# Patient Record
Sex: FEMALE | Race: BLACK OR AFRICAN AMERICAN | NOT HISPANIC OR LATINO | Employment: OTHER | URBAN - METROPOLITAN AREA
[De-identification: names, ages, dates, MRNs, and addresses within clinical notes are randomized per-mention and may not be internally consistent; named-entity substitution may affect disease eponyms.]

---

## 2017-03-07 DIAGNOSIS — E11.9 TYPE 2 DIABETES MELLITUS WITHOUT COMPLICATION: ICD-10-CM

## 2017-03-07 RX ORDER — BLOOD SUGAR DIAGNOSTIC
STRIP MISCELLANEOUS
Qty: 100 EACH | Refills: 4 | Status: SHIPPED | OUTPATIENT
Start: 2017-03-07 | End: 2018-03-02 | Stop reason: SDUPTHER

## 2017-04-04 ENCOUNTER — OFFICE VISIT (OUTPATIENT)
Dept: INTERNAL MEDICINE | Facility: CLINIC | Age: 62
End: 2017-04-04
Payer: COMMERCIAL

## 2017-04-04 VITALS
DIASTOLIC BLOOD PRESSURE: 82 MMHG | SYSTOLIC BLOOD PRESSURE: 129 MMHG | BODY MASS INDEX: 33.35 KG/M2 | WEIGHT: 200.38 LBS | OXYGEN SATURATION: 97 % | TEMPERATURE: 98 F | HEART RATE: 79 BPM

## 2017-04-04 DIAGNOSIS — E78.2 MIXED HYPERLIPIDEMIA: ICD-10-CM

## 2017-04-04 DIAGNOSIS — I10 ESSENTIAL HYPERTENSION: ICD-10-CM

## 2017-04-04 DIAGNOSIS — E11.9 TYPE 2 DIABETES MELLITUS WITHOUT COMPLICATION, WITHOUT LONG-TERM CURRENT USE OF INSULIN: Primary | ICD-10-CM

## 2017-04-04 LAB
ALBUMIN SERPL BCP-MCNC: 3.7 G/DL
ALP SERPL-CCNC: 53 U/L
ALT SERPL W/O P-5'-P-CCNC: 8 U/L
ANION GAP SERPL CALC-SCNC: 11 MMOL/L
AST SERPL-CCNC: 18 U/L
BILIRUB SERPL-MCNC: 0.6 MG/DL
BUN SERPL-MCNC: 15 MG/DL
CALCIUM SERPL-MCNC: 9.9 MG/DL
CHLORIDE SERPL-SCNC: 104 MMOL/L
CHOLEST/HDLC SERPL: 3.7 {RATIO}
CO2 SERPL-SCNC: 28 MMOL/L
CREAT SERPL-MCNC: 0.8 MG/DL
EST. GFR  (AFRICAN AMERICAN): >60 ML/MIN/1.73 M^2
EST. GFR  (NON AFRICAN AMERICAN): >60 ML/MIN/1.73 M^2
GLUCOSE SERPL-MCNC: 105 MG/DL
HDL/CHOLESTEROL RATIO: 26.8 %
HDLC SERPL-MCNC: 142 MG/DL
HDLC SERPL-MCNC: 38 MG/DL
LDLC SERPL CALC-MCNC: 77.4 MG/DL
NONHDLC SERPL-MCNC: 104 MG/DL
POTASSIUM SERPL-SCNC: 4 MMOL/L
PROT SERPL-MCNC: 7.7 G/DL
SODIUM SERPL-SCNC: 143 MMOL/L
TRIGL SERPL-MCNC: 133 MG/DL

## 2017-04-04 PROCEDURE — 4010F ACE/ARB THERAPY RXD/TAKEN: CPT | Mod: S$GLB,,, | Performed by: FAMILY MEDICINE

## 2017-04-04 PROCEDURE — 1160F RVW MEDS BY RX/DR IN RCRD: CPT | Mod: S$GLB,,, | Performed by: FAMILY MEDICINE

## 2017-04-04 PROCEDURE — 80061 LIPID PANEL: CPT

## 2017-04-04 PROCEDURE — 80053 COMPREHEN METABOLIC PANEL: CPT

## 2017-04-04 PROCEDURE — 3074F SYST BP LT 130 MM HG: CPT | Mod: S$GLB,,, | Performed by: FAMILY MEDICINE

## 2017-04-04 PROCEDURE — 83036 HEMOGLOBIN GLYCOSYLATED A1C: CPT

## 2017-04-04 PROCEDURE — 99214 OFFICE O/P EST MOD 30 MIN: CPT | Mod: S$GLB,,, | Performed by: FAMILY MEDICINE

## 2017-04-04 PROCEDURE — 99999 PR PBB SHADOW E&M-EST. PATIENT-LVL IV: CPT | Mod: PBBFAC,,, | Performed by: FAMILY MEDICINE

## 2017-04-04 PROCEDURE — 3044F HG A1C LEVEL LT 7.0%: CPT | Mod: S$GLB,,, | Performed by: FAMILY MEDICINE

## 2017-04-04 PROCEDURE — 3079F DIAST BP 80-89 MM HG: CPT | Mod: S$GLB,,, | Performed by: FAMILY MEDICINE

## 2017-04-04 RX ORDER — BENAZEPRIL/HYDROCHLOROTHIAZIDE 20 MG-25MG
1 TABLET ORAL DAILY
Qty: 90 TABLET | Refills: 1 | Status: SHIPPED | OUTPATIENT
Start: 2017-04-04 | End: 2017-09-13 | Stop reason: SDUPTHER

## 2017-04-04 RX ORDER — GLIMEPIRIDE 2 MG/1
2 TABLET ORAL
Qty: 90 TABLET | Refills: 0 | Status: SHIPPED | OUTPATIENT
Start: 2017-04-04 | End: 2017-07-03 | Stop reason: SDUPTHER

## 2017-04-04 NOTE — PATIENT INSTRUCTIONS
FBS .   Normal <100.    2 hours after meal normal <140. Goal DM <160-180.    OK to try OTC sleep aid - tylenol PM OK - only as needed.
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
## 2017-04-04 NOTE — MR AVS SNAPSHOT
[facility] [address]  [place] [address]  Phone: [phone]  Fax: [phone]                  Raquel Pickard   2017 8:00 AM   Office Visit    Description:  Female : 1955   Provider:  Kathy Delgado MD   Department:  [facility] Care           Reason for Visit     follow up on medications           Diagnoses this Visit        Comments    Type 2 diabetes mellitus without complication, without long-term current use of insulin    -  Primary     Mixed hyperlipidemia         Essential hypertension                To Do List           Future Appointments        Provider Department Dept Phone    2017 8:15 AM Garry Alberto MD [facility] Ophthalmology [phone]      Goals (5 Years of Data)     None      Follow-Up and Disposition     Return in about 6 months (around 10/4/2017).       These Medications        Disp Refills Start End    benazepril-hydrochlorthiazide (LOTENSIN HCT) 20-25 mg Tab 90 tablet 1 2017    Take 1 tablet by mouth once daily. - Oral    Pharmacy: [facility] - [address] Ph #: [phone]       glimepiride (AMARYL) 2 MG tablet 90 tablet 0 2017     Take 1 tablet (2 mg total) by mouth daily with breakfast. - Oral    Pharmacy: [facility] - [address] Ph #: [phone]         [facility] On Call     [facility] On Call Nurse Care Line -  Assistance  Unless otherwise directed by your provider, please contact [facility], our nurse care line that is available for  assistance.     Registered nurses in the [facility] On Call Center provide: appointment scheduling, clinical advisement, health education, and other advisory services.  Call: [phone] (toll free)               Medications           Message regarding Medications     Verify the changes and/or additions to your medication regime listed below are the same as  discussed with your clinician today.  If any of these changes or additions are incorrect, please notify your healthcare provider.        START taking these NEW medications        Refills    benazepril-hydrochlorthiazide (LOTENSIN HCT) 20-25 mg Tab 1    Sig: Take 1 tablet by mouth once daily.    Class: Normal    Route: Oral      CHANGE how you are taking these medications     Start Taking Instead of    glimepiride (AMARYL) 2 MG tablet glimepiride (AMARYL) 2 MG tablet    Dosage:  Take 1 tablet (2 mg total) by mouth daily with breakfast. Dosage:  TAKE 1 TABLET ONCE DAILY (SEE MD FOR VISIT AND LABS)    Reason for Change:  Reorder       STOP taking these medications     benazepril-hydrochlorthiazide (LOTENSIN HCT) 20-12.5 mg per tablet Take 1 tablet by mouth once daily.           Verify that the below list of medications is an accurate representation of the medications you are currently taking.  If none reported, the list may be blank. If incorrect, please contact your healthcare provider. Carry this list with you in case of emergency.           Current Medications     brimonidine 0.15 % OPTH DROP (ALPHAGAN) 0.15 % ophthalmic solution Place 1 drop into both eyes 2 (two) times daily. Dispense 3 month supply    dorzolamide-timolol 2-0.5% (COSOPT) 22.3-6.8 mg/mL ophthalmic solution Place 1 drop into both eyes 2 (two) times daily. Dispense 3 month supply    glimepiride (AMARYL) 2 MG tablet Take 1 tablet (2 mg total) by mouth daily with breakfast.    latanoprost 0.005 % ophthalmic solution PLACE 1 DROP INTO BOTH EYES ONCE DAILY.    metformin (GLUCOPHAGE-XR) 500 MG 24 hr tablet TAKE 3 TABLETS (1,500 MG TOTAL) DAILY WITH DINNER OR EVENING MEAL (SEE MD FOR REFILLS)    ONETOUCH ULTRA TEST Strp USE ONCE DAILY    propranolol (INDERAL LA) 80 MG 24 hr capsule Take 1 capsule (80 mg total) by mouth once daily.    rosuvastatin (CRESTOR) 10 MG tablet Take 1 tablet (10 mg total) by mouth nightly.    benazepril-hydrochlorthiazide (LOTENSIN  HCT) 20-25 mg Tab Take 1 tablet by mouth once daily.           Clinical Reference Information           Your Vitals Were     BP Pulse Temp    144/90 (BP Location: Right arm, Patient Position: Sitting, BP Method: Manual) 79 97.6 °F (36.4 °C) (Tympanic)    Weight SpO2 BMI    90.9 kg (200 lb 6.4 oz) 97% 33.35 kg/m2      Blood Pressure          Most Recent Value    BP  (!)  144/90      Allergies as of 4/4/2017     Travatan Z [Travoprost]      Immunizations Administered on Date of Encounter - 4/4/2017     None      Orders Placed During Today's Visit      Normal Orders This Visit    Comprehensive metabolic panel     Hemoglobin A1c     Lipid panel     Future Labs/Procedures Expected by Expires    CBC auto differential  10/4/2017 (Approximate) 4/5/2018    Comprehensive metabolic panel  10/4/2017 (Approximate) 4/5/2018    Hemoglobin A1c  10/4/2017 (Approximate) 4/5/2018    Microalbumin/creatinine urine ratio  10/4/2017 4/5/2018      [facility] Sign-Up     Activating your [facility] account is as easy as 1-2-3!     1) Visit [email or web address], select Sign Up Now, enter this activation code and your date of birth, then select Next.  [email or web address]  Expires: 5/19/2017  8:50 AM      2) Create a username and password to use when you visit [facility] in the future and select a security question in case you lose your password and select Next.    3) Enter your e-mail address and click Sign Up!    Additional Information  If you have questions, please e-mail [email or web address] or call [phone] to talk to our [facility] staff. Remember, [facility] is NOT to be used for urgent needs. For medical emergencies, dial 911.         Instructions    FBS .   Normal <100.    2 hours after meal normal <140. Goal DM <160-180.    OK to try OTC sleep aid - tylenol PM OK - only as needed.       Language Assistance Services     ATTENTION: Language assistance services are available, free of charge. Please call [phone].      ATENCIÓN: Si  habla christina, tiene a cho disposición servicios gratuitos de asistencia lingüística. Llame al [phone].     CHÚ Ý: N?u b?n nói Ti?ng Vi?t, có các d?ch v? h? tr? ngôn ng? mi?n phí dành cho b?n. G?i s? [phone].         [facility] complies with applicable Federal civil rights laws and does not discriminate on the basis of race, color, national origin, age, disability, or sex.

## 2017-04-05 LAB
ESTIMATED AVG GLUCOSE: 137 MG/DL
HBA1C MFR BLD HPLC: 6.4 %

## 2017-04-17 ENCOUNTER — TELEPHONE (OUTPATIENT)
Dept: INTERNAL MEDICINE | Facility: CLINIC | Age: 62
End: 2017-04-17

## 2017-04-17 NOTE — TELEPHONE ENCOUNTER
Pt was calling in regards to her lab work, gave pt her lab results, pt verbalized understanding of results

## 2017-04-17 NOTE — TELEPHONE ENCOUNTER
----- Message from Juanita Rankin sent at 4/17/2017  1:41 PM CDT -----  Contact: Patient   Patient is returning a call regarding labs, please call her back at [phone]. Thank you

## 2017-04-28 ENCOUNTER — OFFICE VISIT (OUTPATIENT)
Dept: OPHTHALMOLOGY | Facility: CLINIC | Age: 62
End: 2017-04-28
Payer: COMMERCIAL

## 2017-04-28 DIAGNOSIS — H25.13 NUCLEAR SCLEROSIS OF BOTH EYES: ICD-10-CM

## 2017-04-28 DIAGNOSIS — H40.1133 PRIMARY OPEN ANGLE GLAUCOMA OF BOTH EYES, SEVERE STAGE: Primary | ICD-10-CM

## 2017-04-28 PROCEDURE — 92133 CPTRZD OPH DX IMG PST SGM ON: CPT | Mod: S$GLB,,, | Performed by: OPHTHALMOLOGY

## 2017-04-28 PROCEDURE — 99999 PR PBB SHADOW E&M-EST. PATIENT-LVL II: CPT | Mod: PBBFAC,,, | Performed by: OPHTHALMOLOGY

## 2017-04-28 PROCEDURE — 92012 INTRM OPH EXAM EST PATIENT: CPT | Mod: S$GLB,,, | Performed by: OPHTHALMOLOGY

## 2017-04-28 NOTE — PROGRESS NOTES
SUBJECTIVE:   Raquel Pickard is a 61 y.o. female   Uncorrected distance visual acuity was 20/25 +1 in the right eye and 20/25 +1 in the left eye.   Chief Complaint   Patient presents with    Glaucoma     4 month IOP GOCT chk        HPI:  HPI     Glaucoma    Additional comments: 4 month IOP GOCT chk           Comments   Pt here for 4 month IOP GOCT chk. No pain or discomfort. VA stable. 100%   compliant with gtts.     1. Severe COAG DX 7-10 (Tmax 23/25) Goal = 16  SLT OS 4/23/14(18-16)  SLT OD 2/12/14(17-16)  HYPEREMIA WITH XALATAN AND TRAVATAN     2. DM SINCE 2012 NO DBR  3. Mild CATS OU    COSOPT BID OU  ALPHAGAN BID OU  LATANOPROST QHS OU       Last edited by Reza Alexandra, Patient Care Assistant on 4/28/2017  9:56   AM. (History)        Assessment /Plan :  1. Primary open angle glaucoma of both eyes, severe stage Doing well, IOP within acceptable range relative to target IOP and no evidence of progression. Continue current treatment. Reviewed importance of continued compliance with treatment and follow up.     2. Nuclear sclerosis of both eyes monitor for now     Return to clinic in 4 months  or as needed.  With IOP Check

## 2017-04-28 NOTE — MR AVS SNAPSHOT
[facility] - Ophthalmology  [address] [facility] [place] [address]  Phone: [phone]  Fax: [phone]                  Raquel Pickard   2017 9:45 AM   Office Visit    Description:  Female : 1955   Provider:  Garry Alberot MD   Department:  [facility] - Ophthalmology           Reason for Visit     Glaucoma           Diagnoses this Visit        Comments    Primary open angle glaucoma of both eyes, severe stage    -  Primary     Nuclear sclerosis of both eyes                To Do List           Future Appointments        Provider Department Dept Phone    8/3/2017 9:00 AM Garry Alberto MD [facility] Ophthalmology [phone]      Goals (5 Years of Data)     None      [facility] On [facility]     [facility] On Call Nurse Care Line -  Assistance  Unless otherwise directed by your provider, please contact [facility], our nurse care line that is available for  assistance.     Registered nurses in the [facility] On Call Center provide: appointment scheduling, clinical advisement, health education, and other advisory services.  Call: [phone] (toll free)               Medications           Message regarding Medications     Verify the changes and/or additions to your medication regime listed below are the same as discussed with your clinician today.  If any of these changes or additions are incorrect, please notify your healthcare provider.             Verify that the below list of medications is an accurate representation of the medications you are currently taking.  If none reported, the list may be blank. If incorrect, please contact your healthcare provider. Carry this list with you in case of emergency.           Current Medications     benazepril-hydrochlorthiazide (LOTENSIN HCT) 20-25 mg Tab Take 1 tablet by mouth once daily.    brimonidine 0.15 % OPTH DROP (ALPHAGAN) 0.15 % ophthalmic solution Place 1 drop into both eyes 2 (two) times daily. Dispense 3 month supply     dorzolamide-timolol 2-0.5% (COSOPT) 22.3-6.8 mg/mL ophthalmic solution Place 1 drop into both eyes 2 (two) times daily. Dispense 3 month supply    glimepiride (AMARYL) 2 MG tablet Take 1 tablet (2 mg total) by mouth daily with breakfast.    latanoprost 0.005 % ophthalmic solution PLACE 1 DROP INTO BOTH EYES ONCE DAILY.    metformin (GLUCOPHAGE-XR) 500 MG 24 hr tablet TAKE 3 TABLETS (1,500 MG TOTAL) DAILY WITH DINNER OR EVENING MEAL (SEE MD FOR REFILLS)    ONETOUCH ULTRA TEST Strp USE ONCE DAILY    propranolol (INDERAL LA) 80 MG 24 hr capsule Take 1 capsule (80 mg total) by mouth once daily.    rosuvastatin (CRESTOR) 10 MG tablet Take 1 tablet (10 mg total) by mouth nightly.           Clinical Reference Information           Allergies as of 4/28/2017     Travatan Z [Travoprost]      Immunizations Administered on Date of Encounter - 4/28/2017     None      Orders Placed During Today's Visit      Normal Orders This Visit    Posterior Segment OCT Optic Nerve- Both eyes       [facility] Sign-Up     Activating your [facility] account is as easy as 1-2-3!     1) Visit [email or web address], select Sign Up Now, enter this activation code and your date of birth, then select Next.  [email or web address]  Expires: 5/19/2017  8:50 AM      2) Create a username and password to use when you visit [facility] in the future and select a security question in case you lose your password and select Next.    3) Enter your e-mail address and click Sign Up!    Additional Information  If you have questions, please e-mail [email or web address] or call [phone] to talk to our [facility] staff. Remember, [facility] is NOT to be used for urgent needs. For medical emergencies, dial 911.         Language Assistance Services     ATTENTION: Language assistance services are available, free of charge. Please call [phone].      ATENCIÓN: Si habla español, tiene a cho disposición servicios gratuitos de asistencia lingüística. Llame al [phone].     [facility]  Ý: N?u b?n nói Ti?ng Vi?t, có các d?ch v? h? tr? ngôn ng? mi?n phí dành cho b?n. G?i s? [phone].         [facility]hthal[facility] complies with applicable Federal civil rights laws and does not discriminate on the basis of race, color, national origin, age, disability, or sex.

## 2017-05-07 DIAGNOSIS — E78.2 MIXED HYPERLIPIDEMIA: ICD-10-CM

## 2017-05-07 RX ORDER — ROSUVASTATIN CALCIUM 10 MG/1
TABLET, COATED ORAL
Qty: 90 TABLET | Refills: 1 | Status: SHIPPED | OUTPATIENT
Start: 2017-05-07 | End: 2017-11-05 | Stop reason: SDUPTHER

## 2017-05-23 RX ORDER — LATANOPROST 50 UG/ML
SOLUTION/ DROPS OPHTHALMIC
Qty: 2.5 ML | Refills: 1 | Status: SHIPPED | OUTPATIENT
Start: 2017-05-23 | End: 2017-05-25 | Stop reason: SDUPTHER

## 2017-05-25 RX ORDER — LATANOPROST 50 UG/ML
1 SOLUTION/ DROPS OPHTHALMIC NIGHTLY
Qty: 2.5 ML | Refills: 12 | Status: SHIPPED | OUTPATIENT
Start: 2017-05-25 | End: 2017-06-19 | Stop reason: SDUPTHER

## 2017-05-25 NOTE — TELEPHONE ENCOUNTER
----- Message from Jeana Morales sent at 5/25/2017  1:00 PM CDT -----  Contact: Patient   Refill request for Rx Latanoprost drops.    [id number] - [place] - [address] [facility]  [address] [place] [address]  Phone: [phone] Fax: [phone]    Thanks  td

## 2017-06-19 RX ORDER — LATANOPROST 50 UG/ML
SOLUTION/ DROPS OPHTHALMIC
Qty: 7.5 ML | Refills: 11 | Status: SHIPPED | OUTPATIENT
Start: 2017-06-19 | End: 2017-06-19 | Stop reason: SDUPTHER

## 2017-06-19 NOTE — TELEPHONE ENCOUNTER
----- Message from REZA Mas sent at 6/19/2017  3:55 PM CDT -----  Contact: pt  Pt calling to speak to nurse....states that she needs to get Rx refills called in for all 3 of her current eye drop Rx...the patient states that she is running low and needs to have them called in to her mail order pharmacy..the patient uses.    [facility] - [address] [address]  Phone: [phone] Fax: [phone]    Please adv/call pt back at CELL# [phone]//thx jw

## 2017-06-21 RX ORDER — BRIMONIDINE TARTRATE 1.5 MG/ML
1 SOLUTION/ DROPS OPHTHALMIC 2 TIMES DAILY
Qty: 3 BOTTLE | Refills: 6 | Status: SHIPPED | OUTPATIENT
Start: 2017-06-21 | End: 2018-05-03 | Stop reason: SDUPTHER

## 2017-06-21 RX ORDER — DORZOLAMIDE HYDROCHLORIDE AND TIMOLOL MALEATE 20; 5 MG/ML; MG/ML
1 SOLUTION/ DROPS OPHTHALMIC 2 TIMES DAILY
Qty: 3 BOTTLE | Refills: 6 | Status: SHIPPED | OUTPATIENT
Start: 2017-06-21 | End: 2018-04-17 | Stop reason: SDUPTHER

## 2017-06-21 RX ORDER — LATANOPROST 50 UG/ML
1 SOLUTION/ DROPS OPHTHALMIC NIGHTLY
Qty: 7.5 ML | Refills: 11 | Status: SHIPPED | OUTPATIENT
Start: 2017-06-21 | End: 2018-10-08 | Stop reason: SDUPTHER

## 2017-07-03 DIAGNOSIS — E11.9 TYPE 2 DIABETES MELLITUS WITHOUT COMPLICATION, WITHOUT LONG-TERM CURRENT USE OF INSULIN: ICD-10-CM

## 2017-07-03 RX ORDER — GLIMEPIRIDE 2 MG/1
TABLET ORAL
Qty: 90 TABLET | Refills: 0 | Status: SHIPPED | OUTPATIENT
Start: 2017-07-03 | End: 2017-10-01 | Stop reason: SDUPTHER

## 2017-07-28 ENCOUNTER — OFFICE VISIT (OUTPATIENT)
Dept: OPHTHALMOLOGY | Facility: CLINIC | Age: 62
End: 2017-07-28
Payer: COMMERCIAL

## 2017-07-28 DIAGNOSIS — H25.13 NUCLEAR SCLEROSIS OF BOTH EYES: ICD-10-CM

## 2017-07-28 DIAGNOSIS — H40.1133 PRIMARY OPEN ANGLE GLAUCOMA OF BOTH EYES, SEVERE STAGE: Primary | ICD-10-CM

## 2017-07-28 PROCEDURE — 99999 PR PBB SHADOW E&M-EST. PATIENT-LVL II: CPT | Mod: PBBFAC,,, | Performed by: OPHTHALMOLOGY

## 2017-07-28 PROCEDURE — 92012 INTRM OPH EXAM EST PATIENT: CPT | Mod: S$GLB,,, | Performed by: OPHTHALMOLOGY

## 2017-07-28 NOTE — PROGRESS NOTES
SUBJECTIVE:   Raquel Pickard is a 61 y.o. female   Uncorrected distance visual acuity was 20/25 -2 in the right eye and 20/25 -1 in the left eye.   Chief Complaint   Patient presents with    Glaucoma     4 month IOP check, Cosopt BID OU, Alphagan BID OU, Latanoprost QHS OU        HPI:  HPI     Glaucoma    Additional comments: 4 month IOP check, Cosopt BID OU, Alphagan BID OU,   Latanoprost QHS OU           Comments   Pt states she had trouble getting her drops refilled so she was out for a   couple weeks(2weeks ago). She has been back on her drops regularly for   about a week. No pain or irritation in the eyes. Only using readers for   small print.     1. Severe COAG DX 7-10 (Tmax 23/25) Goal = 16  SLT OS 4/23/14(18-16)  SLT OD 2/12/14(17-16)  HYPEREMIA WITH XALATAN AND TRAVATAN     2. DM SINCE 2012 NO DBR  3. Mild CATS OU    COSOPT BID OU  ALPHAGAN BID OU  LATANOPROST QHS OU       Last edited by Onesimo Griggs on 7/28/2017  9:01 AM. (History)        Assessment /Plan :  1. Primary open angle glaucoma of both eyes, severe stage Doing well, IOP within acceptable range relative to target IOP and no evidence of progression. Continue current treatment. Reviewed importance of continued compliance with treatment and follow up.     2. Nuclear sclerosis of both eyes monitor for now     Return to clinic in 4 months  or as needed.  With 24-2 HVF, Dilation and SDP's

## 2017-09-13 DIAGNOSIS — I10 ESSENTIAL HYPERTENSION: ICD-10-CM

## 2017-09-13 RX ORDER — BENAZEPRIL/HYDROCHLOROTHIAZIDE 20 MG-25MG
TABLET ORAL
Qty: 90 TABLET | Refills: 1 | Status: SHIPPED | OUTPATIENT
Start: 2017-09-13 | End: 2018-03-11 | Stop reason: SDUPTHER

## 2017-10-01 DIAGNOSIS — E11.9 TYPE 2 DIABETES MELLITUS WITHOUT COMPLICATION, WITHOUT LONG-TERM CURRENT USE OF INSULIN: ICD-10-CM

## 2017-10-01 RX ORDER — GLIMEPIRIDE 2 MG/1
TABLET ORAL
Qty: 90 TABLET | Refills: 0 | Status: SHIPPED | OUTPATIENT
Start: 2017-10-01 | End: 2017-12-29 | Stop reason: SDUPTHER

## 2017-11-05 DIAGNOSIS — E78.2 MIXED HYPERLIPIDEMIA: ICD-10-CM

## 2017-11-05 RX ORDER — ROSUVASTATIN CALCIUM 10 MG/1
TABLET, COATED ORAL
Qty: 90 TABLET | Refills: 1 | Status: SHIPPED | OUTPATIENT
Start: 2017-11-05 | End: 2018-05-04 | Stop reason: SDUPTHER

## 2017-11-14 ENCOUNTER — TELEPHONE (OUTPATIENT)
Dept: INTERNAL MEDICINE | Facility: CLINIC | Age: 62
End: 2017-11-14

## 2017-12-01 ENCOUNTER — OFFICE VISIT (OUTPATIENT)
Dept: INTERNAL MEDICINE | Facility: CLINIC | Age: 62
End: 2017-12-01
Payer: COMMERCIAL

## 2017-12-01 VITALS
HEART RATE: 94 BPM | BODY MASS INDEX: 32.34 KG/M2 | OXYGEN SATURATION: 98 % | DIASTOLIC BLOOD PRESSURE: 89 MMHG | TEMPERATURE: 98 F | SYSTOLIC BLOOD PRESSURE: 139 MMHG | WEIGHT: 194.31 LBS

## 2017-12-01 DIAGNOSIS — Z87.898 HISTORY OF PALPITATIONS: ICD-10-CM

## 2017-12-01 DIAGNOSIS — Z23 IMMUNIZATION DUE: ICD-10-CM

## 2017-12-01 DIAGNOSIS — I10 ESSENTIAL HYPERTENSION: Chronic | ICD-10-CM

## 2017-12-01 DIAGNOSIS — E78.2 MIXED HYPERLIPIDEMIA: ICD-10-CM

## 2017-12-01 DIAGNOSIS — E11.9 TYPE 2 DIABETES MELLITUS WITHOUT COMPLICATION, WITHOUT LONG-TERM CURRENT USE OF INSULIN: ICD-10-CM

## 2017-12-01 DIAGNOSIS — Z00.00 WELLNESS EXAMINATION: Primary | ICD-10-CM

## 2017-12-01 LAB
ALBUMIN SERPL BCP-MCNC: 3.6 G/DL
ALP SERPL-CCNC: 54 U/L
ALT SERPL W/O P-5'-P-CCNC: 12 U/L
ANION GAP SERPL CALC-SCNC: 9 MMOL/L
AST SERPL-CCNC: 17 U/L
BASOPHILS # BLD AUTO: 0.04 K/UL
BASOPHILS NFR BLD: 0.5 %
BILIRUB SERPL-MCNC: 0.5 MG/DL
BUN SERPL-MCNC: 19 MG/DL
CALCIUM SERPL-MCNC: 10 MG/DL
CHLORIDE SERPL-SCNC: 104 MMOL/L
CO2 SERPL-SCNC: 30 MMOL/L
CREAT SERPL-MCNC: 0.7 MG/DL
CREAT UR-MCNC: 89 MG/DL
DIFFERENTIAL METHOD: ABNORMAL
EOSINOPHIL # BLD AUTO: 0.2 K/UL
EOSINOPHIL NFR BLD: 2.1 %
ERYTHROCYTE [DISTWIDTH] IN BLOOD BY AUTOMATED COUNT: 15.6 %
EST. GFR  (AFRICAN AMERICAN): >60 ML/MIN/1.73 M^2
EST. GFR  (NON AFRICAN AMERICAN): >60 ML/MIN/1.73 M^2
ESTIMATED AVG GLUCOSE: 128 MG/DL
GLUCOSE SERPL-MCNC: 91 MG/DL
HBA1C MFR BLD HPLC: 6.1 %
HCT VFR BLD AUTO: 36.2 %
HGB BLD-MCNC: 11.5 G/DL
IMM GRANULOCYTES # BLD AUTO: 0.03 K/UL
IMM GRANULOCYTES NFR BLD AUTO: 0.4 %
LYMPHOCYTES # BLD AUTO: 2.4 K/UL
LYMPHOCYTES NFR BLD: 30.6 %
MCH RBC QN AUTO: 29.5 PG
MCHC RBC AUTO-ENTMCNC: 31.8 G/DL
MCV RBC AUTO: 93 FL
MICROALBUMIN UR DL<=1MG/L-MCNC: 21 UG/ML
MICROALBUMIN/CREATININE RATIO: 23.6 UG/MG
MONOCYTES # BLD AUTO: 0.5 K/UL
MONOCYTES NFR BLD: 6.6 %
NEUTROPHILS # BLD AUTO: 4.7 K/UL
NEUTROPHILS NFR BLD: 59.8 %
NRBC BLD-RTO: 0 /100 WBC
PLATELET # BLD AUTO: 239 K/UL
PMV BLD AUTO: 9.9 FL
POTASSIUM SERPL-SCNC: 3.4 MMOL/L
PROT SERPL-MCNC: 7.7 G/DL
RBC # BLD AUTO: 3.9 M/UL
SODIUM SERPL-SCNC: 143 MMOL/L
WBC # BLD AUTO: 7.91 K/UL

## 2017-12-01 PROCEDURE — 82570 ASSAY OF URINE CREATININE: CPT

## 2017-12-01 PROCEDURE — 99999 PR PBB SHADOW E&M-EST. PATIENT-LVL IV: CPT | Mod: PBBFAC,,, | Performed by: FAMILY MEDICINE

## 2017-12-01 PROCEDURE — 99396 PREV VISIT EST AGE 40-64: CPT | Mod: 25,S$GLB,, | Performed by: FAMILY MEDICINE

## 2017-12-01 PROCEDURE — 83036 HEMOGLOBIN GLYCOSYLATED A1C: CPT

## 2017-12-01 PROCEDURE — 90686 IIV4 VACC NO PRSV 0.5 ML IM: CPT | Mod: S$GLB,,, | Performed by: FAMILY MEDICINE

## 2017-12-01 PROCEDURE — 80053 COMPREHEN METABOLIC PANEL: CPT

## 2017-12-01 PROCEDURE — 90471 IMMUNIZATION ADMIN: CPT | Mod: S$GLB,,, | Performed by: FAMILY MEDICINE

## 2017-12-01 PROCEDURE — 85025 COMPLETE CBC W/AUTO DIFF WBC: CPT

## 2017-12-01 PROCEDURE — 99212 OFFICE O/P EST SF 10 MIN: CPT | Mod: 25,S$GLB,, | Performed by: FAMILY MEDICINE

## 2017-12-01 RX ORDER — PROPRANOLOL HYDROCHLORIDE 80 MG/1
80 CAPSULE, EXTENDED RELEASE ORAL DAILY
Qty: 90 CAPSULE | Refills: 3 | Status: SHIPPED | OUTPATIENT
Start: 2017-12-01 | End: 2018-11-25 | Stop reason: SDUPTHER

## 2017-12-01 RX ORDER — METFORMIN HYDROCHLORIDE 500 MG/1
1500 TABLET, EXTENDED RELEASE ORAL DAILY
Qty: 270 TABLET | Refills: 1 | Status: SHIPPED | OUTPATIENT
Start: 2017-12-01 | End: 2018-05-30 | Stop reason: SDUPTHER
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
## 2017-12-05 ENCOUNTER — TELEPHONE (OUTPATIENT)
Dept: INTERNAL MEDICINE | Facility: CLINIC | Age: 62
End: 2017-12-05

## 2017-12-05 NOTE — TELEPHONE ENCOUNTER
----- Message from Luis Rodriguez sent at 12/5/2017 11:59 AM CST -----  Contact: self [phone]  Returning call, please call back at [phone].  Md Gonsalo

## 2017-12-06 ENCOUNTER — OFFICE VISIT (OUTPATIENT)
Dept: OPHTHALMOLOGY | Facility: CLINIC | Age: 62
End: 2017-12-06
Payer: COMMERCIAL

## 2017-12-06 DIAGNOSIS — E11.9 TYPE 2 DIABETES MELLITUS WITHOUT COMPLICATION, WITHOUT LONG-TERM CURRENT USE OF INSULIN: ICD-10-CM

## 2017-12-06 DIAGNOSIS — H40.1133 PRIMARY OPEN ANGLE GLAUCOMA OF BOTH EYES, SEVERE STAGE: Primary | ICD-10-CM

## 2017-12-06 DIAGNOSIS — H25.13 NUCLEAR SCLEROSIS OF BOTH EYES: ICD-10-CM

## 2017-12-06 PROCEDURE — 92250 FUNDUS PHOTOGRAPHY W/I&R: CPT | Mod: S$GLB,,, | Performed by: OPHTHALMOLOGY

## 2017-12-06 PROCEDURE — 92083 EXTENDED VISUAL FIELD XM: CPT | Mod: S$GLB,,, | Performed by: OPHTHALMOLOGY

## 2017-12-06 PROCEDURE — 99999 PR PBB SHADOW E&M-EST. PATIENT-LVL II: CPT | Mod: PBBFAC,,, | Performed by: OPHTHALMOLOGY

## 2017-12-06 PROCEDURE — 92014 COMPRE OPH EXAM EST PT 1/>: CPT | Mod: S$GLB,,, | Performed by: OPHTHALMOLOGY

## 2017-12-06 NOTE — PROGRESS NOTES
SUBJECTIVE:   Raquel Pickard is a 62 y.o. female   Uncorrected distance visual acuity was 20/25 in the right eye and 20/25 +1 in the left eye.   No chief complaint on file.       HPI:      Assessment /Plan :  1. Primary open angle glaucoma of both eyes, severe stage Doing well, IOP within acceptable range relative to target IOP and no evidence of progression. Continue current treatment. Reviewed importance of continued compliance with treatment and follow up.   2. Nuclear sclerosis of both eyes Nuclear sclerotic cataract - not visually significant. Observe.     3. Type 2 diabetes mellitus without complication, without long-term current use of insulin   No diabetic retinopathy at this time. Reviewed diabetic eye precautions including avoiding tobacco use,  Good glucose control, and importance of regular follow up.        Return to clinic in 3 months  or as needed.  With IOP Check and GOCT

## 2017-12-21 DIAGNOSIS — H40.1193 SEVERE STAGE CHRONIC OPEN ANGLE GLAUCOMA: Primary | ICD-10-CM

## 2017-12-22 RX ORDER — DORZOLAMIDE HCL 20 MG/ML
1 SOLUTION/ DROPS OPHTHALMIC 3 TIMES DAILY
Qty: 10 ML | Refills: 1 | Status: SHIPPED | OUTPATIENT
Start: 2017-12-22 | End: 2018-01-02 | Stop reason: RX

## 2017-12-22 RX ORDER — TIMOLOL MALEATE 5 MG/ML
1 SOLUTION/ DROPS OPHTHALMIC 2 TIMES DAILY
Qty: 5 ML | Refills: 1 | Status: SHIPPED | OUTPATIENT
Start: 2017-12-22 | End: 2018-10-08 | Stop reason: ALTCHOICE

## 2017-12-29 DIAGNOSIS — E11.9 TYPE 2 DIABETES MELLITUS WITHOUT COMPLICATION, WITHOUT LONG-TERM CURRENT USE OF INSULIN: ICD-10-CM

## 2017-12-31 RX ORDER — GLIMEPIRIDE 2 MG/1
TABLET ORAL
Qty: 90 TABLET | Refills: 3 | Status: SHIPPED | OUTPATIENT
Start: 2017-12-31 | End: 2018-12-25 | Stop reason: SDUPTHER

## 2018-01-02 RX ORDER — BRINZOLAMIDE 10 MG/ML
1 SUSPENSION/ DROPS OPHTHALMIC 3 TIMES DAILY
Qty: 90 DROP | Refills: 3 | Status: SHIPPED | OUTPATIENT
Start: 2018-01-02 | End: 2018-10-08 | Stop reason: ALTCHOICE

## 2018-03-02 DIAGNOSIS — E11.9 TYPE 2 DIABETES MELLITUS WITHOUT COMPLICATION: ICD-10-CM

## 2018-03-02 RX ORDER — BLOOD SUGAR DIAGNOSTIC
STRIP MISCELLANEOUS
Qty: 100 EACH | Refills: 4 | Status: SHIPPED | OUTPATIENT
Start: 2018-03-02 | End: 2019-02-24 | Stop reason: SDUPTHER

## 2018-03-05 ENCOUNTER — OFFICE VISIT (OUTPATIENT)
Dept: OPHTHALMOLOGY | Facility: CLINIC | Age: 63
End: 2018-03-05
Payer: COMMERCIAL

## 2018-03-05 DIAGNOSIS — H40.1133 PRIMARY OPEN ANGLE GLAUCOMA OF BOTH EYES, SEVERE STAGE: Primary | ICD-10-CM

## 2018-03-05 DIAGNOSIS — H25.13 NUCLEAR SCLEROSIS OF BOTH EYES: ICD-10-CM

## 2018-03-05 PROCEDURE — 99999 PR PBB SHADOW E&M-EST. PATIENT-LVL I: CPT | Mod: PBBFAC,,, | Performed by: OPHTHALMOLOGY

## 2018-03-05 PROCEDURE — 92012 INTRM OPH EXAM EST PATIENT: CPT | Mod: S$GLB,,, | Performed by: OPHTHALMOLOGY

## 2018-03-05 PROCEDURE — 92133 CPTRZD OPH DX IMG PST SGM ON: CPT | Mod: S$GLB,,, | Performed by: OPHTHALMOLOGY

## 2018-03-05 NOTE — PROGRESS NOTES
SUBJECTIVE:   Raquel Pickard is a 62 y.o. female   Uncorrected distance visual acuity was 20/25 -1 in the right eye and 20/25 -1 in the left eye.   Chief Complaint   Patient presents with    Glaucoma     Latanoprost OU qhs, Cosopt BID OU, and Alphagan BID OU 95-98% cx        HPI:  HPI     Glaucoma    Additional comments: Latanoprost OU qhs, Cosopt BID OU, and Alphagan BID   OU 95-98% cx           Comments   3 month glaucoma check (IOP check and review GOCT)    No new eye changes since patient's last visit  No eye pain  Patient states that she has not started the Azopt and Timolol, because she   still had some Cosopt left.    1. Severe COAG DX 7-10 (Tmax 23/25) Goal = 16  SLT OS 4/23/14(18-16)  SLT OD 2/12/14(17-16)  HYPEREMIA WITH XALATAN AND TRAVATAN     2. DM SINCE 2012 NO DBR  3. Mild CATS OU    COSOPT BID OU  ALPHAGAN BID OU  LATANOPROST QHS OU  95-98% cx       Last edited by Meaghan William on 3/5/2018  7:59 AM. (History)        Assessment /Plan :  1. Primary open angle glaucoma of both eyes, severe stage Doing well, IOP within acceptable range relative to target IOP and no evidence of progression. Continue current treatment. Reviewed importance of continued compliance with treatment and follow up.     2. Nuclear sclerosis of both eyes Nuclear sclerotic cataract - not visually significant. Observe.         Return to clinic in 3-4 months  or as needed.  With IOP Check

## 2018-03-11 DIAGNOSIS — I10 ESSENTIAL HYPERTENSION: ICD-10-CM

## 2018-03-12 RX ORDER — BENAZEPRIL/HYDROCHLOROTHIAZIDE 20 MG-25MG
TABLET ORAL
Qty: 90 TABLET | Refills: 1 | Status: SHIPPED | OUTPATIENT
Start: 2018-03-12 | End: 2018-11-05 | Stop reason: SDUPTHER

## 2018-04-13 DIAGNOSIS — E11.9 TYPE 2 DIABETES MELLITUS WITHOUT COMPLICATION: ICD-10-CM

## 2018-04-17 RX ORDER — DORZOLAMIDE HYDROCHLORIDE AND TIMOLOL MALEATE 20; 5 MG/ML; MG/ML
1 SOLUTION/ DROPS OPHTHALMIC 2 TIMES DAILY
Qty: 3 BOTTLE | Refills: 6 | Status: SHIPPED | OUTPATIENT
Start: 2018-04-17 | End: 2018-10-08 | Stop reason: SDUPTHER

## 2018-04-17 NOTE — TELEPHONE ENCOUNTER
----- Message from Rosaline Orlando sent at 4/17/2018 10:10 AM CDT -----  Contact: pt  Would like to get refill on (COSOPT) into pharmacy at [facility] [phone] any questions pls call [phone]

## 2018-05-03 ENCOUNTER — TELEPHONE (OUTPATIENT)
Dept: OPHTHALMOLOGY | Facility: CLINIC | Age: 63
End: 2018-05-03

## 2018-05-03 RX ORDER — BRIMONIDINE TARTRATE 1.5 MG/ML
1 SOLUTION/ DROPS OPHTHALMIC 2 TIMES DAILY
Qty: 3 BOTTLE | Refills: 6 | Status: SHIPPED | OUTPATIENT
Start: 2018-05-03 | End: 2018-10-08 | Stop reason: SDUPTHER

## 2018-05-03 NOTE — TELEPHONE ENCOUNTER
----- Message from Rosaline Orlando sent at 5/3/2018  9:47 AM CDT -----  Contact: pt  Need refill sent into [facility] at [phone] for  brimonidine 0.15 % OPTH DROP (ALPHAGAN) 0.15 % ophthalmic solution any questions pls call  [phone] thx

## 2018-05-04 DIAGNOSIS — E78.2 MIXED HYPERLIPIDEMIA: ICD-10-CM

## 2018-05-04 RX ORDER — ROSUVASTATIN CALCIUM 10 MG/1
TABLET, COATED ORAL
Qty: 90 TABLET | Refills: 0 | Status: SHIPPED | OUTPATIENT
Start: 2018-05-04 | End: 2018-08-02 | Stop reason: SDUPTHER

## 2018-05-30 DIAGNOSIS — E11.9 TYPE 2 DIABETES MELLITUS WITHOUT COMPLICATION, WITHOUT LONG-TERM CURRENT USE OF INSULIN: ICD-10-CM

## 2018-05-30 RX ORDER — METFORMIN HYDROCHLORIDE 500 MG/1
TABLET, EXTENDED RELEASE ORAL
Qty: 270 TABLET | Refills: 1 | Status: SHIPPED | OUTPATIENT
Start: 2018-05-30 | End: 2018-11-25 | Stop reason: SDUPTHER

## 2018-06-04 ENCOUNTER — OFFICE VISIT (OUTPATIENT)
Dept: OPHTHALMOLOGY | Facility: CLINIC | Age: 63
End: 2018-06-04
Payer: COMMERCIAL

## 2018-06-04 DIAGNOSIS — H04.129 DRY EYE: ICD-10-CM

## 2018-06-04 DIAGNOSIS — H25.13 NUCLEAR SCLEROSIS OF BOTH EYES: ICD-10-CM

## 2018-06-04 DIAGNOSIS — H40.1133 PRIMARY OPEN ANGLE GLAUCOMA OF BOTH EYES, SEVERE STAGE: Primary | ICD-10-CM

## 2018-06-04 PROCEDURE — 99999 PR PBB SHADOW E&M-EST. PATIENT-LVL II: CPT | Mod: PBBFAC,,, | Performed by: OPHTHALMOLOGY

## 2018-06-04 PROCEDURE — 92012 INTRM OPH EXAM EST PATIENT: CPT | Mod: S$GLB,,, | Performed by: OPHTHALMOLOGY

## 2018-06-04 NOTE — PROGRESS NOTES
SUBJECTIVE:   Raquel Pickard is a 62 y.o. female   Uncorrected distance visual acuity was 20/25 in the right eye and 20/25 +1 in the left eye.   Chief Complaint   Patient presents with    Glaucoma     pt here for 4 month iop check states ou are watering very bad this am beside that no other complaints doing much better with drops         HPI:  HPI     Glaucoma    Additional comments: pt here for 4 month iop check states ou are watering   very bad this am beside that no other complaints doing much better with   drops            Comments   1. Severe COAG DX 7-10 (Tmax 23/25) Goal = 16  SLT OS 4/23/14(18-16)  SLT OD 2/12/14(17-16)  HYPEREMIA WITH XALATAN AND TRAVATAN     2. DM SINCE 2012 NO DBR  3. Mild CATS OU    COSOPT BID OU  ALPHAGAN BID OU  LATANOPROST QHS OU       Last edited by Stefani Rojas MA on 6/4/2018  8:53 AM. (History)        Assessment /Plan :  1. Primary open angle glaucoma of both eyes, severe stage Doing well, IOP within acceptable range relative to target IOP and no evidence of progression. Continue current treatment. Reviewed importance of continued compliance with treatment and follow up.     2. Nuclear sclerosis of both eyes monitor for now   3. Dry eye, bilateral recommend artificial tears prn OU       Return to clinic in 3-4 months  or as needed.  With IOP Check and GOCT

## 2018-07-20 DIAGNOSIS — E11.9 TYPE 2 DIABETES MELLITUS WITHOUT COMPLICATION: ICD-10-CM

## 2018-08-02 DIAGNOSIS — E78.2 MIXED HYPERLIPIDEMIA: ICD-10-CM

## 2018-08-02 RX ORDER — ROSUVASTATIN CALCIUM 10 MG/1
TABLET, COATED ORAL
Qty: 90 TABLET | Refills: 1 | Status: SHIPPED | OUTPATIENT
Start: 2018-08-02 | End: 2019-01-29 | Stop reason: SDUPTHER

## 2018-09-24 ENCOUNTER — PATIENT OUTREACH (OUTPATIENT)
Dept: ADMINISTRATIVE | Facility: HOSPITAL | Age: 63
End: 2018-09-24

## 2018-10-08 ENCOUNTER — OFFICE VISIT (OUTPATIENT)
Dept: OPHTHALMOLOGY | Facility: CLINIC | Age: 63
End: 2018-10-08
Payer: COMMERCIAL

## 2018-10-08 DIAGNOSIS — H40.1133 PRIMARY OPEN ANGLE GLAUCOMA OF BOTH EYES, SEVERE STAGE: Primary | ICD-10-CM

## 2018-10-08 PROCEDURE — 99999 PR PBB SHADOW E&M-EST. PATIENT-LVL II: CPT | Mod: PBBFAC,,, | Performed by: OPHTHALMOLOGY

## 2018-10-08 PROCEDURE — 92012 INTRM OPH EXAM EST PATIENT: CPT | Mod: S$GLB,,, | Performed by: OPHTHALMOLOGY

## 2018-10-08 RX ORDER — DORZOLAMIDE HYDROCHLORIDE AND TIMOLOL MALEATE 20; 5 MG/ML; MG/ML
1 SOLUTION/ DROPS OPHTHALMIC 2 TIMES DAILY
Qty: 3 BOTTLE | Refills: 3 | Status: SHIPPED | OUTPATIENT
Start: 2018-10-08 | End: 2019-04-09 | Stop reason: SDUPTHER

## 2018-10-08 RX ORDER — BRIMONIDINE TARTRATE 1.5 MG/ML
1 SOLUTION/ DROPS OPHTHALMIC 2 TIMES DAILY
Qty: 3 BOTTLE | Refills: 3 | Status: SHIPPED | OUTPATIENT
Start: 2018-10-08 | End: 2019-09-27 | Stop reason: SDUPTHER

## 2018-10-08 RX ORDER — LATANOPROST 50 UG/ML
1 SOLUTION/ DROPS OPHTHALMIC NIGHTLY
Qty: 7.5 ML | Refills: 3 | Status: SHIPPED | OUTPATIENT
Start: 2018-10-08 | End: 2019-06-12 | Stop reason: SDUPTHER

## 2018-10-08 NOTE — PROGRESS NOTES
===============================  10/08/2018   Raquel Pickard,   62 y.o. female   Last visit [facility]: :12/15/2015   Last visit eye dept. 6/4/2018  [facility]:  Uncorrected distance visual acuity was 20/25 in the right eye and 20/30 in the left eye.  Tonometry     Tonometry (Applanation, 8:38 AM)       Right Left    Pressure 16 13          Tonometry #2 (Applanation, 9:25 AM)       Right Left    Pressure 15 16          Target Pressure       Right Left    Target 16 16               Not recorded         Not recorded        Chief Complaint   Patient presents with    Glaucoma     DR. CPG PTS/  4 MONTH IOP CHECK/GOCT     Ophthalmic Medications     Ophthalmic - Intraocular Pressure Reducing Agents, Beta-blockers Start End    timolol maleate 0.5% (TIMOPTIC) 0.5 % Drop (Discontinued) 12/22/2017 10/8/2018    Sig: Place 1 drop into both eyes 2 (two) times daily.    Class: Phone In    Route: Both Eyes    Reason for Discontinue: Alternate therapy    Ophthalmic-Intraocular Pressure Reducing Agents, Prostaglandin Analogs Start End    latanoprost 0.005 % ophthalmic solution 10/8/2018     Sig: Place 1 drop into both eyes every evening.    Route: Both Eyes    latanoprost 0.005 % ophthalmic solution (Discontinued) 6/21/2017 10/8/2018    Sig: Place 1 drop into both eyes every evening.    Route: Both Eyes    Reason for Discontinue: Reorder    Ophthalmic-Intraocular Press. Reducing, Terra. Alpha Adrenergic Agonists Start End    brimonidine 0.15 % OPTH DROP (ALPHAGAN) 0.15 % ophthalmic solution 10/8/2018     Sig: Place 1 drop into both eyes 2 (two) times daily. Dispense 3 month supply    Route: Both Eyes    brimonidine 0.15 % OPTH DROP (ALPHAGAN) 0.15 % ophthalmic solution (Discontinued) 5/3/2018 10/8/2018    Sig: Place 1 drop into both eyes 2 (two) times daily. Dispense 3 month supply    Route: Both Eyes    Reason for Discontinue: Reorder    Ophthalmic - Carbonic Anhydrase Inhibitors Start End    brinzolamide (AZOPT) 1 % ophthalmic suspension  (Discontinued) 1/2/2018 10/8/2018    Sig: Place 1 drop into both eyes 3 (three) times daily.    Patient taking differently:  Place 1 drop into both eyes 2 (two) times daily.       Route: Both Eyes    Reason for Discontinue: Alternate therapy         HPI     Glaucoma      Additional comments: DR. CPG PTS/  4 MONTH IOP CHECK/GOCT              Comments     1. Severe COAG DX 7-10 (Tmax 23/25) Goal = 16  SLT OS 4/23/14(18-16)  SLT OD 2/12/14(17-16)  HYPEREMIA WITH XALATAN AND TRAVATAN     2. DM SINCE 2012 NO DBR  3. Mild CATS OU    COSOPT BID OU  ALPHAGAN BID OU  LATANOPROST QHS OU          Last edited by Cairna Wade on 10/8/2018  8:32 AM. (History)          ________________  10/8/2018  Problem List Items Addressed This Visit        Eye/Vision problems    Primary open angle glaucoma of both eyes, severe stage - Primary    Relevant Medications    brimonidine 0.15 % OPTH DROP (ALPHAGAN) 0.15 % ophthalmic solution    latanoprost 0.005 % ophthalmic solution    dorzolamide-timolol 2-0.5% (COSOPT) 22.3-6.8 mg/mL ophthalmic solution        Dr. Alberto patient  .Good IOP  Compliant with Drops  Continue above drops  rtc with Dr. Alberto 4 months, VF, SDP, dilate       ===========================

## 2018-10-15 ENCOUNTER — PATIENT OUTREACH (OUTPATIENT)
Dept: ADMINISTRATIVE | Facility: HOSPITAL | Age: 63
End: 2018-10-15

## 2018-10-31 ENCOUNTER — TELEPHONE (OUTPATIENT)
Dept: OPHTHALMOLOGY | Facility: CLINIC | Age: 63
End: 2018-10-31

## 2018-11-05 DIAGNOSIS — I10 ESSENTIAL HYPERTENSION: ICD-10-CM

## 2018-11-05 RX ORDER — BENAZEPRIL/HYDROCHLOROTHIAZIDE 20 MG-25MG
1 TABLET ORAL DAILY
Qty: 90 TABLET | Refills: 0 | Status: SHIPPED | OUTPATIENT
Start: 2018-11-05 | End: 2018-11-09 | Stop reason: SDUPTHER

## 2018-11-05 NOTE — TELEPHONE ENCOUNTER
[facility] sent [facility] requesting a new prescription for    benazepril-hydrochlorthiazide (LOTENSIN HCT) 20-25 mg Tab. Please advise.    LOV 12/01/2017

## 2018-11-09 DIAGNOSIS — I10 ESSENTIAL HYPERTENSION: ICD-10-CM

## 2018-11-09 RX ORDER — BENAZEPRIL/HYDROCHLOROTHIAZIDE 20 MG-25MG
1 TABLET ORAL DAILY
Qty: 90 TABLET | Refills: 0 | Status: SHIPPED | OUTPATIENT
Start: 2018-11-09 | End: 2019-02-09 | Stop reason: SDUPTHER

## 2018-11-09 NOTE — TELEPHONE ENCOUNTER
----- Message from Jocelin Lowry sent at 11/9/2018  2:59 PM CST -----  Contact: pt  Pt stated she calling to see if the doctor can change the pharmacy to [facility], she can be reached at [phone] Thanks

## 2018-11-09 NOTE — TELEPHONE ENCOUNTER
Patient called stating that [facility] can not ship her benazepril-hydrochlorthiazide (LOTENSIN HCT) 20-25 mg Tab due to they are out.  Patient then stated that she is completely out of her medication and needs  to send a prescription to [facility] on [address]. Please advise.

## 2018-11-10 NOTE — TELEPHONE ENCOUNTER
Unfortunately [facility] pharmacy not open this weekend. I have sent RX [facility].  Will advise pt of this by phone in AM.

## 2018-11-19 ENCOUNTER — PATIENT OUTREACH (OUTPATIENT)
Dept: ADMINISTRATIVE | Facility: HOSPITAL | Age: 63
End: 2018-11-19

## 2018-11-19 NOTE — PROGRESS NOTES
I have attempted without success to contact this patient to schedule an appointment. Contact not reachable.

## 2018-11-25 DIAGNOSIS — E11.9 TYPE 2 DIABETES MELLITUS WITHOUT COMPLICATION, WITHOUT LONG-TERM CURRENT USE OF INSULIN: ICD-10-CM

## 2018-11-25 DIAGNOSIS — Z87.898 HISTORY OF PALPITATIONS: ICD-10-CM

## 2018-11-25 DIAGNOSIS — I10 ESSENTIAL HYPERTENSION: Primary | ICD-10-CM

## 2018-11-26 PROBLEM — E11.9 TYPE 2 DIABETES MELLITUS WITHOUT COMPLICATION, WITHOUT LONG-TERM CURRENT USE OF INSULIN: Status: ACTIVE | Noted: 2018-11-26

## 2018-11-26 RX ORDER — PROPRANOLOL HYDROCHLORIDE 80 MG/1
CAPSULE, EXTENDED RELEASE ORAL
Qty: 90 CAPSULE | Refills: 3 | Status: SHIPPED | OUTPATIENT
Start: 2018-11-26 | End: 2019-12-05 | Stop reason: SDUPTHER

## 2018-11-26 RX ORDER — METFORMIN HYDROCHLORIDE 500 MG/1
TABLET, EXTENDED RELEASE ORAL
Qty: 270 TABLET | Refills: 0 | Status: SHIPPED | OUTPATIENT
Start: 2018-11-26 | End: 2019-02-24 | Stop reason: SDUPTHER

## 2018-11-26 NOTE — TELEPHONE ENCOUNTER
Refill requests for metformin and propranolol are refilled: metformin for 90 days only.    The patient is due for lab work and annual evaluation.  I have added her urine test for microalbuminuria to the A1c and lipid tests which are already in the chart.  All 3 of these tests need to be scheduled and a visit scheduled to follow.

## 2018-12-18 NOTE — TELEPHONE ENCOUNTER
----- Message from Rosaline Orlando sent at 12/18/2018  3:42 PM CST -----  Contact: pt/ [phone]  pls send in 3 month supplies refill on  dorzolamide-timolol 2-0.5% (COSOPT)  Over to [facility] [address]  any questions pls contact pt

## 2018-12-18 NOTE — TELEPHONE ENCOUNTER
Spoke with Hyun at [facility].  Okay refill request, okay to divide into dorzolamide and timolol if needed.

## 2018-12-19 RX ORDER — TIMOLOL MALEATE 5 MG/ML
1 SOLUTION/ DROPS OPHTHALMIC 2 TIMES DAILY
Qty: 5 ML | Refills: 6 | Status: SHIPPED | OUTPATIENT
Start: 2018-12-19 | End: 2019-02-11

## 2018-12-19 RX ORDER — DORZOLAMIDE HCL 20 MG/ML
1 SOLUTION/ DROPS OPHTHALMIC 3 TIMES DAILY
Qty: 10 ML | Refills: 4 | Status: SHIPPED | OUTPATIENT
Start: 2018-12-19 | End: 2019-02-11

## 2018-12-24 ENCOUNTER — OFFICE VISIT (OUTPATIENT)
Dept: INTERNAL MEDICINE | Facility: CLINIC | Age: 63
End: 2018-12-24
Payer: COMMERCIAL

## 2018-12-24 VITALS
TEMPERATURE: 98 F | WEIGHT: 197.63 LBS | OXYGEN SATURATION: 99 % | HEIGHT: 65 IN | BODY MASS INDEX: 32.93 KG/M2 | HEART RATE: 79 BPM | DIASTOLIC BLOOD PRESSURE: 84 MMHG | SYSTOLIC BLOOD PRESSURE: 138 MMHG

## 2018-12-24 DIAGNOSIS — Z01.419 WELL WOMAN EXAM WITH ROUTINE GYNECOLOGICAL EXAM: Primary | ICD-10-CM

## 2018-12-24 DIAGNOSIS — I10 ESSENTIAL HYPERTENSION: Chronic | ICD-10-CM

## 2018-12-24 DIAGNOSIS — E11.9 TYPE 2 DIABETES MELLITUS WITHOUT COMPLICATION, WITHOUT LONG-TERM CURRENT USE OF INSULIN: ICD-10-CM

## 2018-12-24 DIAGNOSIS — E78.5 HYPERLIPIDEMIA ASSOCIATED WITH TYPE 2 DIABETES MELLITUS: ICD-10-CM

## 2018-12-24 DIAGNOSIS — E11.69 HYPERLIPIDEMIA ASSOCIATED WITH TYPE 2 DIABETES MELLITUS: ICD-10-CM

## 2018-12-24 LAB
ALBUMIN SERPL BCP-MCNC: 3.6 G/DL
ALBUMIN/CREAT UR: 10.8 UG/MG
ALP SERPL-CCNC: 56 U/L
ALT SERPL W/O P-5'-P-CCNC: 13 U/L
ANION GAP SERPL CALC-SCNC: 10 MMOL/L
AST SERPL-CCNC: 21 U/L
BASOPHILS # BLD AUTO: 0.04 K/UL
BASOPHILS NFR BLD: 0.6 %
BILIRUB SERPL-MCNC: 0.4 MG/DL
BUN SERPL-MCNC: 18 MG/DL
CALCIUM SERPL-MCNC: 10.4 MG/DL
CHLORIDE SERPL-SCNC: 103 MMOL/L
CHOLEST SERPL-MCNC: 162 MG/DL
CHOLEST/HDLC SERPL: 3.7 {RATIO}
CO2 SERPL-SCNC: 30 MMOL/L
CREAT SERPL-MCNC: 0.8 MG/DL
CREAT UR-MCNC: 130 MG/DL
DIFFERENTIAL METHOD: ABNORMAL
EOSINOPHIL # BLD AUTO: 0.1 K/UL
EOSINOPHIL NFR BLD: 1.9 %
ERYTHROCYTE [DISTWIDTH] IN BLOOD BY AUTOMATED COUNT: 15.3 %
EST. GFR  (AFRICAN AMERICAN): >60 ML/MIN/1.73 M^2
EST. GFR  (NON AFRICAN AMERICAN): >60 ML/MIN/1.73 M^2
ESTIMATED AVG GLUCOSE: 126 MG/DL
GLUCOSE SERPL-MCNC: 127 MG/DL
HBA1C MFR BLD HPLC: 6 %
HCT VFR BLD AUTO: 37.4 %
HDLC SERPL-MCNC: 44 MG/DL
HDLC SERPL: 27.2 %
HGB BLD-MCNC: 11.6 G/DL
IMM GRANULOCYTES # BLD AUTO: 0.02 K/UL
IMM GRANULOCYTES NFR BLD AUTO: 0.3 %
LDLC SERPL CALC-MCNC: 84.8 MG/DL
LYMPHOCYTES # BLD AUTO: 2.7 K/UL
LYMPHOCYTES NFR BLD: 37.7 %
MCH RBC QN AUTO: 28.8 PG
MCHC RBC AUTO-ENTMCNC: 31 G/DL
MCV RBC AUTO: 93 FL
MICROALBUMIN UR DL<=1MG/L-MCNC: 14 UG/ML
MONOCYTES # BLD AUTO: 0.5 K/UL
MONOCYTES NFR BLD: 6.4 %
NEUTROPHILS # BLD AUTO: 3.7 K/UL
NEUTROPHILS NFR BLD: 53.1 %
NONHDLC SERPL-MCNC: 118 MG/DL
NRBC BLD-RTO: 0 /100 WBC
PLATELET # BLD AUTO: 270 K/UL
PMV BLD AUTO: 10.1 FL
POTASSIUM SERPL-SCNC: 3.7 MMOL/L
PROT SERPL-MCNC: 7.8 G/DL
RBC # BLD AUTO: 4.03 M/UL
SODIUM SERPL-SCNC: 143 MMOL/L
TRIGL SERPL-MCNC: 166 MG/DL
WBC # BLD AUTO: 7.02 K/UL

## 2018-12-24 PROCEDURE — 3079F DIAST BP 80-89 MM HG: CPT | Mod: CPTII,S$GLB,, | Performed by: FAMILY MEDICINE

## 2018-12-24 PROCEDURE — 83036 HEMOGLOBIN GLYCOSYLATED A1C: CPT

## 2018-12-24 PROCEDURE — 82043 UR ALBUMIN QUANTITATIVE: CPT

## 2018-12-24 PROCEDURE — 80061 LIPID PANEL: CPT

## 2018-12-24 PROCEDURE — 3075F SYST BP GE 130 - 139MM HG: CPT | Mod: CPTII,S$GLB,, | Performed by: FAMILY MEDICINE

## 2018-12-24 PROCEDURE — 99999 PR PBB SHADOW E&M-EST. PATIENT-LVL III: CPT | Mod: PBBFAC,,, | Performed by: FAMILY MEDICINE

## 2018-12-24 PROCEDURE — 90686 IIV4 VACC NO PRSV 0.5 ML IM: CPT | Mod: S$GLB,,, | Performed by: FAMILY MEDICINE

## 2018-12-24 PROCEDURE — 85025 COMPLETE CBC W/AUTO DIFF WBC: CPT

## 2018-12-24 PROCEDURE — 90471 IMMUNIZATION ADMIN: CPT | Mod: S$GLB,,, | Performed by: FAMILY MEDICINE

## 2018-12-24 PROCEDURE — 3044F HG A1C LEVEL LT 7.0%: CPT | Mod: CPTII,S$GLB,, | Performed by: FAMILY MEDICINE

## 2018-12-24 PROCEDURE — 99396 PREV VISIT EST AGE 40-64: CPT | Mod: 25,S$GLB,, | Performed by: FAMILY MEDICINE

## 2018-12-24 PROCEDURE — 80053 COMPREHEN METABOLIC PANEL: CPT

## 2018-12-24 PROCEDURE — 88175 CYTOPATH C/V AUTO FLUID REDO: CPT

## 2018-12-24 NOTE — PATIENT INSTRUCTIONS
Normal FBS <100.  or more is consistent with diabetes (DM).    Normal BS 2 hours after eating a meal <140. Good control for DM <160-170. Uncontrolled BS at 2 hours after a meal is 200 or more.    Times to check BS:   Before any meal.   2 hours after eating a meal.   Before bedtime.

## 2018-12-24 NOTE — PROGRESS NOTES
Subjective:       Patient ID: Raquel Pickard is a 63 y.o. female.    Chief Complaint: Annual Exam    Pt with DM2, HTN, HLD here for F/U - last visit 12/2017.  She wishes to have her well-woman exam with Pap performed today.  Lab Results       Component                Value               Date                       WBC                      7.91                12/01/2017                 HGB                      11.5 (L)            12/01/2017                 HCT                      36.2 (L)            12/01/2017                 PLT                      239                 12/01/2017                 CHOL                     142                 04/04/2017                 TRIG                     133                 04/04/2017                 HDL                      38 (L)              04/04/2017                 LDLCALC                  77.4                04/04/2017                 ALT                      12                  12/01/2017                 AST                      17                  12/01/2017                 NA                       143                 12/01/2017                 K                        3.4 (L)             12/01/2017                 CL                       104                 12/01/2017                 CALCIUM                  10.0                12/01/2017                 CREATININE               0.7                 12/01/2017                 BUN                      19                  12/01/2017                 CO2                      30 (H)              12/01/2017                 GLU                      91                  12/01/2017                 ESTGFRAFRICA             >60.0               12/01/2017                 EGFRNONAA                >60.0               12/01/2017                 HGBA1C                   6.1 (H)             12/01/2017                 MICALBCREAT              23.6                12/01/2017            DM2 has been well controlled on metformin 1500 daily and  "2 mg glimepiride just prn. Last time she took it has been 2 weeks ago. Takes it 2-3 times month. DM assoc HLd controlled on rosuvastatin 10 ion past. She currently only takes an OTC product "gralique" QOD. She took crestor last 2-3 days.  HTN on Ace/Hctz controlled in past. States took no BP med this AM.      ANNUAL EXAM:  Preventative Health Checklist 24-64 years of age    Raquel Pickard presents today for an annual exam.    Zoster Vaccine due on 10/15/2014 - declined  Lipid Panel due on 04/04/2018 - today  Hemoglobin A1c due on 06/01/2018 - today  Pap Smear with HPV Cotest due on 06/24/2018 - today  Influenza Vaccine due on 08/01/2018 - today  Mammogram due on 10/09/2018 - Dr Hernandez - October 2018 - HUMA signed  Health Maintenance Summary                Zoster Vaccine Overdue 10/15/2014     Lipid Panel Overdue 4/4/2018      Done 4/4/2017 LIPID PANEL     Done 11/8/2016 LIPID PANEL     Done 5/15/2015 LIPID PANEL     Done 6/10/2014 LIPID PANEL     Done 6/26/2013     Hemoglobin A1c Overdue 6/1/2018      Done 12/1/2017 HEMOGLOBIN A1C Hemoglobin A1C      Patient has more history with this topic...    Pap Smear with HPV Cotest Overdue 6/24/2018      Done 6/24/2015 LIQUID-BASED PAP SMEAR, SCREENING     Done 6/24/2015 [facility]: FINDINGS - EXTERNAL LAB DATE - PAP SMEAR    Influenza Vaccine Overdue 8/1/2018      Done 12/1/2017 Imm Admin: Influenza - Quadrivalent - PF     Done 11/8/2016 Imm Admin: Influenza - Quadrivalent     Done 10/29/2014 Imm Admin: Influenza - Quadrivalent    Mammogram Overdue 10/9/2018      Done 10/9/2017 [facility]: WORKFLOW - HEALTHY PLANET - EXTERNAL DATA -   EXTERNAL PROCEDURES DATE - MAMMOGRAM     Done 10/10/2016 [facility]: WORKFLOW - HEALTHY PLANET - EXTERNAL DATA -   EXTERNAL PROCEDURES DATE - MAMMOGRAM     Done 10/12/2015 [facility]: WORKFLOW - HEALTHY PLANET - EXTERNAL DATA -   EXTERNAL PROCEDURES DATE - MAMMOGRAM     Not Clinically Appropriate 6/24/2015      Done 10/29/2014 [facility]: WORKFLOW - " HEALTHY PLANET - EXTERNAL DATA -   EXTERNAL PROCEDURES DATE - MAMMOGRAM     Patient has more history with this topic...    Eye Exam Next Due 10/8/2019      Done 10/8/2018 LOS:93332 IA EYE EXAM, EST PATIENT,INTERMED     Done 6/4/2018 LOS:88141 IA EYE EXAM, EST PATIENT,INTERMED     Done 3/5/2018 LOS:23901 IA EYE EXAM, EST PATIENT,INTERMED     Done 12/6/2017 LOS:97771 IA EYE EXAM, EST PATIENT,COMPREHESV     Done 7/28/2017 LOS:14350 IA EYE EXAM, EST PATIENT,INTERMED     Patient has more history with this topic...    Low Dose Statin Next Due 12/24/2019      Done 12/24/2018 Registry Metric: Last Current Statin Reviewed Date     Done 8/2/2018 Registry Metric: Last Current Statin Order Date    Foot Exam Next Due 12/24/2019      Done 12/24/2018      Done 12/1/2017 [facility]: WORKFLOW - DIABETES - DIABETIC FOOT EXAM   PERFORMED     Done 11/8/2016 [facility]: WORKFLOW - DIABETES - DIABETIC FOOT EXAM   PERFORMED     Done 5/15/2015     Colonoscopy Next Due 4/18/2022      Done 4/18/2017 [facility]: WORKFLOW - HEALTHY PLANET - EXTERNAL DATA -   EXTERNAL PROCEDURES DATE - COLONOSCOPY     Done 4/18/2017 DR. CHARLENE NATH/GI ASSO. POLYP X 3. REPEAT 5 YRS.     Done 4/18/2017 [facility] COLONOSCOPY     Done 8/13/2012     TETANUS VACCINE Next Due 11/8/2026      Done 11/8/2016 Imm Admin: Tdap    Hepatitis C Screening This plan is no longer active.      Done 5/15/2015 HEPATITIS C ANTIBODY    Pneumococcal Vaccine (Medium Risk) This plan is no longer active.      Done 6/24/2015 Imm Admin: Pneumococcal Polysaccharide - 23 Valent        Counseling:  Nutrition: Encouraged to take 5-10 servings fruits and vegetables daily   Exercise:  Physical activity recommendations reviewed and given hand out  Avoid Tobacco Use: reviewed  Avoid ETOH/Drug Use:  reviewed  STD Prevention/Abstinence:   Avoid High Risk Behavior:   Unintended Pregnancy:    Lap-shoulder Belts:  Yes  No text/talk while driving: Reviewed  Bike/ATV Motorcycle Helmets:  N/A  Smoke Detector:   yes  Safe Storage/Removal of Firearms: reviewed    Calcium Intake (females):  Calcium recommendations given with handout  Regular Dental Visits:  yes  Floss, Brush and Fluoride: yes    She has completed a full 14 system review. All items are negative except as indicated.      Review of Systems   Constitutional: Negative.    HENT: Negative.    Eyes: Negative.    Respiratory: Negative.    Cardiovascular: Negative.    Gastrointestinal: Negative.    Endocrine: Negative.    Genitourinary: Negative.    Musculoskeletal: Positive for arthralgias (B wrists L>R - saw ortho at [facility] - had shot to left, now right hurts).   Skin: Negative.    Allergic/Immunologic: Negative.    Neurological: Negative.    Hematological: Negative.    Psychiatric/Behavioral: Negative.        Objective:      Physical Exam   Constitutional: She is oriented to person, place, and time. She appears well-developed and well-nourished.   HENT:   Head: Normocephalic and atraumatic.   Right Ear: Tympanic membrane, external ear and ear canal normal.   Left Ear: Tympanic membrane, external ear and ear canal normal.   Nose: Nose normal.   Mouth/Throat: Oropharynx is clear and moist. No oropharyngeal exudate.   Eyes: Conjunctivae and EOM are normal.   Neck: Normal range of motion. Neck supple. No thyromegaly present.   Cardiovascular: Normal rate, regular rhythm and normal heart sounds. Exam reveals no gallop and no friction rub.   No murmur heard.  Pulmonary/Chest: Effort normal and breath sounds normal. She exhibits no tenderness.   Abdominal: Soft. She exhibits no distension. There is no tenderness.   Genitourinary: Vagina normal and uterus normal. Cervix exhibits no motion tenderness and no discharge. Right adnexum displays no mass, no tenderness and no fullness. Left adnexum displays no mass, no tenderness and no fullness. No vaginal discharge found.   Musculoskeletal: She exhibits no edema.   Lymphadenopathy:     She has no cervical adenopathy.    Neurological: She is alert and oriented to person, place, and time.   Skin: Skin is warm and dry.   Psychiatric: She has a normal mood and affect. Her behavior is normal.         Assessment/Plan:     1. Well woman exam with routine gynecological exam  Lipid panel    CBC auto differential    Comprehensive metabolic panel    Liquid-based pap smear, screening   2. Essential hypertension  CBC auto differential    Comprehensive metabolic panel   3. Type 2 diabetes mellitus without complication, without long-term current use of insulin  Hemoglobin A1c    Microalbumin/creatinine urine ratio   4. Hyperlipidemia associated with type 2 diabetes mellitus  Lipid panel   HUMA sigend for mammogram from Dr Hernandez performed this fall.

## 2018-12-25 DIAGNOSIS — E11.9 TYPE 2 DIABETES MELLITUS WITHOUT COMPLICATION, WITHOUT LONG-TERM CURRENT USE OF INSULIN: ICD-10-CM

## 2018-12-26 RX ORDER — GLIMEPIRIDE 2 MG/1
TABLET ORAL
Qty: 90 TABLET | Refills: 3 | Status: SHIPPED | OUTPATIENT
Start: 2018-12-26 | End: 2019-12-05 | Stop reason: SDUPTHER

## 2018-12-28 ENCOUNTER — TELEPHONE (OUTPATIENT)
Dept: INTERNAL MEDICINE | Facility: CLINIC | Age: 63
End: 2018-12-28

## 2018-12-28 NOTE — TELEPHONE ENCOUNTER
Returned the patient phone call. Advised her of her test results. Patient verbally understood the information given.

## 2018-12-28 NOTE — TELEPHONE ENCOUNTER
----- Message from Perla Galeanoite sent at 12/28/2018 10:29 AM CST -----  Contact: Pt   Pt called and stated she was returning a call. She can be reached at [phone].    Thanks,  TF

## 2019-01-29 DIAGNOSIS — E78.2 MIXED HYPERLIPIDEMIA: ICD-10-CM

## 2019-01-29 RX ORDER — ROSUVASTATIN CALCIUM 10 MG/1
TABLET, COATED ORAL
Qty: 90 TABLET | Refills: 3 | Status: SHIPPED | OUTPATIENT
Start: 2019-01-29 | End: 2019-12-05 | Stop reason: SDUPTHER

## 2019-02-09 DIAGNOSIS — I10 ESSENTIAL HYPERTENSION: ICD-10-CM

## 2019-02-09 RX ORDER — BENAZEPRIL/HYDROCHLOROTHIAZIDE 20 MG-25MG
TABLET ORAL
Qty: 90 TABLET | Refills: 3 | Status: SHIPPED | OUTPATIENT
Start: 2019-02-09 | End: 2019-06-27 | Stop reason: SDUPTHER

## 2019-02-11 ENCOUNTER — OFFICE VISIT (OUTPATIENT)
Dept: OPHTHALMOLOGY | Facility: CLINIC | Age: 64
End: 2019-02-11
Payer: COMMERCIAL

## 2019-02-11 DIAGNOSIS — E11.9 TYPE 2 DIABETES MELLITUS WITHOUT COMPLICATION, WITHOUT LONG-TERM CURRENT USE OF INSULIN: ICD-10-CM

## 2019-02-11 DIAGNOSIS — H25.13 NUCLEAR SCLEROSIS OF BOTH EYES: ICD-10-CM

## 2019-02-11 DIAGNOSIS — H04.129 DRY EYE: ICD-10-CM

## 2019-02-11 DIAGNOSIS — H40.1133 PRIMARY OPEN ANGLE GLAUCOMA OF BOTH EYES, SEVERE STAGE: Primary | ICD-10-CM

## 2019-02-11 PROCEDURE — 92014 COMPRE OPH EXAM EST PT 1/>: CPT | Mod: S$GLB,,, | Performed by: OPHTHALMOLOGY

## 2019-02-11 PROCEDURE — 99999 PR PBB SHADOW E&M-EST. PATIENT-LVL II: CPT | Mod: PBBFAC,,, | Performed by: OPHTHALMOLOGY

## 2019-02-11 PROCEDURE — 92250 COLOR FUNDUS PHOTOGRAPHY - OU - BOTH EYES: ICD-10-PCS | Mod: S$GLB,,, | Performed by: OPHTHALMOLOGY

## 2019-02-11 PROCEDURE — 92083 EXTENDED VISUAL FIELD XM: CPT | Mod: S$GLB,,, | Performed by: OPHTHALMOLOGY

## 2019-02-11 PROCEDURE — 92250 FUNDUS PHOTOGRAPHY W/I&R: CPT | Mod: S$GLB,,, | Performed by: OPHTHALMOLOGY

## 2019-02-11 PROCEDURE — 92083 HUMPHREY VISUAL FIELD - OU - BOTH EYES: ICD-10-PCS | Mod: S$GLB,,, | Performed by: OPHTHALMOLOGY

## 2019-02-11 PROCEDURE — 99999 PR PBB SHADOW E&M-EST. PATIENT-LVL II: ICD-10-PCS | Mod: PBBFAC,,, | Performed by: OPHTHALMOLOGY

## 2019-02-11 PROCEDURE — 92014 PR EYE EXAM, EST PATIENT,COMPREHESV: ICD-10-PCS | Mod: S$GLB,,, | Performed by: OPHTHALMOLOGY

## 2019-02-11 NOTE — PROGRESS NOTES
SUBJECTIVE:   Raquel Pickard is a 63 y.o. female   Uncorrected distance visual acuity was 20/30 -1 in the right eye and 20/25 +1 in the left eye.   Chief Complaint   Patient presents with    Glaucoma    Diabetic Eye Exam        HPI:  HPI     4 mon HVF, Dilated Exam/Yearly Diabetic Eye Exam  No pain or discomfort  VA stable OU  100% compliant with drops  BS under control    1. Severe COAG DX 7-10 (Tmax 23/25) Goal = 16  SLT OS 4/23/14(18-16)  SLT OD 2/12/14(17-16)  HYPEREMIA WITH XALATAN AND TRAVATAN     2. DM SINCE 2012 NO DBR  3. Mild CATS OU    COSOPT BID OU  ALPHAGAN BID OU  LATANOPROST QHS OU    Last edited by Vanessa Arteaga on 2/11/2019  9:10 AM. (History)        Assessment /Plan :  1. Primary open angle glaucoma of both eyes, severe stage Doing well, IOP within acceptable range relative to target IOP and no evidence of progression. Continue current treatment. Reviewed importance of continued compliance with treatment and follow up.     2. Nuclear sclerosis of both eyes Patient does reports mild visual decline from incipient cataractous changes, but not sufficient to affect activities of daily living. I recommend monitoring visual status and follow up when visual symptoms worsen.     3. Type 2 diabetes mellitus without complication, without long-term current use of insulin No diabetic retinopathy at this time. Reviewed diabetic eye precautions including avoiding tobacco use,  Good glucose control, and importance of regular follow up.      4. Dry eye  -- Condition stable, no therapeutic change required. Monitoring routinely.           Return to clinic in 3-4 months  or as needed.  With IOP Check and GOCT

## 2019-02-24 DIAGNOSIS — E11.9 TYPE 2 DIABETES MELLITUS WITHOUT COMPLICATION: ICD-10-CM

## 2019-02-24 DIAGNOSIS — E11.9 TYPE 2 DIABETES MELLITUS WITHOUT COMPLICATION, WITHOUT LONG-TERM CURRENT USE OF INSULIN: ICD-10-CM

## 2019-02-25 RX ORDER — METFORMIN HYDROCHLORIDE 500 MG/1
TABLET, EXTENDED RELEASE ORAL
Qty: 270 TABLET | Refills: 2 | Status: SHIPPED | OUTPATIENT
Start: 2019-02-25 | End: 2019-12-05 | Stop reason: SDUPTHER

## 2019-04-09 DIAGNOSIS — H40.1133 PRIMARY OPEN ANGLE GLAUCOMA OF BOTH EYES, SEVERE STAGE: ICD-10-CM

## 2019-04-09 RX ORDER — DORZOLAMIDE HYDROCHLORIDE AND TIMOLOL MALEATE 20; 5 MG/ML; MG/ML
1 SOLUTION/ DROPS OPHTHALMIC 2 TIMES DAILY
Qty: 3 BOTTLE | Refills: 3 | Status: SHIPPED | OUTPATIENT
Start: 2019-04-09 | End: 2019-06-12 | Stop reason: SDUPTHER

## 2019-04-09 RX ORDER — DORZOLAMIDE HYDROCHLORIDE AND TIMOLOL MALEATE 20; 5 MG/ML; MG/ML
1 SOLUTION/ DROPS OPHTHALMIC 2 TIMES DAILY
Qty: 3 BOTTLE | Refills: 3 | Status: CANCELLED | OUTPATIENT
Start: 2019-04-09

## 2019-04-09 NOTE — TELEPHONE ENCOUNTER
----- Message from Debra Foy sent at 4/9/2019  1:57 PM CDT -----  Contact: pt  .Type:  RX Refill Request    Who Called:  pt  Refill or New Rx: refill   RX Name and Strength: eye drops (2)  How is the patient currently taking it? (ex. 1XDay):  Is this a 30 day or 90 day RX:   Preferred Pharmacy with phone number:     [facility]  [id number] [place] [id number]  Phone: [phone] Fax: [phone]      Local or Mail Order:local  Ordering Provider: Dolly  Would the patient rather a call back or a response via [facility]?  Call back   Best Call Back Number:[phone]  Additional Information:

## 2019-04-11 RX ORDER — LATANOPROST 50 UG/ML
1 SOLUTION/ DROPS OPHTHALMIC DAILY
Qty: 1 BOTTLE | Refills: 12 | OUTPATIENT
Start: 2019-04-11

## 2019-05-10 ENCOUNTER — OFFICE VISIT (OUTPATIENT)
Dept: OPHTHALMOLOGY | Facility: CLINIC | Age: 64
End: 2019-05-10
Payer: COMMERCIAL

## 2019-05-10 DIAGNOSIS — H25.13 NUCLEAR SCLEROSIS OF BOTH EYES: ICD-10-CM

## 2019-05-10 DIAGNOSIS — H40.1133 PRIMARY OPEN ANGLE GLAUCOMA OF BOTH EYES, SEVERE STAGE: Primary | ICD-10-CM

## 2019-05-10 PROCEDURE — 99999 PR PBB SHADOW E&M-EST. PATIENT-LVL II: ICD-10-PCS | Mod: PBBFAC,,, | Performed by: OPHTHALMOLOGY

## 2019-05-10 PROCEDURE — 92012 INTRM OPH EXAM EST PATIENT: CPT | Mod: S$GLB,,, | Performed by: OPHTHALMOLOGY

## 2019-05-10 PROCEDURE — 92012 PR EYE EXAM, EST PATIENT,INTERMED: ICD-10-PCS | Mod: S$GLB,,, | Performed by: OPHTHALMOLOGY

## 2019-05-10 PROCEDURE — 92133 CPTRZD OPH DX IMG PST SGM ON: CPT | Mod: S$GLB,,, | Performed by: OPHTHALMOLOGY

## 2019-05-10 PROCEDURE — 99999 PR PBB SHADOW E&M-EST. PATIENT-LVL II: CPT | Mod: PBBFAC,,, | Performed by: OPHTHALMOLOGY

## 2019-05-10 PROCEDURE — 92133 POSTERIOR SEGMENT OCT OPTIC NERVE(OCULAR COHERENCE TOMOGRAPHY) - OU - BOTH EYES: ICD-10-PCS | Mod: S$GLB,,, | Performed by: OPHTHALMOLOGY

## 2019-05-10 NOTE — PROGRESS NOTES
SUBJECTIVE:   Raquel Pickard is a 63 y.o. female   Uncorrected distance visual acuity was 20/40 +1 in the right eye and 20/25 in the left eye.   Chief Complaint   Patient presents with    Glaucoma        HPI:  HPI     Pt here for 3m IOP GOCT chk. No pain or discomfort. VA stable. 100%   compliant with gtts.     1. Severe COAG DX 7-10 (Tmax 23/25) Goal = 16  SLT OS 4/23/14(18-16)  SLT OD 2/12/14(17-16)  HYPEREMIA WITH XALATAN AND TRAVATAN     2. DM SINCE 2012 NO DBR  3. MOD CATS OD>OS    COSOPT BID OU  ALPHAGAN BID OU  LATANOPROST QHS OU    Last edited by Reza Alexandra, Patient Care Assistant on 5/10/2019  7:53   AM. (History)        Assessment /Plan :  1. Primary open angle glaucoma of both eyes, severe stage Doing well, IOP within acceptable range relative to target IOP and no evidence of progression. Continue current treatment. Reviewed importance of continued compliance with treatment and follow up.     2. Nuclear sclerosis of both eyes monitor for now     Return to clinic in 3 months  or as needed.  With IOP Check

## 2019-06-11 DIAGNOSIS — H40.1133 PRIMARY OPEN ANGLE GLAUCOMA OF BOTH EYES, SEVERE STAGE: ICD-10-CM

## 2019-06-11 NOTE — TELEPHONE ENCOUNTER
----- Message from Rosaline Orlando sent at 6/11/2019  2:03 PM CDT -----  Contact: pt  Please call pt at  [phone], regarding eye drop, pt need called in to [facility] pharmacy .

## 2019-06-12 RX ORDER — DORZOLAMIDE HYDROCHLORIDE AND TIMOLOL MALEATE 20; 5 MG/ML; MG/ML
1 SOLUTION/ DROPS OPHTHALMIC 2 TIMES DAILY
Qty: 30 ML | Refills: 3 | Status: SHIPPED | OUTPATIENT
Start: 2019-06-12 | End: 2019-08-07 | Stop reason: SDUPTHER

## 2019-06-12 RX ORDER — LATANOPROST 50 UG/ML
1 SOLUTION/ DROPS OPHTHALMIC NIGHTLY
Qty: 7.5 ML | Refills: 3 | Status: SHIPPED | OUTPATIENT
Start: 2019-06-12 | End: 2019-09-27 | Stop reason: SDUPTHER

## 2019-06-26 RX ORDER — LATANOPROST 50 UG/ML
1 SOLUTION/ DROPS OPHTHALMIC NIGHTLY
Qty: 3 BOTTLE | Refills: 4 | Status: SHIPPED | OUTPATIENT
Start: 2019-06-26 | End: 2019-12-05 | Stop reason: SDUPTHER

## 2019-06-26 RX ORDER — DORZOLAMIDE HYDROCHLORIDE AND TIMOLOL MALEATE 20; 5 MG/ML; MG/ML
1 SOLUTION/ DROPS OPHTHALMIC 2 TIMES DAILY
Qty: 10 ML | Refills: 4 | Status: SHIPPED | OUTPATIENT
Start: 2019-06-26 | End: 2019-12-05 | Stop reason: SDUPTHER

## 2019-06-27 DIAGNOSIS — I10 ESSENTIAL HYPERTENSION: ICD-10-CM

## 2019-06-27 RX ORDER — BENAZEPRIL/HYDROCHLOROTHIAZIDE 20 MG-25MG
1 TABLET ORAL DAILY
Qty: 90 TABLET | Refills: 3 | Status: SHIPPED | OUTPATIENT
Start: 2019-06-27 | End: 2019-12-05 | Stop reason: SDUPTHER

## 2019-07-03 ENCOUNTER — PATIENT OUTREACH (OUTPATIENT)
Dept: ADMINISTRATIVE | Facility: HOSPITAL | Age: 64
End: 2019-07-03

## 2019-07-03 NOTE — PROGRESS NOTES
Spoke with pt re scheduling MD appt with Dr. Delgado for f/u DM and HTN. Appt scheduled for 08/07/19. Instructed pt on appt. Pt verbalized understanding

## 2019-07-24 ENCOUNTER — PATIENT OUTREACH (OUTPATIENT)
Dept: ADMINISTRATIVE | Facility: HOSPITAL | Age: 64
End: 2019-07-24

## 2019-08-07 ENCOUNTER — OFFICE VISIT (OUTPATIENT)
Dept: INTERNAL MEDICINE | Facility: CLINIC | Age: 64
End: 2019-08-07
Payer: COMMERCIAL

## 2019-08-07 VITALS
DIASTOLIC BLOOD PRESSURE: 80 MMHG | HEIGHT: 65 IN | BODY MASS INDEX: 31.07 KG/M2 | WEIGHT: 186.5 LBS | HEART RATE: 72 BPM | OXYGEN SATURATION: 99 % | TEMPERATURE: 99 F | SYSTOLIC BLOOD PRESSURE: 127 MMHG

## 2019-08-07 DIAGNOSIS — E78.5 HYPERLIPIDEMIA ASSOCIATED WITH TYPE 2 DIABETES MELLITUS: ICD-10-CM

## 2019-08-07 DIAGNOSIS — E11.9 TYPE 2 DIABETES MELLITUS WITHOUT COMPLICATION, WITHOUT LONG-TERM CURRENT USE OF INSULIN: Primary | ICD-10-CM

## 2019-08-07 DIAGNOSIS — E11.69 HYPERLIPIDEMIA ASSOCIATED WITH TYPE 2 DIABETES MELLITUS: ICD-10-CM

## 2019-08-07 DIAGNOSIS — I10 ESSENTIAL HYPERTENSION: ICD-10-CM

## 2019-08-07 LAB
ALBUMIN SERPL BCP-MCNC: 3.6 G/DL (ref 3.5–5.2)
ALBUMIN/CREAT UR: 6.3 UG/MG (ref 0–30)
ALP SERPL-CCNC: 55 U/L (ref 55–135)
ALT SERPL W/O P-5'-P-CCNC: 12 U/L (ref 10–44)
ANION GAP SERPL CALC-SCNC: 11 MMOL/L (ref 8–16)
AST SERPL-CCNC: 16 U/L (ref 10–40)
BILIRUB SERPL-MCNC: 0.5 MG/DL (ref 0.1–1)
BUN SERPL-MCNC: 17 MG/DL (ref 8–23)
CALCIUM SERPL-MCNC: 10.1 MG/DL (ref 8.7–10.5)
CHLORIDE SERPL-SCNC: 102 MMOL/L (ref 95–110)
CHOLEST SERPL-MCNC: 144 MG/DL (ref 120–199)
CHOLEST/HDLC SERPL: 3.2 {RATIO} (ref 2–5)
CO2 SERPL-SCNC: 29 MMOL/L (ref 23–29)
CREAT SERPL-MCNC: 0.8 MG/DL (ref 0.5–1.4)
CREAT UR-MCNC: 111 MG/DL (ref 15–325)
EST. GFR  (AFRICAN AMERICAN): >60 ML/MIN/1.73 M^2
EST. GFR  (NON AFRICAN AMERICAN): >60 ML/MIN/1.73 M^2
GLUCOSE SERPL-MCNC: 127 MG/DL (ref 70–110)
HDLC SERPL-MCNC: 45 MG/DL (ref 40–75)
HDLC SERPL: 31.3 % (ref 20–50)
LDLC SERPL CALC-MCNC: 73.8 MG/DL (ref 63–159)
MICROALBUMIN UR DL<=1MG/L-MCNC: 7 UG/ML
NONHDLC SERPL-MCNC: 99 MG/DL
POTASSIUM SERPL-SCNC: 3.3 MMOL/L (ref 3.5–5.1)
PROT SERPL-MCNC: 7.4 G/DL (ref 6–8.4)
SODIUM SERPL-SCNC: 142 MMOL/L (ref 136–145)
TRIGL SERPL-MCNC: 126 MG/DL (ref 30–150)

## 2019-08-07 PROCEDURE — 80061 LIPID PANEL: CPT

## 2019-08-07 PROCEDURE — 3079F DIAST BP 80-89 MM HG: CPT | Mod: CPTII,S$GLB,, | Performed by: FAMILY MEDICINE

## 2019-08-07 PROCEDURE — 3074F PR MOST RECENT SYSTOLIC BLOOD PRESSURE < 130 MM HG: ICD-10-PCS | Mod: CPTII,S$GLB,, | Performed by: FAMILY MEDICINE

## 2019-08-07 PROCEDURE — 99214 PR OFFICE/OUTPT VISIT, EST, LEVL IV, 30-39 MIN: ICD-10-PCS | Mod: S$GLB,,, | Performed by: FAMILY MEDICINE

## 2019-08-07 PROCEDURE — 3008F PR BODY MASS INDEX (BMI) DOCUMENTED: ICD-10-PCS | Mod: CPTII,S$GLB,, | Performed by: FAMILY MEDICINE

## 2019-08-07 PROCEDURE — 3074F SYST BP LT 130 MM HG: CPT | Mod: CPTII,S$GLB,, | Performed by: FAMILY MEDICINE

## 2019-08-07 PROCEDURE — 83036 HEMOGLOBIN GLYCOSYLATED A1C: CPT

## 2019-08-07 PROCEDURE — 3008F BODY MASS INDEX DOCD: CPT | Mod: CPTII,S$GLB,, | Performed by: FAMILY MEDICINE

## 2019-08-07 PROCEDURE — 80053 COMPREHEN METABOLIC PANEL: CPT

## 2019-08-07 PROCEDURE — 3044F PR MOST RECENT HEMOGLOBIN A1C LEVEL <7.0%: ICD-10-PCS | Mod: CPTII,S$GLB,, | Performed by: FAMILY MEDICINE

## 2019-08-07 PROCEDURE — 3044F HG A1C LEVEL LT 7.0%: CPT | Mod: CPTII,S$GLB,, | Performed by: FAMILY MEDICINE

## 2019-08-07 PROCEDURE — 99999 PR PBB SHADOW E&M-EST. PATIENT-LVL III: ICD-10-PCS | Mod: PBBFAC,,, | Performed by: FAMILY MEDICINE

## 2019-08-07 PROCEDURE — 3079F PR MOST RECENT DIASTOLIC BLOOD PRESSURE 80-89 MM HG: ICD-10-PCS | Mod: CPTII,S$GLB,, | Performed by: FAMILY MEDICINE

## 2019-08-07 PROCEDURE — 99999 PR PBB SHADOW E&M-EST. PATIENT-LVL III: CPT | Mod: PBBFAC,,, | Performed by: FAMILY MEDICINE

## 2019-08-07 PROCEDURE — 99214 OFFICE O/P EST MOD 30 MIN: CPT | Mod: S$GLB,,, | Performed by: FAMILY MEDICINE

## 2019-08-07 PROCEDURE — 82043 UR ALBUMIN QUANTITATIVE: CPT

## 2019-08-07 NOTE — PROGRESS NOTES
Subjective:       Patient ID: Raquel Pickard is a 63 y.o. female.    Chief Complaint: Diabetes and Hypertension    Patient with type 2 diabetes, hypertension, hyperlipidemia here for scheduled recheck with last labs December 2018. Lab Results       Component                Value               Date                       WBC                      7.02                12/24/2018                 HGB                      11.6 (L)            12/24/2018                 HCT                      37.4                12/24/2018                 PLT                      270                 12/24/2018                 CHOL                     162                 12/24/2018                 TRIG                     166 (H)             12/24/2018                 HDL                      44                  12/24/2018                 LDLCALC                  84.8                12/24/2018                 ALT                      13                  12/24/2018                 AST                      21                  12/24/2018                 NA                       143                 12/24/2018                 K                        3.7                 12/24/2018                 CL                       103                 12/24/2018                 CALCIUM                  10.4                12/24/2018                 CREATININE               0.8                 12/24/2018                 BUN                      18                  12/24/2018                 CO2                      30 (H)              12/24/2018                 GLU                      127 (H)             12/24/2018                 ESTGFRAFRICA             >60.0               12/24/2018                 EGFRNONAA                >60.0               12/24/2018                 HGBA1C                   6.0 (H)             12/24/2018                 MICALBCREAT              10.8                12/24/2018    Well controlled DM2 on current Diabetes Medications    glimepiride (AMARYL) 2 MG tablet takes PRN - if going out to eat. MetFORMIN (GLUCOPHAGE-XR) 500 MG 24 hr tablet 3 TABLETS DAILY     HTN controlled on Hypertension Medications  benazepril-hydrochlorthiazide (LOTENSIN HCT) 20-25 mg daily.    propranolol (INDERAL LA) 80 MG 24 hr capsule TAKE 1 CAPSULE DAILY     Lipids controlled on statin - but inconsistent use - using supplemental garlique at last visit, occas statin.            Diabetes   She presents for her follow-up diabetic visit. She has type 2 diabetes mellitus. There are no hypoglycemic associated symptoms. Pertinent negatives for diabetes include no chest pain, no fatigue, no foot paresthesias and no visual change. There are no hypoglycemic complications. Symptoms are stable. Pertinent negatives for diabetic complications include no retinopathy. Risk factors for coronary artery disease include dyslipidemia, hypertension, obesity, post-menopausal and diabetes mellitus. Current diabetic treatment includes oral agent (dual therapy). She is compliant with treatment all of the time. Weight trend: weight down 11 lbs since last December. She is following a generally healthy diet. Meal planning includes avoidance of concentrated sweets. She participates in exercise intermittently. Her home blood glucose trend is fluctuating minimally. Her breakfast blood glucose range is generally 110-130 mg/dl. An ACE inhibitor/angiotensin II receptor blocker is being taken. She does not see a podiatrist.Eye exam is current.     Review of Systems   Constitutional: Negative for fatigue.   Respiratory: Negative for shortness of breath.    Cardiovascular: Negative for chest pain, palpitations and leg swelling.   Gastrointestinal: Negative for constipation and diarrhea.   Musculoskeletal: Positive for arthralgias (upper back into her shoulders). Negative for neck pain and neck stiffness.   Psychiatric/Behavioral: Negative for sleep disturbance.       Objective:      Physical Exam    Constitutional: She is oriented to person, place, and time. She appears well-developed and well-nourished.   HENT:   Head: Normocephalic and atraumatic.   Right Ear: Tympanic membrane, external ear and ear canal normal.   Left Ear: Tympanic membrane, external ear and ear canal normal.   Nose: Nose normal.   Mouth/Throat: Oropharynx is clear and moist. No oropharyngeal exudate.   Eyes: Conjunctivae and EOM are normal.   Neck: Normal range of motion. Neck supple. No thyromegaly present.   Cardiovascular: Normal rate, regular rhythm and normal heart sounds. Exam reveals no gallop and no friction rub.   No murmur heard.  Pulmonary/Chest: Effort normal and breath sounds normal. She exhibits no tenderness.   Abdominal: Soft. She exhibits no distension. There is no tenderness.   Musculoskeletal: She exhibits no edema.   Lymphadenopathy:     She has no cervical adenopathy.   Neurological: She is alert and oriented to person, place, and time.   Skin: Skin is warm and dry.   Psychiatric: She has a normal mood and affect. Her behavior is normal.         Assessment/Plan:     1. Type 2 diabetes mellitus without complication, without long-term current use of insulin  Hemoglobin A1c    Microalbumin/creatinine urine ratio    Comprehensive metabolic panel   2. Essential hypertension     3. Hyperlipidemia associated with type 2 diabetes mellitus  Lipid panel    She declines digital medicine programs.  She has well controlled diabetes and hypertension at this time.  Lab check today then move to 6 month a1c and annual again in a year.  Refills prn.  She gets Mammogram from Dr. Hernandez.  HUMA signed.  Mammo due in October will hold till November.

## 2019-08-07 NOTE — PATIENT INSTRUCTIONS
"Talk to your pharmacy and/or your insurance about the "Shingrix" shingles vaccine (two-shot series).    "

## 2019-08-08 LAB
ESTIMATED AVG GLUCOSE: 146 MG/DL (ref 68–131)
HBA1C MFR BLD HPLC: 6.7 % (ref 4–5.6)

## 2019-08-12 ENCOUNTER — OFFICE VISIT (OUTPATIENT)
Dept: OPHTHALMOLOGY | Facility: CLINIC | Age: 64
End: 2019-08-12
Payer: COMMERCIAL

## 2019-08-12 DIAGNOSIS — H04.129 DRY EYE: ICD-10-CM

## 2019-08-12 DIAGNOSIS — H40.1133 PRIMARY OPEN ANGLE GLAUCOMA OF BOTH EYES, SEVERE STAGE: Primary | ICD-10-CM

## 2019-08-12 DIAGNOSIS — H25.13 NUCLEAR SCLEROSIS OF BOTH EYES: ICD-10-CM

## 2019-08-12 PROCEDURE — 99999 PR PBB SHADOW E&M-EST. PATIENT-LVL II: CPT | Mod: PBBFAC,,, | Performed by: OPHTHALMOLOGY

## 2019-08-12 PROCEDURE — 99999 PR PBB SHADOW E&M-EST. PATIENT-LVL II: ICD-10-PCS | Mod: PBBFAC,,, | Performed by: OPHTHALMOLOGY

## 2019-08-12 PROCEDURE — 92012 INTRM OPH EXAM EST PATIENT: CPT | Mod: S$GLB,,, | Performed by: OPHTHALMOLOGY

## 2019-08-12 PROCEDURE — 92012 PR EYE EXAM, EST PATIENT,INTERMED: ICD-10-PCS | Mod: S$GLB,,, | Performed by: OPHTHALMOLOGY

## 2019-08-12 NOTE — PROGRESS NOTES
SUBJECTIVE:   Raquel Pickard is a 63 y.o. female   Uncorrected distance visual acuity was 20/40 -1 in the right eye and 20/25 in the left eye.   Chief Complaint   Patient presents with    Glaucoma     3 mth IOP check. c/o tearing OD>OS         HPI:  HPI     Glaucoma      Additional comments: 3 mth IOP check. c/o tearing OD>OS               Comments     1. Severe COAG DX 7-10 (Tmax 23/25) Goal = 16  SLT OS 4/23/14(18-16)  SLT OD 2/12/14(17-16)  HYPEREMIA WITH XALATAN AND TRAVATAN     2. DM SINCE 2012 NO DBR  3. MOD CATS OD>OS    COSOPT BID OU  ALPHAGAN BID OU  LATANOPROST QHS OU          Last edited by Eliana Figueroa MA on 8/12/2019  7:57 AM. (History)        Assessment /Plan :  1. Primary open angle glaucoma of both eyes, severe stage Doing well, IOP within acceptable range relative to target IOP and no evidence of progression. Continue current treatment. Reviewed importance of continued compliance with treatment and follow up.     2. Nuclear sclerosis of both eyes monitor for now   3. Dry eye recommend artificial tears prn OU     Return to clinic in 3 months  or as needed.  With IOP Check and GOCT

## 2019-09-27 DIAGNOSIS — H40.1133 PRIMARY OPEN ANGLE GLAUCOMA OF BOTH EYES, SEVERE STAGE: ICD-10-CM

## 2019-09-27 RX ORDER — BRIMONIDINE TARTRATE 1.5 MG/ML
1 SOLUTION/ DROPS OPHTHALMIC 2 TIMES DAILY
Qty: 15 ML | Refills: 3 | Status: SHIPPED | OUTPATIENT
Start: 2019-09-27 | End: 2019-11-13 | Stop reason: SDUPTHER

## 2019-09-27 RX ORDER — LATANOPROST 50 UG/ML
1 SOLUTION/ DROPS OPHTHALMIC NIGHTLY
Qty: 7.5 ML | Refills: 3 | Status: SHIPPED | OUTPATIENT
Start: 2019-09-27 | End: 2020-05-21 | Stop reason: SDUPTHER

## 2019-09-27 NOTE — TELEPHONE ENCOUNTER
----- Message from Rosaline Orlando sent at 9/27/2019 11:09 AM CDT -----  Contact: pt  Please call pt at  [phone] regarding a script for eye drops to [facility].

## 2019-10-18 ENCOUNTER — IMMUNIZATION (OUTPATIENT)
Dept: PHARMACY | Facility: CLINIC | Age: 64
End: 2019-10-18
Payer: COMMERCIAL

## 2019-11-06 ENCOUNTER — TELEPHONE (OUTPATIENT)
Dept: INTERNAL MEDICINE | Facility: CLINIC | Age: 64
End: 2019-11-06

## 2019-11-06 NOTE — TELEPHONE ENCOUNTER
Spoke with the patient wanted to see Dr. Delgado today but refused the next available 12/10/19 and refused to see other doctor. Patient requesting to call her back if there will be an open spot today.

## 2019-11-06 NOTE — TELEPHONE ENCOUNTER
----- Message from Peewee Gardiner sent at 11/6/2019  9:36 AM CST -----  Contact: self  Type:  Sooner Apoointment Request    Caller is requesting a sooner appointment.  Caller declined first available appointment listed below.  Caller will not accept being placed on the waitlist and is requesting a message be sent to doctor.  Name of Caller:Raquel Pickard  When is the first available appointment?12/10  Symptoms:pain in right side   Would the patient rather a call back or a response via [facility]? Call back  Best Call Back Number:[phone]  Additional Information: none    Thanks,  Peewee Gardiner

## 2019-11-11 ENCOUNTER — TELEPHONE (OUTPATIENT)
Dept: INTERNAL MEDICINE | Facility: CLINIC | Age: 64
End: 2019-11-11

## 2019-11-11 NOTE — TELEPHONE ENCOUNTER
----- Message from Lucila Welch sent at 11/11/2019  8:39 AM CST -----  Contact: pt   She's calling in regards Pain in side     pls call pt back at [phone] (home)

## 2019-11-11 NOTE — TELEPHONE ENCOUNTER
Patient called requesting an appointment for pain in her side.  Advised patient of  availability.  Patient declined.  Offered .  Patient accepted.

## 2019-11-12 ENCOUNTER — OFFICE VISIT (OUTPATIENT)
Dept: INTERNAL MEDICINE | Facility: CLINIC | Age: 64
End: 2019-11-12
Payer: MEDICARE

## 2019-11-12 VITALS
HEIGHT: 65 IN | WEIGHT: 197.44 LBS | HEART RATE: 69 BPM | BODY MASS INDEX: 32.9 KG/M2 | OXYGEN SATURATION: 98 % | SYSTOLIC BLOOD PRESSURE: 118 MMHG | TEMPERATURE: 99 F | DIASTOLIC BLOOD PRESSURE: 72 MMHG

## 2019-11-12 DIAGNOSIS — R14.1 ABDOMINAL GAS PAIN: ICD-10-CM

## 2019-11-12 DIAGNOSIS — I10 ESSENTIAL HYPERTENSION: Chronic | ICD-10-CM

## 2019-11-12 DIAGNOSIS — R14.0 ABDOMINAL BLOATING: Primary | ICD-10-CM

## 2019-11-12 PROCEDURE — 3074F PR MOST RECENT SYSTOLIC BLOOD PRESSURE < 130 MM HG: ICD-10-PCS | Mod: CPTII,S$GLB,, | Performed by: FAMILY MEDICINE

## 2019-11-12 PROCEDURE — 3008F PR BODY MASS INDEX (BMI) DOCUMENTED: ICD-10-PCS | Mod: CPTII,S$GLB,, | Performed by: FAMILY MEDICINE

## 2019-11-12 PROCEDURE — 3008F BODY MASS INDEX DOCD: CPT | Mod: CPTII,S$GLB,, | Performed by: FAMILY MEDICINE

## 2019-11-12 PROCEDURE — 99213 PR OFFICE/OUTPT VISIT, EST, LEVL III, 20-29 MIN: ICD-10-PCS | Mod: S$GLB,,, | Performed by: FAMILY MEDICINE

## 2019-11-12 PROCEDURE — 1100F PTFALLS ASSESS-DOCD GE2>/YR: CPT | Mod: CPTII,S$GLB,, | Performed by: FAMILY MEDICINE

## 2019-11-12 PROCEDURE — 99999 PR PBB SHADOW E&M-EST. PATIENT-LVL III: ICD-10-PCS | Mod: PBBFAC,,, | Performed by: FAMILY MEDICINE

## 2019-11-12 PROCEDURE — 3074F SYST BP LT 130 MM HG: CPT | Mod: CPTII,S$GLB,, | Performed by: FAMILY MEDICINE

## 2019-11-12 PROCEDURE — 99213 OFFICE O/P EST LOW 20 MIN: CPT | Mod: S$GLB,,, | Performed by: FAMILY MEDICINE

## 2019-11-12 PROCEDURE — 3078F PR MOST RECENT DIASTOLIC BLOOD PRESSURE < 80 MM HG: ICD-10-PCS | Mod: CPTII,S$GLB,, | Performed by: FAMILY MEDICINE

## 2019-11-12 PROCEDURE — 3288F FALL RISK ASSESSMENT DOCD: CPT | Mod: CPTII,S$GLB,, | Performed by: FAMILY MEDICINE

## 2019-11-12 PROCEDURE — 3288F PR FALLS RISK ASSESSMENT DOCUMENTED: ICD-10-PCS | Mod: CPTII,S$GLB,, | Performed by: FAMILY MEDICINE

## 2019-11-12 PROCEDURE — 1100F PR PT FALLS ASSESS DOC 2+ FALLS/FALL W/INJURY/YR: ICD-10-PCS | Mod: CPTII,S$GLB,, | Performed by: FAMILY MEDICINE

## 2019-11-12 PROCEDURE — 99999 PR PBB SHADOW E&M-EST. PATIENT-LVL III: CPT | Mod: PBBFAC,,, | Performed by: FAMILY MEDICINE

## 2019-11-12 PROCEDURE — 3078F DIAST BP <80 MM HG: CPT | Mod: CPTII,S$GLB,, | Performed by: FAMILY MEDICINE

## 2019-11-12 NOTE — PATIENT INSTRUCTIONS
Antacid/Gas Oral solution  What is this medicine?  ALUMINUM HYDROXIDE; MAGNESIUM HYDROXIDE; SIMETHICONE (a LOO mi num dez DROX prabhu; mag NEE zhum dez DROX prabhu; sye METH i jamia) is an antacid and antigas medicine. It is used to relieve the symptoms of indigestion, heartburn, sour stomach, and the discomfort caused by gas.  How should I use this medicine?  Take this medicine by mouth. Follow the directions on the label. Shake well before using. Use a specially marked spoon or container to measure your medicine. Ask your pharmacist if you do not have one. Household spoons are not accurate. Antacids are usually taken after meals and at bedtime or as directed by your doctor or health care professional. After taking the medication, drink a full glass of water. Take your doses at regular intervals. Do not take your medicine more often than directed.  Talk to your pediatrician regarding the use of this medicine in children. While this drug may be prescribed for children as young as 6 years old for selected conditions, precautions do apply.  What side effects may I notice from receiving this medicine?  Side effects that you should report to your doctor or health care professional as soon as possible:  · allergic reactions like skin rash, itching or hives, swelling of the face, lips, or tongue  · bone or joint aches and pains  · confusion or irritability  · headache  · loss of appetite  · nausea, vomiting  · unusually weak or tired  Side effects that usually do not require medical attention (report to your doctor or health care professional if they continue or are bothersome):  · chalky taste  · constipation  · diarrhea  · hemorrhoids  What may interact with this medicine?  · amphetamine  · antibiotics  · captopril  · delavirdine  · gabapentin  · heart medicines, such as digoxin or digitoxin  · hyoscyamine  · iron salts  · isoniazid  · medicines for breathing difficulties like ipratropium and tiotropium  · medicines for  diabetes  · medicines for fungal infections like itraconazole and ketoconazole  · medicines for osteoporosis like alendronate, etidronate, risedronate and tiludronate  · medicines for overactive bladder like oxybutynin and tolterodine  · medicines for seizures like ethotoin and phenytoin  · methenamine  · mycophenolate  · pancrelipase  · penicillamine  · phenothiazines like chlorpromazine, mesoridazine, prochlorperazine, thioridazine  · quinidine  · rosuvastatin  · sodium fluoride  · sodium polystyrene sulfonate  · sotalol  · sucralfate  · tacrolimus  · thyroid hormones like levothyroxine  · ursodiol  · vitamin D  · zalcitabine  What if I miss a dose?  If you miss a dose, take it as soon as you can. If it is almost time for your next dose, take only that dose. Do not take double or extra doses.  Where should I keep my medicine?  Keep out of the reach of children.  Store at room temperature between 15 and 30 degrees C (59 and 86 degrees F). Do not freeze. Protect from light and moisture. Throw away any unused medicine after the expiration date.  What should I tell my health care provider before I take this medicine?  They need to know if you have any of these conditions:  · bowel, intestinal, or stomach disease  · constipation  · diarrhea  · kidney disease  · liver disease  · on a sodium (salt) restricted diet  · stomach bleeding or obstruction  · an unusual or allergic reaction to aluminum hydroxide, magnesium hydroxide, simethicone, other medicines, foods, dyes, or preservatives  · pregnant or trying to get pregnant  · breast-feeding  What should I watch for while using this medicine?  Tell your doctor or healthcare professional if your symptoms do not start to get better or if they get worse. Do not treat yourself for stomach problems with this medicine for more than 2 weeks. See a doctor if you have black tarry stools, rectal bleeding, or if you feel unusually tired. Do not change to another antacid product  without advice.  If you are taking other medicines, leave an interval of at least 2 hours before or after taking this medicine.  To help reduce constipation, drink several glasses of water a day.  NOTE:This sheet is a summary. It may not cover all possible information. If you have questions about this medicine, talk to your doctor, pharmacist, or health care provider. Copyright© 2017 [employer]         (3) slightly limited

## 2019-11-12 NOTE — PROGRESS NOTES
"Subjective:       Patient ID: Raquel Pickard is a 64 y.o. female.    Chief Complaint: Abdominal Pain (right side)    HPI  Onset 1+ weeks  Relieved with positions and holding side  Denies blood in stool  Characterized as discomfort, no pain  A/w belching, discomfort rotates to different places, abdominal bloating  Last BM yesterday  Some constipation  Denies trauma  Has tried advil to relief possible muscle pain    Review of Systems   Constitutional: Positive for appetite change. Negative for activity change, fever and unexpected weight change.   Gastrointestinal: Positive for abdominal distention. Negative for abdominal pain, blood in stool, constipation and diarrhea.   Skin: Negative for rash.        Objective:   /72 (BP Location: Right arm, Patient Position: Sitting, BP Method: Large (Automatic))   Pulse 69   Temp 99.4 °F (37.4 °C) (Tympanic)   Ht 5' 5" (1.651 m)   Wt 89.5 kg (197 lb 6.8 oz)   SpO2 98%   BMI 32.85 kg/m²     BP Readings from Last 3 Encounters:   11/12/19 118/72   08/07/19 127/80   12/24/18 138/84       Lab Results   Component Value Date    HGBA1C 6.7 (H) 08/07/2019       Physical Exam   Constitutional: She is oriented to person, place, and time. She appears well-nourished. No distress.   HENT:   Head: Normocephalic and atraumatic.   Mouth/Throat: Oropharynx is clear and moist.   Eyes: Conjunctivae and EOM are normal. No scleral icterus.   Neck: Normal range of motion. Neck supple.   Cardiovascular: Normal rate, regular rhythm and normal heart sounds.   Pulmonary/Chest: Effort normal and breath sounds normal. She has no wheezes.   Abdominal: Soft. Bowel sounds are normal. There is no tenderness. There is no rigidity, no rebound, no guarding, no CVA tenderness, no tenderness at McBurney's point and negative Gaxiola's sign.   Normal abdominal exam w/  Tympanic to percussion upper quadrants  Dullness to percussion lower quadrants  Neg carnet sign  belching thorough out exam   Musculoskeletal: " She exhibits no edema or deformity.   Neurological: She is alert and oriented to person, place, and time.   Skin: Skin is warm and dry.   Psychiatric: She has a normal mood and affect. Her behavior is normal.   Vitals reviewed.    Assessment:     1. Abdominal bloating    2. Essential hypertension    3. Abdominal gas pain      Plan:     Problem List Items Addressed This Visit        Cardiac/Vascular    Essential hypertension (Chronic)    Current Assessment & Plan     Normotensive. Cont meds and monitor           Other Visit Diagnoses     Abdominal bloating    -  Primary    Abdominal gas pain          Reviewed labs, imaging, past documents from previous providers, prior hospitalizations (when applicable) and summarized findings.    Last cscope in 2017 with polypectomy  Follow up if symptoms worsen or fail to improve.

## 2019-11-13 DIAGNOSIS — H40.1133 PRIMARY OPEN ANGLE GLAUCOMA OF BOTH EYES, SEVERE STAGE: ICD-10-CM

## 2019-11-13 RX ORDER — BRIMONIDINE TARTRATE 1.5 MG/ML
1 SOLUTION/ DROPS OPHTHALMIC 2 TIMES DAILY
Qty: 15 ML | Refills: 3 | Status: SHIPPED | OUTPATIENT
Start: 2019-11-13 | End: 2019-12-05 | Stop reason: SDUPTHER

## 2019-11-18 DIAGNOSIS — M79.641 PAIN OF RIGHT HAND: Primary | ICD-10-CM

## 2019-11-19 ENCOUNTER — OFFICE VISIT (OUTPATIENT)
Dept: ORTHOPEDICS | Facility: CLINIC | Age: 64
End: 2019-11-19
Payer: MEDICARE

## 2019-11-19 ENCOUNTER — HOSPITAL ENCOUNTER (OUTPATIENT)
Dept: RADIOLOGY | Facility: HOSPITAL | Age: 64
Discharge: HOME | End: 2019-11-19
Attending: ORTHOPAEDIC SURGERY
Payer: MEDICARE

## 2019-11-19 VITALS
DIASTOLIC BLOOD PRESSURE: 77 MMHG | WEIGHT: 197 LBS | HEART RATE: 77 BPM | HEIGHT: 65 IN | SYSTOLIC BLOOD PRESSURE: 120 MMHG | BODY MASS INDEX: 32.82 KG/M2

## 2019-11-19 DIAGNOSIS — M79.641 PAIN OF RIGHT HAND: ICD-10-CM

## 2019-11-19 DIAGNOSIS — S63.511A SPRAIN OF CARPAL JOINT OF RIGHT WRIST, INITIAL ENCOUNTER: Primary | ICD-10-CM

## 2019-11-19 PROCEDURE — 3008F BODY MASS INDEX DOCD: CPT | Mod: CPTII,S$GLB,, | Performed by: ORTHOPAEDIC SURGERY

## 2019-11-19 PROCEDURE — 99203 PR OFFICE/OUTPT VISIT, NEW, LEVL III, 30-44 MIN: ICD-10-PCS | Mod: S$GLB,,, | Performed by: ORTHOPAEDIC SURGERY

## 2019-11-19 PROCEDURE — 3078F PR MOST RECENT DIASTOLIC BLOOD PRESSURE < 80 MM HG: ICD-10-PCS | Mod: CPTII,S$GLB,, | Performed by: ORTHOPAEDIC SURGERY

## 2019-11-19 PROCEDURE — 99999 PR PBB SHADOW E&M-EST. PATIENT-LVL III: CPT | Mod: PBBFAC,,, | Performed by: ORTHOPAEDIC SURGERY

## 2019-11-19 PROCEDURE — 73130 X-RAY EXAM OF HAND: CPT | Mod: TC,RT

## 2019-11-19 PROCEDURE — 99203 OFFICE O/P NEW LOW 30 MIN: CPT | Mod: S$GLB,,, | Performed by: ORTHOPAEDIC SURGERY

## 2019-11-19 PROCEDURE — 73130 X-RAY EXAM OF HAND: CPT | Mod: 26,RT,, | Performed by: RADIOLOGY

## 2019-11-19 PROCEDURE — 3288F FALL RISK ASSESSMENT DOCD: CPT | Mod: CPTII,S$GLB,, | Performed by: ORTHOPAEDIC SURGERY

## 2019-11-19 PROCEDURE — 1100F PR PT FALLS ASSESS DOC 2+ FALLS/FALL W/INJURY/YR: ICD-10-PCS | Mod: CPTII,S$GLB,, | Performed by: ORTHOPAEDIC SURGERY

## 2019-11-19 PROCEDURE — 1100F PTFALLS ASSESS-DOCD GE2>/YR: CPT | Mod: CPTII,S$GLB,, | Performed by: ORTHOPAEDIC SURGERY

## 2019-11-19 PROCEDURE — 73130 XR HAND COMPLETE 3 VIEW RIGHT: ICD-10-PCS | Mod: 26,RT,, | Performed by: RADIOLOGY

## 2019-11-19 PROCEDURE — 99999 PR PBB SHADOW E&M-EST. PATIENT-LVL III: ICD-10-PCS | Mod: PBBFAC,,, | Performed by: ORTHOPAEDIC SURGERY

## 2019-11-19 PROCEDURE — 3078F DIAST BP <80 MM HG: CPT | Mod: CPTII,S$GLB,, | Performed by: ORTHOPAEDIC SURGERY

## 2019-11-19 PROCEDURE — 3074F SYST BP LT 130 MM HG: CPT | Mod: CPTII,S$GLB,, | Performed by: ORTHOPAEDIC SURGERY

## 2019-11-19 PROCEDURE — 3074F PR MOST RECENT SYSTOLIC BLOOD PRESSURE < 130 MM HG: ICD-10-PCS | Mod: CPTII,S$GLB,, | Performed by: ORTHOPAEDIC SURGERY

## 2019-11-19 PROCEDURE — 3008F PR BODY MASS INDEX (BMI) DOCUMENTED: ICD-10-PCS | Mod: CPTII,S$GLB,, | Performed by: ORTHOPAEDIC SURGERY

## 2019-11-19 PROCEDURE — 3288F PR FALLS RISK ASSESSMENT DOCUMENTED: ICD-10-PCS | Mod: CPTII,S$GLB,, | Performed by: ORTHOPAEDIC SURGERY

## 2019-11-19 NOTE — PROGRESS NOTES
Subjective:     Patient ID: Raquel Pickard is a 64 y.o. female.    Chief Complaint: Pain, Swelling, and Injury of the Right Wrist    The patient is a 64-year-old female who fell onto her outstretched right hand 9 days ago and injured her right wrist. She tried a splint with minimal improvement.      Past Medical History:   Diagnosis Date    Breast cancer 2011    L    Diabetes mellitus     Glaucoma     Hyperlipidemia     Hypertension     MVP (mitral valve prolapse)     Obesity      Past Surgical History:   Procedure Laterality Date    BREAST LUMPECTOMY      COLONOSCOPY W/ POLYPECTOMY  04/18/2017    DR. CHARLENE NATH/ANTONIO SALTER. POLYP X 3. REPEAT 5 YRS.    SLT - OU - BOTH EYES      TRIGGER FINGER RELEASE      Thumb    TUBAL LIGATION       Family History   Problem Relation Age of Onset    Glaucoma Brother     Macular degeneration Brother     Esophageal cancer Father     Heart disease Father     Hypertension Father     Liver cancer Mother     Cancer Mother     Hypertension Sister     Cataracts Sister     Diabetes Sister     Cholelithiasis Sister     Blindness Neg Hx     Retinal detachment Neg Hx     Strabismus Neg Hx     Stroke Neg Hx     Thyroid disease Neg Hx      Social History     Socioeconomic History    Marital status:      Spouse name: Not on file    Number of children: 2    Years of education: Not on file    Highest education level: Not on file   Occupational History    Occupation: retired teacher   Social Needs    Financial resource strain: Not on file    Food insecurity:     Worry: Not on file     Inability: Not on file    Transportation needs:     Medical: Not on file     Non-medical: Not on file   Tobacco Use    Smoking status: Never Smoker    Smokeless tobacco: Never Used   Substance and Sexual Activity    Alcohol use: Yes     Comment: very rare    Drug use: No    Sexual activity: Yes     Partners: Male   Lifestyle    Physical activity:     Days per week: Not  on file     Minutes per session: Not on file    Stress: Not on file   Relationships    Social connections:     Talks on phone: Not on file     Gets together: Not on file     Attends Faith service: Not on file     Active member of club or organization: Not on file     Attends meetings of clubs or organizations: Not on file     Relationship status: Not on file   Other Topics Concern    Not on file   Social History Narrative    Not on file     Medication List with Changes/Refills   Current Medications    BENAZEPRIL-HYDROCHLORTHIAZIDE (LOTENSIN HCT) 20-25 MG TAB    Take 1 tablet by mouth once daily.    BRIMONIDINE 0.15 % OPTH DROP (ALPHAGAN) 0.15 % OPHTHALMIC SOLUTION    Place 1 drop into both eyes 2 (two) times daily. Dispense 3 month supply    DORZOLAMIDE-TIMOLOL 2-0.5% (COSOPT) 22.3-6.8 MG/ML OPHTHALMIC SOLUTION    Place 1 drop into both eyes 2 (two) times daily. Please dispense a 3 month supply    GLIMEPIRIDE (AMARYL) 2 MG TABLET    TAKE 1 TABLET DAILY WITH BREAKFAST    LATANOPROST 0.005 % OPHTHALMIC SOLUTION    Place 1 drop into both eyes every evening. Please dispense a 3 month supply    LATANOPROST 0.005 % OPHTHALMIC SOLUTION    Place 1 drop into both eyes every evening.    METFORMIN (GLUCOPHAGE-XR) 500 MG 24 HR TABLET    TAKE 3 TABLETS ONCE DAILY    ONETOUCH ULTRA BLUE TEST STRIP STRP    USE ONCE DAILY    PROPRANOLOL (INDERAL LA) 80 MG 24 HR CAPSULE    TAKE 1 CAPSULE DAILY    ROSUVASTATIN (CRESTOR) 10 MG TABLET    TAKE 1 TABLET NIGHTLY     Review of patient's allergies indicates:   Allergen Reactions    Travatan z [travoprost]      Review of Systems   Constitution: Negative for malaise/fatigue.   HENT: Negative for hearing loss.    Eyes: Positive for visual disturbance. Negative for double vision.   Cardiovascular: Negative for chest pain.   Respiratory: Negative for shortness of breath.    Endocrine: Negative for cold intolerance.   Hematologic/Lymphatic: Does not bruise/bleed easily.   Skin: Negative  for poor wound healing and suspicious lesions.   Musculoskeletal: Negative for gout, joint pain and joint swelling.   Gastrointestinal: Negative for nausea and vomiting.   Genitourinary: Negative for dysuria.   Neurological: Negative for numbness, paresthesias and sensory change.   Psychiatric/Behavioral: Negative for depression, memory loss and substance abuse. The patient is not nervous/anxious.    Allergic/Immunologic: Negative for persistent infections.       Objective:   Body mass index is 32.78 kg/m².  Vitals:    11/19/19 1311   BP: 120/77   Pulse: 77                General    Constitutional: She is oriented to person, place, and time. She appears well-developed and well-nourished. No distress.   HENT:   Head: Normocephalic.   Eyes: EOM are normal.   Neck: Normal range of motion.   Pulmonary/Chest: Effort normal.   Neurological: She is oriented to person, place, and time.   Psychiatric: She has a normal mood and affect.             Right Hand/Wrist Exam     Inspection   Scars: Wrist - absent Hand -  absent  Effusion: Wrist - absent Hand -  absent    Pain   Wrist - The patient exhibits pain of the flexor/pronator group.    Tenderness   The patient is tender to palpation of the dorsal area.    Other     Neuorologic Exam    Median Distribution: normal  Ulnar Distribution: normal  Radial Distribution: normal    Comments:  The patient is tender about the right wrist dorsal radiocarpal joint as well as volarly.  She has no numbness.  There are no motor or sensory deficits.  There is slight swelling.          Vascular Exam       Capillary Refill  Right Hand: normal capillary refill      Relevant imaging results reviewed and interpreted by me, discussed with the patient and / or family today radiographs of the right hand and wrist were reviewed which showed no fracture other than calcific deposits in the long and small finger  Assessment:     Encounter Diagnosis   Name Primary?    Sprain of carpal joint of right  wrist, initial encounter Yes        Plan:       The patient was given a thumb spica splint.  She will take naproxen as needed. She will return in 3 or 4 weeks if no better for consideration of MRI scanning              Disclaimer: This note was prepared using a voice recognition system and is likely to have sound alike errors within the text.

## 2019-11-21 ENCOUNTER — OFFICE VISIT (OUTPATIENT)
Dept: OPHTHALMOLOGY | Facility: CLINIC | Age: 64
End: 2019-11-21
Payer: MEDICARE

## 2019-11-21 DIAGNOSIS — H40.1133 PRIMARY OPEN ANGLE GLAUCOMA OF BOTH EYES, SEVERE STAGE: Primary | ICD-10-CM

## 2019-11-21 DIAGNOSIS — H25.13 NUCLEAR SCLEROSIS OF BOTH EYES: ICD-10-CM

## 2019-11-21 DIAGNOSIS — H04.129 DRY EYE: ICD-10-CM

## 2019-11-21 PROCEDURE — 92133 POSTERIOR SEGMENT OCT OPTIC NERVE(OCULAR COHERENCE TOMOGRAPHY) - OU - BOTH EYES: ICD-10-PCS | Mod: S$GLB,,, | Performed by: OPHTHALMOLOGY

## 2019-11-21 PROCEDURE — 92012 INTRM OPH EXAM EST PATIENT: CPT | Mod: S$GLB,,, | Performed by: OPHTHALMOLOGY

## 2019-11-21 PROCEDURE — 92133 CPTRZD OPH DX IMG PST SGM ON: CPT | Mod: S$GLB,,, | Performed by: OPHTHALMOLOGY

## 2019-11-21 PROCEDURE — 99999 PR PBB SHADOW E&M-EST. PATIENT-LVL II: CPT | Mod: PBBFAC,,, | Performed by: OPHTHALMOLOGY

## 2019-11-21 PROCEDURE — 99999 PR PBB SHADOW E&M-EST. PATIENT-LVL II: ICD-10-PCS | Mod: PBBFAC,,, | Performed by: OPHTHALMOLOGY

## 2019-11-21 PROCEDURE — 92012 PR EYE EXAM, EST PATIENT,INTERMED: ICD-10-PCS | Mod: S$GLB,,, | Performed by: OPHTHALMOLOGY

## 2019-11-21 NOTE — PROGRESS NOTES
SUBJECTIVE:   Raquel Pickard is a 64 y.o. female   Uncorrected distance visual acuity was 20/30 in the right eye and 20/25 in the left eye.   Chief Complaint   Patient presents with    Glaucoma        HPI:  HPI     Pt here for 3m GOCT IOP chk. No pain or discomfort. VA stable. 100%   compliant with gtts.     1. Severe COAG DX 7-10 (Tmax 23/25) Goal = 16  SLT OS 4/23/14(18-16)  SLT OD 2/12/14(17-16)  HYPEREMIA WITH XALATAN AND TRAVATAN     2. DM SINCE 2012 NO DBR  3. MOD CATS OD>OS  4. Dry Eyes    COSOPT BID OU  ALPHAGAN BID OU  LATANOPROST QHS OU    Last edited by Reza Alexandra, Patient Care Assistant on 11/21/2019  9:39   AM. (History)        Assessment /Plan :  1. Primary open angle glaucoma of both eyes, severe stage Doing well, IOP within acceptable range relative to target IOP and no evidence of progression. Continue current treatment. Reviewed importance of continued compliance with treatment and follow up.     2. Nuclear sclerosis of both eyes Patient does reports mild visual decline from incipient cataractous changes, but not sufficient to affect activities of daily living. I recommend monitoring visual status and follow up when visual symptoms worsen.     3. Dry eye recommend at's qid OU           Return to clinic in 3-4 months  or as needed.  With Dilation, HVF 24-2 and SDP

## 2019-12-05 DIAGNOSIS — E78.2 MIXED HYPERLIPIDEMIA: ICD-10-CM

## 2019-12-05 DIAGNOSIS — H40.1133 PRIMARY OPEN ANGLE GLAUCOMA OF BOTH EYES, SEVERE STAGE: ICD-10-CM

## 2019-12-05 DIAGNOSIS — E11.9 TYPE 2 DIABETES MELLITUS WITHOUT COMPLICATION: ICD-10-CM

## 2019-12-05 DIAGNOSIS — Z87.898 HISTORY OF PALPITATIONS: ICD-10-CM

## 2019-12-05 DIAGNOSIS — I10 ESSENTIAL HYPERTENSION: ICD-10-CM

## 2019-12-05 DIAGNOSIS — E11.9 TYPE 2 DIABETES MELLITUS WITHOUT COMPLICATION, WITHOUT LONG-TERM CURRENT USE OF INSULIN: ICD-10-CM

## 2019-12-05 RX ORDER — DORZOLAMIDE HYDROCHLORIDE AND TIMOLOL MALEATE 20; 5 MG/ML; MG/ML
1 SOLUTION/ DROPS OPHTHALMIC 2 TIMES DAILY
Qty: 10 ML | Refills: 4 | Status: SHIPPED | OUTPATIENT
Start: 2019-12-05 | End: 2020-11-18 | Stop reason: SDUPTHER

## 2019-12-05 RX ORDER — LATANOPROST 50 UG/ML
1 SOLUTION/ DROPS OPHTHALMIC NIGHTLY
Qty: 3 BOTTLE | Refills: 4 | Status: SHIPPED | OUTPATIENT
Start: 2019-12-05 | End: 2020-12-30 | Stop reason: SDUPTHER

## 2019-12-05 RX ORDER — BRIMONIDINE TARTRATE 1.5 MG/ML
1 SOLUTION/ DROPS OPHTHALMIC 2 TIMES DAILY
Qty: 15 ML | Refills: 3 | Status: SHIPPED | OUTPATIENT
Start: 2019-12-05 | End: 2020-04-30 | Stop reason: SDUPTHER

## 2019-12-05 NOTE — TELEPHONE ENCOUNTER
----- Message from Tabitha Orlando sent at 12/5/2019 10:40 AM CST -----  Contact: pt  Per pt please fax [facility] at [phone], no additional info given and can be reached at [phone] or [phone]///thxMW

## 2019-12-05 NOTE — TELEPHONE ENCOUNTER
Patient called requesting all medications to go to [facility].  Please advise.    LOV 11/12/2019

## 2019-12-05 NOTE — TELEPHONE ENCOUNTER
----- Message from Tabitha Orlando sent at 12/5/2019 10:40 AM CST -----  Contact: pt  Per pt please fax all med refills to [facility] at [phone], no additional info given and can be reached at [phone] or [phone]///thxMW

## 2019-12-06 RX ORDER — METFORMIN HYDROCHLORIDE 500 MG/1
1500 TABLET, EXTENDED RELEASE ORAL DAILY
Qty: 270 TABLET | Refills: 4 | Status: SHIPPED | OUTPATIENT
Start: 2019-12-06 | End: 2021-04-08

## 2019-12-06 RX ORDER — BENAZEPRIL/HYDROCHLOROTHIAZIDE 20 MG-25MG
1 TABLET ORAL DAILY
Qty: 90 TABLET | Refills: 4 | Status: SHIPPED | OUTPATIENT
Start: 2019-12-06 | End: 2021-03-02 | Stop reason: SDUPTHER

## 2019-12-06 RX ORDER — PROPRANOLOL HYDROCHLORIDE 80 MG/1
80 CAPSULE, EXTENDED RELEASE ORAL DAILY
Qty: 90 CAPSULE | Refills: 4 | Status: SHIPPED | OUTPATIENT
Start: 2019-12-06 | End: 2021-01-14

## 2019-12-06 RX ORDER — ROSUVASTATIN CALCIUM 10 MG/1
10 TABLET, COATED ORAL NIGHTLY
Qty: 90 TABLET | Refills: 4 | Status: SHIPPED | OUTPATIENT
Start: 2019-12-06 | End: 2020-01-27

## 2019-12-06 RX ORDER — GLIMEPIRIDE 2 MG/1
2 TABLET ORAL
Qty: 90 TABLET | Refills: 4 | Status: SHIPPED | OUTPATIENT
Start: 2019-12-06 | End: 2020-02-03

## 2019-12-09 NOTE — TELEPHONE ENCOUNTER
Called the patient to advise that her prescriptions were sent to [facility].  Received no answer. Left a voicemail.

## 2020-01-08 ENCOUNTER — OFFICE VISIT (OUTPATIENT)
Dept: ORTHOPEDICS | Facility: CLINIC | Age: 65
End: 2020-01-08
Payer: MEDICARE

## 2020-01-08 VITALS
HEIGHT: 65 IN | BODY MASS INDEX: 32.82 KG/M2 | SYSTOLIC BLOOD PRESSURE: 120 MMHG | HEART RATE: 71 BPM | DIASTOLIC BLOOD PRESSURE: 81 MMHG | WEIGHT: 197 LBS

## 2020-01-08 DIAGNOSIS — M65.4 RADIAL STYLOID TENOSYNOVITIS (DE QUERVAIN): Primary | ICD-10-CM

## 2020-01-08 PROCEDURE — 3008F PR BODY MASS INDEX (BMI) DOCUMENTED: ICD-10-PCS | Mod: HCNC,CPTII,S$GLB, | Performed by: ORTHOPAEDIC SURGERY

## 2020-01-08 PROCEDURE — 3079F PR MOST RECENT DIASTOLIC BLOOD PRESSURE 80-89 MM HG: ICD-10-PCS | Mod: HCNC,CPTII,S$GLB, | Performed by: ORTHOPAEDIC SURGERY

## 2020-01-08 PROCEDURE — 3288F PR FALLS RISK ASSESSMENT DOCUMENTED: ICD-10-PCS | Mod: HCNC,CPTII,S$GLB, | Performed by: ORTHOPAEDIC SURGERY

## 2020-01-08 PROCEDURE — 1100F PR PT FALLS ASSESS DOC 2+ FALLS/FALL W/INJURY/YR: ICD-10-PCS | Mod: HCNC,CPTII,S$GLB, | Performed by: ORTHOPAEDIC SURGERY

## 2020-01-08 PROCEDURE — 3288F FALL RISK ASSESSMENT DOCD: CPT | Mod: HCNC,CPTII,S$GLB, | Performed by: ORTHOPAEDIC SURGERY

## 2020-01-08 PROCEDURE — 3074F SYST BP LT 130 MM HG: CPT | Mod: HCNC,CPTII,S$GLB, | Performed by: ORTHOPAEDIC SURGERY

## 2020-01-08 PROCEDURE — 3008F BODY MASS INDEX DOCD: CPT | Mod: HCNC,CPTII,S$GLB, | Performed by: ORTHOPAEDIC SURGERY

## 2020-01-08 PROCEDURE — 99213 OFFICE O/P EST LOW 20 MIN: CPT | Mod: HCNC,25,S$GLB, | Performed by: ORTHOPAEDIC SURGERY

## 2020-01-08 PROCEDURE — 20550 TENDON SHEATH: ICD-10-PCS | Mod: HCNC,RT,S$GLB, | Performed by: ORTHOPAEDIC SURGERY

## 2020-01-08 PROCEDURE — 3079F DIAST BP 80-89 MM HG: CPT | Mod: HCNC,CPTII,S$GLB, | Performed by: ORTHOPAEDIC SURGERY

## 2020-01-08 PROCEDURE — 3074F PR MOST RECENT SYSTOLIC BLOOD PRESSURE < 130 MM HG: ICD-10-PCS | Mod: HCNC,CPTII,S$GLB, | Performed by: ORTHOPAEDIC SURGERY

## 2020-01-08 PROCEDURE — 1100F PTFALLS ASSESS-DOCD GE2>/YR: CPT | Mod: HCNC,CPTII,S$GLB, | Performed by: ORTHOPAEDIC SURGERY

## 2020-01-08 PROCEDURE — 99213 PR OFFICE/OUTPT VISIT, EST, LEVL III, 20-29 MIN: ICD-10-PCS | Mod: HCNC,25,S$GLB, | Performed by: ORTHOPAEDIC SURGERY

## 2020-01-08 PROCEDURE — 99999 PR PBB SHADOW E&M-EST. PATIENT-LVL III: ICD-10-PCS | Mod: PBBFAC,HCNC,, | Performed by: ORTHOPAEDIC SURGERY

## 2020-01-08 PROCEDURE — 99999 PR PBB SHADOW E&M-EST. PATIENT-LVL III: CPT | Mod: PBBFAC,HCNC,, | Performed by: ORTHOPAEDIC SURGERY

## 2020-01-08 PROCEDURE — 20550 NJX 1 TENDON SHEATH/LIGAMENT: CPT | Mod: HCNC,RT,S$GLB, | Performed by: ORTHOPAEDIC SURGERY

## 2020-01-08 RX ORDER — TRIAMCINOLONE ACETONIDE 40 MG/ML
40 INJECTION, SUSPENSION INTRA-ARTICULAR; INTRAMUSCULAR
Status: DISCONTINUED | OUTPATIENT
Start: 2020-01-08 | End: 2020-01-08 | Stop reason: HOSPADM

## 2020-01-08 RX ADMIN — TRIAMCINOLONE ACETONIDE 40 MG: 40 INJECTION, SUSPENSION INTRA-ARTICULAR; INTRAMUSCULAR at 09:01

## 2020-01-08 NOTE — PROCEDURES
R first doral compartmentTendon Sheath  Date/Time: 1/8/2020 9:20 AM  Performed by: Mark Rashid MD  Authorized by: Mark Rashid MD     Consent Done?: Yes (Verbal)  Timeout: prior to procedure the correct patient, procedure, and site was verified    Indications:  Pain  Timeout: prior to procedure the correct patient, procedure, and site was verified    Location:  Wrist  Prep: patient was prepped and draped in usual sterile fashion    Needle size:  25 G  Approach:  Radial  Medications:  40 mg triamcinolone acetonide 40 mg/mL

## 2020-01-08 NOTE — PROGRESS NOTES
Subjective:     Patient ID: Raquel Pickard is a 64 y.o. female.    Chief Complaint: Pain of the Right Wrist    The patient is a 64-year-old female with right de Quervain tendonitis unresponsive to splinting and physical therapy.  She requested cortisone injection.      Past Medical History:   Diagnosis Date    Breast cancer 2011    L    Diabetes mellitus     Glaucoma     Hyperlipidemia     Hypertension     MVP (mitral valve prolapse)     Obesity      Past Surgical History:   Procedure Laterality Date    BREAST LUMPECTOMY      COLONOSCOPY W/ POLYPECTOMY  04/18/2017    DR. CHARLENE NATH/ANTONIO SALTER. POLYP X 3. REPEAT 5 YRS.    SLT - OU - BOTH EYES      TRIGGER FINGER RELEASE      Thumb    TUBAL LIGATION       Family History   Problem Relation Age of Onset    Glaucoma Brother     Macular degeneration Brother     Esophageal cancer Father     Heart disease Father     Hypertension Father     Liver cancer Mother     Cancer Mother     Hypertension Sister     Cataracts Sister     Diabetes Sister     Cholelithiasis Sister     Blindness Neg Hx     Retinal detachment Neg Hx     Strabismus Neg Hx     Stroke Neg Hx     Thyroid disease Neg Hx      Social History     Socioeconomic History    Marital status:      Spouse name: Not on file    Number of children: 2    Years of education: Not on file    Highest education level: Not on file   Occupational History    Occupation: retired teacher   Social Needs    Financial resource strain: Not on file    Food insecurity:     Worry: Not on file     Inability: Not on file    Transportation needs:     Medical: Not on file     Non-medical: Not on file   Tobacco Use    Smoking status: Never Smoker    Smokeless tobacco: Never Used   Substance and Sexual Activity    Alcohol use: Yes     Comment: very rare    Drug use: No    Sexual activity: Yes     Partners: Male   Lifestyle    Physical activity:     Days per week: Not on file     Minutes per  session: Not on file    Stress: Not on file   Relationships    Social connections:     Talks on phone: Not on file     Gets together: Not on file     Attends Orthodox service: Not on file     Active member of club or organization: Not on file     Attends meetings of clubs or organizations: Not on file     Relationship status: Not on file   Other Topics Concern    Not on file   Social History Narrative    Not on file     Medication List with Changes/Refills   Current Medications    BENAZEPRIL-HYDROCHLORTHIAZIDE (LOTENSIN HCT) 20-25 MG TAB    Take 1 tablet by mouth once daily.    BLOOD SUGAR DIAGNOSTIC (ONETOUCH ULTRA BLUE TEST STRIP) STRP    USE ONCE DAILY    BRIMONIDINE 0.15 % OPTH DROP (ALPHAGAN) 0.15 % OPHTHALMIC SOLUTION    Place 1 drop into both eyes 2 (two) times daily. Dispense 3 month supply    DORZOLAMIDE-TIMOLOL 2-0.5% (COSOPT) 22.3-6.8 MG/ML OPHTHALMIC SOLUTION    Place 1 drop into both eyes 2 (two) times daily. Please dispense a 3 month supply    GLIMEPIRIDE (AMARYL) 2 MG TABLET    Take 1 tablet (2 mg total) by mouth daily with breakfast.    LATANOPROST 0.005 % OPHTHALMIC SOLUTION    Place 1 drop into both eyes every evening.    LATANOPROST 0.005 % OPHTHALMIC SOLUTION    Place 1 drop into both eyes every evening. Please dispense a 3 month supply    METFORMIN (GLUCOPHAGE-XR) 500 MG 24 HR TABLET    Take 3 tablets (1,500 mg total) by mouth once daily.    PROPRANOLOL (INDERAL LA) 80 MG 24 HR CAPSULE    Take 1 capsule (80 mg total) by mouth once daily.    ROSUVASTATIN (CRESTOR) 10 MG TABLET    Take 1 tablet (10 mg total) by mouth nightly.     Review of patient's allergies indicates:   Allergen Reactions    Juanjo garcia [travoprost]      Review of Systems   Constitution: Negative for malaise/fatigue.   HENT: Negative for hearing loss.    Eyes: Positive for visual disturbance. Negative for double vision.   Cardiovascular: Negative for chest pain.   Respiratory: Negative for shortness of breath.    Endocrine:  Negative for cold intolerance.   Hematologic/Lymphatic: Does not bruise/bleed easily.   Skin: Negative for poor wound healing and suspicious lesions.   Musculoskeletal: Negative for gout, joint pain and joint swelling.   Gastrointestinal: Negative for nausea and vomiting.   Genitourinary: Negative for dysuria.   Neurological: Negative for numbness, paresthesias and sensory change.   Psychiatric/Behavioral: Negative for depression, memory loss and substance abuse. The patient is not nervous/anxious.    Allergic/Immunologic: Negative for persistent infections.       Objective:   Body mass index is 32.78 kg/m².  Vitals:    01/08/20 0931   BP: 120/81   Pulse: 71                General    Constitutional: She is oriented to person, place, and time. She appears well-developed and well-nourished. No distress.   HENT:   Head: Normocephalic.   Eyes: EOM are normal.   Neck: Normal range of motion.   Pulmonary/Chest: Effort normal.   Neurological: She is oriented to person, place, and time.   Psychiatric: She has a normal mood and affect.             Right Hand/Wrist Exam     Inspection   Scars: Wrist - absent   Effusion: Wrist - absent     Pain   Wrist - The patient exhibits pain of the extensory musculature.    Tenderness   The patient is tender to palpation of the radial area.    Tests   Finkelstein's test: positive      Other     Neuorologic Exam    Median Distribution: normal  Ulnar Distribution: normal  Radial Distribution: normal    Comments:  The patient is tender 1st dorsal extensor tendon compartment and has a positive Finkelstein test.  There are no motor or sensory deficits.          Vascular Exam       Capillary Refill  Right Hand: normal capillary refill         radiographs right wrist were normal  Assessment:     Encounter Diagnosis   Name Primary?    Radial styloid tenosynovitis (de quervain) Yes    right wrist    Plan:     The patient is injected right wrist 1st dorsal extensor tendon compartment with 0.5 cc  of Kenalog and 0.5 cc of 2% plain lidocaine under sterile technique. She will return in 3 weeks if not improved.                Disclaimer: This note was prepared using a voice recognition system and is likely to have sound alike errors within the text.

## 2020-01-24 ENCOUNTER — PATIENT OUTREACH (OUTPATIENT)
Dept: ADMINISTRATIVE | Facility: HOSPITAL | Age: 65
End: 2020-01-24

## 2020-01-24 NOTE — LETTER
January 24, 2020        Raquel Pickard  [address]  [place] [address]      Dear Ms. Pickard,    You have an upcoming appointment with Kathy Delgado MD on 02/07/20.      Your chart is indicating you may be due for the following and I will be happy to assist you in scheduling any needed appointments:  Health Maintenance Due   Topic    DEXA SCAN     Mammogram     Pneumococcal Vaccine (65+ Low/Medium Risk) (1 of 2 - PCV13)    Foot Exam           If you have had any of the above done at another facility, please bring the records or information with you so that your record at [facility] will be complete.    We will be happy to assist you with scheduling any necessary appointments or you may contact the [facility] appointment desk at [phone] to schedule at your convenience.     Thank you for choosing [facility] for your healthcare needs,      Marleen C., LPN Care Coordinator  [facility]  [phone]

## 2020-01-25 DIAGNOSIS — E78.2 MIXED HYPERLIPIDEMIA: ICD-10-CM

## 2020-01-27 RX ORDER — ROSUVASTATIN CALCIUM 10 MG/1
TABLET, COATED ORAL
Qty: 90 TABLET | Refills: 4 | Status: SHIPPED | OUTPATIENT
Start: 2020-01-27 | End: 2021-01-14

## 2020-02-03 DIAGNOSIS — E11.9 TYPE 2 DIABETES MELLITUS WITHOUT COMPLICATION, WITHOUT LONG-TERM CURRENT USE OF INSULIN: ICD-10-CM

## 2020-02-03 RX ORDER — GLIMEPIRIDE 2 MG/1
TABLET ORAL
Qty: 90 TABLET | Refills: 4 | Status: SHIPPED | OUTPATIENT
Start: 2020-02-03 | End: 2021-07-07 | Stop reason: SDUPTHER

## 2020-02-07 ENCOUNTER — OFFICE VISIT (OUTPATIENT)
Dept: INTERNAL MEDICINE | Facility: CLINIC | Age: 65
End: 2020-02-07
Payer: MEDICARE

## 2020-02-07 VITALS
SYSTOLIC BLOOD PRESSURE: 114 MMHG | HEART RATE: 76 BPM | BODY MASS INDEX: 33.21 KG/M2 | WEIGHT: 199.31 LBS | DIASTOLIC BLOOD PRESSURE: 73 MMHG | TEMPERATURE: 98 F | OXYGEN SATURATION: 97 % | HEIGHT: 65 IN

## 2020-02-07 DIAGNOSIS — E78.5 HYPERLIPIDEMIA ASSOCIATED WITH TYPE 2 DIABETES MELLITUS: ICD-10-CM

## 2020-02-07 DIAGNOSIS — Z23 IMMUNIZATION DUE: ICD-10-CM

## 2020-02-07 DIAGNOSIS — I10 ESSENTIAL HYPERTENSION: Chronic | ICD-10-CM

## 2020-02-07 DIAGNOSIS — E11.69 HYPERLIPIDEMIA ASSOCIATED WITH TYPE 2 DIABETES MELLITUS: ICD-10-CM

## 2020-02-07 DIAGNOSIS — Z78.0 ASYMPTOMATIC MENOPAUSAL STATE: ICD-10-CM

## 2020-02-07 DIAGNOSIS — Z11.4 SCREENING FOR HIV WITHOUT PRESENCE OF RISK FACTORS: ICD-10-CM

## 2020-02-07 DIAGNOSIS — E11.9 TYPE 2 DIABETES MELLITUS WITHOUT COMPLICATION, WITHOUT LONG-TERM CURRENT USE OF INSULIN: Primary | ICD-10-CM

## 2020-02-07 LAB
ANION GAP SERPL CALC-SCNC: 9 MMOL/L (ref 8–16)
BASOPHILS # BLD AUTO: 0.02 K/UL (ref 0–0.2)
BASOPHILS NFR BLD: 0.3 % (ref 0–1.9)
BUN SERPL-MCNC: 18 MG/DL (ref 8–23)
CALCIUM SERPL-MCNC: 10 MG/DL (ref 8.7–10.5)
CHLORIDE SERPL-SCNC: 105 MMOL/L (ref 95–110)
CO2 SERPL-SCNC: 31 MMOL/L (ref 23–29)
CREAT SERPL-MCNC: 0.8 MG/DL (ref 0.5–1.4)
DIFFERENTIAL METHOD: ABNORMAL
EOSINOPHIL # BLD AUTO: 0.2 K/UL (ref 0–0.5)
EOSINOPHIL NFR BLD: 2.2 % (ref 0–8)
ERYTHROCYTE [DISTWIDTH] IN BLOOD BY AUTOMATED COUNT: 15.9 % (ref 11.5–14.5)
EST. GFR  (AFRICAN AMERICAN): >60 ML/MIN/1.73 M^2
EST. GFR  (NON AFRICAN AMERICAN): >60 ML/MIN/1.73 M^2
GLUCOSE SERPL-MCNC: 113 MG/DL (ref 70–110)
HCT VFR BLD AUTO: 37.8 % (ref 37–48.5)
HGB BLD-MCNC: 11.4 G/DL (ref 12–16)
IMM GRANULOCYTES # BLD AUTO: 0.04 K/UL (ref 0–0.04)
IMM GRANULOCYTES NFR BLD AUTO: 0.5 % (ref 0–0.5)
LYMPHOCYTES # BLD AUTO: 2.5 K/UL (ref 1–4.8)
LYMPHOCYTES NFR BLD: 32.3 % (ref 18–48)
MCH RBC QN AUTO: 29.4 PG (ref 27–31)
MCHC RBC AUTO-ENTMCNC: 30.2 G/DL (ref 32–36)
MCV RBC AUTO: 97 FL (ref 82–98)
MONOCYTES # BLD AUTO: 0.6 K/UL (ref 0.3–1)
MONOCYTES NFR BLD: 7.4 % (ref 4–15)
NEUTROPHILS # BLD AUTO: 4.5 K/UL (ref 1.8–7.7)
NEUTROPHILS NFR BLD: 57.3 % (ref 38–73)
NRBC BLD-RTO: 0 /100 WBC
PLATELET # BLD AUTO: 229 K/UL (ref 150–350)
PMV BLD AUTO: 10.2 FL (ref 9.2–12.9)
POTASSIUM SERPL-SCNC: 4 MMOL/L (ref 3.5–5.1)
RBC # BLD AUTO: 3.88 M/UL (ref 4–5.4)
SODIUM SERPL-SCNC: 145 MMOL/L (ref 136–145)
WBC # BLD AUTO: 7.87 K/UL (ref 3.9–12.7)

## 2020-02-07 PROCEDURE — 90670 PNEUMOCOCCAL CONJUGATE VACCINE 13-VALENT LESS THAN 5YO & GREATER THAN: ICD-10-PCS | Mod: HCNC,S$GLB,, | Performed by: FAMILY MEDICINE

## 2020-02-07 PROCEDURE — 99214 PR OFFICE/OUTPT VISIT, EST, LEVL IV, 30-39 MIN: ICD-10-PCS | Mod: 25,HCNC,S$GLB, | Performed by: FAMILY MEDICINE

## 2020-02-07 PROCEDURE — 3078F DIAST BP <80 MM HG: CPT | Mod: HCNC,CPTII,S$GLB, | Performed by: FAMILY MEDICINE

## 2020-02-07 PROCEDURE — 90670 PCV13 VACCINE IM: CPT | Mod: HCNC,S$GLB,, | Performed by: FAMILY MEDICINE

## 2020-02-07 PROCEDURE — G0009 ADMIN PNEUMOCOCCAL VACCINE: HCPCS | Mod: HCNC,S$GLB,, | Performed by: FAMILY MEDICINE

## 2020-02-07 PROCEDURE — 3078F PR MOST RECENT DIASTOLIC BLOOD PRESSURE < 80 MM HG: ICD-10-PCS | Mod: HCNC,CPTII,S$GLB, | Performed by: FAMILY MEDICINE

## 2020-02-07 PROCEDURE — 86703 HIV-1/HIV-2 1 RESULT ANTBDY: CPT | Mod: HCNC

## 2020-02-07 PROCEDURE — 99214 OFFICE O/P EST MOD 30 MIN: CPT | Mod: 25,HCNC,S$GLB, | Performed by: FAMILY MEDICINE

## 2020-02-07 PROCEDURE — 3008F PR BODY MASS INDEX (BMI) DOCUMENTED: ICD-10-PCS | Mod: HCNC,CPTII,S$GLB, | Performed by: FAMILY MEDICINE

## 2020-02-07 PROCEDURE — 3008F BODY MASS INDEX DOCD: CPT | Mod: HCNC,CPTII,S$GLB, | Performed by: FAMILY MEDICINE

## 2020-02-07 PROCEDURE — 3074F PR MOST RECENT SYSTOLIC BLOOD PRESSURE < 130 MM HG: ICD-10-PCS | Mod: HCNC,CPTII,S$GLB, | Performed by: FAMILY MEDICINE

## 2020-02-07 PROCEDURE — 80048 BASIC METABOLIC PNL TOTAL CA: CPT | Mod: HCNC

## 2020-02-07 PROCEDURE — 85025 COMPLETE CBC W/AUTO DIFF WBC: CPT | Mod: HCNC

## 2020-02-07 PROCEDURE — 99999 PR PBB SHADOW E&M-EST. PATIENT-LVL IV: ICD-10-PCS | Mod: PBBFAC,HCNC,, | Performed by: FAMILY MEDICINE

## 2020-02-07 PROCEDURE — 3074F SYST BP LT 130 MM HG: CPT | Mod: HCNC,CPTII,S$GLB, | Performed by: FAMILY MEDICINE

## 2020-02-07 PROCEDURE — 83036 HEMOGLOBIN GLYCOSYLATED A1C: CPT | Mod: HCNC

## 2020-02-07 PROCEDURE — 1101F PT FALLS ASSESS-DOCD LE1/YR: CPT | Mod: HCNC,CPTII,S$GLB, | Performed by: FAMILY MEDICINE

## 2020-02-07 PROCEDURE — 3044F PR MOST RECENT HEMOGLOBIN A1C LEVEL <7.0%: ICD-10-PCS | Mod: HCNC,CPTII,S$GLB, | Performed by: FAMILY MEDICINE

## 2020-02-07 PROCEDURE — G0009 PNEUMOCOCCAL CONJUGATE VACCINE 13-VALENT LESS THAN 5YO & GREATER THAN: ICD-10-PCS | Mod: HCNC,S$GLB,, | Performed by: FAMILY MEDICINE

## 2020-02-07 PROCEDURE — 3044F HG A1C LEVEL LT 7.0%: CPT | Mod: HCNC,CPTII,S$GLB, | Performed by: FAMILY MEDICINE

## 2020-02-07 PROCEDURE — 1101F PR PT FALLS ASSESS DOC 0-1 FALLS W/OUT INJ PAST YR: ICD-10-PCS | Mod: HCNC,CPTII,S$GLB, | Performed by: FAMILY MEDICINE

## 2020-02-07 PROCEDURE — 99999 PR PBB SHADOW E&M-EST. PATIENT-LVL IV: CPT | Mod: PBBFAC,HCNC,, | Performed by: FAMILY MEDICINE

## 2020-02-07 RX ORDER — BENAZEPRIL HYDROCHLORIDE 20 MG/1
TABLET ORAL
COMMUNITY
End: 2020-08-07

## 2020-02-07 NOTE — PROGRESS NOTES
Subjective:       Patient ID: Raquel Pickard is a 64 y.o. female.    Chief Complaint: Annual Exam    Patient with type 2 diabetes, hypertension, hyperlipidemia here for scheduled recheck and due for labs.     Most recent labs;       Component                Value               Date                       WBC                      7.02                12/24/2018                 HGB                      11.6 (L)            12/24/2018                 HCT                      37.4                12/24/2018                 PLT                      270                 12/24/2018                 CHOL                     144                 08/07/2019                 TRIG                     126                 08/07/2019                 HDL                      45                  08/07/2019                 LDLCALC                  73.8                08/07/2019                 ALT                      12                  08/07/2019                 AST                      16                  08/07/2019                 NA                       142                 08/07/2019                 K                        3.3 (L)             08/07/2019                 CL                       102                 08/07/2019                 CALCIUM                  10.1                08/07/2019                 CREATININE               0.8                 08/07/2019                 BUN                      17                  08/07/2019                 CO2                      29                  08/07/2019                 GLU                      127 (H)             08/07/2019                 ESTGFRAFRICA             >60.0               08/07/2019                 EGFRNONAA                >60.0               08/07/2019                 HGBA1C                   6.7 (H)             08/07/2019                 MICALBCREAT              6.3                 08/07/2019              A1c above 6.5 for the 1st time in a few years in August.  Lab  Results       Component                Value               Date                       HGBA1C                   6.7 (H)             08/07/2019                 HGBA1C                   6.0 (H)             12/24/2018                 HGBA1C                   6.1 (H)             12/01/2017                 HGBA1C                   6.4 (H)             04/04/2017                 HGBA1C                   6.4 (H)             11/08/2016                 HGBA1C                   6.3 (H)             05/15/2015            Diabetes Medications      glimepiride (AMARYL) 2 MG tablet TAKE 1 TABLET DAILY WITH BREAKFAST  - only takes it if going to eat a big meal   metFORMIN (GLUCOPHAGE-XR) 500 MG 24 hr tablet Take 3 tablets (1,500 mg total) by mouth once daily.   She really does not like taking metformin.  We reviewed precautions and hepatic injury of which she is most concerned is noted to be rare.     BP controlled.  BP Readings from Last 3 Encounters:  02/07/20 : 114/73  01/08/20 : 120/81  11/19/19 : 120/77    Hypertension Medications      benazepril-hydrochlorthiazide (LOTENSIN HCT) 20-25 mg Tab Take 1 tablet by mouth once daily.    propranolol (INDERAL LA) 80 MG 24 hr capsule Take 1 capsule (80 mg total) by mouth once daily.        On Rosuvastatin 10:  Lab Results       Component                Value               Date                       CHOL                     144                 08/07/2019                 CHOL                     162                 12/24/2018                 CHOL                     142                 04/04/2017            Lab Results       Component                Value               Date                       HDL                      45                  08/07/2019                 HDL                      44                  12/24/2018                 HDL                      38 (L)              04/04/2017            Lab Results       Component                Value               Date                        LDLCALC                  73.8                08/07/2019                 LDLCALC                  84.8                12/24/2018                 LDLCALC                  77.4                04/04/2017            Lab Results       Component                Value               Date                       TRIG                     126                 08/07/2019                 TRIG                     166 (H)             12/24/2018                 TRIG                     133                 04/04/2017              S/he has completed a full 14 system review. All items are negative except as indicated.      Review of Systems   Constitutional: Negative.    HENT: Negative.    Eyes: Positive for itching.   Respiratory: Negative.  Negative for shortness of breath.    Cardiovascular: Negative.  Negative for chest pain and leg swelling.   Gastrointestinal: Negative.    Endocrine: Negative.    Genitourinary: Negative.    Musculoskeletal: Negative.    Skin: Negative.    Allergic/Immunologic: Negative.    Neurological: Negative.  Negative for numbness.   Hematological: Negative.    Psychiatric/Behavioral: Negative.        Objective:      Physical Exam   Constitutional: She is oriented to person, place, and time. She appears well-developed and well-nourished.   HENT:   Head: Normocephalic and atraumatic.   Right Ear: External ear normal.   Left Ear: External ear normal.   Mouth/Throat: Oropharynx is clear and moist. No oropharyngeal exudate.   Neck: Normal range of motion. Neck supple.   Cardiovascular: Normal rate, regular rhythm and normal heart sounds.   Pulses:       Dorsalis pedis pulses are 2+ on the right side, and 2+ on the left side.        Posterior tibial pulses are 1+ on the right side, and 1+ on the left side.   Pulmonary/Chest: Effort normal and breath sounds normal.   Abdominal: Soft. Bowel sounds are normal. She exhibits no distension. There is no tenderness.   Musculoskeletal:        Right foot: There is normal range  of motion and no deformity.        Left foot: There is normal range of motion and no deformity.   Feet:   Right Foot:   Protective Sensation: 10 sites tested. 10 sites sensed.   Skin Integrity: Negative for ulcer or skin breakdown.   Left Foot:   Protective Sensation: 10 sites tested. 10 sites sensed.   Skin Integrity: Negative for ulcer or skin breakdown.   Neurological: She is alert and oriented to person, place, and time.   Skin: Skin is warm and dry.   Psychiatric: She has a normal mood and affect. Her behavior is normal.         Assessment/Plan:     1. Type 2 diabetes mellitus without complication, without long-term current use of insulin  Hemoglobin A1c   2. Essential hypertension  CBC auto differential    Basic metabolic panel   3. Hyperlipidemia associated with type 2 diabetes mellitus     4. Asymptomatic menopausal state  DXA Bone Density Spine And Hip   5. Screening for HIV without presence of risk factors  HIV 1/2 Ag/Ab (4th Gen)   6. Immunization due  Pneumococcal Conjugate Vaccine (13 Valent) (IM)    Labs today.  She had never seen the results of her last labs.  Reviewed low potassium and diet supplementation.  If A1C is 7.0 or above, she will be increased to glimepiride once every day. Currently only taking PRN.  Recheck in 6 months unless problems.  [facility] signed for 2019 mammogram [facility]. BMD due, ordered.

## 2020-02-07 NOTE — PATIENT INSTRUCTIONS
Chemistry panel shows slight decrease in potassium.  Your diuretic in your blood pressure medicine can cause this if you're not getting enough potassium in foods.  Potassium rich foods include most melons, oranges, bananas, baked potatoes.  Please eat potassium rich food daily.    If A1C is 7.0 or above, I will be asking you to take glimepiride once every day with your first meal of the day.

## 2020-02-08 LAB
ESTIMATED AVG GLUCOSE: 140 MG/DL (ref 68–131)
HBA1C MFR BLD HPLC: 6.5 % (ref 4–5.6)

## 2020-02-10 LAB — HIV 1+2 AB+HIV1 P24 AG SERPL QL IA: NEGATIVE

## 2020-02-21 ENCOUNTER — OFFICE VISIT (OUTPATIENT)
Dept: OPHTHALMOLOGY | Facility: CLINIC | Age: 65
End: 2020-02-21
Payer: MEDICARE

## 2020-02-21 DIAGNOSIS — H25.043 POSTERIOR SUBCAPSULAR AGE-RELATED CATARACT OF BOTH EYES: ICD-10-CM

## 2020-02-21 DIAGNOSIS — H26.9 CORTICAL CATARACT OF BOTH EYES: ICD-10-CM

## 2020-02-21 DIAGNOSIS — E11.9 TYPE 2 DIABETES MELLITUS WITHOUT COMPLICATION, WITHOUT LONG-TERM CURRENT USE OF INSULIN: ICD-10-CM

## 2020-02-21 DIAGNOSIS — H25.13 NUCLEAR SCLEROSIS OF BOTH EYES: ICD-10-CM

## 2020-02-21 DIAGNOSIS — H40.1133 PRIMARY OPEN ANGLE GLAUCOMA OF BOTH EYES, SEVERE STAGE: Primary | ICD-10-CM

## 2020-02-21 PROCEDURE — 99999 PR PBB SHADOW E&M-EST. PATIENT-LVL II: CPT | Mod: PBBFAC,HCNC,, | Performed by: OPHTHALMOLOGY

## 2020-02-21 PROCEDURE — 92083 EXTENDED VISUAL FIELD XM: CPT | Mod: HCNC,S$GLB,, | Performed by: OPHTHALMOLOGY

## 2020-02-21 PROCEDURE — 92014 PR EYE EXAM, EST PATIENT,COMPREHESV: ICD-10-PCS | Mod: HCNC,S$GLB,, | Performed by: OPHTHALMOLOGY

## 2020-02-21 PROCEDURE — 99499 RISK ADDL DX/OHS AUDIT: ICD-10-PCS | Mod: HCNC,S$GLB,, | Performed by: OPHTHALMOLOGY

## 2020-02-21 PROCEDURE — 92136 IOL MASTER - OD - RIGHT EYE: ICD-10-PCS | Mod: HCNC,RT,S$GLB, | Performed by: OPHTHALMOLOGY

## 2020-02-21 PROCEDURE — 92083 HUMPHREY VISUAL FIELD - OU - BOTH EYES: ICD-10-PCS | Mod: HCNC,S$GLB,, | Performed by: OPHTHALMOLOGY

## 2020-02-21 PROCEDURE — 92250 COLOR FUNDUS PHOTOGRAPHY - OU - BOTH EYES: ICD-10-PCS | Mod: HCNC,S$GLB,, | Performed by: OPHTHALMOLOGY

## 2020-02-21 PROCEDURE — 92014 COMPRE OPH EXAM EST PT 1/>: CPT | Mod: HCNC,S$GLB,, | Performed by: OPHTHALMOLOGY

## 2020-02-21 PROCEDURE — 99999 PR PBB SHADOW E&M-EST. PATIENT-LVL II: ICD-10-PCS | Mod: PBBFAC,HCNC,, | Performed by: OPHTHALMOLOGY

## 2020-02-21 PROCEDURE — 92136 OPHTHALMIC BIOMETRY: CPT | Mod: HCNC,RT,S$GLB, | Performed by: OPHTHALMOLOGY

## 2020-02-21 PROCEDURE — 92250 FUNDUS PHOTOGRAPHY W/I&R: CPT | Mod: HCNC,S$GLB,, | Performed by: OPHTHALMOLOGY

## 2020-02-21 PROCEDURE — 99499 UNLISTED E&M SERVICE: CPT | Mod: HCNC,S$GLB,, | Performed by: OPHTHALMOLOGY

## 2020-02-21 RX ORDER — GATIFLOXACIN 5 MG/ML
1 SOLUTION/ DROPS OPHTHALMIC 2 TIMES DAILY
Qty: 1 BOTTLE | Refills: 2 | Status: SHIPPED | OUTPATIENT
Start: 2020-02-21 | End: 2020-12-30 | Stop reason: ALTCHOICE

## 2020-02-21 RX ORDER — KETOROLAC TROMETHAMINE 5 MG/ML
1 SOLUTION OPHTHALMIC 4 TIMES DAILY
Qty: 1 BOTTLE | Refills: 2 | Status: SHIPPED | OUTPATIENT
Start: 2020-02-21 | End: 2020-12-30 | Stop reason: ALTCHOICE

## 2020-02-21 RX ORDER — PREDNISOLONE ACETATE 10 MG/ML
1 SUSPENSION/ DROPS OPHTHALMIC 4 TIMES DAILY
Qty: 1 BOTTLE | Refills: 2 | Status: SHIPPED | OUTPATIENT
Start: 2020-02-21 | End: 2020-12-30 | Stop reason: ALTCHOICE

## 2020-02-21 NOTE — PROGRESS NOTES
SUBJECTIVE  Raquel Pickard is 64 y.o. female  Uncorrected distance visual acuity was 20/50 -1 in the right eye and 20/25 -1 in the left eye.   Chief Complaint   Patient presents with    Glaucoma          HPI     3 to 4 months glaucoma check (dilation, HVF and SDP's)    Patient states that her right eye gets blurry temporally. It clears up   when she brushes her eyelids.  Eyelids or eyelashes are blocking it    1. Severe COAG DX 7-10 (Tmax 23/25) Goal = 16  SLT OS 4/23/14(18-16)  SLT OD 2/12/14(17-16)  HYPEREMIA WITH XALATAN AND TRAVATAN     2. DM SINCE 2012 NO DBR  3. MOD CATS OD>OS  4. Dry Eyes    COSOPT BID OU  ALPHAGAN BID OU  LATANOPROST QHS OU    Last edited by Meaghan William on 2/21/2020  8:10 AM. (History)         Assessment /Plan :  1. Primary open angle glaucoma of both eyes, severe stage Doing well, IOP within acceptable range relative to target IOP and no evidence of progression. Continue current treatment. Reviewed importance of continued compliance with treatment and follow up.     2. Type 2 diabetes mellitus without complication, without long-term current use of insulin No diabetic retinopathy at this time. Reviewed diabetic eye precautions including avoiding tobacco use,  Good glucose control, and importance of regular follow up.      3. Nuclear sclerosis of both eyes OD>OS  Visually Significant Cataract OD > OS:   Patient reports decreased vision consistent with the clinical amount of lenticular opacity,  which reaches the level of visual significance and affects activities of daily living such as  read. Risks, benefits, and alternatives to cataract surgery were  discussed.  IOL options were discussed, including Premium IOL'S and the associated  side effects and additional patient cost associated with them as well as patient's visual  goals. The pt expressed a desire to proceed with surgery with the potential for some  reasonable degree of visual improvement and was consented.  Risks of loss  of vision and eye reviewed as well as possibility of need for spectacle correction after surgery even with premium implants. Verbal and written preop  instructions were provided to the patient.       Pt is interested in traditional monofocal IOL aiming for:    Distance OU and understands that glasses will be generally needed at all times for near vision and often for finer distance correction especially for higher degrees of astigmatism.       Final visual acuity may be limited by astigmatism, glaucoma damage and diabetes    Pt wishes to have Phaco/IOL  OD with MIGS    Requests a Monofocal IOL.    Will aim for distance    Other considerations: None     4. Cortical cataract of both eyes as above   5. Posterior subcapsular age-related cataract of both eyes OD>OS as above

## 2020-02-24 ENCOUNTER — APPOINTMENT (OUTPATIENT)
Dept: RADIOLOGY | Facility: HOSPITAL | Age: 65
End: 2020-02-24
Attending: FAMILY MEDICINE
Payer: MEDICARE

## 2020-02-24 DIAGNOSIS — Z78.0 ASYMPTOMATIC MENOPAUSAL STATE: ICD-10-CM

## 2020-02-24 PROCEDURE — 77080 DEXA BONE DENSITY SPINE HIP: ICD-10-PCS | Mod: 26,HCNC,, | Performed by: RADIOLOGY

## 2020-02-24 PROCEDURE — 77080 DXA BONE DENSITY AXIAL: CPT | Mod: 26,HCNC,, | Performed by: RADIOLOGY

## 2020-02-24 PROCEDURE — 77080 DXA BONE DENSITY AXIAL: CPT | Mod: TC,HCNC

## 2020-03-30 DIAGNOSIS — E11.9 TYPE 2 DIABETES MELLITUS WITHOUT COMPLICATION: ICD-10-CM

## 2020-03-30 NOTE — TELEPHONE ENCOUNTER
----- Message from Isidro Umanzor sent at 3/30/2020 10:29 AM CDT -----  Contact: Pt  Pt called and needs a refill for Ultra One Touch Strips     Pt is testing 2 or 3 times per day so she would like you to increase the quantity.    Pt can be reached at [phone] or [phone]

## 2020-03-30 NOTE — TELEPHONE ENCOUNTER
Spoke with the patient. Patient asking a new prescription to filled in with new direction. Patient state she testing 2 to 3 times a day and asking if 300 each will be dispense. Please advise.

## 2020-04-15 DIAGNOSIS — E11.9 TYPE 2 DIABETES MELLITUS WITHOUT COMPLICATION, WITHOUT LONG-TERM CURRENT USE OF INSULIN: Primary | ICD-10-CM

## 2020-04-15 NOTE — TELEPHONE ENCOUNTER
----- Message from Christine Fisher sent at 4/15/2020  1:17 PM CDT -----  Contact: Aleksandra/[facility] [phone]  Type:  Pharmacy Calling to Clarify an RX    Name of Caller:Aleksandra  Pharmacy Name:[facility]   Prescription Name: accu check ronel meter, strips and lancets  What do they need to clarify?:   Best Call Back Number:[phone]  Additional Information: States that she is calling to check status on refill request.

## 2020-04-16 RX ORDER — DEXTROSE 4 G
TABLET,CHEWABLE ORAL
Refills: 0 | Status: CANCELLED | OUTPATIENT
Start: 2020-04-16 | End: 2021-04-16

## 2020-04-16 NOTE — TELEPHONE ENCOUNTER
Spoke with the patient stating to send it to mail order. Verbally understood.    [facility] - [place], [place] - [address]

## 2020-04-17 RX ORDER — INSULIN PUMP SYRINGE, 3 ML
EACH MISCELLANEOUS
Qty: 1 EACH | Refills: 0 | Status: SHIPPED | OUTPATIENT
Start: 2020-04-17

## 2020-04-17 RX ORDER — LANCETS
EACH MISCELLANEOUS
Qty: 100 EACH | Refills: 4 | Status: SHIPPED | OUTPATIENT
Start: 2020-04-17 | End: 2021-04-29

## 2020-04-30 DIAGNOSIS — H40.1133 PRIMARY OPEN ANGLE GLAUCOMA OF BOTH EYES, SEVERE STAGE: ICD-10-CM

## 2020-05-01 RX ORDER — BRIMONIDINE TARTRATE 1.5 MG/ML
1 SOLUTION/ DROPS OPHTHALMIC 2 TIMES DAILY
Qty: 15 ML | Refills: 3 | Status: SHIPPED | OUTPATIENT
Start: 2020-05-01 | End: 2020-11-18 | Stop reason: SDUPTHER

## 2020-05-19 ENCOUNTER — PATIENT OUTREACH (OUTPATIENT)
Dept: ADMINISTRATIVE | Facility: OTHER | Age: 65
End: 2020-05-19

## 2020-05-19 DIAGNOSIS — Z12.31 ENCOUNTER FOR SCREENING MAMMOGRAM FOR MALIGNANT NEOPLASM OF BREAST: Primary | ICD-10-CM

## 2020-05-21 ENCOUNTER — OFFICE VISIT (OUTPATIENT)
Dept: OPHTHALMOLOGY | Facility: CLINIC | Age: 65
End: 2020-05-21
Payer: MEDICARE

## 2020-05-21 DIAGNOSIS — H25.13 NUCLEAR SCLEROSIS OF BOTH EYES: ICD-10-CM

## 2020-05-21 DIAGNOSIS — E11.9 TYPE 2 DIABETES MELLITUS WITHOUT COMPLICATION, WITHOUT LONG-TERM CURRENT USE OF INSULIN: ICD-10-CM

## 2020-05-21 DIAGNOSIS — H40.1133 PRIMARY OPEN ANGLE GLAUCOMA OF BOTH EYES, SEVERE STAGE: Primary | ICD-10-CM

## 2020-05-21 PROCEDURE — 99499 UNLISTED E&M SERVICE: CPT | Mod: HCNC,S$GLB,, | Performed by: OPHTHALMOLOGY

## 2020-05-21 PROCEDURE — 99499 RISK ADDL DX/OHS AUDIT: ICD-10-PCS | Mod: HCNC,S$GLB,, | Performed by: OPHTHALMOLOGY

## 2020-05-21 PROCEDURE — 92012 PR EYE EXAM, EST PATIENT,INTERMED: ICD-10-PCS | Mod: HCNC,S$GLB,, | Performed by: OPHTHALMOLOGY

## 2020-05-21 PROCEDURE — 92012 INTRM OPH EXAM EST PATIENT: CPT | Mod: HCNC,S$GLB,, | Performed by: OPHTHALMOLOGY

## 2020-05-21 PROCEDURE — 99999 PR PBB SHADOW E&M-EST. PATIENT-LVL I: ICD-10-PCS | Mod: PBBFAC,HCNC,, | Performed by: OPHTHALMOLOGY

## 2020-05-21 PROCEDURE — 99999 PR PBB SHADOW E&M-EST. PATIENT-LVL I: CPT | Mod: PBBFAC,HCNC,, | Performed by: OPHTHALMOLOGY

## 2020-05-21 RX ORDER — LATANOPROST 50 UG/ML
1 SOLUTION/ DROPS OPHTHALMIC NIGHTLY
Qty: 7.5 ML | Refills: 3 | Status: SHIPPED | OUTPATIENT
Start: 2020-05-21 | End: 2020-11-18 | Stop reason: SDUPTHER

## 2020-05-21 NOTE — PROGRESS NOTES
SUBJECTIVE  Raquel Pickard is 64 y.o. female  Uncorrected distance visual acuity was 20/60 in the right eye and 20/25 -1 in the left eye.   Chief Complaint   Patient presents with    Glaucoma          HPI     The patient states her eyes are doing fine. The patient states she would   like an IOP check by [facility] then discuss cataract surgery.    1. Severe COAG DX 7-10 (Tmax 23/25) Goal = 16  SLT OS 4/23/14(18-16)  SLT OD 2/12/14(17-16)  HYPEREMIA WITH XALATAN AND TRAVATAN     2. DM SINCE 2012 NO DBR  3. MOD CATS OD>OS  4. Dry Eyes    COSOPT BID OU  ALPHAGAN BID OU  LATANOPROST QHS OU    Last edited by Alexus Kelley on 5/21/2020  9:00 AM. (History)         Assessment /Plan :  1. Primary open angle glaucoma of both eyes, severe stage IOP not within acceptable range relative to target IOP with risk of irreversible visual loss. Better IOP control is recommended. Discussed options, risks, and benefits of additional medication, SLT laser, and/or incisional glaucoma surgery. Reviewed importance of continued compliance with treatment and follow up.     Patient chooses pt ran out of drops will resume      2. Nuclear sclerosis of both eyes will discuss surgery once covid-19 has declined    3. Type 2 diabetes mellitus without complication, without long-term current use of insulin             Return to clinic in 3 months  or as needed.  With IOP Check and GOCT

## 2020-06-17 ENCOUNTER — TELEPHONE (OUTPATIENT)
Dept: INTERNAL MEDICINE | Facility: CLINIC | Age: 65
End: 2020-06-17

## 2020-06-17 NOTE — TELEPHONE ENCOUNTER
Spoke with patient and she wanted to speak with doctor about there being a recall on Metformin and she wants to know what to do.----- Message from Maggy Slaughter sent at 6/16/2020 11:09 AM CDT -----  Contact: Raquel  Pt called to consult with nurse about medications. Please call pt back at [phone] or [phone]. Thanks tp

## 2020-06-19 ENCOUNTER — TELEPHONE (OUTPATIENT)
Dept: INTERNAL MEDICINE | Facility: CLINIC | Age: 65
End: 2020-06-19

## 2020-06-19 NOTE — TELEPHONE ENCOUNTER
Called [facility] pharmacy and spoke with Dayan (pharmacy technician) and she stated that none of the [facility] patients were affected by recall because they don't use any of the manufacturers that were involved in the recall. I spoke with patient and informed her of what I was told by Chuy and she stated that she hopes they are telling the truth and thanked me for calling.

## 2020-06-21 NOTE — TELEPHONE ENCOUNTER
Only some products/mfg were affected. She should call her pharmacy to see if her medication was affected. If it was, they might have an alternate  available for her.  If it was affected and they have no safe product, she can check with other parmacies to see if they have unaffected lines in stock.  [facility] have unaffected product and I can refill it through them if needed.

## 2020-07-06 ENCOUNTER — TELEPHONE (OUTPATIENT)
Dept: INTERNAL MEDICINE | Facility: CLINIC | Age: 65
End: 2020-07-06

## 2020-07-06 NOTE — TELEPHONE ENCOUNTER
----- Message from Ousmane Bowman sent at 7/6/2020  2:20 PM CDT -----  Contact: Pt  Pt states she has questions about her meds / and can be reached at [phone]//[email or web address]

## 2020-07-22 ENCOUNTER — OUTSIDE PLACE OF SERVICE (OUTPATIENT)
Dept: ADMINISTRATIVE | Facility: OTHER | Age: 65
End: 2020-07-22
Payer: MEDICARE

## 2020-07-22 PROCEDURE — 66984 XCAPSL CTRC RMVL W/O ECP: CPT | Mod: 51,RT,ICN, | Performed by: OPHTHALMOLOGY

## 2020-07-22 PROCEDURE — 66984 PR REMOVAL, CATARACT, W/INSRT INTRAOC LENS, W/O ENDO CYCLO: ICD-10-PCS | Mod: 51,RT,ICN, | Performed by: OPHTHALMOLOGY

## 2020-07-22 PROCEDURE — 0191T PR INSERT ANT SEGMENT DRAIN WO RESERVOIR, INTERNAL APPR, INTO TRABECULAR: CPT | Mod: RT,ICN,, | Performed by: OPHTHALMOLOGY

## 2020-07-22 PROCEDURE — 0191T PR INSERT ANT SEGMENT DRAIN WO RESERVOIR, INTERNAL APPR, INTO TRABECULAR: ICD-10-PCS | Mod: RT,ICN,, | Performed by: OPHTHALMOLOGY

## 2020-07-23 ENCOUNTER — OFFICE VISIT (OUTPATIENT)
Dept: OPHTHALMOLOGY | Facility: CLINIC | Age: 65
End: 2020-07-23
Payer: MEDICARE

## 2020-07-23 DIAGNOSIS — Z98.890 POST-OPERATIVE STATE: Primary | ICD-10-CM

## 2020-07-23 PROCEDURE — 99999 PR PBB SHADOW E&M-EST. PATIENT-LVL III: CPT | Mod: PBBFAC,HCNC,, | Performed by: OPHTHALMOLOGY

## 2020-07-23 PROCEDURE — 99024 PR POST-OP FOLLOW-UP VISIT: ICD-10-PCS | Mod: HCNC,S$GLB,, | Performed by: OPHTHALMOLOGY

## 2020-07-23 PROCEDURE — 99024 POSTOP FOLLOW-UP VISIT: CPT | Mod: HCNC,S$GLB,, | Performed by: OPHTHALMOLOGY

## 2020-07-23 PROCEDURE — 99999 PR PBB SHADOW E&M-EST. PATIENT-LVL III: ICD-10-PCS | Mod: PBBFAC,HCNC,, | Performed by: OPHTHALMOLOGY

## 2020-07-23 NOTE — PROGRESS NOTES
SUBJECTIVE  Raquelchris Pickard is 64 y.o. female  Uncorrected distance visual acuity was 20/20 -1 in the right eye and not recorded in the left eye.   Chief Complaint   Patient presents with    Post-op Evaluation          HPI     1 day phaco OD with migs per pt doing well no complaints     1. Severe COAG DX 7-10 (Tmax 23/25) Goal = 16  SLT OS 4/23/14(18-16)  SLT OD 2/12/14(17-16)  HYPEREMIA WITH XALATAN AND TRAVATAN     2. DM SINCE 2012 NO DBR  3. MOD CATS OS  PC IOL/MIGS OD 7/22/20  4. Dry Eyes        Pred ket qid gat bid OD   COSOPT BID OU  ALPHAGAN BID OU  LATANOPROST QHS OU    Last edited by Stefani Rojas MA on 7/23/2020  8:11 AM. (History)         Assessment /Plan :  1. Post-operative state Exam:  SLE:  S/C: normal  K : trace k fold  AC: trace cell and flare  Iris: normal  Lens: PCIOL    IMP:  PO Day 1 S/P Phaco/IOL OD with goniotomy : Doing well.    Plan:  Continue gtts to operative eye:  Pred 1% QID  Ketorolac QID  Zymaxid BID  Reinstructed in importance of absolute compliance with Post-OP instructions including medications, shield at bedtime, and limitation of activities. Follow up appointments in approximately one and six weeks or call immediately for increased pain, redness or vision loss.

## 2020-07-30 ENCOUNTER — OFFICE VISIT (OUTPATIENT)
Dept: OPHTHALMOLOGY | Facility: CLINIC | Age: 65
End: 2020-07-30
Payer: MEDICARE

## 2020-07-30 DIAGNOSIS — Z98.890 POST-OPERATIVE STATE: Primary | ICD-10-CM

## 2020-07-30 DIAGNOSIS — H40.1133 PRIMARY OPEN ANGLE GLAUCOMA OF BOTH EYES, SEVERE STAGE: ICD-10-CM

## 2020-07-30 PROCEDURE — 99999 PR PBB SHADOW E&M-EST. PATIENT-LVL III: ICD-10-PCS | Mod: PBBFAC,HCNC,, | Performed by: OPHTHALMOLOGY

## 2020-07-30 PROCEDURE — 99024 POSTOP FOLLOW-UP VISIT: CPT | Mod: HCNC,S$GLB,, | Performed by: OPHTHALMOLOGY

## 2020-07-30 PROCEDURE — 99024 PR POST-OP FOLLOW-UP VISIT: ICD-10-PCS | Mod: HCNC,S$GLB,, | Performed by: OPHTHALMOLOGY

## 2020-07-30 PROCEDURE — 99999 PR PBB SHADOW E&M-EST. PATIENT-LVL III: CPT | Mod: PBBFAC,HCNC,, | Performed by: OPHTHALMOLOGY

## 2020-07-30 NOTE — PROGRESS NOTES
SUBJECTIVE  Raquel MARLA Pickard is 64 y.o. female  Uncorrected distance visual acuity was 20/30 +2 in the right eye and not recorded in the left eye.   Chief Complaint   Patient presents with    Post-op Evaluation          HPI     Patient returns for a one week p.o. visit.  Patient states her vision is   good    Last edited by ASHELY Olivares on 7/30/2020  8:17 AM. (History)         Assessment /Plan :  1. Post-operative state Slit lamp exam:  L/L: nl  K: clear, wound sealed  AC: trace cell and flare  Iris/Lens: IOL centered and stable      IMP:  PO Week 1 S/P Phaco/ IOL withGoniotomy OD : Doing well with no evidence of infection or abnormal inflammation.     Plan:  Pt given and instructed in one week PO instructions. D/C zymaxid and start to taper off Ketorlac and Pred Forte 1% weekly. Can resume normal activitites and d/c eye shield. OTC reading glasses can be used until evaluated for final MR. Follow up in one month or PRN pain, redness, vision loss, or other concerns.     2. Primary open angle glaucoma of both eyes, severe stage

## 2020-08-07 ENCOUNTER — OFFICE VISIT (OUTPATIENT)
Dept: INTERNAL MEDICINE | Facility: CLINIC | Age: 65
End: 2020-08-07
Payer: MEDICARE

## 2020-08-07 VITALS
OXYGEN SATURATION: 95 % | BODY MASS INDEX: 31.77 KG/M2 | DIASTOLIC BLOOD PRESSURE: 80 MMHG | HEART RATE: 64 BPM | SYSTOLIC BLOOD PRESSURE: 132 MMHG | WEIGHT: 190.69 LBS | HEIGHT: 65 IN | TEMPERATURE: 97 F

## 2020-08-07 DIAGNOSIS — E11.69 HYPERLIPIDEMIA ASSOCIATED WITH TYPE 2 DIABETES MELLITUS: ICD-10-CM

## 2020-08-07 DIAGNOSIS — E78.5 HYPERLIPIDEMIA ASSOCIATED WITH TYPE 2 DIABETES MELLITUS: ICD-10-CM

## 2020-08-07 DIAGNOSIS — I10 ESSENTIAL HYPERTENSION: ICD-10-CM

## 2020-08-07 DIAGNOSIS — E11.9 TYPE 2 DIABETES MELLITUS WITHOUT COMPLICATION, WITHOUT LONG-TERM CURRENT USE OF INSULIN: Primary | ICD-10-CM

## 2020-08-07 DIAGNOSIS — E66.9 OBESITY, CLASS I, BMI 30-34.9: ICD-10-CM

## 2020-08-07 PROCEDURE — 3075F PR MOST RECENT SYSTOLIC BLOOD PRESS GE 130-139MM HG: ICD-10-PCS | Mod: HCNC,CPTII,S$GLB, | Performed by: FAMILY MEDICINE

## 2020-08-07 PROCEDURE — 99214 PR OFFICE/OUTPT VISIT, EST, LEVL IV, 30-39 MIN: ICD-10-PCS | Mod: HCNC,S$GLB,, | Performed by: FAMILY MEDICINE

## 2020-08-07 PROCEDURE — 3075F SYST BP GE 130 - 139MM HG: CPT | Mod: HCNC,CPTII,S$GLB, | Performed by: FAMILY MEDICINE

## 2020-08-07 PROCEDURE — 3008F BODY MASS INDEX DOCD: CPT | Mod: HCNC,CPTII,S$GLB, | Performed by: FAMILY MEDICINE

## 2020-08-07 PROCEDURE — 99999 PR PBB SHADOW E&M-EST. PATIENT-LVL V: CPT | Mod: PBBFAC,HCNC,, | Performed by: FAMILY MEDICINE

## 2020-08-07 PROCEDURE — 1101F PR PT FALLS ASSESS DOC 0-1 FALLS W/OUT INJ PAST YR: ICD-10-PCS | Mod: HCNC,CPTII,S$GLB, | Performed by: FAMILY MEDICINE

## 2020-08-07 PROCEDURE — 3079F DIAST BP 80-89 MM HG: CPT | Mod: HCNC,CPTII,S$GLB, | Performed by: FAMILY MEDICINE

## 2020-08-07 PROCEDURE — 1101F PT FALLS ASSESS-DOCD LE1/YR: CPT | Mod: HCNC,CPTII,S$GLB, | Performed by: FAMILY MEDICINE

## 2020-08-07 PROCEDURE — 99999 PR PBB SHADOW E&M-EST. PATIENT-LVL V: ICD-10-PCS | Mod: PBBFAC,HCNC,, | Performed by: FAMILY MEDICINE

## 2020-08-07 PROCEDURE — 99214 OFFICE O/P EST MOD 30 MIN: CPT | Mod: HCNC,S$GLB,, | Performed by: FAMILY MEDICINE

## 2020-08-07 PROCEDURE — 3008F PR BODY MASS INDEX (BMI) DOCUMENTED: ICD-10-PCS | Mod: HCNC,CPTII,S$GLB, | Performed by: FAMILY MEDICINE

## 2020-08-07 PROCEDURE — 3044F PR MOST RECENT HEMOGLOBIN A1C LEVEL <7.0%: ICD-10-PCS | Mod: HCNC,CPTII,S$GLB, | Performed by: FAMILY MEDICINE

## 2020-08-07 PROCEDURE — 3044F HG A1C LEVEL LT 7.0%: CPT | Mod: HCNC,CPTII,S$GLB, | Performed by: FAMILY MEDICINE

## 2020-08-07 PROCEDURE — 3079F PR MOST RECENT DIASTOLIC BLOOD PRESSURE 80-89 MM HG: ICD-10-PCS | Mod: HCNC,CPTII,S$GLB, | Performed by: FAMILY MEDICINE

## 2020-08-07 NOTE — PROGRESS NOTES
Subjective:       Patient ID: Raquel Pickard is a 64 y.o. female.    Chief Complaint: Follow-up (6 months)    Patient with type 2 diabetes, hypertension, hyperlipidemia here for scheduled recheck.   Due for labs - has contd to keep well controlled with only p.r.n. use of glimepiride.  sts taking a number of supplements for COVID and not exercising - states talking about getting out walking with .  Lab Results - 6/3/2020 CMP and CBC reviewed from hem/onc  Still on eye meds from recent right cataract surgery.  Still not comfortable taking metformin - discussed rare liver effect reviewed at last visit. Kidney functioin excellent - eGFR>90. She does take it - occas just two. Reviewed MOA.       Component                Value               Date                       WBC                      7.87                02/07/2020                 HGB                      11.4 (L)            02/07/2020                 HCT                      37.8                02/07/2020                 PLT                      229                 02/07/2020                 CHOL                     144                 08/07/2019                 TRIG                     126                 08/07/2019                 HDL                      45                  08/07/2019                 LDLCALC                  73.8                08/07/2019                 ALT                      12                  08/07/2019                 AST                      16                  08/07/2019                 NA                       145                 02/07/2020                 K                        4.0                 02/07/2020                 CL                       105                 02/07/2020                 CALCIUM                  10.0                02/07/2020                 CREATININE               0.8                 02/07/2020                 BUN                      18                  02/07/2020                 CO2                       31 (H)              02/07/2020                 GLU                      113 (H)             02/07/2020                 ESTGFRAFRICA             >60.0               02/07/2020                 EGFRNONAA                >60.0               02/07/2020                 HGBA1C                   6.5 (H)             02/07/2020                 MICALBCREAT              6.3                 08/07/2019            Lab Results       Component                Value               Date                       HGBA1C                   6.5 (H)             02/07/2020                 HGBA1C                   6.7 (H)             08/07/2019                 HGBA1C                   6.0 (H)             12/24/2018                 HGBA1C                   6.1 (H)             12/01/2017                 HGBA1C                   6.4 (H)             04/04/2017                 HGBA1C                   6.4 (H)             11/08/2016            Diabetes Medications        glimepiride (AMARYL) 2 MG tablet TAKE 1 TABLET DAILY WITH BREAKFAST - takes PRN only   metFORMIN (GLUCOPHAGE-XR) 500 MG 24 hr tablet Take 3 tablets (1,500 mg total) by mouth once daily.        BP Readings from Last 3 Encounters:  08/07/20 : 132/80  02/07/20 : 114/73  01/08/20 : 120/81  On her checks sts 110s-120s/60s-70s    Hypertension Medications        benazepril-hydrochlorthiazide (LOTENSIN HCT) 20-25 mg Tab Take 1 tablet by mouth once daily.    propranolol (INDERAL LA) 80 MG 24 hr capsule Take 1 capsule (80 mg total) by mouth once daily.        Lab Results rosuvastatin 10 mg       Component                Value               Date                       CHOL                     144                 08/07/2019                 CHOL                     162                 12/24/2018                 CHOL                     142                 04/04/2017            Lab Results       Component                Value               Date                       HDL                      45                   08/07/2019                 HDL                      44                  12/24/2018                 HDL                      38 (L)              04/04/2017            Lab Results       Component                Value               Date                       LDLCALC                  73.8                08/07/2019                 LDLCALC                  84.8                12/24/2018                 LDLCALC                  77.4                04/04/2017            Lab Results       Component                Value               Date                       TRIG                     126                 08/07/2019                 TRIG                     166 (H)             12/24/2018                 TRIG                     133                 04/04/2017                 CHOLHDL                  26.8                04/04/2017              Diabetes  She presents for her follow-up diabetic visit. She has type 2 diabetes mellitus. Her disease course has been stable. (Had two episodes of low BS - sweaty not right - BS was 40s. sts no affect on thought processes. Just had not eaten during day.) Pertinent negatives for diabetes include no chest pain, no foot paresthesias, no foot ulcerations, no polyphagia and no polyuria. There are no hypoglycemic complications. There are no diabetic complications. Risk factors for coronary artery disease include hypertension, obesity, dyslipidemia, diabetes mellitus, post-menopausal and sedentary lifestyle. Current diabetic treatment includes oral agent (dual therapy). Weight trend: down 9 lbs since Feb. She is following a generally healthy diet. Meal planning includes avoidance of concentrated sweets. She rarely (was going to the gym prior to COVID - not walking) participates in exercise. Her home blood glucose trend is fluctuating minimally. Her breakfast blood glucose range is generally 110-130 mg/dl. An ACE inhibitor/angiotensin II receptor blocker is being taken. She does not  see a podiatrist.Eye exam is current.     Review of Systems   Constitutional: Negative for fever.   HENT: Negative for sore throat.    Respiratory: Negative for cough and shortness of breath.    Cardiovascular: Negative for chest pain and leg swelling.   Gastrointestinal: Negative for constipation and diarrhea.   Endocrine: Negative for polyphagia and polyuria.   Musculoskeletal: Positive for arthralgias (mild - resolve with movement). Negative for back pain.   Psychiatric/Behavioral: Negative for sleep disturbance.       Objective:      Physical Exam  Constitutional:       Appearance: She is well-developed.   HENT:      Head: Normocephalic and atraumatic.   Cardiovascular:      Rate and Rhythm: Normal rate and regular rhythm.      Heart sounds: Normal heart sounds.   Pulmonary:      Effort: Pulmonary effort is normal. No respiratory distress.      Breath sounds: Normal breath sounds.   Musculoskeletal:      Right lower leg: No edema.      Left lower leg: No edema.   Skin:     General: Skin is warm and dry.   Neurological:      Mental Status: She is alert and oriented to person, place, and time.   Psychiatric:         Behavior: Behavior normal.           Assessment/Plan:     1. Type 2 diabetes mellitus without complication, without long-term current use of insulin  Hemoglobin A1C    Microalbumin/creatinine urine ratio    Hemoglobin A1C   2. Hyperlipidemia associated with type 2 diabetes mellitus  Lipid Panel   3. Essential hypertension  Basic metabolic panel   doing well - any changes dep on labs - otherwise recheck 6 months with labs  HUMA signed for mm from 10/2020 - Dr Hernandez  Metformin vs alternatives discussed - she will continue on metformin.   PNV next time. Shingrix rec reviewed. Flu shot advised early Oct.

## 2020-08-19 ENCOUNTER — PATIENT OUTREACH (OUTPATIENT)
Dept: ADMINISTRATIVE | Facility: HOSPITAL | Age: 65
End: 2020-08-19

## 2020-08-20 ENCOUNTER — PATIENT OUTREACH (OUTPATIENT)
Dept: ADMINISTRATIVE | Facility: OTHER | Age: 65
End: 2020-08-20

## 2020-08-20 NOTE — PROGRESS NOTES
Health Maintenance Due   Topic Date Due    Shingles Vaccine (1 of 2) 10/15/2004    Mammogram  10/08/2019     Updates were requested from care everywhere.  Chart was reviewed for overdue Proactive [facility] Encounters ([facility]) topics (CRS, Breast Cancer Screening, Eye exam)  Health Maintenance has been updated.  LINKS immunization registry triggered.  Immunizations were reconciled.

## 2020-08-21 ENCOUNTER — OFFICE VISIT (OUTPATIENT)
Dept: OPHTHALMOLOGY | Facility: CLINIC | Age: 65
End: 2020-08-21
Payer: MEDICARE

## 2020-08-21 DIAGNOSIS — H40.1133 PRIMARY OPEN ANGLE GLAUCOMA OF BOTH EYES, SEVERE STAGE: ICD-10-CM

## 2020-08-21 DIAGNOSIS — Z98.890 POST-OPERATIVE STATE: Primary | ICD-10-CM

## 2020-08-21 PROCEDURE — 99499 RISK ADDL DX/OHS AUDIT: ICD-10-PCS | Mod: S$GLB,,, | Performed by: OPHTHALMOLOGY

## 2020-08-21 PROCEDURE — 99024 POSTOP FOLLOW-UP VISIT: CPT | Mod: HCNC,S$GLB,, | Performed by: OPHTHALMOLOGY

## 2020-08-21 PROCEDURE — 99499 UNLISTED E&M SERVICE: CPT | Mod: S$GLB,,, | Performed by: OPHTHALMOLOGY

## 2020-08-21 PROCEDURE — 99999 PR PBB SHADOW E&M-EST. PATIENT-LVL III: ICD-10-PCS | Mod: PBBFAC,HCNC,, | Performed by: OPHTHALMOLOGY

## 2020-08-21 PROCEDURE — 99999 PR PBB SHADOW E&M-EST. PATIENT-LVL III: CPT | Mod: PBBFAC,HCNC,, | Performed by: OPHTHALMOLOGY

## 2020-08-21 PROCEDURE — 99024 PR POST-OP FOLLOW-UP VISIT: ICD-10-PCS | Mod: HCNC,S$GLB,, | Performed by: OPHTHALMOLOGY

## 2020-08-21 NOTE — PROGRESS NOTES
SUBJECTIVE  Raquelchris Pickard is 64 y.o. female  Uncorrected distance visual acuity was 20/25 -1 in the right eye and 20/20 -1 in the left eye.   Chief Complaint   Patient presents with    Glaucoma          HPI     Pt here for 1 month iop check states she has been out of alphagan for 5   days       1. Severe COAG DX 7-10 (Tmax 23/25) Goal = 16  SLT OS 4/23/14(18-16)  SLT OD 2/12/14(17-16)  HYPEREMIA WITH XALATAN AND TRAVATAN     2. DM SINCE 2012 NO DBR  3. MOD CATS OS  PC IOL/Hydrus OD 7/22/20 (+18.5 SN60WF)  4. Dry Eyes        COSOPT BID OU  ALPHAGAN BID OU  LATANOPROST QHS OU    Last edited by Stefani Rojas MA on 8/21/2020 10:51 AM. (History)         Assessment /Plan :  1. Post-operative state   Exam:    Slit lamp exam:  L/L: nl  S/C: quiet and uninflamed  K: clear, wound sealed  AC: no cell or flare  Iris/Lens: IOL centered and stable      Assessment:    PO Month 1 S/P Phaco/IOL OD + MIGS :Doing Well and completing healing process. Final visual correction options discussed and appropriate prescriptions and/or OTC reading glass recommendations offered to patient. Pt desires OTC Readers. Pt instructed to follow up with Dr. Alberto in 3 months for IOP check and GOCT or PRN any pain, redness, vision changes or other concerns.     2. Primary open angle glaucoma of both eyes, severe stage

## 2020-08-24 ENCOUNTER — PATIENT OUTREACH (OUTPATIENT)
Dept: ADMINISTRATIVE | Facility: HOSPITAL | Age: 65
End: 2020-08-24

## 2020-08-24 NOTE — LETTER
August 24, 2020    Dr Kassy Hernandez             [facility]  [address]  [facility] [address]  Phone: [phone] August 24, 2020     Patient: Raquel Pickard    YOB: 1955   Date of Visit: 8/24/2020       To whom it may concern       We are seeing Raquel PickardJONATHAN.O.B is 1955, at [facility]. Kathy Delgado MD is their primary care physician. To help with our [place] maintenance records could you please send the following:     Most recent Mammogram Result.    Please send fax to [phone], Attention Amanda MORA LPN Care Coordination.    Thank-you in advance for your assistance. If you have any questions or concerns, please don't hesitate to contact me at [phone].     Amanda DEAN LPN  Care Coordination Department  [facility]

## 2020-08-24 NOTE — PROGRESS NOTES
MAMMOGRAM REPORT: I spoke with pt that states she has her mammogram yearly outside of [facility] and is up to date. I have requested mammogram.

## 2020-09-25 ENCOUNTER — PATIENT OUTREACH (OUTPATIENT)
Dept: ADMINISTRATIVE | Facility: HOSPITAL | Age: 65
End: 2020-09-25

## 2020-09-25 NOTE — LETTER
September 25, 2020        Ada Daigle MD  [address] [facility] [address]             [facility] [address]  [facility] [address]  Phone: [phone]   Patient: Raquel Pickard   MR Number: [id number]   YOB: 1955   Date of Visit: 9/25/2020       Dear Dr. Daigle:      To whom it may concern       We are seeing Raquel Pickard YOB: 1955, at [facility]. Kathy Delgado MD is their primary care physician. To help with our health maintenance records could you please send the following:     Most recent Mammogram Result.      Please send fax to [phone].    Thank-you in advance for your assistance. If you have any questions or concerns, please don't hesitate to contact me at [phone].     Jami GUTIÉRREZ LPN Care Coordination   [facility]  Phone [phone],  Fax [phone]

## 2020-09-25 NOTE — PROGRESS NOTES
Working Mammogram Report.    Encounter note 8/24/20 indicates mammo is up to date and report has been requested.

## 2020-09-30 ENCOUNTER — IMMUNIZATION (OUTPATIENT)
Dept: PHARMACY | Facility: CLINIC | Age: 65
End: 2020-09-30
Payer: MEDICARE

## 2020-10-12 ENCOUNTER — TELEPHONE (OUTPATIENT)
Dept: INTERNAL MEDICINE | Facility: CLINIC | Age: 65
End: 2020-10-12

## 2020-10-12 NOTE — TELEPHONE ENCOUNTER
----- Message from Eliana Sousa sent at 10/12/2020 11:43 AM CDT -----  Contact: Raquel Torres called in regards to having concerns about medication recall. Please call back at [phone]. Thanks jh

## 2020-10-12 NOTE — TELEPHONE ENCOUNTER
Spoke with patient.  Advised her that specific lots and specific 's products are involved.  Advised patient to contact her pharmacy to find out if her medications are involved.  She is generally a little worried about metformin to begin with.  Her numbers are very good.  We discussed that she could probably be well controlled on just 2 metformin daily.  Advised her that she can stop her metformin altogether until she finds out if it is involved in the recalls.  Advised her to start out with 1 and then add a 2nd 1 after a week if she has stopped it for any amount of time to avoid diarrhea.  Asked her to give us a call when she has clarified what she is going to do.

## 2020-10-30 ENCOUNTER — OFFICE VISIT (OUTPATIENT)
Dept: ORTHOPEDICS | Facility: CLINIC | Age: 65
End: 2020-10-30
Payer: MEDICARE

## 2020-10-30 VITALS
HEIGHT: 65 IN | HEART RATE: 75 BPM | WEIGHT: 190 LBS | SYSTOLIC BLOOD PRESSURE: 113 MMHG | DIASTOLIC BLOOD PRESSURE: 81 MMHG | BODY MASS INDEX: 31.65 KG/M2

## 2020-10-30 DIAGNOSIS — M65.4 RADIAL STYLOID TENOSYNOVITIS (DE QUERVAIN): Primary | ICD-10-CM

## 2020-10-30 DIAGNOSIS — M65.30 TRIGGER FINGER, ACQUIRED: ICD-10-CM

## 2020-10-30 PROCEDURE — 3074F SYST BP LT 130 MM HG: CPT | Mod: HCNC,CPTII,S$GLB, | Performed by: ORTHOPAEDIC SURGERY

## 2020-10-30 PROCEDURE — 20550 NJX 1 TENDON SHEATH/LIGAMENT: CPT | Mod: HCNC,RT,S$GLB, | Performed by: ORTHOPAEDIC SURGERY

## 2020-10-30 PROCEDURE — 1125F AMNT PAIN NOTED PAIN PRSNT: CPT | Mod: HCNC,S$GLB,, | Performed by: ORTHOPAEDIC SURGERY

## 2020-10-30 PROCEDURE — 99213 PR OFFICE/OUTPT VISIT, EST, LEVL III, 20-29 MIN: ICD-10-PCS | Mod: HCNC,25,S$GLB, | Performed by: ORTHOPAEDIC SURGERY

## 2020-10-30 PROCEDURE — 20550 TENDON SHEATH: R LONG MCP: ICD-10-PCS | Mod: HCNC,RT,S$GLB, | Performed by: ORTHOPAEDIC SURGERY

## 2020-10-30 PROCEDURE — 3079F DIAST BP 80-89 MM HG: CPT | Mod: HCNC,CPTII,S$GLB, | Performed by: ORTHOPAEDIC SURGERY

## 2020-10-30 PROCEDURE — 99213 OFFICE O/P EST LOW 20 MIN: CPT | Mod: HCNC,25,S$GLB, | Performed by: ORTHOPAEDIC SURGERY

## 2020-10-30 PROCEDURE — 3079F PR MOST RECENT DIASTOLIC BLOOD PRESSURE 80-89 MM HG: ICD-10-PCS | Mod: HCNC,CPTII,S$GLB, | Performed by: ORTHOPAEDIC SURGERY

## 2020-10-30 PROCEDURE — 3008F BODY MASS INDEX DOCD: CPT | Mod: HCNC,CPTII,S$GLB, | Performed by: ORTHOPAEDIC SURGERY

## 2020-10-30 PROCEDURE — 1101F PT FALLS ASSESS-DOCD LE1/YR: CPT | Mod: HCNC,CPTII,S$GLB, | Performed by: ORTHOPAEDIC SURGERY

## 2020-10-30 PROCEDURE — 3008F PR BODY MASS INDEX (BMI) DOCUMENTED: ICD-10-PCS | Mod: HCNC,CPTII,S$GLB, | Performed by: ORTHOPAEDIC SURGERY

## 2020-10-30 PROCEDURE — 99999 PR PBB SHADOW E&M-EST. PATIENT-LVL III: CPT | Mod: PBBFAC,HCNC,, | Performed by: ORTHOPAEDIC SURGERY

## 2020-10-30 PROCEDURE — 1125F PR PAIN SEVERITY QUANTIFIED, PAIN PRESENT: ICD-10-PCS | Mod: HCNC,S$GLB,, | Performed by: ORTHOPAEDIC SURGERY

## 2020-10-30 PROCEDURE — 1101F PR PT FALLS ASSESS DOC 0-1 FALLS W/OUT INJ PAST YR: ICD-10-PCS | Mod: HCNC,CPTII,S$GLB, | Performed by: ORTHOPAEDIC SURGERY

## 2020-10-30 PROCEDURE — 1159F MED LIST DOCD IN RCRD: CPT | Mod: HCNC,S$GLB,, | Performed by: ORTHOPAEDIC SURGERY

## 2020-10-30 PROCEDURE — 99999 PR PBB SHADOW E&M-EST. PATIENT-LVL III: ICD-10-PCS | Mod: PBBFAC,HCNC,, | Performed by: ORTHOPAEDIC SURGERY

## 2020-10-30 PROCEDURE — 1159F PR MEDICATION LIST DOCUMENTED IN MEDICAL RECORD: ICD-10-PCS | Mod: HCNC,S$GLB,, | Performed by: ORTHOPAEDIC SURGERY

## 2020-10-30 PROCEDURE — 3074F PR MOST RECENT SYSTOLIC BLOOD PRESSURE < 130 MM HG: ICD-10-PCS | Mod: HCNC,CPTII,S$GLB, | Performed by: ORTHOPAEDIC SURGERY

## 2020-10-30 RX ORDER — TRIAMCINOLONE ACETONIDE 40 MG/ML
40 INJECTION, SUSPENSION INTRA-ARTICULAR; INTRAMUSCULAR
Status: SHIPPED | OUTPATIENT
Start: 2020-10-30

## 2020-10-30 RX ADMIN — TRIAMCINOLONE ACETONIDE 40 MG: 40 INJECTION, SUSPENSION INTRA-ARTICULAR; INTRAMUSCULAR at 08:10

## 2020-10-30 NOTE — PROGRESS NOTES
Subjective:     Patient ID: Raquel Pickard is a 65 y.o. female.    Chief Complaint: Pain of the Right Wrist    The patient is a 65-year-old female status post right de Quervain injection November 2019 with good results until last month.  She declines additional injections.  She wishes to have a right de Quervain release.  She has tried splinting without improvement.  She also has right long trigger finger she wished to have addressed at the same surgery and also inject left index and small finger during the same anesthetic.  However she changed her mind and wished to have injection right de Quervain and right long trigger finger.      Past Medical History:   Diagnosis Date    Breast cancer 2011    L    Diabetes mellitus     Glaucoma     Hyperlipidemia     Hypertension     MVP (mitral valve prolapse)     Obesity      Past Surgical History:   Procedure Laterality Date    BREAST LUMPECTOMY      CATARACT EXTRACTION      COLONOSCOPY W/ POLYPECTOMY  04/18/2017    DR. CHARLENE NATH/ANTONIO SALTER. POLYP X 3. REPEAT 5 YRS.    PCIOL Right     DR. MENDOZA    SLT - OU - BOTH EYES      TRIGGER FINGER RELEASE      Thumb    TUBAL LIGATION       Family History   Problem Relation Age of Onset    Glaucoma Brother     Macular degeneration Brother     Esophageal cancer Father     Heart disease Father     Hypertension Father     Liver cancer Mother     Cancer Mother     Hypertension Sister     Cataracts Sister     Diabetes Sister     Cholelithiasis Sister     Blindness Neg Hx     Retinal detachment Neg Hx     Strabismus Neg Hx     Stroke Neg Hx     Thyroid disease Neg Hx      Social History     Socioeconomic History    Marital status:      Spouse name: Not on file    Number of children: 2    Years of education: Not on file    Highest education level: Not on file   Occupational History    Occupation: retired teacher   Social Needs    Financial resource strain: Not on file    Food insecurity      Worry: Not on file     Inability: Not on file    Transportation needs     Medical: Not on file     Non-medical: Not on file   Tobacco Use    Smoking status: Never Smoker    Smokeless tobacco: Never Used   Substance and Sexual Activity    Alcohol use: Yes     Comment: very rare    Drug use: No    Sexual activity: Yes     Partners: Male   Lifestyle    Physical activity     Days per week: Not on file     Minutes per session: Not on file    Stress: Not on file   Relationships    Social connections     Talks on phone: Not on file     Gets together: Not on file     Attends Jew service: Not on file     Active member of club or organization: Not on file     Attends meetings of clubs or organizations: Not on file     Relationship status: Not on file   Other Topics Concern    Not on file   Social History Narrative    Not on file     Medication List with Changes/Refills   Current Medications    BENAZEPRIL-HYDROCHLORTHIAZIDE (LOTENSIN HCT) 20-25 MG TAB    Take 1 tablet by mouth once daily.    BLOOD SUGAR DIAGNOSTIC STRP    To check BG 1 times daily, to use with [employer] meter    BLOOD-GLUCOSE METER KIT    To check BG 1 times daily, [employer] meter    BRIMONIDINE 0.15 % OPTH DROP (ALPHAGAN) 0.15 % OPHTHALMIC SOLUTION    Place 1 drop into both eyes 2 (two) times daily. Dispense 3 month supply    DORZOLAMIDE-TIMOLOL 2-0.5% (COSOPT) 22.3-6.8 MG/ML OPHTHALMIC SOLUTION    Place 1 drop into both eyes 2 (two) times daily. Please dispense a 3 month supply    GATIFLOXACIN 0.5 % DROP DROPS    Place 1 drop into the right eye 2 (two) times daily. Start one day before surgery    GLIMEPIRIDE (AMARYL) 2 MG TABLET    TAKE 1 TABLET DAILY WITH BREAKFAST    KETOROLAC 0.5% (ACULAR) 0.5 % DROP    Place 1 drop into the right eye 4 (four) times daily. Eyedrops to start one day before surgery    LANCETS MISC    To check BG 1 times daily, to use with [employer] meter    LATANOPROST 0.005 % OPHTHALMIC SOLUTION    Place 1  drop into both eyes every evening. Please dispense a 3 month supply    LATANOPROST 0.005 % OPHTHALMIC SOLUTION    Place 1 drop into both eyes every evening.    METFORMIN (GLUCOPHAGE-XR) 500 MG 24 HR TABLET    Take 3 tablets (1,500 mg total) by mouth once daily.    PREDNISOLONE ACETATE (PRED FORTE) 1 % DRPS    Place 1 drop into the right eye 4 (four) times daily. Start one day before surgery    PROPRANOLOL (INDERAL LA) 80 MG 24 HR CAPSULE    Take 1 capsule (80 mg total) by mouth once daily.    ROSUVASTATIN (CRESTOR) 10 MG TABLET    TAKE 1 TABLET NIGHTLY     Review of patient's allergies indicates:   Allergen Reactions    Travatan z [travoprost]      Review of Systems   Constitution: Negative for malaise/fatigue.   HENT: Negative for hearing loss.    Eyes: Positive for visual disturbance. Negative for double vision.   Cardiovascular: Negative for chest pain.   Respiratory: Negative for shortness of breath.    Endocrine: Negative for cold intolerance.   Hematologic/Lymphatic: Does not bruise/bleed easily.   Skin: Negative for poor wound healing and suspicious lesions.   Musculoskeletal: Negative for gout, joint pain and joint swelling.   Gastrointestinal: Negative for nausea and vomiting.   Genitourinary: Negative for dysuria.   Neurological: Negative for numbness, paresthesias and sensory change.   Psychiatric/Behavioral: Negative for depression, memory loss and substance abuse. The patient is not nervous/anxious.    Allergic/Immunologic: Negative for persistent infections.       Objective:   Body mass index is 31.62 kg/m².  Vitals:    10/30/20 0821   BP: 113/81   Pulse: 75                General    Constitutional: She is oriented to person, place, and time. She appears well-developed and well-nourished. No distress.   HENT:   Head: Normocephalic.   Mouth/Throat: Oropharynx is clear and moist.   Eyes: EOM are normal.   Neck: Normal range of motion.   Cardiovascular: Normal rate.    Pulmonary/Chest: Effort normal.    Abdominal: Soft.   Neurological: She is alert and oriented to person, place, and time. No cranial nerve deficit.   Psychiatric: She has a normal mood and affect.             Right Hand/Wrist Exam     Inspection   Scars: Wrist - absent Hand -  absent  Effusion: Wrist - absent Hand -  absent    Pain   Hand - The patient exhibits pain of the middle MCP.  Wrist - The patient exhibits pain of the extensory musculature.    Tests   Finkelstein's test: positive      Other     Neuorologic Exam    Median Distribution: normal  Ulnar Distribution: normal  Radial Distribution: normal    Comments:  The patient is tender 1st dorsal extensor tendon compartment with a positive Finkelstein test.  She has active triggering of the right long finger.  She has palpable nodule that moves with tendon excursion.      Left Hand/Wrist Exam     Inspection   Scars: Wrist - absent Hand -  absent  Effusion: Wrist - absent Hand -  absent    Pain   Hand - The patient exhibits pain of the little MCP and index MCP.    Other     Sensory Exam  Median Distribution: normal  Ulnar Distribution: normal  Radial Distribution: normal    Comments:  The patient is active triggering left index and small finger with a palpable nodule that moves with tendon excursion.          Vascular Exam       Capillary Refill  Right Hand: normal capillary refill  Left Hand: normal capillary refill      Relevant imaging results reviewed and interpreted by me, discussed with the patient and / or family today radiographs right wrist were last year were normal but she does have 2 small calcific deposits long and small finger PIP joints as noted previously  Assessment:     Encounter Diagnoses   Name Primary?    Trigger finger, acquired Yes    Radial styloid tenosynovitis (de quervain)     right long trigger finger  Right de Quervain tendonitis  Left index and small trigger finger    Plan:       The patient wishes to have a right de Quervain release as well as right long trigger  finger release.  She wishes to have the left index and small trigger fingers injected during the same anesthetic..  She was counseled regarding the surgery.  Risk complications and alternatives were discussed including the risk of infection, anesthetic risk, injury to nerves and vessels, loss of motion, and possible need for additional surgeries were discussed.  She seems to understand and agree that surgery.  All questions were answered.  However the patient changed her mind and wished to have injection right de Quervain and right long trigger finger and was injected today with 0.5 cc of Kenalog and 0.5 cc of 2% plain lidocaine 1st dorsal extensor tendon compartment and 0.5 cc Kenalog and 0.5 cc 2% plain lidocaine in her right long trigger finger.              Disclaimer: This note was prepared using a voice recognition system and is likely to have sound alike errors within the text.

## 2020-10-30 NOTE — PROCEDURES
Tendon Sheath    Date/Time: 10/30/2020 8:40 AM  Performed by: Mark Rashid MD  Authorized by: Mark Rashid MD     Consent Done?:  Yes (Verbal)  Indications:  Pain  Timeout: prior to procedure the correct patient, procedure, and site was verified    Prep: patient was prepped and draped in usual sterile fashion      Local anesthesia used?: Yes    Local anesthetic:  Lidocaine 2% without epinephrine  Anesthetic total (ml):  0.5    Location:  Wrist  Site:  R first doral compartment  Ultrasonic guidance for needle placement?: No    Needle size:  25 G  Medications:  40 mg triamcinolone acetonide 40 mg/mL

## 2020-10-30 NOTE — PROCEDURES
Tendon Sheath: R long MCP    Date/Time: 10/30/2020 8:40 AM  Performed by: Mark Rashid MD  Authorized by: Mark Rashid MD     Consent Done?:  Yes (Verbal)  Indications:  Pain  Timeout: prior to procedure the correct patient, procedure, and site was verified    Prep: patient was prepped and draped in usual sterile fashion      Local anesthesia used?: Yes    Local anesthetic:  Lidocaine 2% without epinephrine  Anesthetic total (ml):  0.5    Location:  Long finger  Site:  R long MCP  Ultrasonic guidance for needle placement?: No    Needle size:  25 G  Approach:  Volar  Medications:  40 mg triamcinolone acetonide 40 mg/mL

## 2020-11-18 ENCOUNTER — OFFICE VISIT (OUTPATIENT)
Dept: OPHTHALMOLOGY | Facility: CLINIC | Age: 65
End: 2020-11-18
Payer: MEDICARE

## 2020-11-18 DIAGNOSIS — Z96.1 PSEUDOPHAKIA OF RIGHT EYE: ICD-10-CM

## 2020-11-18 DIAGNOSIS — H26.9 CORTICAL CATARACT OF LEFT EYE: ICD-10-CM

## 2020-11-18 DIAGNOSIS — H40.1133 PRIMARY OPEN ANGLE GLAUCOMA OF BOTH EYES, SEVERE STAGE: Primary | ICD-10-CM

## 2020-11-18 DIAGNOSIS — H25.12 NUCLEAR SCLEROSIS OF LEFT EYE: ICD-10-CM

## 2020-11-18 DIAGNOSIS — H40.1133 PRIMARY OPEN ANGLE GLAUCOMA OF BOTH EYES, SEVERE STAGE: ICD-10-CM

## 2020-11-18 DIAGNOSIS — H25.042 POSTERIOR SUBCAPSULAR AGE-RELATED CATARACT OF LEFT EYE: ICD-10-CM

## 2020-11-18 PROCEDURE — 92012 INTRM OPH EXAM EST PATIENT: CPT | Mod: HCNC,S$GLB,, | Performed by: OPHTHALMOLOGY

## 2020-11-18 PROCEDURE — 99999 PR PBB SHADOW E&M-EST. PATIENT-LVL III: ICD-10-PCS | Mod: PBBFAC,HCNC,, | Performed by: OPHTHALMOLOGY

## 2020-11-18 PROCEDURE — 92012 PR EYE EXAM, EST PATIENT,INTERMED: ICD-10-PCS | Mod: HCNC,S$GLB,, | Performed by: OPHTHALMOLOGY

## 2020-11-18 PROCEDURE — 92133 POSTERIOR SEGMENT OCT OPTIC NERVE(OCULAR COHERENCE TOMOGRAPHY) - OU - BOTH EYES: ICD-10-PCS | Mod: HCNC,S$GLB,, | Performed by: OPHTHALMOLOGY

## 2020-11-18 PROCEDURE — 99999 PR PBB SHADOW E&M-EST. PATIENT-LVL III: CPT | Mod: PBBFAC,HCNC,, | Performed by: OPHTHALMOLOGY

## 2020-11-18 PROCEDURE — 92133 CPTRZD OPH DX IMG PST SGM ON: CPT | Mod: HCNC,S$GLB,, | Performed by: OPHTHALMOLOGY

## 2020-11-18 NOTE — PROGRESS NOTES
SUBJECTIVE  Raquelchris Pickard is 65 y.o. female  Uncorrected distance visual acuity was 20/20 -1 in the right eye and 20/25 -2 in the left eye.   Chief Complaint   Patient presents with    Glaucoma     3M IOP w/ GOCT           HPI     Glaucoma      Additional comments: 3M IOP w/ GOCT               Comments     The patient states that her eyes are doing well and every now and then   she'll see a floater. She denies any pain. 100% drop compliance.     1. Severe COAG DX 7-10 (Tmax 23/25) Goal = 16  SLT OS 4/23/14(18-16)  SLT OD 2/12/14(17-16)  HYPEREMIA WITH XALATAN AND TRAVATAN     2. DM SINCE 2012 NO DBR  3. MOD CATS OS  PC IOL + ? OD 7/22/20 (+18.5 SN60WF)  4. Dry Eyes      *gonio next visit to confirm hydrus vs gonio*    COSOPT BID OU  ALPHAGAN BID OU  LATANOPROST QHS OU          Last edited by Guillermina Mendoza on 11/18/2020  2:27 PM. (History)         Assessment /Plan :  1. Primary open angle glaucoma of both eyes, severe stage Doing well, IOP within acceptable range relative to target IOP and no evidence of progression. Continue current treatment. Reviewed importance of continued compliance with treatment and follow up.     2. Pseudophakia of right eye  -- Condition stable, no therapeutic change required. Monitoring routinely.     3. Nuclear sclerosis of left eye monitor for now   4. Cortical cataract of left eye monitor for now   5. Posterior subcapsular age-related cataract of left eye monitor for now     Return to clinic in 3 months  or as needed.  With Dilation and HVF 24-2

## 2020-11-19 RX ORDER — BRIMONIDINE TARTRATE 1.5 MG/ML
1 SOLUTION/ DROPS OPHTHALMIC 2 TIMES DAILY
Qty: 15 ML | Refills: 3 | Status: SHIPPED | OUTPATIENT
Start: 2020-11-19 | End: 2021-08-04

## 2020-11-19 RX ORDER — DORZOLAMIDE HYDROCHLORIDE AND TIMOLOL MALEATE 20; 5 MG/ML; MG/ML
1 SOLUTION/ DROPS OPHTHALMIC 2 TIMES DAILY
Qty: 10 ML | Refills: 4 | Status: SHIPPED | OUTPATIENT
Start: 2020-11-19 | End: 2021-05-03

## 2020-11-19 RX ORDER — LATANOPROST 50 UG/ML
1 SOLUTION/ DROPS OPHTHALMIC NIGHTLY
Qty: 7.5 ML | Refills: 3 | Status: SHIPPED | OUTPATIENT
Start: 2020-11-19 | End: 2021-06-01

## 2020-12-30 ENCOUNTER — OFFICE VISIT (OUTPATIENT)
Dept: INTERNAL MEDICINE | Facility: CLINIC | Age: 65
End: 2020-12-30
Payer: MEDICARE

## 2020-12-30 VITALS
TEMPERATURE: 99 F | WEIGHT: 189.81 LBS | HEART RATE: 72 BPM | BODY MASS INDEX: 31.63 KG/M2 | SYSTOLIC BLOOD PRESSURE: 118 MMHG | HEIGHT: 65 IN | DIASTOLIC BLOOD PRESSURE: 68 MMHG | OXYGEN SATURATION: 97 %

## 2020-12-30 DIAGNOSIS — H69.92 ETD (EUSTACHIAN TUBE DYSFUNCTION), LEFT: Primary | ICD-10-CM

## 2020-12-30 PROCEDURE — 3008F BODY MASS INDEX DOCD: CPT | Mod: HCNC,CPTII,S$GLB, | Performed by: FAMILY MEDICINE

## 2020-12-30 PROCEDURE — 99999 PR PBB SHADOW E&M-EST. PATIENT-LVL IV: CPT | Mod: PBBFAC,HCNC,, | Performed by: FAMILY MEDICINE

## 2020-12-30 PROCEDURE — 1126F PR PAIN SEVERITY QUANTIFIED, NO PAIN PRESENT: ICD-10-PCS | Mod: HCNC,S$GLB,, | Performed by: FAMILY MEDICINE

## 2020-12-30 PROCEDURE — 1159F MED LIST DOCD IN RCRD: CPT | Mod: HCNC,S$GLB,, | Performed by: FAMILY MEDICINE

## 2020-12-30 PROCEDURE — 3288F FALL RISK ASSESSMENT DOCD: CPT | Mod: HCNC,CPTII,S$GLB, | Performed by: FAMILY MEDICINE

## 2020-12-30 PROCEDURE — 1159F PR MEDICATION LIST DOCUMENTED IN MEDICAL RECORD: ICD-10-PCS | Mod: HCNC,S$GLB,, | Performed by: FAMILY MEDICINE

## 2020-12-30 PROCEDURE — 3074F PR MOST RECENT SYSTOLIC BLOOD PRESSURE < 130 MM HG: ICD-10-PCS | Mod: HCNC,CPTII,S$GLB, | Performed by: FAMILY MEDICINE

## 2020-12-30 PROCEDURE — 1126F AMNT PAIN NOTED NONE PRSNT: CPT | Mod: HCNC,S$GLB,, | Performed by: FAMILY MEDICINE

## 2020-12-30 PROCEDURE — 99213 PR OFFICE/OUTPT VISIT, EST, LEVL III, 20-29 MIN: ICD-10-PCS | Mod: HCNC,S$GLB,, | Performed by: FAMILY MEDICINE

## 2020-12-30 PROCEDURE — 1101F PT FALLS ASSESS-DOCD LE1/YR: CPT | Mod: HCNC,CPTII,S$GLB, | Performed by: FAMILY MEDICINE

## 2020-12-30 PROCEDURE — 3074F SYST BP LT 130 MM HG: CPT | Mod: HCNC,CPTII,S$GLB, | Performed by: FAMILY MEDICINE

## 2020-12-30 PROCEDURE — 3288F PR FALLS RISK ASSESSMENT DOCUMENTED: ICD-10-PCS | Mod: HCNC,CPTII,S$GLB, | Performed by: FAMILY MEDICINE

## 2020-12-30 PROCEDURE — 3078F DIAST BP <80 MM HG: CPT | Mod: HCNC,CPTII,S$GLB, | Performed by: FAMILY MEDICINE

## 2020-12-30 PROCEDURE — 3072F LOW RISK FOR RETINOPATHY: CPT | Mod: HCNC,S$GLB,, | Performed by: FAMILY MEDICINE

## 2020-12-30 PROCEDURE — 99999 PR PBB SHADOW E&M-EST. PATIENT-LVL IV: ICD-10-PCS | Mod: PBBFAC,HCNC,, | Performed by: FAMILY MEDICINE

## 2020-12-30 PROCEDURE — 3008F PR BODY MASS INDEX (BMI) DOCUMENTED: ICD-10-PCS | Mod: HCNC,CPTII,S$GLB, | Performed by: FAMILY MEDICINE

## 2020-12-30 PROCEDURE — 99213 OFFICE O/P EST LOW 20 MIN: CPT | Mod: HCNC,S$GLB,, | Performed by: FAMILY MEDICINE

## 2020-12-30 PROCEDURE — 1101F PR PT FALLS ASSESS DOC 0-1 FALLS W/OUT INJ PAST YR: ICD-10-PCS | Mod: HCNC,CPTII,S$GLB, | Performed by: FAMILY MEDICINE

## 2020-12-30 PROCEDURE — 3072F PR LOW RISK FOR RETINOPATHY: ICD-10-PCS | Mod: HCNC,S$GLB,, | Performed by: FAMILY MEDICINE

## 2020-12-30 PROCEDURE — 3078F PR MOST RECENT DIASTOLIC BLOOD PRESSURE < 80 MM HG: ICD-10-PCS | Mod: HCNC,CPTII,S$GLB, | Performed by: FAMILY MEDICINE

## 2020-12-30 RX ORDER — FLUTICASONE PROPIONATE 50 MCG
2 SPRAY, SUSPENSION (ML) NASAL DAILY
Qty: 16 G | Refills: 3 | Status: SHIPPED | OUTPATIENT
Start: 2020-12-30 | End: 2021-03-24

## 2020-12-30 NOTE — PROGRESS NOTES
Subjective:       Patient ID: Raquel Pickard is a 65 y.o. female.    Chief Complaint: Ear Drainage (no pain just uncomfortable pt can feel fluid draining when ear is tilled but nothing presents)    Here with c/o left ear fullness pressure for last 5 days or so -no pain just uncomfortable pt can feel fluid draining when ear is tilled but nothing comes out. No decrease hearing. Also feeling pressure to midface, mid forehead. Used vicks salve to her nostrils last night which helped, as did hot shower. Symptoms worse at night. Has never used INS spray.    Review of Systems   Constitutional: Negative for chills and fever.   HENT: Negative for hearing loss, rhinorrhea, sinus pressure and tinnitus.    Respiratory: Negative for cough.    Cardiovascular: Negative for chest pain.       Objective:      Physical Exam  Constitutional:       General: She is not in acute distress.     Appearance: She is well-developed. She is obese.   HENT:      Head: Normocephalic and atraumatic.      Right Ear: Tympanic membrane, ear canal and external ear normal. There is no impacted cerumen.      Left Ear: Tympanic membrane, ear canal and external ear normal. There is no impacted cerumen.      Ears:      Comments: Cerumen present B EACs. Not occluding either EAC.     Nose: Nose normal.   Eyes:      Conjunctiva/sclera: Conjunctivae normal.   Neck:      Musculoskeletal: Neck supple.      Thyroid: No thyromegaly.   Cardiovascular:      Rate and Rhythm: Normal rate and regular rhythm.      Heart sounds: Normal heart sounds.   Pulmonary:      Effort: Pulmonary effort is normal.      Breath sounds: Normal breath sounds.   Lymphadenopathy:      Cervical: No cervical adenopathy.   Skin:     General: Skin is warm and dry.   Neurological:      Mental Status: She is alert and oriented to person, place, and time.   Psychiatric:         Behavior: Behavior normal.           Assessment/Plan:     1. ETD (Eustachian tube dysfunction), left  fluticasone  propionate (FLONASE) 50 mcg/actuation nasal spray   also saline NS.

## 2021-01-20 ENCOUNTER — IMMUNIZATION (OUTPATIENT)
Dept: INTERNAL MEDICINE | Facility: CLINIC | Age: 66
End: 2021-01-20
Payer: MEDICARE

## 2021-01-20 DIAGNOSIS — Z23 NEED FOR VACCINATION: Primary | ICD-10-CM

## 2021-01-20 PROCEDURE — 91300 COVID-19, MRNA, LNP-S, PF, 30 MCG/0.3 ML DOSE VACCINE: CPT | Mod: HCNC,PBBFAC | Performed by: FAMILY MEDICINE

## 2021-02-05 ENCOUNTER — LAB VISIT (OUTPATIENT)
Dept: LAB | Facility: HOSPITAL | Age: 66
End: 2021-02-05
Attending: FAMILY MEDICINE
Payer: MEDICARE

## 2021-02-05 DIAGNOSIS — I10 ESSENTIAL HYPERTENSION: ICD-10-CM

## 2021-02-05 DIAGNOSIS — E11.9 TYPE 2 DIABETES MELLITUS WITHOUT COMPLICATION, WITHOUT LONG-TERM CURRENT USE OF INSULIN: ICD-10-CM

## 2021-02-05 PROCEDURE — 80048 BASIC METABOLIC PNL TOTAL CA: CPT

## 2021-02-05 PROCEDURE — 36415 COLL VENOUS BLD VENIPUNCTURE: CPT

## 2021-02-05 PROCEDURE — 83036 HEMOGLOBIN GLYCOSYLATED A1C: CPT

## 2021-02-06 LAB
ANION GAP SERPL CALC-SCNC: 11 MMOL/L (ref 8–16)
BUN SERPL-MCNC: 15 MG/DL (ref 8–23)
CALCIUM SERPL-MCNC: 9.2 MG/DL (ref 8.7–10.5)
CHLORIDE SERPL-SCNC: 103 MMOL/L (ref 95–110)
CO2 SERPL-SCNC: 28 MMOL/L (ref 23–29)
CREAT SERPL-MCNC: 0.7 MG/DL (ref 0.5–1.4)
EST. GFR  (AFRICAN AMERICAN): >60 ML/MIN/1.73 M^2
EST. GFR  (NON AFRICAN AMERICAN): >60 ML/MIN/1.73 M^2
ESTIMATED AVG GLUCOSE: 120 MG/DL (ref 68–131)
GLUCOSE SERPL-MCNC: 92 MG/DL (ref 70–110)
HBA1C MFR BLD: 5.8 % (ref 4–5.6)
POTASSIUM SERPL-SCNC: 3.4 MMOL/L (ref 3.5–5.1)
SODIUM SERPL-SCNC: 142 MMOL/L (ref 136–145)

## 2021-02-08 ENCOUNTER — OFFICE VISIT (OUTPATIENT)
Dept: INTERNAL MEDICINE | Facility: CLINIC | Age: 66
End: 2021-02-08
Payer: MEDICARE

## 2021-02-08 VITALS
RESPIRATION RATE: 18 BRPM | DIASTOLIC BLOOD PRESSURE: 80 MMHG | OXYGEN SATURATION: 95 % | TEMPERATURE: 98 F | BODY MASS INDEX: 31.33 KG/M2 | SYSTOLIC BLOOD PRESSURE: 124 MMHG | WEIGHT: 188.06 LBS | HEART RATE: 84 BPM | HEIGHT: 65 IN

## 2021-02-08 DIAGNOSIS — E11.69 HYPERLIPIDEMIA ASSOCIATED WITH TYPE 2 DIABETES MELLITUS: ICD-10-CM

## 2021-02-08 DIAGNOSIS — E11.9 TYPE 2 DIABETES MELLITUS WITHOUT COMPLICATION, WITHOUT LONG-TERM CURRENT USE OF INSULIN: Primary | ICD-10-CM

## 2021-02-08 DIAGNOSIS — Z23 NEED FOR VACCINATION: ICD-10-CM

## 2021-02-08 DIAGNOSIS — Z85.3 HISTORY OF BREAST CANCER: ICD-10-CM

## 2021-02-08 DIAGNOSIS — M54.9 PAIN, UPPER BACK: ICD-10-CM

## 2021-02-08 DIAGNOSIS — E78.5 HYPERLIPIDEMIA ASSOCIATED WITH TYPE 2 DIABETES MELLITUS: ICD-10-CM

## 2021-02-08 DIAGNOSIS — E87.6 DIURETIC-INDUCED HYPOKALEMIA: ICD-10-CM

## 2021-02-08 DIAGNOSIS — I10 ESSENTIAL HYPERTENSION: ICD-10-CM

## 2021-02-08 DIAGNOSIS — T50.2X5A DIURETIC-INDUCED HYPOKALEMIA: ICD-10-CM

## 2021-02-08 PROCEDURE — 99999 PR PBB SHADOW E&M-EST. PATIENT-LVL V: CPT | Mod: PBBFAC,,, | Performed by: FAMILY MEDICINE

## 2021-02-08 PROCEDURE — 3288F FALL RISK ASSESSMENT DOCD: CPT | Mod: CPTII,S$GLB,, | Performed by: FAMILY MEDICINE

## 2021-02-08 PROCEDURE — 1159F PR MEDICATION LIST DOCUMENTED IN MEDICAL RECORD: ICD-10-PCS | Mod: S$GLB,,, | Performed by: FAMILY MEDICINE

## 2021-02-08 PROCEDURE — 99999 PR PBB SHADOW E&M-EST. PATIENT-LVL V: ICD-10-PCS | Mod: PBBFAC,,, | Performed by: FAMILY MEDICINE

## 2021-02-08 PROCEDURE — 3072F LOW RISK FOR RETINOPATHY: CPT | Mod: S$GLB,,, | Performed by: FAMILY MEDICINE

## 2021-02-08 PROCEDURE — 3044F PR MOST RECENT HEMOGLOBIN A1C LEVEL <7.0%: ICD-10-PCS | Mod: CPTII,S$GLB,, | Performed by: FAMILY MEDICINE

## 2021-02-08 PROCEDURE — 1159F MED LIST DOCD IN RCRD: CPT | Mod: S$GLB,,, | Performed by: FAMILY MEDICINE

## 2021-02-08 PROCEDURE — 1101F PR PT FALLS ASSESS DOC 0-1 FALLS W/OUT INJ PAST YR: ICD-10-PCS | Mod: CPTII,S$GLB,, | Performed by: FAMILY MEDICINE

## 2021-02-08 PROCEDURE — 99214 PR OFFICE/OUTPT VISIT, EST, LEVL IV, 30-39 MIN: ICD-10-PCS | Mod: S$GLB,,, | Performed by: FAMILY MEDICINE

## 2021-02-08 PROCEDURE — 3079F DIAST BP 80-89 MM HG: CPT | Mod: CPTII,S$GLB,, | Performed by: FAMILY MEDICINE

## 2021-02-08 PROCEDURE — 3074F SYST BP LT 130 MM HG: CPT | Mod: CPTII,S$GLB,, | Performed by: FAMILY MEDICINE

## 2021-02-08 PROCEDURE — 3072F PR LOW RISK FOR RETINOPATHY: ICD-10-PCS | Mod: S$GLB,,, | Performed by: FAMILY MEDICINE

## 2021-02-08 PROCEDURE — 3044F HG A1C LEVEL LT 7.0%: CPT | Mod: CPTII,S$GLB,, | Performed by: FAMILY MEDICINE

## 2021-02-08 PROCEDURE — 1101F PT FALLS ASSESS-DOCD LE1/YR: CPT | Mod: CPTII,S$GLB,, | Performed by: FAMILY MEDICINE

## 2021-02-08 PROCEDURE — 1126F AMNT PAIN NOTED NONE PRSNT: CPT | Mod: S$GLB,,, | Performed by: FAMILY MEDICINE

## 2021-02-08 PROCEDURE — 3008F PR BODY MASS INDEX (BMI) DOCUMENTED: ICD-10-PCS | Mod: CPTII,S$GLB,, | Performed by: FAMILY MEDICINE

## 2021-02-08 PROCEDURE — 3008F BODY MASS INDEX DOCD: CPT | Mod: CPTII,S$GLB,, | Performed by: FAMILY MEDICINE

## 2021-02-08 PROCEDURE — 1126F PR PAIN SEVERITY QUANTIFIED, NO PAIN PRESENT: ICD-10-PCS | Mod: S$GLB,,, | Performed by: FAMILY MEDICINE

## 2021-02-08 PROCEDURE — 3288F PR FALLS RISK ASSESSMENT DOCUMENTED: ICD-10-PCS | Mod: CPTII,S$GLB,, | Performed by: FAMILY MEDICINE

## 2021-02-08 PROCEDURE — 99214 OFFICE O/P EST MOD 30 MIN: CPT | Mod: S$GLB,,, | Performed by: FAMILY MEDICINE

## 2021-02-08 PROCEDURE — 3079F PR MOST RECENT DIASTOLIC BLOOD PRESSURE 80-89 MM HG: ICD-10-PCS | Mod: CPTII,S$GLB,, | Performed by: FAMILY MEDICINE

## 2021-02-08 PROCEDURE — 3074F PR MOST RECENT SYSTOLIC BLOOD PRESSURE < 130 MM HG: ICD-10-PCS | Mod: CPTII,S$GLB,, | Performed by: FAMILY MEDICINE

## 2021-02-08 RX ORDER — CYCLOBENZAPRINE HCL 5 MG
5 TABLET ORAL NIGHTLY PRN
Qty: 20 TABLET | Refills: 0 | Status: SHIPPED | OUTPATIENT
Start: 2021-02-08 | End: 2021-07-10

## 2021-02-08 RX ORDER — POTASSIUM CHLORIDE 20 MEQ/1
20 TABLET, EXTENDED RELEASE ORAL DAILY
Qty: 90 TABLET | Refills: 3 | Status: SHIPPED | OUTPATIENT
Start: 2021-02-08 | End: 2022-05-11 | Stop reason: SDUPTHER

## 2021-02-08 RX ORDER — ROSUVASTATIN CALCIUM 20 MG/1
20 TABLET, COATED ORAL DAILY
Qty: 90 TABLET | Refills: 3 | Status: SHIPPED | OUTPATIENT
Start: 2021-02-08 | End: 2021-11-10

## 2021-02-10 ENCOUNTER — IMMUNIZATION (OUTPATIENT)
Dept: INTERNAL MEDICINE | Facility: CLINIC | Age: 66
End: 2021-02-10
Payer: MEDICARE

## 2021-02-10 ENCOUNTER — PES CALL (OUTPATIENT)
Dept: ADMINISTRATIVE | Facility: CLINIC | Age: 66
End: 2021-02-10

## 2021-02-10 DIAGNOSIS — Z23 NEED FOR VACCINATION: Primary | ICD-10-CM

## 2021-02-10 PROCEDURE — 0002A COVID-19, MRNA, LNP-S, PF, 30 MCG/0.3 ML DOSE VACCINE: CPT | Mod: HCNC,PBBFAC | Performed by: FAMILY MEDICINE

## 2021-02-10 PROCEDURE — 91300 COVID-19, MRNA, LNP-S, PF, 30 MCG/0.3 ML DOSE VACCINE: CPT | Mod: HCNC,PBBFAC | Performed by: FAMILY MEDICINE

## 2021-02-18 ENCOUNTER — PATIENT OUTREACH (OUTPATIENT)
Dept: ADMINISTRATIVE | Facility: OTHER | Age: 66
End: 2021-02-18

## 2021-02-19 ENCOUNTER — OFFICE VISIT (OUTPATIENT)
Dept: OPHTHALMOLOGY | Facility: CLINIC | Age: 66
End: 2021-02-19
Payer: MEDICARE

## 2021-02-19 DIAGNOSIS — E11.9 TYPE 2 DIABETES MELLITUS WITHOUT COMPLICATION, WITHOUT LONG-TERM CURRENT USE OF INSULIN: ICD-10-CM

## 2021-02-19 DIAGNOSIS — H40.1133 PRIMARY OPEN ANGLE GLAUCOMA OF BOTH EYES, SEVERE STAGE: Primary | ICD-10-CM

## 2021-02-19 DIAGNOSIS — Z96.1 PSEUDOPHAKIA OF RIGHT EYE: ICD-10-CM

## 2021-02-19 DIAGNOSIS — H25.12 NUCLEAR SCLEROSIS OF LEFT EYE: ICD-10-CM

## 2021-02-19 DIAGNOSIS — H25.042 POSTERIOR SUBCAPSULAR AGE-RELATED CATARACT OF LEFT EYE: ICD-10-CM

## 2021-02-19 DIAGNOSIS — H26.9 CORTICAL CATARACT OF LEFT EYE: ICD-10-CM

## 2021-02-19 LAB
LEFT EYE DM RETINOPATHY: NEGATIVE
RIGHT EYE DM RETINOPATHY: NEGATIVE

## 2021-02-19 PROCEDURE — 92083 EXTENDED VISUAL FIELD XM: CPT | Mod: S$GLB,,, | Performed by: OPHTHALMOLOGY

## 2021-02-19 PROCEDURE — 99999 PR PBB SHADOW E&M-EST. PATIENT-LVL III: ICD-10-PCS | Mod: PBBFAC,,, | Performed by: OPHTHALMOLOGY

## 2021-02-19 PROCEDURE — 3044F PR MOST RECENT HEMOGLOBIN A1C LEVEL <7.0%: ICD-10-PCS | Mod: CPTII,S$GLB,, | Performed by: OPHTHALMOLOGY

## 2021-02-19 PROCEDURE — 3044F HG A1C LEVEL LT 7.0%: CPT | Mod: CPTII,S$GLB,, | Performed by: OPHTHALMOLOGY

## 2021-02-19 PROCEDURE — 1159F MED LIST DOCD IN RCRD: CPT | Mod: S$GLB,,, | Performed by: OPHTHALMOLOGY

## 2021-02-19 PROCEDURE — 99214 OFFICE O/P EST MOD 30 MIN: CPT | Mod: S$GLB,,, | Performed by: OPHTHALMOLOGY

## 2021-02-19 PROCEDURE — 1159F PR MEDICATION LIST DOCUMENTED IN MEDICAL RECORD: ICD-10-PCS | Mod: S$GLB,,, | Performed by: OPHTHALMOLOGY

## 2021-02-19 PROCEDURE — 99214 PR OFFICE/OUTPT VISIT, EST, LEVL IV, 30-39 MIN: ICD-10-PCS | Mod: S$GLB,,, | Performed by: OPHTHALMOLOGY

## 2021-02-19 PROCEDURE — 92083 HUMPHREY VISUAL FIELD - OU - BOTH EYES: ICD-10-PCS | Mod: S$GLB,,, | Performed by: OPHTHALMOLOGY

## 2021-02-19 PROCEDURE — 99999 PR PBB SHADOW E&M-EST. PATIENT-LVL III: CPT | Mod: PBBFAC,,, | Performed by: OPHTHALMOLOGY

## 2021-03-02 DIAGNOSIS — I10 ESSENTIAL HYPERTENSION: ICD-10-CM

## 2021-03-02 RX ORDER — BENAZEPRIL/HYDROCHLOROTHIAZIDE 20 MG-25MG
1 TABLET ORAL DAILY
Qty: 90 TABLET | Refills: 0 | Status: SHIPPED | OUTPATIENT
Start: 2021-03-02 | End: 2021-04-09 | Stop reason: SINTOL

## 2021-03-02 RX ORDER — BENAZEPRIL/HYDROCHLOROTHIAZIDE 20 MG-25MG
1 TABLET ORAL DAILY
Qty: 15 TABLET | Refills: 0 | Status: SHIPPED | OUTPATIENT
Start: 2021-03-02 | End: 2021-03-02 | Stop reason: SDUPTHER

## 2021-04-09 ENCOUNTER — OFFICE VISIT (OUTPATIENT)
Dept: INTERNAL MEDICINE | Facility: CLINIC | Age: 66
End: 2021-04-09
Payer: MEDICARE

## 2021-04-09 VITALS
OXYGEN SATURATION: 99 % | DIASTOLIC BLOOD PRESSURE: 74 MMHG | BODY MASS INDEX: 31.4 KG/M2 | WEIGHT: 188.5 LBS | RESPIRATION RATE: 18 BRPM | SYSTOLIC BLOOD PRESSURE: 118 MMHG | TEMPERATURE: 98 F | HEIGHT: 65 IN | HEART RATE: 79 BPM

## 2021-04-09 DIAGNOSIS — Z17.0 MALIGNANT NEOPLASM OF LEFT BREAST IN FEMALE, ESTROGEN RECEPTOR POSITIVE, UNSPECIFIED SITE OF BREAST: ICD-10-CM

## 2021-04-09 DIAGNOSIS — Z76.89 ESTABLISHING CARE WITH NEW DOCTOR, ENCOUNTER FOR: Primary | ICD-10-CM

## 2021-04-09 DIAGNOSIS — I10 ESSENTIAL HYPERTENSION: ICD-10-CM

## 2021-04-09 DIAGNOSIS — C50.912 MALIGNANT NEOPLASM OF LEFT BREAST IN FEMALE, ESTROGEN RECEPTOR POSITIVE, UNSPECIFIED SITE OF BREAST: ICD-10-CM

## 2021-04-09 PROCEDURE — 3008F PR BODY MASS INDEX (BMI) DOCUMENTED: ICD-10-PCS | Mod: HCNC,CPTII,S$GLB, | Performed by: FAMILY MEDICINE

## 2021-04-09 PROCEDURE — 99214 PR OFFICE/OUTPT VISIT, EST, LEVL IV, 30-39 MIN: ICD-10-PCS | Mod: HCNC,S$GLB,, | Performed by: FAMILY MEDICINE

## 2021-04-09 PROCEDURE — 1159F MED LIST DOCD IN RCRD: CPT | Mod: HCNC,S$GLB,, | Performed by: FAMILY MEDICINE

## 2021-04-09 PROCEDURE — 99499 UNLISTED E&M SERVICE: CPT | Mod: HCNC,S$GLB,, | Performed by: FAMILY MEDICINE

## 2021-04-09 PROCEDURE — 1159F PR MEDICATION LIST DOCUMENTED IN MEDICAL RECORD: ICD-10-PCS | Mod: HCNC,S$GLB,, | Performed by: FAMILY MEDICINE

## 2021-04-09 PROCEDURE — 3288F PR FALLS RISK ASSESSMENT DOCUMENTED: ICD-10-PCS | Mod: HCNC,CPTII,S$GLB, | Performed by: FAMILY MEDICINE

## 2021-04-09 PROCEDURE — 3072F LOW RISK FOR RETINOPATHY: CPT | Mod: HCNC,S$GLB,, | Performed by: FAMILY MEDICINE

## 2021-04-09 PROCEDURE — 99499 RISK ADDL DX/OHS AUDIT: ICD-10-PCS | Mod: HCNC,S$GLB,, | Performed by: FAMILY MEDICINE

## 2021-04-09 PROCEDURE — 1126F PR PAIN SEVERITY QUANTIFIED, NO PAIN PRESENT: ICD-10-PCS | Mod: HCNC,S$GLB,, | Performed by: FAMILY MEDICINE

## 2021-04-09 PROCEDURE — 99999 PR PBB SHADOW E&M-EST. PATIENT-LVL IV: CPT | Mod: PBBFAC,HCNC,, | Performed by: FAMILY MEDICINE

## 2021-04-09 PROCEDURE — 1101F PR PT FALLS ASSESS DOC 0-1 FALLS W/OUT INJ PAST YR: ICD-10-PCS | Mod: HCNC,CPTII,S$GLB, | Performed by: FAMILY MEDICINE

## 2021-04-09 PROCEDURE — 1101F PT FALLS ASSESS-DOCD LE1/YR: CPT | Mod: HCNC,CPTII,S$GLB, | Performed by: FAMILY MEDICINE

## 2021-04-09 PROCEDURE — 3008F BODY MASS INDEX DOCD: CPT | Mod: HCNC,CPTII,S$GLB, | Performed by: FAMILY MEDICINE

## 2021-04-09 PROCEDURE — 99214 OFFICE O/P EST MOD 30 MIN: CPT | Mod: HCNC,S$GLB,, | Performed by: FAMILY MEDICINE

## 2021-04-09 PROCEDURE — 3072F PR LOW RISK FOR RETINOPATHY: ICD-10-PCS | Mod: HCNC,S$GLB,, | Performed by: FAMILY MEDICINE

## 2021-04-09 PROCEDURE — 3288F FALL RISK ASSESSMENT DOCD: CPT | Mod: HCNC,CPTII,S$GLB, | Performed by: FAMILY MEDICINE

## 2021-04-09 PROCEDURE — 1126F AMNT PAIN NOTED NONE PRSNT: CPT | Mod: HCNC,S$GLB,, | Performed by: FAMILY MEDICINE

## 2021-04-09 PROCEDURE — 99999 PR PBB SHADOW E&M-EST. PATIENT-LVL IV: ICD-10-PCS | Mod: PBBFAC,HCNC,, | Performed by: FAMILY MEDICINE

## 2021-04-09 RX ORDER — AMLODIPINE BESYLATE 5 MG/1
5 TABLET ORAL DAILY
Qty: 30 TABLET | Refills: 0 | Status: SHIPPED | OUTPATIENT
Start: 2021-04-09 | End: 2021-05-03

## 2021-04-23 ENCOUNTER — CLINICAL SUPPORT (OUTPATIENT)
Dept: INTERNAL MEDICINE | Facility: CLINIC | Age: 66
End: 2021-04-23
Payer: MEDICARE

## 2021-04-23 VITALS — SYSTOLIC BLOOD PRESSURE: 128 MMHG | HEART RATE: 81 BPM | DIASTOLIC BLOOD PRESSURE: 78 MMHG

## 2021-04-23 PROCEDURE — 99999 PR PBB SHADOW E&M-EST. PATIENT-LVL I: ICD-10-PCS | Mod: PBBFAC,HCNC,,

## 2021-04-23 PROCEDURE — 99999 PR PBB SHADOW E&M-EST. PATIENT-LVL I: CPT | Mod: PBBFAC,HCNC,,

## 2021-04-28 DIAGNOSIS — E11.9 TYPE 2 DIABETES MELLITUS WITHOUT COMPLICATION, WITHOUT LONG-TERM CURRENT USE OF INSULIN: ICD-10-CM

## 2021-04-28 DIAGNOSIS — I10 ESSENTIAL HYPERTENSION: Primary | ICD-10-CM

## 2021-04-29 RX ORDER — BENAZEPRIL/HYDROCHLOROTHIAZIDE 20 MG-25MG
TABLET ORAL
Qty: 90 TABLET | Refills: 3 | OUTPATIENT
Start: 2021-04-29

## 2021-04-30 ENCOUNTER — PES CALL (OUTPATIENT)
Dept: ADMINISTRATIVE | Facility: CLINIC | Age: 66
End: 2021-04-30

## 2021-04-30 RX ORDER — LANCETS
EACH MISCELLANEOUS
Qty: 100 EACH | Refills: 3 | Status: SHIPPED | OUTPATIENT
Start: 2021-04-30 | End: 2022-07-05

## 2021-04-30 RX ORDER — BLOOD SUGAR DIAGNOSTIC
STRIP MISCELLANEOUS
Qty: 100 STRIP | Refills: 3 | Status: SHIPPED | OUTPATIENT
Start: 2021-04-30 | End: 2022-07-05

## 2021-05-01 DIAGNOSIS — I10 ESSENTIAL HYPERTENSION: ICD-10-CM

## 2021-05-04 RX ORDER — AMLODIPINE BESYLATE 5 MG/1
TABLET ORAL
Qty: 90 TABLET | Refills: 3 | Status: SHIPPED | OUTPATIENT
Start: 2021-05-04 | End: 2022-05-11

## 2021-05-07 ENCOUNTER — PES CALL (OUTPATIENT)
Dept: ADMINISTRATIVE | Facility: CLINIC | Age: 66
End: 2021-05-07

## 2021-05-20 ENCOUNTER — PATIENT OUTREACH (OUTPATIENT)
Dept: ADMINISTRATIVE | Facility: OTHER | Age: 66
End: 2021-05-20

## 2021-05-24 ENCOUNTER — OFFICE VISIT (OUTPATIENT)
Dept: OPHTHALMOLOGY | Facility: CLINIC | Age: 66
End: 2021-05-24
Payer: MEDICARE

## 2021-05-24 DIAGNOSIS — Z96.1 PSEUDOPHAKIA OF RIGHT EYE: ICD-10-CM

## 2021-05-24 DIAGNOSIS — H40.1133 PRIMARY OPEN ANGLE GLAUCOMA OF BOTH EYES, SEVERE STAGE: Primary | ICD-10-CM

## 2021-05-24 DIAGNOSIS — H26.9 CORTICAL CATARACT OF LEFT EYE: ICD-10-CM

## 2021-05-24 DIAGNOSIS — H25.042 POSTERIOR SUBCAPSULAR AGE-RELATED CATARACT OF LEFT EYE: ICD-10-CM

## 2021-05-24 DIAGNOSIS — E11.9 TYPE 2 DIABETES MELLITUS WITHOUT COMPLICATION, WITHOUT LONG-TERM CURRENT USE OF INSULIN: ICD-10-CM

## 2021-05-24 DIAGNOSIS — H25.12 NUCLEAR SCLEROSIS OF LEFT EYE: ICD-10-CM

## 2021-05-24 LAB
LEFT EYE DM RETINOPATHY: NEGATIVE
RIGHT EYE DM RETINOPATHY: NEGATIVE

## 2021-05-24 PROCEDURE — 99999 PR PBB SHADOW E&M-EST. PATIENT-LVL III: ICD-10-PCS | Mod: PBBFAC,HCNC,, | Performed by: OPHTHALMOLOGY

## 2021-05-24 PROCEDURE — 99214 OFFICE O/P EST MOD 30 MIN: CPT | Mod: HCNC,S$GLB,, | Performed by: OPHTHALMOLOGY

## 2021-05-24 PROCEDURE — 92133 POSTERIOR SEGMENT OCT OPTIC NERVE(OCULAR COHERENCE TOMOGRAPHY) - OU - BOTH EYES: ICD-10-PCS | Mod: HCNC,S$GLB,, | Performed by: OPHTHALMOLOGY

## 2021-05-24 PROCEDURE — 92136 IOL MASTER - OD - RIGHT EYE: ICD-10-PCS | Mod: HCNC,RT,S$GLB, | Performed by: OPHTHALMOLOGY

## 2021-05-24 PROCEDURE — 1159F MED LIST DOCD IN RCRD: CPT | Mod: HCNC,S$GLB,, | Performed by: OPHTHALMOLOGY

## 2021-05-24 PROCEDURE — 99499 RISK ADDL DX/OHS AUDIT: ICD-10-PCS | Mod: HCNC,S$GLB,, | Performed by: OPHTHALMOLOGY

## 2021-05-24 PROCEDURE — 92133 CPTRZD OPH DX IMG PST SGM ON: CPT | Mod: HCNC,S$GLB,, | Performed by: OPHTHALMOLOGY

## 2021-05-24 PROCEDURE — 92136 OPHTHALMIC BIOMETRY: CPT | Mod: HCNC,RT,S$GLB, | Performed by: OPHTHALMOLOGY

## 2021-05-24 PROCEDURE — 1159F PR MEDICATION LIST DOCUMENTED IN MEDICAL RECORD: ICD-10-PCS | Mod: HCNC,S$GLB,, | Performed by: OPHTHALMOLOGY

## 2021-05-24 PROCEDURE — 99214 PR OFFICE/OUTPT VISIT, EST, LEVL IV, 30-39 MIN: ICD-10-PCS | Mod: HCNC,S$GLB,, | Performed by: OPHTHALMOLOGY

## 2021-05-24 PROCEDURE — 99499 UNLISTED E&M SERVICE: CPT | Mod: HCNC,S$GLB,, | Performed by: OPHTHALMOLOGY

## 2021-05-24 PROCEDURE — 99999 PR PBB SHADOW E&M-EST. PATIENT-LVL III: CPT | Mod: PBBFAC,HCNC,, | Performed by: OPHTHALMOLOGY

## 2021-05-24 RX ORDER — GATIFLOXACIN 5 MG/ML
1 SOLUTION/ DROPS OPHTHALMIC 2 TIMES DAILY
Qty: 1 BOTTLE | Refills: 2 | Status: SHIPPED | OUTPATIENT
Start: 2021-05-24 | End: 2021-10-25

## 2021-05-24 RX ORDER — PREDNISOLONE ACETATE 10 MG/ML
1 SUSPENSION/ DROPS OPHTHALMIC 4 TIMES DAILY
Qty: 1 BOTTLE | Refills: 2 | Status: SHIPPED | OUTPATIENT
Start: 2021-05-24 | End: 2021-08-23 | Stop reason: SDUPTHER

## 2021-05-24 RX ORDER — KETOROLAC TROMETHAMINE 5 MG/ML
1 SOLUTION OPHTHALMIC 4 TIMES DAILY
Qty: 1 BOTTLE | Refills: 2 | Status: SHIPPED | OUTPATIENT
Start: 2021-05-24 | End: 2021-10-25

## 2021-06-16 ENCOUNTER — OUTSIDE PLACE OF SERVICE (OUTPATIENT)
Dept: OPHTHALMOLOGY | Facility: CLINIC | Age: 66
End: 2021-06-16
Payer: MEDICARE

## 2021-06-16 PROCEDURE — 66984 XCAPSL CTRC RMVL W/O ECP: CPT | Mod: 51,LT,, | Performed by: OPHTHALMOLOGY

## 2021-06-16 PROCEDURE — 66984 PR REMOVAL, CATARACT, W/INSRT INTRAOC LENS, W/O ENDO CYCLO: ICD-10-PCS | Mod: 51,LT,, | Performed by: OPHTHALMOLOGY

## 2021-06-16 PROCEDURE — 65820 PR RELIEVE INNER EYE PRESSURE: ICD-10-PCS | Mod: LT,,, | Performed by: OPHTHALMOLOGY

## 2021-06-16 PROCEDURE — 65820 GONIOTOMY: CPT | Mod: LT,,, | Performed by: OPHTHALMOLOGY

## 2021-06-17 ENCOUNTER — OFFICE VISIT (OUTPATIENT)
Dept: OPHTHALMOLOGY | Facility: CLINIC | Age: 66
End: 2021-06-17
Payer: MEDICARE

## 2021-06-17 DIAGNOSIS — Z98.890 POST-OPERATIVE STATE: Primary | ICD-10-CM

## 2021-06-17 PROCEDURE — 99999 PR PBB SHADOW E&M-EST. PATIENT-LVL III: ICD-10-PCS | Mod: PBBFAC,HCNC,, | Performed by: OPHTHALMOLOGY

## 2021-06-17 PROCEDURE — 3044F HG A1C LEVEL LT 7.0%: CPT | Mod: HCNC,CPTII,S$GLB, | Performed by: OPHTHALMOLOGY

## 2021-06-17 PROCEDURE — 3044F PR MOST RECENT HEMOGLOBIN A1C LEVEL <7.0%: ICD-10-PCS | Mod: HCNC,CPTII,S$GLB, | Performed by: OPHTHALMOLOGY

## 2021-06-17 PROCEDURE — 99024 POSTOP FOLLOW-UP VISIT: CPT | Mod: HCNC,S$GLB,, | Performed by: OPHTHALMOLOGY

## 2021-06-17 PROCEDURE — 99999 PR PBB SHADOW E&M-EST. PATIENT-LVL III: CPT | Mod: PBBFAC,HCNC,, | Performed by: OPHTHALMOLOGY

## 2021-06-17 PROCEDURE — 99024 PR POST-OP FOLLOW-UP VISIT: ICD-10-PCS | Mod: HCNC,S$GLB,, | Performed by: OPHTHALMOLOGY

## 2021-06-24 ENCOUNTER — OFFICE VISIT (OUTPATIENT)
Dept: OPHTHALMOLOGY | Facility: CLINIC | Age: 66
End: 2021-06-24
Payer: MEDICARE

## 2021-06-24 DIAGNOSIS — Z98.890 POST-OPERATIVE STATE: Primary | ICD-10-CM

## 2021-06-24 PROCEDURE — 3044F HG A1C LEVEL LT 7.0%: CPT | Mod: HCNC,CPTII,S$GLB, | Performed by: OPHTHALMOLOGY

## 2021-06-24 PROCEDURE — 3044F PR MOST RECENT HEMOGLOBIN A1C LEVEL <7.0%: ICD-10-PCS | Mod: HCNC,CPTII,S$GLB, | Performed by: OPHTHALMOLOGY

## 2021-06-24 PROCEDURE — 99024 PR POST-OP FOLLOW-UP VISIT: ICD-10-PCS | Mod: HCNC,S$GLB,, | Performed by: OPHTHALMOLOGY

## 2021-06-24 PROCEDURE — 99024 POSTOP FOLLOW-UP VISIT: CPT | Mod: HCNC,S$GLB,, | Performed by: OPHTHALMOLOGY

## 2021-06-24 PROCEDURE — 99999 PR PBB SHADOW E&M-EST. PATIENT-LVL III: CPT | Mod: PBBFAC,HCNC,, | Performed by: OPHTHALMOLOGY

## 2021-06-24 PROCEDURE — 99999 PR PBB SHADOW E&M-EST. PATIENT-LVL III: ICD-10-PCS | Mod: PBBFAC,HCNC,, | Performed by: OPHTHALMOLOGY

## 2021-07-06 ENCOUNTER — PATIENT OUTREACH (OUTPATIENT)
Dept: ADMINISTRATIVE | Facility: HOSPITAL | Age: 66
End: 2021-07-06

## 2021-07-07 ENCOUNTER — OFFICE VISIT (OUTPATIENT)
Dept: INTERNAL MEDICINE | Facility: CLINIC | Age: 66
End: 2021-07-07
Payer: MEDICARE

## 2021-07-07 ENCOUNTER — LAB VISIT (OUTPATIENT)
Dept: LAB | Facility: HOSPITAL | Age: 66
End: 2021-07-07
Attending: FAMILY MEDICINE
Payer: MEDICARE

## 2021-07-07 VITALS
BODY MASS INDEX: 32.14 KG/M2 | DIASTOLIC BLOOD PRESSURE: 78 MMHG | HEIGHT: 65 IN | TEMPERATURE: 98 F | OXYGEN SATURATION: 96 % | RESPIRATION RATE: 18 BRPM | WEIGHT: 192.88 LBS | SYSTOLIC BLOOD PRESSURE: 126 MMHG | HEART RATE: 90 BPM

## 2021-07-07 DIAGNOSIS — G89.29 CHRONIC PAIN OF BOTH SHOULDERS: ICD-10-CM

## 2021-07-07 DIAGNOSIS — M25.512 CHRONIC PAIN OF BOTH SHOULDERS: ICD-10-CM

## 2021-07-07 DIAGNOSIS — Z00.00 ROUTINE PHYSICAL EXAMINATION: ICD-10-CM

## 2021-07-07 DIAGNOSIS — M25.511 CHRONIC PAIN OF BOTH SHOULDERS: ICD-10-CM

## 2021-07-07 DIAGNOSIS — E11.9 TYPE 2 DIABETES MELLITUS WITHOUT COMPLICATION, WITHOUT LONG-TERM CURRENT USE OF INSULIN: ICD-10-CM

## 2021-07-07 DIAGNOSIS — Z00.00 ROUTINE PHYSICAL EXAMINATION: Primary | ICD-10-CM

## 2021-07-07 LAB
ALBUMIN SERPL BCP-MCNC: 3.6 G/DL (ref 3.5–5.2)
ALP SERPL-CCNC: 68 U/L (ref 55–135)
ALT SERPL W/O P-5'-P-CCNC: 11 U/L (ref 10–44)
ANION GAP SERPL CALC-SCNC: 10 MMOL/L (ref 8–16)
AST SERPL-CCNC: 16 U/L (ref 10–40)
BILIRUB SERPL-MCNC: 0.6 MG/DL (ref 0.1–1)
BUN SERPL-MCNC: 16 MG/DL (ref 8–23)
CALCIUM SERPL-MCNC: 9.9 MG/DL (ref 8.7–10.5)
CHLORIDE SERPL-SCNC: 104 MMOL/L (ref 95–110)
CHOLEST SERPL-MCNC: 131 MG/DL (ref 120–199)
CHOLEST/HDLC SERPL: 3 {RATIO} (ref 2–5)
CO2 SERPL-SCNC: 27 MMOL/L (ref 23–29)
CREAT SERPL-MCNC: 0.7 MG/DL (ref 0.5–1.4)
EST. GFR  (AFRICAN AMERICAN): >60 ML/MIN/1.73 M^2
EST. GFR  (NON AFRICAN AMERICAN): >60 ML/MIN/1.73 M^2
ESTIMATED AVG GLUCOSE: 134 MG/DL (ref 68–131)
GLUCOSE SERPL-MCNC: 109 MG/DL (ref 70–110)
HBA1C MFR BLD: 6.3 % (ref 4–5.6)
HDLC SERPL-MCNC: 43 MG/DL (ref 40–75)
HDLC SERPL: 32.8 % (ref 20–50)
LDLC SERPL CALC-MCNC: 62 MG/DL (ref 63–159)
NONHDLC SERPL-MCNC: 88 MG/DL
POTASSIUM SERPL-SCNC: 3.7 MMOL/L (ref 3.5–5.1)
PROT SERPL-MCNC: 7.7 G/DL (ref 6–8.4)
SODIUM SERPL-SCNC: 141 MMOL/L (ref 136–145)
TRIGL SERPL-MCNC: 130 MG/DL (ref 30–150)

## 2021-07-07 PROCEDURE — 80061 LIPID PANEL: CPT | Mod: HCNC | Performed by: FAMILY MEDICINE

## 2021-07-07 PROCEDURE — 3288F PR FALLS RISK ASSESSMENT DOCUMENTED: ICD-10-PCS | Mod: HCNC,CPTII,S$GLB, | Performed by: FAMILY MEDICINE

## 2021-07-07 PROCEDURE — 3072F LOW RISK FOR RETINOPATHY: CPT | Mod: HCNC,S$GLB,, | Performed by: FAMILY MEDICINE

## 2021-07-07 PROCEDURE — 3072F PR LOW RISK FOR RETINOPATHY: ICD-10-PCS | Mod: HCNC,S$GLB,, | Performed by: FAMILY MEDICINE

## 2021-07-07 PROCEDURE — 99499 UNLISTED E&M SERVICE: CPT | Mod: HCNC,S$GLB,, | Performed by: FAMILY MEDICINE

## 2021-07-07 PROCEDURE — 3078F DIAST BP <80 MM HG: CPT | Mod: HCNC,CPTII,S$GLB, | Performed by: FAMILY MEDICINE

## 2021-07-07 PROCEDURE — 3044F PR MOST RECENT HEMOGLOBIN A1C LEVEL <7.0%: ICD-10-PCS | Mod: HCNC,CPTII,S$GLB, | Performed by: FAMILY MEDICINE

## 2021-07-07 PROCEDURE — 1126F PR PAIN SEVERITY QUANTIFIED, NO PAIN PRESENT: ICD-10-PCS | Mod: HCNC,S$GLB,, | Performed by: FAMILY MEDICINE

## 2021-07-07 PROCEDURE — 3008F PR BODY MASS INDEX (BMI) DOCUMENTED: ICD-10-PCS | Mod: HCNC,CPTII,S$GLB, | Performed by: FAMILY MEDICINE

## 2021-07-07 PROCEDURE — 3074F SYST BP LT 130 MM HG: CPT | Mod: HCNC,CPTII,S$GLB, | Performed by: FAMILY MEDICINE

## 2021-07-07 PROCEDURE — 99397 PER PM REEVAL EST PAT 65+ YR: CPT | Mod: HCNC,S$GLB,, | Performed by: FAMILY MEDICINE

## 2021-07-07 PROCEDURE — 99999 PR PBB SHADOW E&M-EST. PATIENT-LVL IV: CPT | Mod: PBBFAC,HCNC,, | Performed by: FAMILY MEDICINE

## 2021-07-07 PROCEDURE — 3288F FALL RISK ASSESSMENT DOCD: CPT | Mod: HCNC,CPTII,S$GLB, | Performed by: FAMILY MEDICINE

## 2021-07-07 PROCEDURE — 99499 RISK ADDL DX/OHS AUDIT: ICD-10-PCS | Mod: HCNC,S$GLB,, | Performed by: FAMILY MEDICINE

## 2021-07-07 PROCEDURE — 1126F AMNT PAIN NOTED NONE PRSNT: CPT | Mod: HCNC,S$GLB,, | Performed by: FAMILY MEDICINE

## 2021-07-07 PROCEDURE — 36415 COLL VENOUS BLD VENIPUNCTURE: CPT | Mod: HCNC | Performed by: FAMILY MEDICINE

## 2021-07-07 PROCEDURE — 1101F PT FALLS ASSESS-DOCD LE1/YR: CPT | Mod: HCNC,CPTII,S$GLB, | Performed by: FAMILY MEDICINE

## 2021-07-07 PROCEDURE — 83036 HEMOGLOBIN GLYCOSYLATED A1C: CPT | Mod: HCNC | Performed by: FAMILY MEDICINE

## 2021-07-07 PROCEDURE — 80053 COMPREHEN METABOLIC PANEL: CPT | Mod: HCNC | Performed by: FAMILY MEDICINE

## 2021-07-07 PROCEDURE — 99999 PR PBB SHADOW E&M-EST. PATIENT-LVL IV: ICD-10-PCS | Mod: PBBFAC,HCNC,, | Performed by: FAMILY MEDICINE

## 2021-07-07 PROCEDURE — 1101F PR PT FALLS ASSESS DOC 0-1 FALLS W/OUT INJ PAST YR: ICD-10-PCS | Mod: HCNC,CPTII,S$GLB, | Performed by: FAMILY MEDICINE

## 2021-07-07 PROCEDURE — 3008F BODY MASS INDEX DOCD: CPT | Mod: HCNC,CPTII,S$GLB, | Performed by: FAMILY MEDICINE

## 2021-07-07 PROCEDURE — 3074F PR MOST RECENT SYSTOLIC BLOOD PRESSURE < 130 MM HG: ICD-10-PCS | Mod: HCNC,CPTII,S$GLB, | Performed by: FAMILY MEDICINE

## 2021-07-07 PROCEDURE — 99397 PR PREVENTIVE VISIT,EST,65 & OVER: ICD-10-PCS | Mod: HCNC,S$GLB,, | Performed by: FAMILY MEDICINE

## 2021-07-07 PROCEDURE — 3078F PR MOST RECENT DIASTOLIC BLOOD PRESSURE < 80 MM HG: ICD-10-PCS | Mod: HCNC,CPTII,S$GLB, | Performed by: FAMILY MEDICINE

## 2021-07-07 PROCEDURE — 3044F HG A1C LEVEL LT 7.0%: CPT | Mod: HCNC,CPTII,S$GLB, | Performed by: FAMILY MEDICINE

## 2021-07-07 RX ORDER — GLIMEPIRIDE 2 MG/1
2 TABLET ORAL DAILY PRN
Qty: 90 TABLET | Refills: 3 | Status: SHIPPED | OUTPATIENT
Start: 2021-07-07 | End: 2022-04-28

## 2021-07-07 RX ORDER — TRAMADOL HYDROCHLORIDE 50 MG/1
50 TABLET ORAL EVERY 6 HOURS PRN
COMMUNITY
Start: 2021-04-18 | End: 2023-06-08

## 2021-07-15 DIAGNOSIS — E11.9 TYPE 2 DIABETES MELLITUS WITHOUT COMPLICATION, WITHOUT LONG-TERM CURRENT USE OF INSULIN: ICD-10-CM

## 2021-07-19 RX ORDER — METFORMIN HYDROCHLORIDE 500 MG/1
TABLET, EXTENDED RELEASE ORAL
Qty: 270 TABLET | Refills: 0 | Status: SHIPPED | OUTPATIENT
Start: 2021-07-19 | End: 2021-09-15

## 2021-07-26 ENCOUNTER — OFFICE VISIT (OUTPATIENT)
Dept: OPHTHALMOLOGY | Facility: CLINIC | Age: 66
End: 2021-07-26
Payer: MEDICARE

## 2021-07-26 DIAGNOSIS — Z98.890 POST-OPERATIVE STATE: Primary | ICD-10-CM

## 2021-07-26 PROCEDURE — 99999 PR PBB SHADOW E&M-EST. PATIENT-LVL I: CPT | Mod: PBBFAC,HCNC,, | Performed by: OPHTHALMOLOGY

## 2021-07-26 PROCEDURE — 99999 PR PBB SHADOW E&M-EST. PATIENT-LVL I: ICD-10-PCS | Mod: PBBFAC,HCNC,, | Performed by: OPHTHALMOLOGY

## 2021-07-26 PROCEDURE — 1159F PR MEDICATION LIST DOCUMENTED IN MEDICAL RECORD: ICD-10-PCS | Mod: HCNC,CPTII,S$GLB, | Performed by: OPHTHALMOLOGY

## 2021-07-26 PROCEDURE — 3044F PR MOST RECENT HEMOGLOBIN A1C LEVEL <7.0%: ICD-10-PCS | Mod: HCNC,CPTII,S$GLB, | Performed by: OPHTHALMOLOGY

## 2021-07-26 PROCEDURE — 99024 POSTOP FOLLOW-UP VISIT: CPT | Mod: HCNC,S$GLB,, | Performed by: OPHTHALMOLOGY

## 2021-07-26 PROCEDURE — 1159F MED LIST DOCD IN RCRD: CPT | Mod: HCNC,CPTII,S$GLB, | Performed by: OPHTHALMOLOGY

## 2021-07-26 PROCEDURE — 1160F RVW MEDS BY RX/DR IN RCRD: CPT | Mod: HCNC,CPTII,S$GLB, | Performed by: OPHTHALMOLOGY

## 2021-07-26 PROCEDURE — 99024 PR POST-OP FOLLOW-UP VISIT: ICD-10-PCS | Mod: HCNC,S$GLB,, | Performed by: OPHTHALMOLOGY

## 2021-07-26 PROCEDURE — 1160F PR REVIEW ALL MEDS BY PRESCRIBER/CLIN PHARMACIST DOCUMENTED: ICD-10-PCS | Mod: HCNC,CPTII,S$GLB, | Performed by: OPHTHALMOLOGY

## 2021-07-26 PROCEDURE — 3044F HG A1C LEVEL LT 7.0%: CPT | Mod: HCNC,CPTII,S$GLB, | Performed by: OPHTHALMOLOGY

## 2021-07-29 ENCOUNTER — PES CALL (OUTPATIENT)
Dept: ADMINISTRATIVE | Facility: CLINIC | Age: 66
End: 2021-07-29

## 2021-08-16 DIAGNOSIS — H40.1133 PRIMARY OPEN ANGLE GLAUCOMA OF BOTH EYES, SEVERE STAGE: ICD-10-CM

## 2021-08-17 DIAGNOSIS — H40.1133 PRIMARY OPEN ANGLE GLAUCOMA OF BOTH EYES, SEVERE STAGE: ICD-10-CM

## 2021-08-17 RX ORDER — BRIMONIDINE TARTRATE 1.5 MG/ML
1 SOLUTION/ DROPS OPHTHALMIC 2 TIMES DAILY
Qty: 15 ML | Refills: 3 | Status: SHIPPED | OUTPATIENT
Start: 2021-08-17 | End: 2022-09-19

## 2021-08-17 RX ORDER — BRIMONIDINE TARTRATE 1.5 MG/ML
1 SOLUTION/ DROPS OPHTHALMIC 2 TIMES DAILY
Qty: 15 ML | Refills: 3 | Status: SHIPPED | OUTPATIENT
Start: 2021-08-17 | End: 2021-08-17 | Stop reason: SDUPTHER

## 2021-08-23 ENCOUNTER — OFFICE VISIT (OUTPATIENT)
Dept: OPHTHALMOLOGY | Facility: CLINIC | Age: 66
End: 2021-08-23
Payer: MEDICARE

## 2021-08-23 DIAGNOSIS — Z98.890 POST-OPERATIVE STATE: Primary | ICD-10-CM

## 2021-08-23 PROCEDURE — 99999 PR PBB SHADOW E&M-EST. PATIENT-LVL III: CPT | Mod: PBBFAC,HCNC,, | Performed by: OPHTHALMOLOGY

## 2021-08-23 PROCEDURE — 99024 POSTOP FOLLOW-UP VISIT: CPT | Mod: HCNC,S$GLB,, | Performed by: OPHTHALMOLOGY

## 2021-08-23 PROCEDURE — 1160F RVW MEDS BY RX/DR IN RCRD: CPT | Mod: HCNC,CPTII,S$GLB, | Performed by: OPHTHALMOLOGY

## 2021-08-23 PROCEDURE — 1126F AMNT PAIN NOTED NONE PRSNT: CPT | Mod: HCNC,CPTII,S$GLB, | Performed by: OPHTHALMOLOGY

## 2021-08-23 PROCEDURE — 1101F PR PT FALLS ASSESS DOC 0-1 FALLS W/OUT INJ PAST YR: ICD-10-PCS | Mod: HCNC,CPTII,S$GLB, | Performed by: OPHTHALMOLOGY

## 2021-08-23 PROCEDURE — 3288F PR FALLS RISK ASSESSMENT DOCUMENTED: ICD-10-PCS | Mod: HCNC,CPTII,S$GLB, | Performed by: OPHTHALMOLOGY

## 2021-08-23 PROCEDURE — 99999 PR PBB SHADOW E&M-EST. PATIENT-LVL III: ICD-10-PCS | Mod: PBBFAC,HCNC,, | Performed by: OPHTHALMOLOGY

## 2021-08-23 PROCEDURE — 3288F FALL RISK ASSESSMENT DOCD: CPT | Mod: HCNC,CPTII,S$GLB, | Performed by: OPHTHALMOLOGY

## 2021-08-23 PROCEDURE — 3044F HG A1C LEVEL LT 7.0%: CPT | Mod: HCNC,CPTII,S$GLB, | Performed by: OPHTHALMOLOGY

## 2021-08-23 PROCEDURE — 1101F PT FALLS ASSESS-DOCD LE1/YR: CPT | Mod: HCNC,CPTII,S$GLB, | Performed by: OPHTHALMOLOGY

## 2021-08-23 PROCEDURE — 99024 PR POST-OP FOLLOW-UP VISIT: ICD-10-PCS | Mod: HCNC,S$GLB,, | Performed by: OPHTHALMOLOGY

## 2021-08-23 PROCEDURE — 1160F PR REVIEW ALL MEDS BY PRESCRIBER/CLIN PHARMACIST DOCUMENTED: ICD-10-PCS | Mod: HCNC,CPTII,S$GLB, | Performed by: OPHTHALMOLOGY

## 2021-08-23 PROCEDURE — 1126F PR PAIN SEVERITY QUANTIFIED, NO PAIN PRESENT: ICD-10-PCS | Mod: HCNC,CPTII,S$GLB, | Performed by: OPHTHALMOLOGY

## 2021-08-23 PROCEDURE — 1159F PR MEDICATION LIST DOCUMENTED IN MEDICAL RECORD: ICD-10-PCS | Mod: HCNC,CPTII,S$GLB, | Performed by: OPHTHALMOLOGY

## 2021-08-23 PROCEDURE — 3044F PR MOST RECENT HEMOGLOBIN A1C LEVEL <7.0%: ICD-10-PCS | Mod: HCNC,CPTII,S$GLB, | Performed by: OPHTHALMOLOGY

## 2021-08-23 PROCEDURE — 1159F MED LIST DOCD IN RCRD: CPT | Mod: HCNC,CPTII,S$GLB, | Performed by: OPHTHALMOLOGY

## 2021-08-23 RX ORDER — PREDNISOLONE ACETATE 10 MG/ML
1 SUSPENSION/ DROPS OPHTHALMIC 2 TIMES DAILY
Qty: 5 ML | Refills: 0 | Status: SHIPPED | OUTPATIENT
Start: 2021-08-23 | End: 2021-09-06

## 2021-09-13 ENCOUNTER — TELEPHONE (OUTPATIENT)
Dept: INTERNAL MEDICINE | Facility: CLINIC | Age: 66
End: 2021-09-13

## 2021-09-13 DIAGNOSIS — E11.9 TYPE 2 DIABETES MELLITUS WITHOUT COMPLICATION, WITHOUT LONG-TERM CURRENT USE OF INSULIN: ICD-10-CM

## 2021-09-15 RX ORDER — METFORMIN HYDROCHLORIDE 500 MG/1
TABLET, EXTENDED RELEASE ORAL
Qty: 270 TABLET | Refills: 1 | Status: SHIPPED | OUTPATIENT
Start: 2021-09-15 | End: 2021-11-10 | Stop reason: SDUPTHER

## 2021-09-29 ENCOUNTER — TELEPHONE (OUTPATIENT)
Dept: INTERNAL MEDICINE | Facility: CLINIC | Age: 66
End: 2021-09-29

## 2021-10-01 ENCOUNTER — IMMUNIZATION (OUTPATIENT)
Dept: PRIMARY CARE CLINIC | Facility: CLINIC | Age: 66
End: 2021-10-01
Payer: MEDICARE

## 2021-10-01 DIAGNOSIS — Z23 NEED FOR VACCINATION: Primary | ICD-10-CM

## 2021-10-01 PROCEDURE — 91300 COVID-19, MRNA, LNP-S, PF, 30 MCG/0.3 ML DOSE VACCINE: CPT | Mod: PBBFAC | Performed by: FAMILY MEDICINE

## 2021-10-01 PROCEDURE — 0003A COVID-19, MRNA, LNP-S, PF, 30 MCG/0.3 ML DOSE VACCINE: CPT | Mod: CV19,PBBFAC | Performed by: FAMILY MEDICINE

## 2021-10-10 ENCOUNTER — IMMUNIZATION (OUTPATIENT)
Dept: PRIMARY CARE CLINIC | Facility: CLINIC | Age: 66
End: 2021-10-10
Payer: MEDICARE

## 2021-10-10 PROCEDURE — 90694 FLU VACCINE - QUADRIVALENT - ADJUVANTED: ICD-10-PCS | Mod: HCNC,S$GLB,, | Performed by: FAMILY MEDICINE

## 2021-10-10 PROCEDURE — 90694 VACC AIIV4 NO PRSRV 0.5ML IM: CPT | Mod: HCNC,S$GLB,, | Performed by: FAMILY MEDICINE

## 2021-10-10 PROCEDURE — G0008 FLU VACCINE - QUADRIVALENT - ADJUVANTED: ICD-10-PCS | Mod: HCNC,S$GLB,, | Performed by: FAMILY MEDICINE

## 2021-10-10 PROCEDURE — G0008 ADMIN INFLUENZA VIRUS VAC: HCPCS | Mod: HCNC,S$GLB,, | Performed by: FAMILY MEDICINE

## 2021-10-25 ENCOUNTER — OFFICE VISIT (OUTPATIENT)
Dept: OPHTHALMOLOGY | Facility: CLINIC | Age: 66
End: 2021-10-25
Payer: MEDICARE

## 2021-10-25 DIAGNOSIS — H43.392 VITREOUS FLOATERS OF LEFT EYE: ICD-10-CM

## 2021-10-25 DIAGNOSIS — H40.1133 PRIMARY OPEN ANGLE GLAUCOMA OF BOTH EYES, SEVERE STAGE: Primary | ICD-10-CM

## 2021-10-25 DIAGNOSIS — Z96.1 PSEUDOPHAKIA: ICD-10-CM

## 2021-10-25 PROCEDURE — 1159F PR MEDICATION LIST DOCUMENTED IN MEDICAL RECORD: ICD-10-PCS | Mod: HCNC,CPTII,S$GLB, | Performed by: OPHTHALMOLOGY

## 2021-10-25 PROCEDURE — 4010F PR ACE/ARB THEARPY RXD/TAKEN: ICD-10-PCS | Mod: HCNC,CPTII,S$GLB, | Performed by: OPHTHALMOLOGY

## 2021-10-25 PROCEDURE — 3044F HG A1C LEVEL LT 7.0%: CPT | Mod: HCNC,CPTII,S$GLB, | Performed by: OPHTHALMOLOGY

## 2021-10-25 PROCEDURE — 1160F RVW MEDS BY RX/DR IN RCRD: CPT | Mod: HCNC,CPTII,S$GLB, | Performed by: OPHTHALMOLOGY

## 2021-10-25 PROCEDURE — 1160F PR REVIEW ALL MEDS BY PRESCRIBER/CLIN PHARMACIST DOCUMENTED: ICD-10-PCS | Mod: HCNC,CPTII,S$GLB, | Performed by: OPHTHALMOLOGY

## 2021-10-25 PROCEDURE — 3060F PR POS MICROALBUMINURIA RESULT DOCUMENTED/REVIEW: ICD-10-PCS | Mod: HCNC,CPTII,S$GLB, | Performed by: OPHTHALMOLOGY

## 2021-10-25 PROCEDURE — 3066F PR DOCUMENTATION OF TREATMENT FOR NEPHROPATHY: ICD-10-PCS | Mod: HCNC,CPTII,S$GLB, | Performed by: OPHTHALMOLOGY

## 2021-10-25 PROCEDURE — 99499 UNLISTED E&M SERVICE: CPT | Mod: S$GLB,,, | Performed by: OPHTHALMOLOGY

## 2021-10-25 PROCEDURE — 3060F POS MICROALBUMINURIA REV: CPT | Mod: HCNC,CPTII,S$GLB, | Performed by: OPHTHALMOLOGY

## 2021-10-25 PROCEDURE — 99214 PR OFFICE/OUTPT VISIT, EST, LEVL IV, 30-39 MIN: ICD-10-PCS | Mod: HCNC,S$GLB,, | Performed by: OPHTHALMOLOGY

## 2021-10-25 PROCEDURE — 1159F MED LIST DOCD IN RCRD: CPT | Mod: HCNC,CPTII,S$GLB, | Performed by: OPHTHALMOLOGY

## 2021-10-25 PROCEDURE — 99999 PR PBB SHADOW E&M-EST. PATIENT-LVL III: ICD-10-PCS | Mod: PBBFAC,HCNC,, | Performed by: OPHTHALMOLOGY

## 2021-10-25 PROCEDURE — 4010F ACE/ARB THERAPY RXD/TAKEN: CPT | Mod: HCNC,CPTII,S$GLB, | Performed by: OPHTHALMOLOGY

## 2021-10-25 PROCEDURE — 3066F NEPHROPATHY DOC TX: CPT | Mod: HCNC,CPTII,S$GLB, | Performed by: OPHTHALMOLOGY

## 2021-10-25 PROCEDURE — 99499 RISK ADDL DX/OHS AUDIT: ICD-10-PCS | Mod: S$GLB,,, | Performed by: OPHTHALMOLOGY

## 2021-10-25 PROCEDURE — 92250 COLOR FUNDUS PHOTOGRAPHY - OU - BOTH EYES: ICD-10-PCS | Mod: HCNC,S$GLB,, | Performed by: OPHTHALMOLOGY

## 2021-10-25 PROCEDURE — 92250 FUNDUS PHOTOGRAPHY W/I&R: CPT | Mod: HCNC,S$GLB,, | Performed by: OPHTHALMOLOGY

## 2021-10-25 PROCEDURE — 3044F PR MOST RECENT HEMOGLOBIN A1C LEVEL <7.0%: ICD-10-PCS | Mod: HCNC,CPTII,S$GLB, | Performed by: OPHTHALMOLOGY

## 2021-10-25 PROCEDURE — 99214 OFFICE O/P EST MOD 30 MIN: CPT | Mod: HCNC,S$GLB,, | Performed by: OPHTHALMOLOGY

## 2021-10-25 PROCEDURE — 99999 PR PBB SHADOW E&M-EST. PATIENT-LVL III: CPT | Mod: PBBFAC,HCNC,, | Performed by: OPHTHALMOLOGY

## 2021-10-25 RX ORDER — KETOTIFEN FUMARATE 0.35 MG/ML
1 SOLUTION/ DROPS OPHTHALMIC 2 TIMES DAILY
COMMUNITY
End: 2021-11-10

## 2021-10-27 ENCOUNTER — PATIENT OUTREACH (OUTPATIENT)
Dept: ADMINISTRATIVE | Facility: HOSPITAL | Age: 66
End: 2021-10-27
Payer: MEDICARE

## 2021-11-10 ENCOUNTER — LAB VISIT (OUTPATIENT)
Dept: LAB | Facility: HOSPITAL | Age: 66
End: 2021-11-10
Attending: FAMILY MEDICINE
Payer: MEDICARE

## 2021-11-10 ENCOUNTER — OFFICE VISIT (OUTPATIENT)
Dept: INTERNAL MEDICINE | Facility: CLINIC | Age: 66
End: 2021-11-10
Payer: MEDICARE

## 2021-11-10 VITALS
HEART RATE: 88 BPM | RESPIRATION RATE: 18 BRPM | WEIGHT: 191.38 LBS | BODY MASS INDEX: 31.89 KG/M2 | HEIGHT: 65 IN | OXYGEN SATURATION: 98 % | DIASTOLIC BLOOD PRESSURE: 84 MMHG | TEMPERATURE: 99 F | SYSTOLIC BLOOD PRESSURE: 128 MMHG

## 2021-11-10 DIAGNOSIS — E11.9 TYPE 2 DIABETES MELLITUS WITHOUT COMPLICATION, WITHOUT LONG-TERM CURRENT USE OF INSULIN: Primary | ICD-10-CM

## 2021-11-10 DIAGNOSIS — E11.9 TYPE 2 DIABETES MELLITUS WITHOUT COMPLICATION, WITHOUT LONG-TERM CURRENT USE OF INSULIN: ICD-10-CM

## 2021-11-10 PROCEDURE — 4010F ACE/ARB THERAPY RXD/TAKEN: CPT | Mod: CPTII,S$GLB,, | Performed by: FAMILY MEDICINE

## 2021-11-10 PROCEDURE — 3074F SYST BP LT 130 MM HG: CPT | Mod: CPTII,S$GLB,, | Performed by: FAMILY MEDICINE

## 2021-11-10 PROCEDURE — 3079F PR MOST RECENT DIASTOLIC BLOOD PRESSURE 80-89 MM HG: ICD-10-PCS | Mod: CPTII,S$GLB,, | Performed by: FAMILY MEDICINE

## 2021-11-10 PROCEDURE — 83036 HEMOGLOBIN GLYCOSYLATED A1C: CPT | Performed by: FAMILY MEDICINE

## 2021-11-10 PROCEDURE — 99213 PR OFFICE/OUTPT VISIT, EST, LEVL III, 20-29 MIN: ICD-10-PCS | Mod: S$GLB,,, | Performed by: FAMILY MEDICINE

## 2021-11-10 PROCEDURE — 3060F PR POS MICROALBUMINURIA RESULT DOCUMENTED/REVIEW: ICD-10-PCS | Mod: CPTII,S$GLB,, | Performed by: FAMILY MEDICINE

## 2021-11-10 PROCEDURE — 1101F PR PT FALLS ASSESS DOC 0-1 FALLS W/OUT INJ PAST YR: ICD-10-PCS | Mod: CPTII,S$GLB,, | Performed by: FAMILY MEDICINE

## 2021-11-10 PROCEDURE — 3066F PR DOCUMENTATION OF TREATMENT FOR NEPHROPATHY: ICD-10-PCS | Mod: CPTII,S$GLB,, | Performed by: FAMILY MEDICINE

## 2021-11-10 PROCEDURE — 3288F FALL RISK ASSESSMENT DOCD: CPT | Mod: CPTII,S$GLB,, | Performed by: FAMILY MEDICINE

## 2021-11-10 PROCEDURE — 3079F DIAST BP 80-89 MM HG: CPT | Mod: CPTII,S$GLB,, | Performed by: FAMILY MEDICINE

## 2021-11-10 PROCEDURE — 99999 PR PBB SHADOW E&M-EST. PATIENT-LVL IV: ICD-10-PCS | Mod: PBBFAC,,, | Performed by: FAMILY MEDICINE

## 2021-11-10 PROCEDURE — 3072F LOW RISK FOR RETINOPATHY: CPT | Mod: CPTII,S$GLB,, | Performed by: FAMILY MEDICINE

## 2021-11-10 PROCEDURE — 3008F PR BODY MASS INDEX (BMI) DOCUMENTED: ICD-10-PCS | Mod: CPTII,S$GLB,, | Performed by: FAMILY MEDICINE

## 2021-11-10 PROCEDURE — 3060F POS MICROALBUMINURIA REV: CPT | Mod: CPTII,S$GLB,, | Performed by: FAMILY MEDICINE

## 2021-11-10 PROCEDURE — 3008F BODY MASS INDEX DOCD: CPT | Mod: CPTII,S$GLB,, | Performed by: FAMILY MEDICINE

## 2021-11-10 PROCEDURE — 99999 PR PBB SHADOW E&M-EST. PATIENT-LVL IV: CPT | Mod: PBBFAC,,, | Performed by: FAMILY MEDICINE

## 2021-11-10 PROCEDURE — 36415 COLL VENOUS BLD VENIPUNCTURE: CPT | Performed by: FAMILY MEDICINE

## 2021-11-10 PROCEDURE — 1159F MED LIST DOCD IN RCRD: CPT | Mod: CPTII,S$GLB,, | Performed by: FAMILY MEDICINE

## 2021-11-10 PROCEDURE — 3044F PR MOST RECENT HEMOGLOBIN A1C LEVEL <7.0%: ICD-10-PCS | Mod: CPTII,S$GLB,, | Performed by: FAMILY MEDICINE

## 2021-11-10 PROCEDURE — 3066F NEPHROPATHY DOC TX: CPT | Mod: CPTII,S$GLB,, | Performed by: FAMILY MEDICINE

## 2021-11-10 PROCEDURE — 1101F PT FALLS ASSESS-DOCD LE1/YR: CPT | Mod: CPTII,S$GLB,, | Performed by: FAMILY MEDICINE

## 2021-11-10 PROCEDURE — 1125F PR PAIN SEVERITY QUANTIFIED, PAIN PRESENT: ICD-10-PCS | Mod: CPTII,S$GLB,, | Performed by: FAMILY MEDICINE

## 2021-11-10 PROCEDURE — 1159F PR MEDICATION LIST DOCUMENTED IN MEDICAL RECORD: ICD-10-PCS | Mod: CPTII,S$GLB,, | Performed by: FAMILY MEDICINE

## 2021-11-10 PROCEDURE — 3072F PR LOW RISK FOR RETINOPATHY: ICD-10-PCS | Mod: CPTII,S$GLB,, | Performed by: FAMILY MEDICINE

## 2021-11-10 PROCEDURE — 3288F PR FALLS RISK ASSESSMENT DOCUMENTED: ICD-10-PCS | Mod: CPTII,S$GLB,, | Performed by: FAMILY MEDICINE

## 2021-11-10 PROCEDURE — 4010F PR ACE/ARB THEARPY RXD/TAKEN: ICD-10-PCS | Mod: CPTII,S$GLB,, | Performed by: FAMILY MEDICINE

## 2021-11-10 PROCEDURE — 1125F AMNT PAIN NOTED PAIN PRSNT: CPT | Mod: CPTII,S$GLB,, | Performed by: FAMILY MEDICINE

## 2021-11-10 PROCEDURE — 3044F HG A1C LEVEL LT 7.0%: CPT | Mod: CPTII,S$GLB,, | Performed by: FAMILY MEDICINE

## 2021-11-10 PROCEDURE — 3074F PR MOST RECENT SYSTOLIC BLOOD PRESSURE < 130 MM HG: ICD-10-PCS | Mod: CPTII,S$GLB,, | Performed by: FAMILY MEDICINE

## 2021-11-10 PROCEDURE — 99213 OFFICE O/P EST LOW 20 MIN: CPT | Mod: S$GLB,,, | Performed by: FAMILY MEDICINE

## 2021-11-10 RX ORDER — METFORMIN HYDROCHLORIDE 500 MG/1
TABLET, EXTENDED RELEASE ORAL
Qty: 270 TABLET | Refills: 1 | Status: SHIPPED | OUTPATIENT
Start: 2021-11-10 | End: 2022-04-26

## 2021-11-10 RX ORDER — ROSUVASTATIN CALCIUM 5 MG/1
5 TABLET, COATED ORAL DAILY
Qty: 90 TABLET | Refills: 3 | Status: SHIPPED | OUTPATIENT
Start: 2021-11-10 | End: 2022-09-19

## 2021-11-11 LAB
ESTIMATED AVG GLUCOSE: 128 MG/DL (ref 68–131)
HBA1C MFR BLD: 6.1 % (ref 4–5.6)

## 2021-11-23 ENCOUNTER — TELEPHONE (OUTPATIENT)
Dept: INTERNAL MEDICINE | Facility: CLINIC | Age: 66
End: 2021-11-23
Payer: MEDICARE

## 2021-11-24 DIAGNOSIS — H40.1133 PRIMARY OPEN ANGLE GLAUCOMA OF BOTH EYES, SEVERE STAGE: ICD-10-CM

## 2021-11-24 RX ORDER — DORZOLAMIDE HYDROCHLORIDE AND TIMOLOL MALEATE 20; 5 MG/ML; MG/ML
1 SOLUTION/ DROPS OPHTHALMIC 2 TIMES DAILY
Qty: 20 ML | Refills: 12 | Status: SHIPPED | OUTPATIENT
Start: 2021-11-24 | End: 2022-08-17 | Stop reason: SDUPTHER

## 2021-11-24 RX ORDER — BENAZEPRIL/HYDROCHLOROTHIAZIDE 20 MG-25MG
1 TABLET ORAL DAILY
Qty: 90 TABLET | Refills: 3 | Status: SHIPPED | OUTPATIENT
Start: 2021-11-24 | End: 2022-05-11 | Stop reason: SDUPTHER

## 2021-11-24 NOTE — TELEPHONE ENCOUNTER
----- Message from Kennedy Addison sent at 11/24/2021 12:19 PM CST -----  Contact: Raquel  .Type:  RX Refill Request    Who Called: Raquel  Refill or New Rx: refill  RX Name and Strength: dorzolamide-timolol 2-0.5% (COSOPT) 22.3-6.8 mg/mL ophthalmic  How is the patient currently taking it? (ex. 1XDay):  Is this a 30 day or 90 day RX: 90  Preferred Pharmacy with phone number:.[facility] [address]  Local or Mail Order: local  Ordering Provider: Dr Alberto  Would the patient rather a call back or a response via [facility]? call  Best Call Back Number: [phone]  Additional Information: please give patient a call to notify  Thanks,  SARAH

## 2021-12-09 ENCOUNTER — HOSPITAL ENCOUNTER (OUTPATIENT)
Dept: RADIOLOGY | Facility: HOSPITAL | Age: 66
Discharge: HOME | End: 2021-12-09
Attending: PODIATRIST
Payer: MEDICARE

## 2021-12-09 ENCOUNTER — OFFICE VISIT (OUTPATIENT)
Dept: PODIATRY | Facility: CLINIC | Age: 66
End: 2021-12-09
Payer: MEDICARE

## 2021-12-09 VITALS — HEIGHT: 65 IN | BODY MASS INDEX: 31.82 KG/M2 | WEIGHT: 191 LBS

## 2021-12-09 DIAGNOSIS — M62.461 GASTROCNEMIUS EQUINUS OF RIGHT LOWER EXTREMITY: ICD-10-CM

## 2021-12-09 DIAGNOSIS — M79.672 BILATERAL FOOT PAIN: Primary | ICD-10-CM

## 2021-12-09 DIAGNOSIS — I87.2 VENOUS INSUFFICIENCY OF BOTH LOWER EXTREMITIES: ICD-10-CM

## 2021-12-09 DIAGNOSIS — M62.462 GASTROCNEMIUS EQUINUS OF LEFT LOWER EXTREMITY: ICD-10-CM

## 2021-12-09 DIAGNOSIS — M79.671 BILATERAL FOOT PAIN: Primary | ICD-10-CM

## 2021-12-09 DIAGNOSIS — M72.2 PLANTAR FASCIITIS: ICD-10-CM

## 2021-12-09 PROCEDURE — 3066F PR DOCUMENTATION OF TREATMENT FOR NEPHROPATHY: ICD-10-PCS | Mod: HCNC,CPTII,S$GLB, | Performed by: PODIATRIST

## 2021-12-09 PROCEDURE — 73630 XR FOOT COMPLETE 3 VIEW BILATERAL: ICD-10-PCS | Mod: 26,50,HCNC, | Performed by: RADIOLOGY

## 2021-12-09 PROCEDURE — 99999 PR PBB SHADOW E&M-EST. PATIENT-LVL IV: ICD-10-PCS | Mod: PBBFAC,HCNC,, | Performed by: PODIATRIST

## 2021-12-09 PROCEDURE — 99204 OFFICE O/P NEW MOD 45 MIN: CPT | Mod: HCNC,S$GLB,, | Performed by: PODIATRIST

## 2021-12-09 PROCEDURE — 99999 PR PBB SHADOW E&M-EST. PATIENT-LVL IV: CPT | Mod: PBBFAC,HCNC,, | Performed by: PODIATRIST

## 2021-12-09 PROCEDURE — 3066F NEPHROPATHY DOC TX: CPT | Mod: HCNC,CPTII,S$GLB, | Performed by: PODIATRIST

## 2021-12-09 PROCEDURE — 4010F ACE/ARB THERAPY RXD/TAKEN: CPT | Mod: HCNC,CPTII,S$GLB, | Performed by: PODIATRIST

## 2021-12-09 PROCEDURE — 3060F POS MICROALBUMINURIA REV: CPT | Mod: HCNC,CPTII,S$GLB, | Performed by: PODIATRIST

## 2021-12-09 PROCEDURE — 4010F PR ACE/ARB THEARPY RXD/TAKEN: ICD-10-PCS | Mod: HCNC,CPTII,S$GLB, | Performed by: PODIATRIST

## 2021-12-09 PROCEDURE — 3072F LOW RISK FOR RETINOPATHY: CPT | Mod: HCNC,CPTII,S$GLB, | Performed by: PODIATRIST

## 2021-12-09 PROCEDURE — 73630 X-RAY EXAM OF FOOT: CPT | Mod: 26,50,HCNC, | Performed by: RADIOLOGY

## 2021-12-09 PROCEDURE — 3060F PR POS MICROALBUMINURIA RESULT DOCUMENTED/REVIEW: ICD-10-PCS | Mod: HCNC,CPTII,S$GLB, | Performed by: PODIATRIST

## 2021-12-09 PROCEDURE — 73630 X-RAY EXAM OF FOOT: CPT | Mod: TC,50,HCNC

## 2021-12-09 PROCEDURE — 3072F PR LOW RISK FOR RETINOPATHY: ICD-10-PCS | Mod: HCNC,CPTII,S$GLB, | Performed by: PODIATRIST

## 2021-12-09 PROCEDURE — 99204 PR OFFICE/OUTPT VISIT, NEW, LEVL IV, 45-59 MIN: ICD-10-PCS | Mod: HCNC,S$GLB,, | Performed by: PODIATRIST

## 2021-12-14 ENCOUNTER — PES CALL (OUTPATIENT)
Dept: ADMINISTRATIVE | Facility: CLINIC | Age: 66
End: 2021-12-14
Payer: MEDICARE

## 2021-12-15 ENCOUNTER — CLINICAL SUPPORT (OUTPATIENT)
Dept: REHABILITATION | Facility: HOSPITAL | Age: 66
End: 2021-12-15
Attending: PODIATRIST
Payer: MEDICARE

## 2021-12-15 DIAGNOSIS — M79.672 BILATERAL FOOT PAIN: ICD-10-CM

## 2021-12-15 DIAGNOSIS — M79.671 BILATERAL FOOT PAIN: ICD-10-CM

## 2021-12-15 DIAGNOSIS — M72.2 PLANTAR FASCIITIS: ICD-10-CM

## 2021-12-15 PROCEDURE — 97110 THERAPEUTIC EXERCISES: CPT | Mod: HCNC

## 2021-12-15 PROCEDURE — 97161 PT EVAL LOW COMPLEX 20 MIN: CPT | Mod: HCNC

## 2021-12-20 ENCOUNTER — CLINICAL SUPPORT (OUTPATIENT)
Dept: REHABILITATION | Facility: HOSPITAL | Age: 66
End: 2021-12-20
Attending: PODIATRIST
Payer: MEDICARE

## 2021-12-20 DIAGNOSIS — M72.2 PLANTAR FASCIITIS: ICD-10-CM

## 2021-12-20 PROCEDURE — 97110 THERAPEUTIC EXERCISES: CPT | Mod: HCNC

## 2021-12-27 ENCOUNTER — CLINICAL SUPPORT (OUTPATIENT)
Dept: REHABILITATION | Facility: HOSPITAL | Age: 66
End: 2021-12-27
Attending: PODIATRIST
Payer: MEDICARE

## 2021-12-27 DIAGNOSIS — M72.2 PLANTAR FASCIITIS: ICD-10-CM

## 2021-12-27 PROCEDURE — 97110 THERAPEUTIC EXERCISES: CPT | Mod: HCNC

## 2022-01-03 ENCOUNTER — CLINICAL SUPPORT (OUTPATIENT)
Dept: REHABILITATION | Facility: HOSPITAL | Age: 67
End: 2022-01-03
Attending: PODIATRIST
Payer: MEDICARE

## 2022-01-03 DIAGNOSIS — M72.2 PLANTAR FASCIITIS: ICD-10-CM

## 2022-01-03 PROCEDURE — 97110 THERAPEUTIC EXERCISES: CPT | Mod: HCNC

## 2022-01-03 NOTE — PROGRESS NOTES
[facility]   Physical Therapy Treatment Note     Name: Raquel Pickard  [facility] Number: [id number]    Therapy Diagnosis:   Encounter Diagnosis   Name Primary?    Plantar fasciitis      Physician: Eryn Oleary DPM    Visit Date: 1/3/2022       Physician Orders: PT Eval and Treat   Medical Diagnosis from Referral: M79.671,M79.672 (ICD-10-CM) - Bilateral foot pain  Evaluation Date: 12/15/2021  Authorization Period Expiration: 12/29/22  Plan of Care Expiration: 2/11/22  Visit # / Visits authorized: 1/20 ( 3 visits completed in 2021 including eval)  FOTO: 2/3       PTA Visit #: 0/5     Time In:8:00  Time Out: 8:53  Total Billable Time: 53 minutes    SUBJECTIVE     Pt reports: she is feeling better   She was compliant with home exercise program.  Response to previous treatment: good  Functional change: increase movement    Pain: 2/10   Location: left feet      OBJECTIVE     MMT sitting hip flexion 4/5, R knee extension 3+/5, L knee 4/5, B knee flexion 4/5     Treatment     Raquel received the treatments listed below:      therapeutic exercises to develop strength, endurance, ROM, flexibility and posture for 53 minutes including:  HEP reviewed  Nu step 8 min for endurance, reciprocal movement, strength  gastroc stretch on incline 3 x 30 secs  Soleus stretch on incline 3 x 30 secs  PF stretch off edge of step 3 x 30 secs B  Heel raises 2 x 15 reps SL each  SLS L  16 secs R   44 secs D/C next visit  Towel curls 3 min  Sit to  30 secs 8 reps (last treatment 13 secs)/ 13 reps on the 2nd attempt with no UE support  Not performed today  SLS on air ex D/C  Wobble board f/b 3 min  S/S 3 min D/C   Toe walking 2 laps  Side stepping 2 laps D/C  Rebounder using Red ball overhead standing on air ex 1 x 20 reps (SLS next visit) not performed  Leg press 15# 2 x 20 reps next visit not performed  Slider/ TG 6 bands with ball 3 minutes d/c next visit  Bridges 3 x 15 reps  Step ups 2 x 15 reps B  Squats  not performed  Braiding 2 laps  Ankle TB 4 ways reviewed and issued as HEP with B TB          Patient Education and Home Exercises     Home Exercises Provided and Patient Education Provided     Education provided:   Yes, HEP reviewed and pain management and POC / ankle 4 way with B TB issued    Written Home Exercises Provided: Patient instructed to cont prior HEP and add ankle 4 way with B TB.  Exercises were reviewed and Raquel was able to demonstrate them prior to the end of the session.  Raquel demonstrated good  understanding of the education provided. See EMR under Patient Instructions for exercises provided during therapy sessions    ASSESSMENT     New exercises initiated today. Patient tolerated treatment well and is responding well to treatment. No pain complaints during treatment. Constant cuing to slow down and isolate musculature during exercises.     Raquel Is progressing well towards her goals.   Pt prognosis is Good.     Pt will continue to benefit from skilled outpatient physical therapy to address the deficits listed in the problem list box on initial evaluation, provide pt/family education and to maximize pt's level of independence in the home and community environment.     Pt's spiritual, cultural and educational needs considered and pt agreeable to plan of care and goals.     Anticipated barriers to physical therapy:           Goals:  STG's  2 weeks  1. Patient will be independent with 50% of HEP Met 12/27/21     LTG's 8 weeks  1. Patient will improve FOTO disability score  to 25 %  disability or less in order to improve overall QOL & return to PLOF   2. Patient will report an overall decrease in pain with walking and standing  3. Patient will be independent with HEP and pain management  4. Increase strength in PF by 1/2 muscle grade  5. Increase single leg stance balance to 10 secs on each leg Met 12/27/21  6. Increase strength B LE's by 1/2 muscle grade in affected musculature      Plan   Plan of care  Certification: 12/15/2021 to 2/11/22     Outpatient Physical Therapy 1 to 2 times weekly for 8 weeks to include the following interventions: Manual Therapy, Moist Heat/ Ice, Patient Education, Self Care, Therapeutic Activites, Therapeutic Exercise and Ultrasound, MELODY Carlson, PT

## 2022-01-10 ENCOUNTER — CLINICAL SUPPORT (OUTPATIENT)
Dept: REHABILITATION | Facility: HOSPITAL | Age: 67
End: 2022-01-10
Attending: PODIATRIST
Payer: MEDICARE

## 2022-01-10 ENCOUNTER — PATIENT OUTREACH (OUTPATIENT)
Dept: ADMINISTRATIVE | Facility: OTHER | Age: 67
End: 2022-01-10
Payer: MEDICARE

## 2022-01-10 DIAGNOSIS — M72.2 PLANTAR FASCIITIS: ICD-10-CM

## 2022-01-10 PROCEDURE — 97110 THERAPEUTIC EXERCISES: CPT | Mod: HCNC

## 2022-01-10 NOTE — PROGRESS NOTES
[facility]   Physical Therapy Treatment Note     Name: Raquel Pickard  [facility] Number: [id number]    Therapy Diagnosis:   Encounter Diagnosis   Name Primary?    Plantar fasciitis      Physician: Eryn Oleary DPM    Visit Date: 1/10/2022       Physician Orders: PT Eval and Treat   Medical Diagnosis from Referral: M79.671,M79.672 (ICD-10-CM) - Bilateral foot pain  Evaluation Date: 12/15/2021  Authorization Period Expiration: 12/29/22  Plan of Care Expiration: 2/11/22  Visit # / Visits authorized: 2/20 ( 3 visits completed in 2021 including eval)  FOTO: 2/3       PTA Visit #: 0/5     Time In:8:00  Time Out: 8::43  Total Billable Time: 43 minutes    SUBJECTIVE     Pt reports: she is feeling better  And stronger  She was compliant with home exercise program.  Response to previous treatment: good  Functional change: increase movement and ease with standing up    Pain: 0/10   Location: left feet      OBJECTIVE     MMT sitting hip flexion 4/5, R knee extension 3+/5, L knee 4/5, B knee flexion 4/5     Treatment     Raquel received the treatments listed below:      therapeutic exercises to develop strength, endurance, ROM, flexibility and posture for 43 minutes including:  HEP reviewed  Nu step 8 min for endurance, reciprocal movement, strength  gastroc stretch on incline 3 x 30 secs  Soleus stretch on incline 3 x 30 secs  PF stretch off edge of step 3 x 30 secs B  Heel raises 2 x 15 reps SL each  SLS L  16 secs R   44 secs D/C next visit  Towel curls 3 min D/C  Sit to  30 secs 15 reps with no UE support  SLS on air ex D/C  Wobble board f/b 3 min  S/S 3 min D/C   Toe walking 2 laps  Side stepping 2 laps D/C  Rebounder using Red ball overhead standing on air ex 1 x 20 reps (SLS next visit) not performed  Leg press 15# 2 x 20 reps (increase weight next visit)   Leg extension 15# 2 x 10 reps  Leg curl 15# 2 x 20 reps (add weight next visit)  Slider/ TG 6 bands with ball 3 minutes d/c    Bridges 3 x 15 reps HEP  Step ups 2 x 15 reps B  Squats not performed  Braiding 2 laps  Backward walking 2 laps  Ankle TB 4 ways reviewed and issued as HEP with B TB HEP    (Reasses next week and possible d/c)      Patient Education and Home Exercises     Home Exercises Provided and Patient Education Provided     Education provided:   Yes, HEP reviewed and pain management and POC / ankle 4 way with B TB issued    Written Home Exercises Provided: Patient instructed to cont prior HEP and add ankle 4 way with B TB.  Exercises were reviewed and Raquel was able to demonstrate them prior to the end of the session.  Raquel demonstrated good  understanding of the education provided. See EMR under Patient Instructions for exercises provided during therapy sessions    ASSESSMENT     New exercises with resistance initiated today. Patient tolerated treatment well and is responding well to treatment. No pain complaints during treatment. Constant cuing to slow down and isolate musculature during exercises. Patient is progressing with sit to stands and performed 15 reps in 30 secs.     Raquel Is progressing well towards her goals.   Pt prognosis is Good.     Pt will continue to benefit from skilled outpatient physical therapy to address the deficits listed in the problem list box on initial evaluation, provide pt/family education and to maximize pt's level of independence in the home and community environment.     Pt's spiritual, cultural and educational needs considered and pt agreeable to plan of care and goals.     Anticipated barriers to physical therapy:           Goals:  STG's  2 weeks  1. Patient will be independent with 50% of HEP Met 12/27/21     LTG's 8 weeks  1. Patient will improve FOTO disability score  to 25 %  disability or less in order to improve overall QOL & return to PLOF   2. Patient will report an overall decrease in pain with walking and standing  3. Patient will be independent with HEP and pain  management  4. Increase strength in PF by 1/2 muscle grade  5. Increase single leg stance balance to 10 secs on each leg Met 12/27/21  6. Increase strength B LE's by 1/2 muscle grade in affected musculature      Plan   Plan of care Certification: 12/15/2021 to 2/11/22     Outpatient Physical Therapy 1 to 2 times weekly for 8 weeks to include the following interventions: Manual Therapy, Moist Heat/ Ice, Patient Education, Self Care, Therapeutic Activites, Therapeutic Exercise and Ultrasound, MELODY Carlson, PT

## 2022-01-10 NOTE — PROGRESS NOTES
Chart Reviewed  Care Everywhere updated  Immunizations reconciled  Health Maintenance updated  Ordered:  Upcoming:     See previous TE

## 2022-01-11 ENCOUNTER — OFFICE VISIT (OUTPATIENT)
Dept: PODIATRY | Facility: CLINIC | Age: 67
End: 2022-01-11
Payer: MEDICARE

## 2022-01-11 VITALS — WEIGHT: 191 LBS | BODY MASS INDEX: 31.82 KG/M2 | HEIGHT: 65 IN

## 2022-01-11 DIAGNOSIS — M72.2 PLANTAR FASCIITIS: Primary | ICD-10-CM

## 2022-01-11 DIAGNOSIS — M62.461 GASTROCNEMIUS EQUINUS OF RIGHT LOWER EXTREMITY: ICD-10-CM

## 2022-01-11 DIAGNOSIS — M62.462 GASTROCNEMIUS EQUINUS OF LEFT LOWER EXTREMITY: ICD-10-CM

## 2022-01-11 PROCEDURE — 3288F PR FALLS RISK ASSESSMENT DOCUMENTED: ICD-10-PCS | Mod: HCNC,CPTII,S$GLB, | Performed by: PODIATRIST

## 2022-01-11 PROCEDURE — 1125F PR PAIN SEVERITY QUANTIFIED, PAIN PRESENT: ICD-10-PCS | Mod: HCNC,CPTII,S$GLB, | Performed by: PODIATRIST

## 2022-01-11 PROCEDURE — 3008F PR BODY MASS INDEX (BMI) DOCUMENTED: ICD-10-PCS | Mod: HCNC,CPTII,S$GLB, | Performed by: PODIATRIST

## 2022-01-11 PROCEDURE — 1159F PR MEDICATION LIST DOCUMENTED IN MEDICAL RECORD: ICD-10-PCS | Mod: HCNC,CPTII,S$GLB, | Performed by: PODIATRIST

## 2022-01-11 PROCEDURE — 99499 RISK ADDL DX/OHS AUDIT: ICD-10-PCS | Mod: S$GLB,,, | Performed by: PODIATRIST

## 2022-01-11 PROCEDURE — 1125F AMNT PAIN NOTED PAIN PRSNT: CPT | Mod: HCNC,CPTII,S$GLB, | Performed by: PODIATRIST

## 2022-01-11 PROCEDURE — 99999 PR PBB SHADOW E&M-EST. PATIENT-LVL III: CPT | Mod: PBBFAC,HCNC,, | Performed by: PODIATRIST

## 2022-01-11 PROCEDURE — 3072F LOW RISK FOR RETINOPATHY: CPT | Mod: HCNC,CPTII,S$GLB, | Performed by: PODIATRIST

## 2022-01-11 PROCEDURE — 99999 PR PBB SHADOW E&M-EST. PATIENT-LVL III: ICD-10-PCS | Mod: PBBFAC,HCNC,, | Performed by: PODIATRIST

## 2022-01-11 PROCEDURE — 99213 OFFICE O/P EST LOW 20 MIN: CPT | Mod: HCNC,S$GLB,, | Performed by: PODIATRIST

## 2022-01-11 PROCEDURE — 1101F PT FALLS ASSESS-DOCD LE1/YR: CPT | Mod: HCNC,CPTII,S$GLB, | Performed by: PODIATRIST

## 2022-01-11 PROCEDURE — 3288F FALL RISK ASSESSMENT DOCD: CPT | Mod: HCNC,CPTII,S$GLB, | Performed by: PODIATRIST

## 2022-01-11 PROCEDURE — 3072F PR LOW RISK FOR RETINOPATHY: ICD-10-PCS | Mod: HCNC,CPTII,S$GLB, | Performed by: PODIATRIST

## 2022-01-11 PROCEDURE — 99499 UNLISTED E&M SERVICE: CPT | Mod: S$GLB,,, | Performed by: PODIATRIST

## 2022-01-11 PROCEDURE — 1101F PR PT FALLS ASSESS DOC 0-1 FALLS W/OUT INJ PAST YR: ICD-10-PCS | Mod: HCNC,CPTII,S$GLB, | Performed by: PODIATRIST

## 2022-01-11 PROCEDURE — 3008F BODY MASS INDEX DOCD: CPT | Mod: HCNC,CPTII,S$GLB, | Performed by: PODIATRIST

## 2022-01-11 PROCEDURE — 1159F MED LIST DOCD IN RCRD: CPT | Mod: HCNC,CPTII,S$GLB, | Performed by: PODIATRIST

## 2022-01-11 PROCEDURE — 99213 PR OFFICE/OUTPT VISIT, EST, LEVL III, 20-29 MIN: ICD-10-PCS | Mod: HCNC,S$GLB,, | Performed by: PODIATRIST

## 2022-01-11 RX ORDER — CYCLOBENZAPRINE HCL 10 MG
TABLET ORAL
Qty: 30 TABLET | Refills: 1 | Status: SHIPPED | OUTPATIENT
Start: 2022-01-11 | End: 2023-06-08 | Stop reason: SDUPTHER

## 2022-01-11 NOTE — PATIENT INSTRUCTIONS
"  I recommend searching for "Night Splint for Plantar Fasciitis" on www.amazon.com. You can read reviews to pick the best option, but the splint should look similar to a boot (as pictured below).  If you are able to wear the night splint for at least a few hours while sleeping, then this will help greatly with improving your pain especially in the morning.     A night splint is to be worn at night while you are sleeping.     A more flexible option, if you are not able to tolerate the boot-- STRASSBURG SOCK. You can search this online as well.           "

## 2022-01-11 NOTE — PROGRESS NOTES
PODIATRIC MEDICINE AND SURGERY  1/11/2022    PCP: Dr. Debra Jensen MD    CHIEF COMPLAINT   Chief Complaint   Patient presents with    Heel Pain     F/u for plantar fasciitis b/l, more on left than right, pt states that she has improved w/ P.T., rates pain 2/10, diabetic, wears tennis and socks       HPI:    Raquel Pickard is a 66 y.o. female who has a past medical history of Breast cancer (2011), Diabetes mellitus, Glaucoma, Hyperlipidemia, Hypertension, MVP (mitral valve prolapse), and Obesity.     Raquel presents to clinic today complaining she states that most of her pain is present with ambulation of pain to bilateral feet. Symptoms have been present for months but is worsening.  She states that she does have chronic swelling on both legs.  She has tried compression stockings without much relief.  Patient states left foot is more painful than right and pain is primarily on the ball the foot.  She does for size and notes the pain is worse when she is ambulating versus non ambulating.      Patient denies other pedal complaints at this time.     PMH  Past Medical History:   Diagnosis Date    Breast cancer 2011    L    Diabetes mellitus     Glaucoma     Hyperlipidemia     Hypertension     MVP (mitral valve prolapse)     Obesity        PROBLEM LIST  Patient Active Problem List    Diagnosis Date Noted    Plantar fasciitis 12/15/2021    Malignant neoplasm of left breast in female, estrogen receptor positive 04/09/2021    Type 2 diabetes mellitus without complication, without long-term current use of insulin 11/26/2018    Dry eye 06/04/2018    Severe stage chronic open angle glaucoma 05/12/2016    Primary open angle glaucoma of both eyes, severe stage 12/15/2015    Nuclear sclerosis of both eyes 12/15/2015    History of left breast cancer 12/08/2015    Essential hypertension 05/15/2015    Low tension glaucoma - Both Eyes 08/05/2013       MEDS  Current Outpatient Medications on File Prior to Visit    Medication Sig Dispense Refill    ACCU-CHEK CIRILO PLUS TEST STRP Strp CHECK BLOOD GLUCOSE ONE TIME DAILY 100 strip 3    amLODIPine (NORVASC) 5 MG tablet TAKE 1 TABLET BY MOUTH EVERY DAY 90 tablet 3    benazepril-hydrochlorthiazide (LOTENSIN HCT) 20-25 mg Tab Take 1 tablet by mouth once daily. 90 tablet 3    blood-glucose meter kit To check BG 1 times daily, Accuchek Cirilo meter 1 each 0    brimonidine 0.15 % OPTH DROP (ALPHAGAN) 0.15 % ophthalmic solution Place 1 drop into both eyes 2 (two) times daily. Dispense 90 days supply 15 mL 3    dorzolamide-timolol 2-0.5% (COSOPT) 22.3-6.8 mg/mL ophthalmic solution Place 1 drop into both eyes 2 (two) times daily. 20 mL 12    glimepiride (AMARYL) 2 MG tablet Take 1 tablet (2 mg total) by mouth daily as needed (BS >150). ADMINISTER 30 MINUTES BEFORE MEAL(S). 90 tablet 3    lancets (ACCU-CHEK SOFTCLIX LANCETS) Misc USE  TO  CHECK BLOOD GLUCOSE ONE TIME DAILY 100 each 3    latanoprost 0.005 % ophthalmic solution INSTILL 1 DROP INTO BOTH EYES EVERY EVENING 2.5 mL 6    metFORMIN (GLUCOPHAGE-XR) 500 MG ER 24hr tablet TAKE 3 TABLETS ONE TIME DAILY 270 tablet 1    potassium chloride SA (K-DUR,KLOR-CON) 20 MEQ tablet Take 1 tablet (20 mEq total) by mouth once daily. Take with food 90 tablet 3    propranoloL (INDERAL LA) 80 MG 24 hr capsule TAKE 1 CAPSULE EVERY DAY 90 capsule 4    rosuvastatin (CRESTOR) 5 MG tablet Take 1 tablet (5 mg total) by mouth once daily. 90 tablet 3    traMADoL (ULTRAM) 50 mg tablet Take 50 mg by mouth every 6 (six) hours as needed.       Current Facility-Administered Medications on File Prior to Visit   Medication Dose Route Frequency Provider Last Rate Last Admin    triamcinolone acetonide injection 40 mg  40 mg Intra-articular  Mark Rashid MD   40 mg at 10/30/20 0840    triamcinolone acetonide injection 40 mg  40 mg Intra-articular  Mark Rashid MD   40 mg at 10/30/20 0840       Medication List with Changes/Refills    Current Medications    ACCU-CHEK CIRILO PLUS TEST STRP STRP    CHECK BLOOD GLUCOSE ONE TIME DAILY    AMLODIPINE (NORVASC) 5 MG TABLET    TAKE 1 TABLET BY MOUTH EVERY DAY    BENAZEPRIL-HYDROCHLORTHIAZIDE (LOTENSIN HCT) 20-25 MG TAB    Take 1 tablet by mouth once daily.    BLOOD-GLUCOSE METER KIT    To check BG 1 times daily, Accuchek Cirilo meter    BRIMONIDINE 0.15 % OPTH DROP (ALPHAGAN) 0.15 % OPHTHALMIC SOLUTION    Place 1 drop into both eyes 2 (two) times daily. Dispense 90 days supply    DORZOLAMIDE-TIMOLOL 2-0.5% (COSOPT) 22.3-6.8 MG/ML OPHTHALMIC SOLUTION    Place 1 drop into both eyes 2 (two) times daily.    GLIMEPIRIDE (AMARYL) 2 MG TABLET    Take 1 tablet (2 mg total) by mouth daily as needed (BS >150). ADMINISTER 30 MINUTES BEFORE MEAL(S).    LANCETS (ACCU-CHEK SOFTCLIX LANCETS) MISC    USE  TO  CHECK BLOOD GLUCOSE ONE TIME DAILY    LATANOPROST 0.005 % OPHTHALMIC SOLUTION    INSTILL 1 DROP INTO BOTH EYES EVERY EVENING    METFORMIN (GLUCOPHAGE-XR) 500 MG ER 24HR TABLET    TAKE 3 TABLETS ONE TIME DAILY    POTASSIUM CHLORIDE SA (K-DUR,KLOR-CON) 20 MEQ TABLET    Take 1 tablet (20 mEq total) by mouth once daily. Take with food    PROPRANOLOL (INDERAL LA) 80 MG 24 HR CAPSULE    TAKE 1 CAPSULE EVERY DAY    ROSUVASTATIN (CRESTOR) 5 MG TABLET    Take 1 tablet (5 mg total) by mouth once daily.    TRAMADOL (ULTRAM) 50 MG TABLET    Take 50 mg by mouth every 6 (six) hours as needed.       PSH     Past Surgical History:   Procedure Laterality Date    BREAST LUMPECTOMY      CATARACT EXTRACTION W/  INTRAOCULAR LENS IMPLANT Right 07/22/2020    w/ goniotomy    CATARACT EXTRACTION W/  INTRAOCULAR LENS IMPLANT Left 06/16/2021    w/ goniotomy    COLONOSCOPY W/ POLYPECTOMY  04/18/2017    DR. CHARLENE NATH/ANTONIO SALTER. POLYP X 3. REPEAT 5 YRS.    PCIOL Right     DR. MENDOZA    SLT - OU - BOTH EYES      TRIGGER FINGER RELEASE      Thumb    TUBAL LIGATION          ALL  Review of patient's allergies indicates:   Allergen  "Reactions    Travatan z [travoprost]        SOC     Social History     Tobacco Use    Smoking status: Never Smoker    Smokeless tobacco: Never Used   Substance Use Topics    Alcohol use: Yes     Comment: very rare    Drug use: No         FAMILY HX    Family History   Problem Relation Age of Onset    Glaucoma Brother     Macular degeneration Brother     Esophageal cancer Father     Heart disease Father     Hypertension Father     Liver cancer Mother     Cancer Mother     Hypertension Sister     Cataracts Sister     Diabetes Sister     Cholelithiasis Sister     Blindness Neg Hx     Retinal detachment Neg Hx     Strabismus Neg Hx     Stroke Neg Hx     Thyroid disease Neg Hx             REVIEW OF SYSTEMS  General: This patient is well-developed, well-nourished and appears stated age, well-oriented to person, place and time, and cooperative and pleasant on today's visit   Constitutional: Negative for chills and fever.   Respiratory: Negative for shortness of breath.    Cardiovascular: Negative for chest pain, palpitations, orthopnea  Gastrointestinal: Negative for diarrhea, nausea and vomiting.   Musculoskeletal: Positive for above noted in HPI  Skin: Positive for skin and/or nail changes   Neurological: Positive for tingling and sensory changes  Peripheral Vascular: no claudication or cyanosis  Psychiatric/Behavioral: Negative for altered mental status     PHYSICAL EXAM:      Vitals:    01/11/22 1110   Weight: 86.6 kg (191 lb)   Height: 5' 5" (1.651 m)   PainSc:   2         LOWER EXTREMITY PHYSICAL EXAM  VASCULAR  Dorsalis pedis and posterior tibial pulses palpable 2/4 bilaterally. Capillary refill time immediate to the toes. Feet are warm to the touch. Skin temperature warm to warm from proximally to distally There are no varicosities, telangiectasias noted to bilateral foot and ankle regions. There are no ecchymoses noted to bilateral foot and ankle regions. There is moderate gross lower extremity " edema.    DERMATOLOGIC  Skin moist with healthy texture and turgor.There are no open ulcerations, lacerations, or fissures to bilateral foot and ankle regions. There are no signs of infection as there is no erythema, no proximal-extending lymphangiitis, no fluctuance, or crepitus noted on palpation to bilateral foot and ankle regions. There is no interdigital maceration.   There are no hyperkeratotic lesions noted to feet. Nails are well-trimmed.    NEUROLOGIC  Epicritic sensation is intact as the patient is able to sense light touch to bilateral foot and ankle regions. Achilles and patellar deep tendon reflexes intact. Babinski reflex absent    ORTHOPEDIC/BIOMECHANICAL  No symptomatic structural abnormalities noted. Muscle strength AT/EHL/EDL/PT: 5/5; Achilles/Gastroc/Soleus: 5/5; PB/PL: 5/5 Muscle tone is normal. Diffuse TTP plantar foot and medial arch b/l. Negative pain with palpation of calcaneus. Decreased AJ DF with knee extended and flexed b/l.     ASSESSMENT     Encounter Diagnoses   Name Primary?    Plantar fasciitis - Left Foot Yes    Gastrocnemius equinus of left lower extremity     Gastrocnemius equinus of right lower extremity          PLAN  Patient was educated about clinical and imaging findings, and verbalizes understanding of above.     Diagnoses and all orders for this visit:  Plantar fasciitis - Left Foot    Gastrocnemius equinus of left lower extremity    Gastrocnemius equinus of right lower extremity      -I Discussed different treatment options for heel pain.   -I gave written and verbal instructions on heel cord stretching and this was demonstrated for the patient. A theraband was also provided to the patient for stretching exercises. Recommendations were given for plantar fasciitis treatment including icing, stretching, arch supports, avoidance of barefoot walking, appropriate shoe wear, and strict compliance. Discussed importance of patient to perform stretching exercises.     Recommend  night splint. Referral placed.     Disclaimer:  This note may have been prepared using voice recognition software, it may have not been extensively proofed, as such there could be errors within the text such as sound alike errors.         Future Appointments   Date Time Provider Department Center   1/19/2022  8:00 AM Nilda Carlson, PT [facility] [place]   1/24/2022  8:00 AM Nilda Carlson, PT [facility] [place]   1/31/2022  8:00 AM Nilda Carlson, PT [facility] [place]   2/3/2022  7:30 AM FIELDS, VISUAL-ONE [facility]   2/3/2022  8:15 AM Garry Alberto MD [facility]   3/7/2022  8:00 AM Nilda Carlson, PT [facility] [place]   5/11/2022  7:40 AM [facility]       Report Electronically Signed By:     Eryn Oleary DPM   Podiatry  [facility]  1/11/2022

## 2022-01-13 DIAGNOSIS — Z87.898 HISTORY OF PALPITATIONS: ICD-10-CM

## 2022-01-13 NOTE — TELEPHONE ENCOUNTER
No new care gaps identified.  Powered by [employer]. Reference number: [id number].   1/13/2022 10:53:27 AM CST

## 2022-01-19 ENCOUNTER — CLINICAL SUPPORT (OUTPATIENT)
Dept: REHABILITATION | Facility: HOSPITAL | Age: 67
End: 2022-01-19
Attending: PODIATRIST
Payer: MEDICARE

## 2022-01-19 DIAGNOSIS — M72.2 PLANTAR FASCIITIS: ICD-10-CM

## 2022-01-19 PROCEDURE — 97110 THERAPEUTIC EXERCISES: CPT | Mod: HCNC

## 2022-01-19 NOTE — PROGRESS NOTES
[facility]   Physical Therapy Treatment Note     Name: Raquel Pickard  [facility] Number: [id number]    Therapy Diagnosis:   Encounter Diagnosis   Name Primary?    Plantar fasciitis      Physician: Eryn Oleary DPM    Visit Date: 1/19/2022       Physician Orders: PT Eval and Treat   Medical Diagnosis from Referral: M79.671,M79.672 (ICD-10-CM) - Bilateral foot pain  Evaluation Date: 12/15/2021  Authorization Period Expiration: 12/29/22  Plan of Care Expiration: 2/11/22  Visit # / Visits authorized: 3/20 ( 3 visits completed in 2021 including eval)  FOTO: 2/3       PTA Visit #: 0/5     Time In:8:10  Time Out: 8:52  Total Billable Time: 42 minutes    SUBJECTIVE     Pt reports: she felt more achy in her legs last week and R UE is painful today   She was compliant with home exercise program.  Response to previous treatment: good  Functional change: increase movement and ease with standing up    Pain: 2/10 / achy in legs  Location: left feet      OBJECTIVE     MMT sitting hip flexion 4/5, R knee extension 3+/5, L knee 4/5, B knee flexion 4/5     Treatment     Raquel received the treatments listed below:      therapeutic exercises to develop strength, endurance, ROM, flexibility and posture for 42 minutes including:  HEP reviewed  Nu step 8 min for endurance, reciprocal movement, strength  gastroc stretch on incline 3 x 30 secs  Soleus stretch on incline 3 x 30 secs  PF stretch off edge of step 3 x 30 secs B  Heel raises 2 x 15 reps SL each  SLS L  16 secs R   44 secs D/C   Towel curls 3 min D/C  Sit to  30 secs 15 reps with no UE support  SLS on air ex D/C  Wobble board f/b 3 min  S/S 3 min D/C   Toe walking 2 laps  Side stepping 2 laps D/C  Rebounder using Red ball overhead standing on air ex 1 x 20 reps (SLS next visit) not performed  Leg press 15# 2 x 20 reps (increase weight next visit)   Sitting hip flexion with 3# 2 x 15 reps B  Knee extension 3# 1 x 15 reps B  Standing knee  flexion 3# 1 x 15 reps B  Leg extension 15# 2 x 10 reps NP  Leg curl 15# 2 x 20 reps (add weight next visit) NP  Slider/ TG 6 bands with ball 3 minutes d/c   Bridges 3 x 15 reps HEP  Step ups 2 x 15 reps B  Squats not performed  Braiding 2 laps  Backward walking 2 laps  Ankle TB 4 ways reviewed and issued as HEP with B TB HEP    (Reasses next week and possible d/c)  NP=Not performed      Patient Education and Home Exercises     Home Exercises Provided and Patient Education Provided     Education provided:   Yes, HEP reviewed and pain management and POC / ankle 4 way with B TB issued/ goals and POC reviewed/ discussed, proper mechanics for sit to stands reviewed    Written Home Exercises Provided: Patient instructed to cont prior HEP and add ankle 4 way with B TB.  Exercises were reviewed and Raquel was able to demonstrate them prior to the end of the session.  Raquel demonstrated good  understanding of the education provided. See EMR under Patient Instructions for exercises provided during therapy sessions    ASSESSMENT     New exercises with resistance initiated today. Patient tolerated treatment with difficulty today. Minimal  pain complaints during treatment. Constant cuing to slow down and isolate musculature during exercises. Patient is responding well to treatment and reported she felt better after treatment. She verbalized concern with difficulty of standing up at times depending on height of surface she is rising from.       Raquel Is progressing well towards her goals.   Pt prognosis is Good.     Pt will continue to benefit from skilled outpatient physical therapy to address the deficits listed in the problem list box on initial evaluation, provide pt/family education and to maximize pt's level of independence in the home and community environment.     Pt's spiritual, cultural and educational needs considered and pt agreeable to plan of care and goals.     Anticipated barriers to physical therapy:            Goals:  STG's  2 weeks  1. Patient will be independent with 50% of HEP Met 12/27/21     LTG's 8 weeks  1. Patient will improve FOTO disability score  to 25 %  disability or less in order to improve overall QOL & return to PLOF   2. Patient will report an overall decrease in pain with walking and standing  3. Patient will be independent with HEP and pain management  4. Increase strength in PF by 1/2 muscle grade  5. Increase single leg stance balance to 10 secs on each leg Met 12/27/21  6. Increase strength B LE's by 1/2 muscle grade in affected musculature      Plan   Plan of care Certification: 12/15/2021 to 2/11/22     Outpatient Physical Therapy 1 to 2 times weekly for 8 weeks to include the following interventions: Manual Therapy, Moist Heat/ Ice, Patient Education, Self Care, Therapeutic Activites, Therapeutic Exercise and Ultrasound, MELODY Carlson, PT

## 2022-01-20 ENCOUNTER — OFFICE VISIT (OUTPATIENT)
Dept: URGENT CARE | Facility: CLINIC | Age: 67
End: 2022-01-20
Payer: MEDICARE

## 2022-01-20 ENCOUNTER — TELEPHONE (OUTPATIENT)
Dept: INTERNAL MEDICINE | Facility: CLINIC | Age: 67
End: 2022-01-20
Payer: MEDICARE

## 2022-01-20 VITALS
HEART RATE: 96 BPM | RESPIRATION RATE: 14 BRPM | BODY MASS INDEX: 31.81 KG/M2 | HEIGHT: 65 IN | WEIGHT: 190.94 LBS | OXYGEN SATURATION: 99 % | SYSTOLIC BLOOD PRESSURE: 138 MMHG | DIASTOLIC BLOOD PRESSURE: 74 MMHG | TEMPERATURE: 99 F

## 2022-01-20 DIAGNOSIS — M25.511 ACUTE PAIN OF RIGHT SHOULDER: Primary | ICD-10-CM

## 2022-01-20 PROCEDURE — 1159F MED LIST DOCD IN RCRD: CPT | Mod: CPTII,S$GLB,, | Performed by: NURSE PRACTITIONER

## 2022-01-20 PROCEDURE — 3072F LOW RISK FOR RETINOPATHY: CPT | Mod: CPTII,S$GLB,, | Performed by: NURSE PRACTITIONER

## 2022-01-20 PROCEDURE — 1159F PR MEDICATION LIST DOCUMENTED IN MEDICAL RECORD: ICD-10-PCS | Mod: CPTII,S$GLB,, | Performed by: NURSE PRACTITIONER

## 2022-01-20 PROCEDURE — 1160F PR REVIEW ALL MEDS BY PRESCRIBER/CLIN PHARMACIST DOCUMENTED: ICD-10-PCS | Mod: CPTII,S$GLB,, | Performed by: NURSE PRACTITIONER

## 2022-01-20 PROCEDURE — 1125F AMNT PAIN NOTED PAIN PRSNT: CPT | Mod: CPTII,S$GLB,, | Performed by: NURSE PRACTITIONER

## 2022-01-20 PROCEDURE — 3008F PR BODY MASS INDEX (BMI) DOCUMENTED: ICD-10-PCS | Mod: CPTII,S$GLB,, | Performed by: NURSE PRACTITIONER

## 2022-01-20 PROCEDURE — 1125F PR PAIN SEVERITY QUANTIFIED, PAIN PRESENT: ICD-10-PCS | Mod: CPTII,S$GLB,, | Performed by: NURSE PRACTITIONER

## 2022-01-20 PROCEDURE — 3078F DIAST BP <80 MM HG: CPT | Mod: CPTII,S$GLB,, | Performed by: NURSE PRACTITIONER

## 2022-01-20 PROCEDURE — 96372 PR INJECTION,THERAP/PROPH/DIAG2ST, IM OR SUBCUT: ICD-10-PCS | Mod: S$GLB,,, | Performed by: FAMILY MEDICINE

## 2022-01-20 PROCEDURE — 1160F RVW MEDS BY RX/DR IN RCRD: CPT | Mod: CPTII,S$GLB,, | Performed by: NURSE PRACTITIONER

## 2022-01-20 PROCEDURE — 3008F BODY MASS INDEX DOCD: CPT | Mod: CPTII,S$GLB,, | Performed by: NURSE PRACTITIONER

## 2022-01-20 PROCEDURE — 99214 PR OFFICE/OUTPT VISIT, EST, LEVL IV, 30-39 MIN: ICD-10-PCS | Mod: 25,S$GLB,, | Performed by: NURSE PRACTITIONER

## 2022-01-20 PROCEDURE — 3075F SYST BP GE 130 - 139MM HG: CPT | Mod: CPTII,S$GLB,, | Performed by: NURSE PRACTITIONER

## 2022-01-20 PROCEDURE — 3075F PR MOST RECENT SYSTOLIC BLOOD PRESS GE 130-139MM HG: ICD-10-PCS | Mod: CPTII,S$GLB,, | Performed by: NURSE PRACTITIONER

## 2022-01-20 PROCEDURE — 3072F PR LOW RISK FOR RETINOPATHY: ICD-10-PCS | Mod: CPTII,S$GLB,, | Performed by: NURSE PRACTITIONER

## 2022-01-20 PROCEDURE — 96372 THER/PROPH/DIAG INJ SC/IM: CPT | Mod: S$GLB,,, | Performed by: FAMILY MEDICINE

## 2022-01-20 PROCEDURE — 99214 OFFICE O/P EST MOD 30 MIN: CPT | Mod: 25,S$GLB,, | Performed by: NURSE PRACTITIONER

## 2022-01-20 PROCEDURE — 3078F PR MOST RECENT DIASTOLIC BLOOD PRESSURE < 80 MM HG: ICD-10-PCS | Mod: CPTII,S$GLB,, | Performed by: NURSE PRACTITIONER

## 2022-01-20 RX ORDER — INFLUENZA VACCINE, ADJUVANTED 15; 15; 15; 15 UG/.5ML; UG/.5ML; UG/.5ML; UG/.5ML
INJECTION, SUSPENSION INTRAMUSCULAR
COMMUNITY
Start: 2021-10-10 | End: 2022-07-21

## 2022-01-20 RX ORDER — KETOROLAC TROMETHAMINE 30 MG/ML
30 INJECTION, SOLUTION INTRAMUSCULAR; INTRAVENOUS
Status: COMPLETED | OUTPATIENT
Start: 2022-01-20 | End: 2022-01-20

## 2022-01-20 RX ORDER — IBUPROFEN 800 MG/1
800 TABLET ORAL EVERY 8 HOURS PRN
Qty: 30 TABLET | Refills: 0 | Status: SHIPPED | OUTPATIENT
Start: 2022-01-20 | End: 2022-01-30

## 2022-01-20 RX ADMIN — KETOROLAC TROMETHAMINE 30 MG: 30 INJECTION, SOLUTION INTRAMUSCULAR; INTRAVENOUS at 04:01

## 2022-01-20 NOTE — TELEPHONE ENCOUNTER
Called and spoke with patient. Advised her that no one had any appointments today. Tried to offer what we had for appointment. Patient is going to go to urgent care.

## 2022-01-20 NOTE — TELEPHONE ENCOUNTER
----- Message from Arik De Souza sent at 1/20/2022  1:16 PM CST -----  Contact: self  Type:  Same Day Appointment Request    Caller is requesting a same day appointment.  Caller declined first available appointment listed below.    Name of Caller:.anme   When is the first available appointment?2022  Symptoms:right arm pain and elevated BP  Best Call Back Number:[phone]  Additional Information:

## 2022-01-20 NOTE — PATIENT INSTRUCTIONS
"Patient Education       Shoulder Pain Discharge Instructions   About this topic   Your shoulder joint is made of 3 bones. These are the upper arm bone, the shoulder blade, and the collarbone. The shoulder is a "ball and socket" joint. The "ball" part of the joint is the top part of your upper arm bone. The "socket" part of your joint is a cup shaped indentation in your shoulder blade. Because of this, the shoulder can move in many ways. Strong bands of tissue called ligaments help hold the shoulder in place. Muscles and tendons also hold it in place.  You can have pain in your shoulder for many reasons. It may be hard for the doctor to tell exactly where the pain is coming from. You can have pain in your muscles, bones, or joints. It can also happen in your tendons and ligaments which connect these together.  Causes of this kind of pain may include:  · Overuse or using muscles in the same way over and over  · Trauma from falls, accidents, direct blows to muscles, and injuries such as bone breaks, sprains, or dislocations  · Strain on your muscles from bad posture           What care is needed at home?   · Ask your doctor what you need to do when you go home. Make sure you ask questions if you do not understand what the doctor says. This way you will know what you need to do.  · Rest. Allow your injury to heal before you do slow movements.  · Place an ice pack or a bag of frozen peas wrapped in a towel over the painful part. Never put ice right on the skin. Do not leave the ice on more than 10 to 15 minutes at a time.  · Prop your arm on pillows to help with swelling.  · Your doctor may want you to use a sling, strap, or sleeve to keep your shoulder from moving.  · Heat may be used but not right after an injury. Heat can make swelling worse. If your doctor tells you to use heat, put a heating pad on your shoulder for no more than 20 minutes at a time. Never go to sleep with a heating pad on as this can cause " burns.  · Do range of motion exercises as your therapist or doctor teaches you to do. As your shoulder heals, you will be given more exercises to stretch and strengthen your shoulder.  What follow-up care is needed?   · Your doctor may ask you to make visits to the office to check on your progress. Be sure to keep all these visits.  · Your doctor may send you to physical therapy or occupational therapy to help you regain use of your shoulder sooner.  What drugs may be needed?   The doctor may order drugs to:  · Help with pain and swelling  The doctor may give you a shot of an anti-inflammatory drug called a corticosteroid. This will help with swelling. Talk with your doctor about the risks of this shot.  Will physical activity be limited?   Your doctor may ask you to rest and limit your activity. Based on how bad your shoulder injury is, this could last for a few days to a number of weeks.  What can be done to prevent this health problem?   · Stay active and work out to keep your muscles strong and flexible.  · Warm up slowly and stretch your muscles before you work out. Do not work out if you are overly tired. Take extra care if working out in cold weather.  · Slowly increase the amount of time you work out. If you are using weights, slowly increase the weight to strengthen your muscles.  · Wear protection when playing sports.  · Take breaks often when doing things that use repeat movements.  When do I need to call the doctor?   · Pain or swelling gets worse  · Hand feels cold or numb  · You are not feeling better in 2 or 3 days or you are feeling worse  Teach Back: Helping You Understand   The Teach Back Method helps you understand the information we are giving you. After you talk with the staff, tell them in your own words what you learned. This helps to make sure the staff has described each thing clearly. It also helps to explain things that may have been confusing. Before going home, make sure you can do  these:  · I can tell you about my condition.  · I can tell you what may help ease my pain.  · I can tell you what I will do if I have more pain or swelling or my fingers are cool or blue.  Where can I learn more?   [employer]  http://orthoinfo.aaos.org/PDFs/M29439.pdf   Last Reviewed Date   2020-09-25  Consumer Information Use and Disclaimer   This information is not specific medical advice and does not replace information you receive from your health care provider. This is only a brief summary of general information. It does NOT include all information about conditions, illnesses, injuries, tests, procedures, treatments, therapies, discharge instructions or life-style choices that may apply to you. You must talk with your health care provider for complete information about your health and treatment options. This information should not be used to decide whether or not to accept your health care providers advice, instructions or recommendations. Only your health care provider has the knowledge and training to provide advice that is right for you.  Copyright   Copyright © 2021 [employer], [employer] and its affiliates and/or licensors. All rights reserved.

## 2022-01-20 NOTE — PROGRESS NOTES
"Subjective:       Patient ID: Raquel Pickard is a 66 y.o. female.    Vitals:  height is 5' 5" (1.651 m) and weight is 86.6 kg (190 lb 14.7 oz). Her tympanic temperature is 99.1 °F (37.3 °C). Her blood pressure is 138/74 and her pulse is 96. Her respiration is 14 and oxygen saturation is 99%.     Chief Complaint: Arm Pain    Pt state that she don't know what happen but she got a sharp pain in Rt arm     Arm Pain   The pain is present in the upper right arm. The pain radiates to the right arm. The pain is at a severity of 8/10. The pain is severe. Associated symptoms include muscle weakness, numbness and tingling. Pertinent negatives include no chest pain. The symptoms are aggravated by lifting, movement and a foreign body. She has tried NSAIDs for the symptoms. The treatment provided mild relief.       Cardiovascular: Negative for chest pain.   Skin: Negative for erythema.   Neurological: Positive for numbness.       Objective:      Physical Exam   Constitutional: She is oriented to person, place, and time. She appears well-developed and well-nourished.   HENT:   Head: Normocephalic and atraumatic. Head is without abrasion, without contusion and without laceration.   Ears:   Right Ear: External ear normal.   Left Ear: External ear normal.   Nose: Nose normal.   Mouth/Throat: Oropharynx is clear and moist and mucous membranes are normal.   Eyes: Conjunctivae, EOM and lids are normal. Pupils are equal, round, and reactive to light.   Neck: Trachea normal and phonation normal. Neck supple.   Cardiovascular: Normal rate, regular rhythm and normal heart sounds.   Pulmonary/Chest: Effort normal and breath sounds normal. No stridor. No respiratory distress. She has no decreased breath sounds. She has no wheezes.   Musculoskeletal: Normal range of motion.         General: Normal range of motion.      Right shoulder: She exhibits tenderness and decreased strength. She exhibits no bony tenderness, no swelling and normal pulse. "        Arms:    Neurological: She is alert and oriented to person, place, and time.   Skin: Skin is warm, dry, intact and no rash. Capillary refill takes less than 2 seconds. No abrasion, No burn, No bruising, No erythema and No ecchymosis   Psychiatric: She has a normal mood and affect. Her speech is normal and behavior is normal. Judgment and thought content normal. Cognition and memory  Nursing note and vitals reviewed.        Assessment:       1. Acute pain of right shoulder          Plan:         Acute pain of right shoulder  -     ketorolac injection 30 mg  -     ibuprofen (ADVIL,MOTRIN) 800 MG tablet; Take 1 tablet (800 mg total) by mouth every 8 (eight) hours as needed for Pain.  Dispense: 30 tablet; Refill: 0                  What care is needed at home?   · Ask your doctor what you need to do when you go home. Make sure you ask questions if you do not understand what the doctor says. This way you will know what you need to do.  · Rest. Allow your injury to heal before you do slow movements.  · Place an ice pack or a bag of frozen peas wrapped in a towel over the painful part. Never put ice right on the skin. Do not leave the ice on more than 10 to 15 minutes at a time.  · Prop your arm on pillows to help with swelling.  · Your doctor may want you to use a sling, strap, or sleeve to keep your shoulder from moving.  · Heat may be used but not right after an injury. Heat can make swelling worse. If your doctor tells you to use heat, put a heating pad on your shoulder for no more than 20 minutes at a time. Never go to sleep with a heating pad on as this can cause burns.  · Do range of motion exercises as your therapist or doctor teaches you to do. As your shoulder heals, you will be given more exercises to stretch and strengthen your shoulder.  What follow-up care is needed?   · Your doctor may ask you to make visits to the office to check on your progress. Be sure to keep all these visits.  · Your doctor may  send you to physical therapy or occupational therapy to help you regain use of your shoulder sooner.  What drugs may be needed?   The doctor may order drugs to:  · Help with pain and swelling  The doctor may give you a shot of an anti-inflammatory drug called a corticosteroid. This will help with swelling. Talk with your doctor about the risks of this shot.  Will physical activity be limited?   Your doctor may ask you to rest and limit your activity. Based on how bad your shoulder injury is, this could last for a few days to a number of weeks.  What can be done to prevent this health problem?   · Stay active and work out to keep your muscles strong and flexible.  · Warm up slowly and stretch your muscles before you work out. Do not work out if you are overly tired. Take extra care if working out in cold weather.  · Slowly increase the amount of time you work out. If you are using weights, slowly increase the weight to strengthen your muscles.  · Wear protection when playing sports.  · Take breaks often when doing things that use repeat movements.  When do I need to call the doctor?   · Pain or swelling gets worse  · Hand feels cold or numb  · You are not feeling better in 2 or 3 days or you are feeling worse  Teach Back: Helping You Understand   The Teach Back Method helps you understand the information we are giving you. After you talk with the staff, tell them in your own words what you learned. This helps to make sure the staff has described each thing clearly. It also helps to explain things that may have been confusing. Before going home, make sure you can do these:  · I can tell you about my condition.  · I can tell you what may help ease my pain.  · I can tell you what I will do if I have more pain or swelling or my fingers are cool or blue.

## 2022-01-24 ENCOUNTER — CLINICAL SUPPORT (OUTPATIENT)
Dept: REHABILITATION | Facility: HOSPITAL | Age: 67
End: 2022-01-24
Attending: PODIATRIST
Payer: MEDICARE

## 2022-01-24 DIAGNOSIS — M72.2 PLANTAR FASCIITIS: ICD-10-CM

## 2022-01-24 PROCEDURE — 97110 THERAPEUTIC EXERCISES: CPT | Mod: HCNC

## 2022-01-24 RX ORDER — PROPRANOLOL HYDROCHLORIDE 80 MG/1
80 CAPSULE, EXTENDED RELEASE ORAL DAILY
Qty: 90 CAPSULE | Refills: 0 | Status: SHIPPED | OUTPATIENT
Start: 2022-01-24 | End: 2022-03-29

## 2022-01-24 NOTE — PROGRESS NOTES
[facility]   Physical Therapy Treatment Note /  Discharge Summary    Name: Raquel Pickard  Clinic Number: [id number]    Therapy Diagnosis:   Encounter Diagnosis   Name Primary?    Plantar fasciitis      Physician: Eryn Oleary DPM    Visit Date: 1/24/2022       Physician Orders: PT Eval and Treat   Medical Diagnosis from Referral: M79.671,M79.672 (ICD-10-CM) - Bilateral foot pain  Evaluation Date: 12/15/2021  Authorization Period Expiration: 12/29/22  Plan of Care Expiration: 2/11/22  Visit # / Visits authorized: 4/20 ( 3 visits completed in 2021 including eval)  FOTO: 3/3       PTA Visit #: 0/5     Time In:8:00  Time Out: 8:27  Total Billable Time: 27 minutes    SUBJECTIVE     Pt reports: Patient reports she is feeling better. Patient wants to pursue orders for B shoulders.  She was compliant with home exercise program.  Response to previous treatment: good  Functional change: increase movement and ease with standing up/ decrease pain    Pain: 0/10   Location: left feet      OBJECTIVE     MMT sitting hip flexion 4+/5, R knee extension 4/5, L knee 4+/5, B knee flexion 5/5 Today/ 1/24/22    Treatment     Raquel received the treatments listed below:      therapeutic exercises to develop strength, endurance, ROM, flexibility and posture for 27 minutes including:  HEP reviewed  Nu step 8 min for endurance, reciprocal movement, strength  gastroc stretch on incline 3 x 30 secs NP  Soleus stretch on incline 3 x 30 secs NP  PF stretch off edge of step 3 x 30 secs B NP  Heel raises 2 x 15 reps SL each NP  SLS L  16 secs R   44 secs D/C   Towel curls 3 min D/C  Sit to  30 secs 15 reps with no UE support NP  SLS on air ex D/C  Wobble board f/b 3 min  S/S 3 min D/C   Toe walking 2 laps NP  Side stepping 2 laps D/C  Rebounder using Red ball overhead standing on air ex 1 x 20 reps (SLS next visit) not performed  Leg press 15# 2 x 20 reps (increase weight next visit) NP  Sitting hip flexion  with 3# 2 x 15 reps B NP  Knee extension 3# 1 x 15 reps B NP  Standing knee flexion 3# 1 x 15 reps B  NP  Leg extension 15# 2 x 10 reps NP  Leg curl 15# 2 x 20 reps (add weight next visit) NP  Slider/ TG 6 bands with ball 3 minutes d/c   Bridges 3 x 15 reps HEP  Step ups 2 x 15 reps B NP  Squats not performed  Braiding 2 laps  NP  Backward walking 2 laps NP  Ankle TB 4 ways reviewed and issued as HEP with B TB HEP    HEP, body mechanics, goals reviewed in detail.  MMT    NP=Not performed      Patient Education and Home Exercises     Home Exercises Provided and Patient Education Provided     Education provided:   Yes, HEP reviewed and pain management and POC / ankle 4 way with B TB issued/ goals and POC reviewed/ discussed, proper mechanics for sit to stands reviewed, Body mechanics for carrying objects, HEP in detail. All questions and concerns answered.    Written Home Exercises Provided: Patient instructed to cont prior HEP and add ankle 4 way with B TB.  Exercises were reviewed and Raquel was able to demonstrate them prior to the end of the session.  Raquel demonstrated good  understanding of the education provided. See EMR under Patient Instructions for exercises provided during therapy sessions    ASSESSMENT     Patient has responded well to treatment and is being d/c from PT services today. Mrs. Pickard has met all goals established on her initial evaluation. See below for details. Patient in agreement of discharge of PT services for her feet/ Plantar fasciitis.         Goals:  STG's  2 weeks  1. Patient will be independent with 50% of HEP Met 12/27/21     LTG's 8 weeks  1. Patient will improve FOTO disability score  to 25 %  disability or less in order to improve overall QOL & return to PLOF MET1/24/22 1% limited  2. Patient will report an overall decrease in pain with walking and standing Met 1/24/22  3. Patient will be independent with HEP and pain management Met 1/24/22  4. Increase strength in PF by 1/2 muscle  grade Met 1/24/22  5. Increase single leg stance balance to 10 secs on each leg Met 12/27/21  6. Increase strength B LE's by 1/2 muscle grade in affected musculature Met 1/24/22      Plan   Patient d/c from PT services due to established goals met and functional independence met.       Nilda Carlson, PT

## 2022-02-03 ENCOUNTER — OFFICE VISIT (OUTPATIENT)
Dept: OPHTHALMOLOGY | Facility: CLINIC | Age: 67
End: 2022-02-03
Payer: MEDICARE

## 2022-02-03 DIAGNOSIS — H40.1133 PRIMARY OPEN ANGLE GLAUCOMA OF BOTH EYES, SEVERE STAGE: Primary | ICD-10-CM

## 2022-02-03 DIAGNOSIS — Z96.1 PSEUDOPHAKIA: ICD-10-CM

## 2022-02-03 DIAGNOSIS — E11.9 DIABETES MELLITUS TYPE 2 WITHOUT RETINOPATHY: ICD-10-CM

## 2022-02-03 PROCEDURE — 99999 PR PBB SHADOW E&M-EST. PATIENT-LVL I: ICD-10-PCS | Mod: PBBFAC,HCNC,, | Performed by: OPHTHALMOLOGY

## 2022-02-03 PROCEDURE — 1159F MED LIST DOCD IN RCRD: CPT | Mod: HCNC,CPTII,S$GLB, | Performed by: OPHTHALMOLOGY

## 2022-02-03 PROCEDURE — 92083 HUMPHREY VISUAL FIELD - OU - BOTH EYES: ICD-10-PCS | Mod: HCNC,S$GLB,, | Performed by: OPHTHALMOLOGY

## 2022-02-03 PROCEDURE — 1160F PR REVIEW ALL MEDS BY PRESCRIBER/CLIN PHARMACIST DOCUMENTED: ICD-10-PCS | Mod: HCNC,CPTII,S$GLB, | Performed by: OPHTHALMOLOGY

## 2022-02-03 PROCEDURE — 92014 COMPRE OPH EXAM EST PT 1/>: CPT | Mod: HCNC,S$GLB,, | Performed by: OPHTHALMOLOGY

## 2022-02-03 PROCEDURE — 92133 CPTRZD OPH DX IMG PST SGM ON: CPT | Mod: HCNC,S$GLB,, | Performed by: OPHTHALMOLOGY

## 2022-02-03 PROCEDURE — 92083 EXTENDED VISUAL FIELD XM: CPT | Mod: HCNC,S$GLB,, | Performed by: OPHTHALMOLOGY

## 2022-02-03 PROCEDURE — 1159F PR MEDICATION LIST DOCUMENTED IN MEDICAL RECORD: ICD-10-PCS | Mod: HCNC,CPTII,S$GLB, | Performed by: OPHTHALMOLOGY

## 2022-02-03 PROCEDURE — 99999 PR PBB SHADOW E&M-EST. PATIENT-LVL I: CPT | Mod: PBBFAC,HCNC,, | Performed by: OPHTHALMOLOGY

## 2022-02-03 PROCEDURE — 1160F RVW MEDS BY RX/DR IN RCRD: CPT | Mod: HCNC,CPTII,S$GLB, | Performed by: OPHTHALMOLOGY

## 2022-02-03 PROCEDURE — 92133 POSTERIOR SEGMENT OCT OPTIC NERVE(OCULAR COHERENCE TOMOGRAPHY) - OU - BOTH EYES: ICD-10-PCS | Mod: HCNC,S$GLB,, | Performed by: OPHTHALMOLOGY

## 2022-02-03 PROCEDURE — 92014 PR EYE EXAM, EST PATIENT,COMPREHESV: ICD-10-PCS | Mod: HCNC,S$GLB,, | Performed by: OPHTHALMOLOGY

## 2022-02-03 NOTE — PROGRESS NOTES
SUBJECTIVE  Raquelchris Pickard is 66 y.o. female  Uncorrected distance visual acuity was 20/30 in the right eye and 20/30-2 in the left eye.   Chief Complaint   Patient presents with    Glaucoma     3 month GOCT HVF Dilation          HPI     Glaucoma      Additional comments: 3 month GOCT HVF Dilation              Comments     States that her vision is stable and that she has been compliant with   gtts as directed. Denies any new ocular issues at this time.     1. Severe COAG DX 7-10 (Tmax 23/25) Goal = 16   SLT OS 4/23/14(18-16)   SLT OD 2/12/14(17-16)   HYPEREMIA WITH XALATAN AND TRAVATAN      2. DM SINCE 2012 NO DBR   3. PCIOL w/ goniotomy OS 6/16/21 (+19.0 SN60WF)   PC IOL + goniotomy OD 7/22/20 (+18.5 SN60WF)   4. Dry Eyes   5. Floater OS    COSOPT BID OU   ALPHAGAN BID OU   LATANOPROST QHS OU    [place] BID OU prn            Last edited by Ashleigh Perry on 2/3/2022  8:31 AM. (History)         Assessment /Plan :  1. Primary open angle glaucoma of both eyes, severe stage Doing well, IOP within acceptable range relative to target IOP and no evidence of progression. Continue current treatment. Reviewed importance of continued compliance with treatment and follow up.      Patient instructed to continue using the following glaucoma medication as follows:  Latanoprost one drop in each eye nightly, Alphagan one drop in each eye every 12 hours and Dorzolamide/Timolol (Cosopt) one drop in each eye every 12 hours    Return to clinic in 3-4 months  or as needed.  With IOP Check       2. Diabetes mellitus type 2 without retinopathy No diabetic retinopathy at this time. Reviewed diabetic eye precautions including avoiding tobacco use,  Good glucose control, and importance of regular follow up.      3. Pseudophakia  -- Condition stable, no therapeutic change required. Monitoring routinely.

## 2022-02-07 ENCOUNTER — TELEPHONE (OUTPATIENT)
Dept: INTERNAL MEDICINE | Facility: CLINIC | Age: 67
End: 2022-02-07
Payer: MEDICARE

## 2022-02-07 DIAGNOSIS — M25.512 CHRONIC PAIN OF BOTH SHOULDERS: Primary | ICD-10-CM

## 2022-02-07 DIAGNOSIS — M25.511 CHRONIC PAIN OF BOTH SHOULDERS: Primary | ICD-10-CM

## 2022-02-07 DIAGNOSIS — G89.29 CHRONIC PAIN OF BOTH SHOULDERS: Primary | ICD-10-CM

## 2022-02-07 NOTE — TELEPHONE ENCOUNTER
----- Message from Princess Orlando sent at 2/7/2022 10:05 AM CST -----  Contact: [phone]/ Raquel  Patient would like to get medical advice.    Comments:   Calling to see if provider is back in office.

## 2022-02-07 NOTE — TELEPHONE ENCOUNTER
Pt has state she has josé miguel shoulder pain that she has been seen for before requesting a referral to ortho to see Dr. mead

## 2022-02-08 ENCOUNTER — HOSPITAL ENCOUNTER (OUTPATIENT)
Dept: RADIOLOGY | Facility: HOSPITAL | Age: 67
Discharge: HOME | End: 2022-02-08
Attending: ORTHOPAEDIC SURGERY
Payer: MEDICARE

## 2022-02-08 ENCOUNTER — OFFICE VISIT (OUTPATIENT)
Dept: ORTHOPEDICS | Facility: CLINIC | Age: 67
End: 2022-02-08
Payer: MEDICARE

## 2022-02-08 VITALS
WEIGHT: 190 LBS | DIASTOLIC BLOOD PRESSURE: 75 MMHG | BODY MASS INDEX: 31.65 KG/M2 | HEIGHT: 65 IN | HEART RATE: 78 BPM | SYSTOLIC BLOOD PRESSURE: 115 MMHG

## 2022-02-08 DIAGNOSIS — M25.512 BILATERAL SHOULDER PAIN, UNSPECIFIED CHRONICITY: ICD-10-CM

## 2022-02-08 DIAGNOSIS — M25.511 BILATERAL SHOULDER PAIN, UNSPECIFIED CHRONICITY: ICD-10-CM

## 2022-02-08 DIAGNOSIS — M79.642 BILATERAL HAND PAIN: ICD-10-CM

## 2022-02-08 DIAGNOSIS — M79.641 BILATERAL HAND PAIN: Primary | ICD-10-CM

## 2022-02-08 DIAGNOSIS — M25.511 BILATERAL SHOULDER PAIN, UNSPECIFIED CHRONICITY: Primary | ICD-10-CM

## 2022-02-08 DIAGNOSIS — I89.0 LYMPHEDEMA: Primary | ICD-10-CM

## 2022-02-08 DIAGNOSIS — M79.642 BILATERAL HAND PAIN: Primary | ICD-10-CM

## 2022-02-08 DIAGNOSIS — M25.512 BILATERAL SHOULDER PAIN, UNSPECIFIED CHRONICITY: Primary | ICD-10-CM

## 2022-02-08 DIAGNOSIS — M79.641 BILATERAL HAND PAIN: ICD-10-CM

## 2022-02-08 PROCEDURE — 1125F AMNT PAIN NOTED PAIN PRSNT: CPT | Mod: HCNC,CPTII,S$GLB, | Performed by: ORTHOPAEDIC SURGERY

## 2022-02-08 PROCEDURE — 3074F PR MOST RECENT SYSTOLIC BLOOD PRESSURE < 130 MM HG: ICD-10-PCS | Mod: HCNC,CPTII,S$GLB, | Performed by: ORTHOPAEDIC SURGERY

## 2022-02-08 PROCEDURE — 3072F PR LOW RISK FOR RETINOPATHY: ICD-10-PCS | Mod: HCNC,CPTII,S$GLB, | Performed by: ORTHOPAEDIC SURGERY

## 2022-02-08 PROCEDURE — 99999 PR PBB SHADOW E&M-EST. PATIENT-LVL IV: ICD-10-PCS | Mod: PBBFAC,HCNC,, | Performed by: ORTHOPAEDIC SURGERY

## 2022-02-08 PROCEDURE — 73130 X-RAY EXAM OF HAND: CPT | Mod: 26,50,HCNC, | Performed by: RADIOLOGY

## 2022-02-08 PROCEDURE — 73030 X-RAY EXAM OF SHOULDER: CPT | Mod: 26,50,HCNC, | Performed by: RADIOLOGY

## 2022-02-08 PROCEDURE — 73130 XR HAND COMPLETE 3 VIEWS BILATERAL: ICD-10-PCS | Mod: 26,50,HCNC, | Performed by: RADIOLOGY

## 2022-02-08 PROCEDURE — 1125F PR PAIN SEVERITY QUANTIFIED, PAIN PRESENT: ICD-10-PCS | Mod: HCNC,CPTII,S$GLB, | Performed by: ORTHOPAEDIC SURGERY

## 2022-02-08 PROCEDURE — 3288F PR FALLS RISK ASSESSMENT DOCUMENTED: ICD-10-PCS | Mod: HCNC,CPTII,S$GLB, | Performed by: ORTHOPAEDIC SURGERY

## 2022-02-08 PROCEDURE — 73030 XR SHOULDER COMPLETE 2 OR MORE VIEWS BILATERAL: ICD-10-PCS | Mod: 26,50,HCNC, | Performed by: RADIOLOGY

## 2022-02-08 PROCEDURE — 3078F PR MOST RECENT DIASTOLIC BLOOD PRESSURE < 80 MM HG: ICD-10-PCS | Mod: HCNC,CPTII,S$GLB, | Performed by: ORTHOPAEDIC SURGERY

## 2022-02-08 PROCEDURE — 1101F PT FALLS ASSESS-DOCD LE1/YR: CPT | Mod: HCNC,CPTII,S$GLB, | Performed by: ORTHOPAEDIC SURGERY

## 2022-02-08 PROCEDURE — 1160F RVW MEDS BY RX/DR IN RCRD: CPT | Mod: HCNC,CPTII,S$GLB, | Performed by: ORTHOPAEDIC SURGERY

## 2022-02-08 PROCEDURE — 3072F LOW RISK FOR RETINOPATHY: CPT | Mod: HCNC,CPTII,S$GLB, | Performed by: ORTHOPAEDIC SURGERY

## 2022-02-08 PROCEDURE — 3288F FALL RISK ASSESSMENT DOCD: CPT | Mod: HCNC,CPTII,S$GLB, | Performed by: ORTHOPAEDIC SURGERY

## 2022-02-08 PROCEDURE — 3078F DIAST BP <80 MM HG: CPT | Mod: HCNC,CPTII,S$GLB, | Performed by: ORTHOPAEDIC SURGERY

## 2022-02-08 PROCEDURE — 99999 PR PBB SHADOW E&M-EST. PATIENT-LVL IV: CPT | Mod: PBBFAC,HCNC,, | Performed by: ORTHOPAEDIC SURGERY

## 2022-02-08 PROCEDURE — 3008F PR BODY MASS INDEX (BMI) DOCUMENTED: ICD-10-PCS | Mod: HCNC,CPTII,S$GLB, | Performed by: ORTHOPAEDIC SURGERY

## 2022-02-08 PROCEDURE — 99213 PR OFFICE/OUTPT VISIT, EST, LEVL III, 20-29 MIN: ICD-10-PCS | Mod: HCNC,S$GLB,, | Performed by: ORTHOPAEDIC SURGERY

## 2022-02-08 PROCEDURE — 99213 OFFICE O/P EST LOW 20 MIN: CPT | Mod: HCNC,S$GLB,, | Performed by: ORTHOPAEDIC SURGERY

## 2022-02-08 PROCEDURE — 1159F PR MEDICATION LIST DOCUMENTED IN MEDICAL RECORD: ICD-10-PCS | Mod: HCNC,CPTII,S$GLB, | Performed by: ORTHOPAEDIC SURGERY

## 2022-02-08 PROCEDURE — 3074F SYST BP LT 130 MM HG: CPT | Mod: HCNC,CPTII,S$GLB, | Performed by: ORTHOPAEDIC SURGERY

## 2022-02-08 PROCEDURE — 3008F BODY MASS INDEX DOCD: CPT | Mod: HCNC,CPTII,S$GLB, | Performed by: ORTHOPAEDIC SURGERY

## 2022-02-08 PROCEDURE — 1159F MED LIST DOCD IN RCRD: CPT | Mod: HCNC,CPTII,S$GLB, | Performed by: ORTHOPAEDIC SURGERY

## 2022-02-08 PROCEDURE — 1101F PR PT FALLS ASSESS DOC 0-1 FALLS W/OUT INJ PAST YR: ICD-10-PCS | Mod: HCNC,CPTII,S$GLB, | Performed by: ORTHOPAEDIC SURGERY

## 2022-02-08 PROCEDURE — 1160F PR REVIEW ALL MEDS BY PRESCRIBER/CLIN PHARMACIST DOCUMENTED: ICD-10-PCS | Mod: HCNC,CPTII,S$GLB, | Performed by: ORTHOPAEDIC SURGERY

## 2022-02-08 PROCEDURE — 73130 X-RAY EXAM OF HAND: CPT | Mod: TC,50,HCNC

## 2022-02-08 PROCEDURE — 73030 X-RAY EXAM OF SHOULDER: CPT | Mod: TC,50,HCNC

## 2022-02-08 RX ORDER — IBUPROFEN 800 MG/1
800 TABLET ORAL EVERY 6 HOURS PRN
COMMUNITY

## 2022-02-08 NOTE — PROGRESS NOTES
Subjective:     Patient ID: Raquel Pickard is a 66 y.o. female.    Chief Complaint: Pain of the Right Hand and Pain of the Left Hand      HPI:  The patient is a 66-year-old female who has swelling of both hands for the last month or so.  She has a history of breast cancer and had a left mastectomy.  She has swelling of both hands and stiffness of all fingers.  She has not been to physical therapy.    Past Medical History:   Diagnosis Date    Breast cancer 2011    L    Diabetes mellitus     Glaucoma     Hyperlipidemia     Hypertension     MVP (mitral valve prolapse)     Obesity      Past Surgical History:   Procedure Laterality Date    BREAST LUMPECTOMY      CATARACT EXTRACTION W/  INTRAOCULAR LENS IMPLANT Right 07/22/2020    w/ goniotomy    CATARACT EXTRACTION W/  INTRAOCULAR LENS IMPLANT Left 06/16/2021    w/ goniotomy    COLONOSCOPY W/ POLYPECTOMY  04/18/2017    DR. CHARLENE NATH/ANTONIO SALTER. POLYP X 3. REPEAT 5 YRS.    PCIOL Right     DR. MENDOZA    SLT - OU - BOTH EYES      TRIGGER FINGER RELEASE      Thumb    TUBAL LIGATION       Family History   Problem Relation Age of Onset    Glaucoma Brother     Macular degeneration Brother     Esophageal cancer Father     Heart disease Father     Hypertension Father     Liver cancer Mother     Cancer Mother     Hypertension Sister     Cataracts Sister     Diabetes Sister     Cholelithiasis Sister     Blindness Neg Hx     Retinal detachment Neg Hx     Strabismus Neg Hx     Stroke Neg Hx     Thyroid disease Neg Hx      Social History     Socioeconomic History    Marital status:     Number of children: 2   Occupational History    Occupation: retired teacher   Tobacco Use    Smoking status: Never Smoker    Smokeless tobacco: Never Used   Substance and Sexual Activity    Alcohol use: Yes     Comment: very rare    Drug use: No    Sexual activity: Yes     Partners: Male     Medication List with Changes/Refills   Current Medications     ACCU-CHEK CIRILO PLUS TEST STRP STRP    CHECK BLOOD GLUCOSE ONE TIME DAILY    AMLODIPINE (NORVASC) 5 MG TABLET    TAKE 1 TABLET BY MOUTH EVERY DAY    BENAZEPRIL-HYDROCHLORTHIAZIDE (LOTENSIN HCT) 20-25 MG TAB    Take 1 tablet by mouth once daily.    BLOOD-GLUCOSE METER KIT    To check BG 1 times daily, Accuchek Icrilo meter    BRIMONIDINE 0.15 % OPTH DROP (ALPHAGAN) 0.15 % OPHTHALMIC SOLUTION    Place 1 drop into both eyes 2 (two) times daily. Dispense 90 days supply    CYCLOBENZAPRINE (FLEXERIL) 10 MG TABLET    Take 1 tablet as needed at night for muscle spasm    DORZOLAMIDE-TIMOLOL 2-0.5% (COSOPT) 22.3-6.8 MG/ML OPHTHALMIC SOLUTION    Place 1 drop into both eyes 2 (two) times daily.    FLUAD QUAD 2021-22,65Y UP,,PF, 60 MCG (15 MCG X 4)/0.5 ML SYRG        GLIMEPIRIDE (AMARYL) 2 MG TABLET    Take 1 tablet (2 mg total) by mouth daily as needed (BS >150). ADMINISTER 30 MINUTES BEFORE MEAL(S).    IBUPROFEN (ADVIL,MOTRIN) 800 MG TABLET    Take 800 mg by mouth every 6 (six) hours as needed for Pain.    LANCETS (ACCU-CHEK SOFTCLIX LANCETS) MISC    USE  TO  CHECK BLOOD GLUCOSE ONE TIME DAILY    LATANOPROST 0.005 % OPHTHALMIC SOLUTION    INSTILL 1 DROP INTO BOTH EYES EVERY EVENING    METFORMIN (GLUCOPHAGE-XR) 500 MG ER 24HR TABLET    TAKE 3 TABLETS ONE TIME DAILY    POTASSIUM CHLORIDE SA (K-DUR,KLOR-CON) 20 MEQ TABLET    Take 1 tablet (20 mEq total) by mouth once daily. Take with food    PROPRANOLOL (INDERAL LA) 80 MG 24 HR CAPSULE    Take 1 capsule (80 mg total) by mouth once daily.    ROSUVASTATIN (CRESTOR) 5 MG TABLET    Take 1 tablet (5 mg total) by mouth once daily.    TRAMADOL (ULTRAM) 50 MG TABLET    Take 50 mg by mouth every 6 (six) hours as needed.     Review of patient's allergies indicates:   Allergen Reactions    Travatan z [travoprost]      Review of Systems   Constitutional: Negative for malaise/fatigue.   HENT: Negative for hearing loss.    Eyes: Positive for visual disturbance. Negative for double vision.    Cardiovascular: Negative for chest pain.   Respiratory: Negative for shortness of breath.    Endocrine: Negative for cold intolerance.   Hematologic/Lymphatic: Does not bruise/bleed easily.   Skin: Negative for poor wound healing and suspicious lesions.   Musculoskeletal: Negative for gout, joint pain and joint swelling.   Gastrointestinal: Negative for nausea and vomiting.   Genitourinary: Negative for dysuria.   Neurological: Negative for numbness, paresthesias and sensory change.   Psychiatric/Behavioral: Negative for depression, memory loss and substance abuse. The patient is not nervous/anxious.    Allergic/Immunologic: Negative for persistent infections.       Objective:   Body mass index is 31.62 kg/m².  Vitals:    02/08/22 0848   BP: 115/75   Pulse: 78                General    Constitutional: She is oriented to person, place, and time. She appears well-developed and well-nourished. No distress.   HENT:   Head: Normocephalic.   Eyes: EOM are normal.   Pulmonary/Chest: Effort normal.   Neurological: She is oriented to person, place, and time.   Psychiatric: She has a normal mood and affect.             Right Hand/Wrist Exam     Inspection   Scars: Wrist - absent Hand -  absent  Effusion: Wrist - present Hand -  present    Other     Neuorologic Exam    Median Distribution: normal  Ulnar Distribution: normal  Radial Distribution: normal    Comments:  The patient has swelling of the right hand diffusely.  She only has about 0-45 degrees range of motion of the proximal interphalangeal joints of the fingers.  There are no motor or sensory deficits.      Left Hand/Wrist Exam     Inspection   Scars: Wrist - absent Hand -  absent  Effusion: Wrist - present Hand -  present    Other     Sensory Exam  Median Distribution: normal  Ulnar Distribution: normal  Radial Distribution: normal    Comments:  The patient has swelling of the left hand diffusely.  She only has about 0-45 degrees range of motion of the PIP joints  of the fingers.  There are no motor or sensory deficits          Vascular Exam       Capillary Refill  Right Hand: normal capillary refill  Left Hand: normal capillary refill      Relevant imaging results reviewed and interpreted by me, discussed with the patient and / or family today radiographs of the left hand were normal.  Radiographs of the right hand were normal other than a small calcific deposit ulnar aspect PIP joint right small finger  Assessment:     Lymphedema both hands     Plan:       The patient is sent to physical therapy.  She will hopefully get compression gloves.  They will work on range of motion exercises.  I sent her for rheumatologic workup including a rheumatoid factor NANDA C-reactive protein and sed rate.              Disclaimer: This note was prepared using a voice recognition system and is likely to have sound alike errors within the text.

## 2022-02-09 ENCOUNTER — TELEPHONE (OUTPATIENT)
Dept: RHEUMATOLOGY | Facility: CLINIC | Age: 67
End: 2022-02-09
Payer: MEDICARE

## 2022-02-09 DIAGNOSIS — M25.512 BILATERAL SHOULDER PAIN, UNSPECIFIED CHRONICITY: ICD-10-CM

## 2022-02-09 DIAGNOSIS — M79.641 BILATERAL HAND PAIN: Primary | ICD-10-CM

## 2022-02-09 DIAGNOSIS — M79.642 BILATERAL HAND PAIN: Primary | ICD-10-CM

## 2022-02-09 DIAGNOSIS — M25.511 BILATERAL SHOULDER PAIN, UNSPECIFIED CHRONICITY: ICD-10-CM

## 2022-02-16 ENCOUNTER — CLINICAL SUPPORT (OUTPATIENT)
Dept: REHABILITATION | Facility: HOSPITAL | Age: 67
End: 2022-02-16
Attending: ORTHOPAEDIC SURGERY
Payer: MEDICARE

## 2022-02-16 DIAGNOSIS — I89.0 LYMPHEDEMA OF LEFT ARM: ICD-10-CM

## 2022-02-16 DIAGNOSIS — I89.0 LYMPHEDEMA: ICD-10-CM

## 2022-02-16 PROCEDURE — 97535 SELF CARE MNGMENT TRAINING: CPT | Mod: HCNC

## 2022-02-16 PROCEDURE — 97161 PT EVAL LOW COMPLEX 20 MIN: CPT | Mod: HCNC

## 2022-02-16 NOTE — PLAN OF CARE
[facility]  Physical Therapy/Lymphedema Initial Evaluation    Name: Raquel Pickard  Clinic Number: [id number]    Therapy Diagnosis:   Encounter Diagnoses   Name Primary?    Lymphedema     Lymphedema of left arm         Physician: Mark Rashid,*    Physician Orders: PT Eval and Treat   Medical Diagnosis from Referral: lymphedema   Evaluation Date: 2/16/2022  Authorization Period Expiration: 2/23/2023  Plan of Care Expiration: 3/16/2022  Visit # / Visits authorized: 1/ 1    Time In: 0900  Time Out: 1000  Total Billable Time: 10 minutes    Precautions: Standard    Subjective   Date of onset: 2 months  History of current condition - Raquel reports: swelling that comes and goes in both hands. She feels like when the pain in her shoulders happens, she gets swelling in her hands. She reports pain in both shoulders as constant and attributes pain to the way she sleeps. She wakes up with shoulder stiffness and as she moves, it improves. At the end of the day, shoulder stiffness returns. She reports neck stiffness as well. She is retired and works on a computer a lot and reads.     Medical History:   Past Medical History:   Diagnosis Date    Breast cancer 2011    L    Diabetes mellitus     Glaucoma     Hyperlipidemia     Hypertension     MVP (mitral valve prolapse)     Obesity        Surgical History:   Raquel Pickard  has a past surgical history that includes Breast lumpectomy; Tubal ligation; Trigger finger release; Argon Trabeculoplasty - OU - Both Eyes; Colonoscopy w/ polypectomy (04/18/2017); PCIOL (Right); Cataract extraction w/  intraocular lens implant (Right, 07/22/2020); and Cataract extraction w/  intraocular lens implant (Left, 06/16/2021).    Medications:   Raquel has a current medication list which includes the following prescription(s): accu-chek ronel plus test strp, amlodipine, benazepril-hydrochlorthiazide, blood-glucose meter, brimonidine 0.15 % opth drop,  "cyclobenzaprine, dorzolamide-timolol 2-0.5%, fluad quad 2021-22(65y up)(pf), glimepiride, ibuprofen, lancets, latanoprost, metformin, potassium chloride sa, propranolol, rosuvastatin, and tramadol, and the following Facility-Administered Medications: triamcinolone acetonide and triamcinolone acetonide.    Allergies:   Review of patient's allergies indicates:   Allergen Reactions    Travatan z [travoprost]         Imaging, none:     Surgery: left lumpectomy + 8 lymph nodes x 11 years ago  Radiation:  6 weeks  Chemotherapy: yes   Hormonal Medications: none   Pt denies CHF, KF  Previous Lymphedema Treatment: yes, at  [facility] years ago  Social History:  lives with their spouse  Family Support:  Nutritional status: adequate   Educational needs: some knowledge of lymphedema  Fall risk: no  Occupation: retird  Prior Level of Function: swelling that comes and goes for 2 months  Current Level of Function: swelling that comes and goes in hand and arm    Pain:  Current 3/10, worst 10/10, best 3/10   Location: bilateral hands   Description: Tight  Aggravating Factors: Night Time and Morning  Easing Factors: MLD, elevation, compression    Pts goals: manage the swelling in left hand and arm and decrease stiffness in neck and shoulders    Objective     STAGE Secondary Stage I Lymphedema  Amount of Swelling: mild  Location of Swelling: above wrist, hand, upper arm  Skin Integrity: intact  Palpation/Texture: soft, loss of skin elasticity above wrist  Circulation: adequate  Posture: forward head and shoulders    Range of Motion - UE/LE  (R) 150 degrees shoulder flexion and extension  (L) 150 degrees shoulder flexion and extension    Strength:   (R) 4+/5 shoulder flexion  (L) 4+/5 shoulder flexion    Sensation: *        [facility] RIGHT UE  2/16/2022 LEFT UE  2/16/2022 DIFF  At eval   E + 8" -  cm - cm - cm   E + 6" 31 cm 34 cm 3 cm   E + 4" 30 cm 33 cm 3 cm   E + 2" 29 cm 31 cm 2 cm   Elbow 27 cm 28.5 cm 1.5 cm   W+ 8" 25 cm 26 " "cm 1 cm   W +  6" 23 cm 23 cm 0 cm   W + 4" 19 cm 20 cm 1 cm   Wrist 20 cm 19.5 cm - cm   DPC 20.5 cm 20.5 cm 0 cm   IP Thumb 7.5 cm 7.5 cm 0 cm     Length of arm: 45 cm      TREATMENT   Treatment Time In: 0945  Treatment Time Out: 1000  Total Treatment time separate from Evaluation: 15 minutes     Raquel received self care/home management to develop knowledge/understanding of lymphedema etiology, progression and treatment options x 15 minutes including:    PROVIDED LYMPHEDEMA HANDOUT AND REVIEWED THE FOLLOWING INFORMATION:  -discussed etiology of lymphedema and the lymphatic system  -discussed cellulitis and ways to decrease risk of cellulitis  -provided lymphedema resources   -reviewed recommended exercises      Home Exercises and Patient Education Provided    Education provided:   - lymphedema handout provided  - Pt was educated in lymphedema etiology and management plans.  Pt was provided with written  risk reductions and precautions for managing lymphedema.      This patient is in agreement to participate in Lymphedema treatment.    Written Home Exercises Provided: yes.  Exercises were reviewed and Raquel was able to demonstrate them prior to the end of the session.  Raquel demonstrated good  understanding of the education provided.     See EMR under Patient Instructions for exercises provided 2/16/2022.    Assessment   Raquel is a 66 y.o. female referred to [facility] with a medical diagnosis of lymphedeam. This patient presents s/p breast cancer, removal of left axilla lymph nodes and radiation treatment   resulting in: lymphedema of the left arm, increased stiffness in the left shoulder/arm , and placing the pt at higher risk of infection.     Pt prognosis is Excellent.   Pt will benefit from skilled outpatient Physical Therapy to address the deficits stated above and in the chart below, provide pt/family education, and to maximize pt's level of independence.     Plan of care discussed with " patient: Yes  Pt's spiritual, cultural and educational needs considered and patient is agreeable to the plan of care and goals as stated below:     Anticipated Barriers for therapy: none    Medical Necessity is demonstrated by the following  History  Co-morbidities and personal factors that may impact the plan of care Co-morbidities:   history of cancer    Personal Factors:   no deficits     low   Examination  Body Structures and Functions, activity limitations and participation restrictions that may impact the plan of care Body Regions:   upper extremities    Body Systems:    edema    Participation Restrictions:   none    Activity limitations:   Learning and applying knowledge  no deficits    General Tasks and Commands  no deficits    Communication  no deficits    Mobility  no deficits    Self care  no deficits    Domestic Life  no deficits    Interactions/Relationships  no deficits    Life Areas  no deficits    Community and Social Life  no deficits         low   Clinical Presentation stable and uncomplicated low   Decision Making/ Complexity Score: low     The following goals were discussed with the patient and patient is in agreement with them as to be addressed in the treatment plan.     Short Term Goals: (2 weeks)    1. Patient will perform self lymph drainage techniques to areas within reach to enhance lymphatic drainage and skin condition.  (progressing, not met)      Long Term Goals: (4 weeks)  1. Patient to neyda/doff compression garment with daily compliance to assist in lymphedema management, skin elasticity, and tissue density.  (progressing, not met)  2. Pt to be I and compliant with HEP to allow for increased function in affected limb.   (progressing, not met)      Plan   Plan of care Certification: 2/16/2022 to 3/16/2022.    Outpatient Physical Therapy 1 times weekly for up to 4 weeks to include the following interventions: Patient Education, Self Care and Therapeutic Exercise. Complete Decongestive  Therapy- compression and home equipment needs to be addressed and assisted.    Pt may be seen by a PTA as part of the Rehab treatment team.    Vesta Hurd, PT, ANDRZEJ

## 2022-02-22 NOTE — PROGRESS NOTES
Physical Therapy Treatment Note     Name: Raquel Pickard  [facility] Number: [id number]    Therapy Diagnosis:   Encounter Diagnosis   Name Primary?    Lymphedema of left arm Yes     Physician: Mark Rashid,*    Visit Date: 2/23/2022    Physician Orders: PT Eval and Treat   Medical Diagnosis from Referral: lymphedema   Evaluation Date: 2/16/2022  Authorization Period Expiration: 2/23/2023  Plan of Care Expiration: 3/16/2022  Visit # / Visits authorized: 1/ 20    Time In: 0905  Time Out: 1005   Total Billable Time: 50 minutes timed; 10 minutes untimed    Precautions: Standard    Subjective     Pt reports: got a call from the compression pump rep but not from the garment rep as of today.  She was compliant with home exercise program.  Response to previous treatment: good  Functional change: none at this time    Pain: 0/10  Location:  n/a      Objective       Raquel received the following self care/home management techniques applied for (50) minutes, including:    Intervention Performed Today    Self MLD traininig left arm x Performed Diaphragmatic breathing  Performed short neck series  Established Axilla-Inguinal Anastomoses (A-I-A)  Established Axilla-Axilla Anastomoses (A-A-A)  Performed MLD on anterior and posterior LUE     Called [employer] regarding arm sleeve/glove x Insurance will not cover the cost of sleeve or glove. She plans to purchase a sleeve when she returns home and will continue to wear her compression glove from [employer].                    Plan for Next Visit: review of MLD if needed        Raquel received the following supervised modalities after being cleared for contradictions: Vasopneumatic Compression x 10 minutes    Home Exercises Provided and Patient Education Provided     Education provided:   - see self care/home management section    Written Home Exercises Provided: yes.  Exercises were reviewed and Raquel was able to demonstrate them prior to the end of the session.  Raquel demonstrated good   understanding of the education provided.     See EMR under Patient Instructions for exercises provided 2/23/2022.    Assessment     Raquel learned how to perform self MLD. She learned MLD years ago but did not learn what she learned today. She verbalized understanding of the teachings and will continue at home. She plans to order a sleeve when she returns home. She may return to therapy for one more visit after she receives her compression sleeve. Any follow up training needs will be provided at that time.  Raquel Is progressing well towards her goals.   Pt prognosis is Excellent.     Pt will continue to benefit from skilled outpatient physical therapy to address the deficits listed in the problem list box on initial evaluation, provide pt/family education and to maximize pt's level of independence in the home and community environment.     Pt's spiritual, cultural and educational needs considered and pt agreeable to plan of care and goals.     Anticipated barriers to physical therapy: none    Goals:  Short Term Goals: (2 weeks)     1. Patient will perform self lymph drainage techniques to areas within reach to enhance lymphatic drainage and skin condition.  (progressing, not met)        Long Term Goals: (4 weeks)  1. Patient to nedya/doff compression garment with daily compliance to assist in lymphedema management, skin elasticity, and tissue density.  (progressing, not met)  2. Pt to be I and compliant with HEP to allow for increased function in affected limb.   (progressing, not met)        Plan   Plan of care Certification: 2/16/2022 to 3/16/2022.     Outpatient Physical Therapy 1 times weekly for up to 4 weeks to include the following interventions: Patient Education, Self Care and Therapeutic Exercise. Complete Decongestive Therapy- compression and home equipment needs to be addressed and assisted.      Vesta Hurd, PT, PARMINDER-SACHI

## 2022-02-23 ENCOUNTER — CLINICAL SUPPORT (OUTPATIENT)
Dept: REHABILITATION | Facility: HOSPITAL | Age: 67
End: 2022-02-23
Attending: ORTHOPAEDIC SURGERY
Payer: MEDICARE

## 2022-02-23 DIAGNOSIS — I89.0 LYMPHEDEMA OF LEFT ARM: Primary | ICD-10-CM

## 2022-02-23 PROCEDURE — 97535 SELF CARE MNGMENT TRAINING: CPT | Mod: HCNC

## 2022-02-23 PROCEDURE — 97016 VASOPNEUMATIC DEVICE THERAPY: CPT | Mod: HCNC

## 2022-02-28 ENCOUNTER — PATIENT OUTREACH (OUTPATIENT)
Dept: ADMINISTRATIVE | Facility: OTHER | Age: 67
End: 2022-02-28
Payer: MEDICARE

## 2022-03-01 ENCOUNTER — OFFICE VISIT (OUTPATIENT)
Dept: ORTHOPEDICS | Facility: CLINIC | Age: 67
End: 2022-03-01
Payer: MEDICARE

## 2022-03-01 VITALS — HEIGHT: 65 IN | BODY MASS INDEX: 31.65 KG/M2 | WEIGHT: 190 LBS

## 2022-03-01 DIAGNOSIS — M75.102 NONTRAUMATIC TEAR OF LEFT ROTATOR CUFF, UNSPECIFIED TEAR EXTENT: Primary | ICD-10-CM

## 2022-03-01 DIAGNOSIS — G89.29 CHRONIC LEFT SHOULDER PAIN: Primary | ICD-10-CM

## 2022-03-01 DIAGNOSIS — M25.512 BILATERAL SHOULDER PAIN, UNSPECIFIED CHRONICITY: ICD-10-CM

## 2022-03-01 DIAGNOSIS — M25.512 CHRONIC LEFT SHOULDER PAIN: Primary | ICD-10-CM

## 2022-03-01 DIAGNOSIS — M25.511 BILATERAL SHOULDER PAIN, UNSPECIFIED CHRONICITY: ICD-10-CM

## 2022-03-01 DIAGNOSIS — M75.101 TEAR OF RIGHT ROTATOR CUFF, UNSPECIFIED TEAR EXTENT, UNSPECIFIED WHETHER TRAUMATIC: ICD-10-CM

## 2022-03-01 PROCEDURE — 3072F PR LOW RISK FOR RETINOPATHY: ICD-10-PCS | Mod: HCNC,CPTII,S$GLB, | Performed by: STUDENT IN AN ORGANIZED HEALTH CARE EDUCATION/TRAINING PROGRAM

## 2022-03-01 PROCEDURE — 99999 PR PBB SHADOW E&M-EST. PATIENT-LVL IV: ICD-10-PCS | Mod: PBBFAC,HCNC,, | Performed by: STUDENT IN AN ORGANIZED HEALTH CARE EDUCATION/TRAINING PROGRAM

## 2022-03-01 PROCEDURE — 1101F PT FALLS ASSESS-DOCD LE1/YR: CPT | Mod: HCNC,CPTII,S$GLB, | Performed by: STUDENT IN AN ORGANIZED HEALTH CARE EDUCATION/TRAINING PROGRAM

## 2022-03-01 PROCEDURE — 3288F PR FALLS RISK ASSESSMENT DOCUMENTED: ICD-10-PCS | Mod: HCNC,CPTII,S$GLB, | Performed by: STUDENT IN AN ORGANIZED HEALTH CARE EDUCATION/TRAINING PROGRAM

## 2022-03-01 PROCEDURE — 3072F LOW RISK FOR RETINOPATHY: CPT | Mod: HCNC,CPTII,S$GLB, | Performed by: STUDENT IN AN ORGANIZED HEALTH CARE EDUCATION/TRAINING PROGRAM

## 2022-03-01 PROCEDURE — 1125F AMNT PAIN NOTED PAIN PRSNT: CPT | Mod: HCNC,CPTII,S$GLB, | Performed by: STUDENT IN AN ORGANIZED HEALTH CARE EDUCATION/TRAINING PROGRAM

## 2022-03-01 PROCEDURE — 1159F MED LIST DOCD IN RCRD: CPT | Mod: HCNC,CPTII,S$GLB, | Performed by: STUDENT IN AN ORGANIZED HEALTH CARE EDUCATION/TRAINING PROGRAM

## 2022-03-01 PROCEDURE — 1160F PR REVIEW ALL MEDS BY PRESCRIBER/CLIN PHARMACIST DOCUMENTED: ICD-10-PCS | Mod: HCNC,CPTII,S$GLB, | Performed by: STUDENT IN AN ORGANIZED HEALTH CARE EDUCATION/TRAINING PROGRAM

## 2022-03-01 PROCEDURE — 3008F PR BODY MASS INDEX (BMI) DOCUMENTED: ICD-10-PCS | Mod: HCNC,CPTII,S$GLB, | Performed by: STUDENT IN AN ORGANIZED HEALTH CARE EDUCATION/TRAINING PROGRAM

## 2022-03-01 PROCEDURE — 1159F PR MEDICATION LIST DOCUMENTED IN MEDICAL RECORD: ICD-10-PCS | Mod: HCNC,CPTII,S$GLB, | Performed by: STUDENT IN AN ORGANIZED HEALTH CARE EDUCATION/TRAINING PROGRAM

## 2022-03-01 PROCEDURE — 3288F FALL RISK ASSESSMENT DOCD: CPT | Mod: HCNC,CPTII,S$GLB, | Performed by: STUDENT IN AN ORGANIZED HEALTH CARE EDUCATION/TRAINING PROGRAM

## 2022-03-01 PROCEDURE — 99213 OFFICE O/P EST LOW 20 MIN: CPT | Mod: HCNC,S$GLB,, | Performed by: STUDENT IN AN ORGANIZED HEALTH CARE EDUCATION/TRAINING PROGRAM

## 2022-03-01 PROCEDURE — 99213 PR OFFICE/OUTPT VISIT, EST, LEVL III, 20-29 MIN: ICD-10-PCS | Mod: HCNC,S$GLB,, | Performed by: STUDENT IN AN ORGANIZED HEALTH CARE EDUCATION/TRAINING PROGRAM

## 2022-03-01 PROCEDURE — 1160F RVW MEDS BY RX/DR IN RCRD: CPT | Mod: HCNC,CPTII,S$GLB, | Performed by: STUDENT IN AN ORGANIZED HEALTH CARE EDUCATION/TRAINING PROGRAM

## 2022-03-01 PROCEDURE — 1125F PR PAIN SEVERITY QUANTIFIED, PAIN PRESENT: ICD-10-PCS | Mod: HCNC,CPTII,S$GLB, | Performed by: STUDENT IN AN ORGANIZED HEALTH CARE EDUCATION/TRAINING PROGRAM

## 2022-03-01 PROCEDURE — 99999 PR PBB SHADOW E&M-EST. PATIENT-LVL IV: CPT | Mod: PBBFAC,HCNC,, | Performed by: STUDENT IN AN ORGANIZED HEALTH CARE EDUCATION/TRAINING PROGRAM

## 2022-03-01 PROCEDURE — 3008F BODY MASS INDEX DOCD: CPT | Mod: HCNC,CPTII,S$GLB, | Performed by: STUDENT IN AN ORGANIZED HEALTH CARE EDUCATION/TRAINING PROGRAM

## 2022-03-01 PROCEDURE — 1101F PR PT FALLS ASSESS DOC 0-1 FALLS W/OUT INJ PAST YR: ICD-10-PCS | Mod: HCNC,CPTII,S$GLB, | Performed by: STUDENT IN AN ORGANIZED HEALTH CARE EDUCATION/TRAINING PROGRAM

## 2022-03-01 NOTE — PROGRESS NOTES
Orthopaedics Sports Medicine     Shoulder Initial Visit         3/1/2022    Referring MD: Mark Rashid,*    Chief Complaint   Patient presents with    Left Shoulder - Pain    Right Shoulder - Pain         History of Present Illness:   Raquel Pickard is a 66 y.o. right-hand dominant female who presents with bilateral shoulder pain and dysfunction.    Onset of the symptoms was several years ago, worsened over the past 2 months     Inciting event: No specific injury, gradual onset of symptoms.     Current symptoms include pain in the shoulders, localized laterally, pain quality is intermittent aching, sharp, and shooting pain    Pain is aggravated by pressure, lifting, overuse, and nighttime.       Evaluation to date: X-Ray     Treatment to date: Rest, activity modification, CSI (06/2021), ointment, ice, heat     Past Medical History:   Past Medical History:   Diagnosis Date    Breast cancer 2011    L    Diabetes mellitus     Glaucoma     Hyperlipidemia     Hypertension     MVP (mitral valve prolapse)     Obesity        Past Surgical History:   Past Surgical History:   Procedure Laterality Date    BREAST LUMPECTOMY      CATARACT EXTRACTION W/  INTRAOCULAR LENS IMPLANT Right 07/22/2020    w/ goniotomy    CATARACT EXTRACTION W/  INTRAOCULAR LENS IMPLANT Left 06/16/2021    w/ goniotomy    COLONOSCOPY W/ POLYPECTOMY  04/18/2017    DR. CHARLENE NATH/GI ANGIEO. POLYP X 3. REPEAT 5 YRS.    PCIOL Right     DR. MENDOZA    SLT - OU - BOTH EYES      TRIGGER FINGER RELEASE      Thumb    TUBAL LIGATION         Medications:  Patient's Medications   New Prescriptions    No medications on file   Previous Medications    ACCU-CHEK CIRILO PLUS TEST STRP STRP    CHECK BLOOD GLUCOSE ONE TIME DAILY    AMLODIPINE (NORVASC) 5 MG TABLET    TAKE 1 TABLET BY MOUTH EVERY DAY    BENAZEPRIL-HYDROCHLORTHIAZIDE (LOTENSIN HCT) 20-25 MG TAB    Take 1 tablet by mouth once daily.    BLOOD-GLUCOSE METER KIT    To check BG 1  "times daily, [employer] Monica meter    BRIMONIDINE 0.15 % OPTH DROP (ALPHAGAN) 0.15 % OPHTHALMIC SOLUTION    Place 1 drop into both eyes 2 (two) times daily. Dispense 90 days supply    CYCLOBENZAPRINE (FLEXERIL) 10 MG TABLET    Take 1 tablet as needed at night for muscle spasm    DORZOLAMIDE-TIMOLOL 2-0.5% (COSOPT) 22.3-6.8 MG/ML OPHTHALMIC SOLUTION    Place 1 drop into both eyes 2 (two) times daily.    FLUAD QUAD 2021-22,65Y UP,,PF, 60 MCG (15 MCG X 4)/0.5 ML SYRG        GLIMEPIRIDE (AMARYL) 2 MG TABLET    Take 1 tablet (2 mg total) by mouth daily as needed (BS >150). ADMINISTER 30 MINUTES BEFORE MEAL(S).    IBUPROFEN (ADVIL,MOTRIN) 800 MG TABLET    Take 800 mg by mouth every 6 (six) hours as needed for Pain.    LANCETS (ACCU-CHEK SOFTCLIX LANCETS) MISC    USE  TO  CHECK BLOOD GLUCOSE ONE TIME DAILY    LATANOPROST 0.005 % OPHTHALMIC SOLUTION    INSTILL 1 DROP INTO BOTH EYES EVERY EVENING    METFORMIN (GLUCOPHAGE-XR) 500 MG ER 24HR TABLET    TAKE 3 TABLETS ONE TIME DAILY    POTASSIUM CHLORIDE SA (K-DUR,KLOR-CON) 20 MEQ TABLET    Take 1 tablet (20 mEq total) by mouth once daily. Take with food    PROPRANOLOL (INDERAL LA) 80 MG 24 HR CAPSULE    Take 1 capsule (80 mg total) by mouth once daily.    ROSUVASTATIN (CRESTOR) 5 MG TABLET    Take 1 tablet (5 mg total) by mouth once daily.    TRAMADOL (ULTRAM) 50 MG TABLET    Take 50 mg by mouth every 6 (six) hours as needed.   Modified Medications    No medications on file   Discontinued Medications    No medications on file       Allergies:   Review of patient's allergies indicates:   Allergen Reactions    Travatan z [travoprost]        Social History:   Home town: [place]  Alcohol use: She reports current alcohol use.  Tobacco use: She reports that she has never smoked. She has never used smokeless tobacco.      Physical Examination:  Estimated body mass index is 31.62 kg/m² as calculated from the following:    Height as of this encounter: 5' 5" (1.651 m).    Weight as " of this encounter: 86.2 kg (190 lb).    General  Healthy appearing female in no acute distress  Alert and oriented, normal mood, appropriate affect    Shoulder Examination:  Patient is alert and oriented, no distress. Skin is intact. Neuro is normal with no focal motor or sensory findings.    Cervical exam is unremarkable. Intact cervical ROM. Negative Spurling's test    Physical Exam:  RIGHT    LEFT    Scap Dyskinesis/Winging (-)    (-)    Tenderness:          Greater Tuberosity             +    +  Bicipital Groove  (-)    +  AC joint   (-)    (-)  Other:     ROM:  Forward Elevation 160    170  Abduction  90    30  ER (at side)  80    60  IR   T8    T8    Strength:   Supraspinatus  4/5    4/5  Infraspinatus  4+/5    4/5  Subscap / IR  5/5    5/5     Special Tests:   Neer:    +    +   Jerez:   +    +   SS Stress:   +    +   Bear Hug:   (-)    (-)   Dundy's:   +    +   Resisted Thrower's:   +    +   Cross Arm Abduction:  (-)    (-)    Neurovascular examination  - Motor grossly intact bilaterally to shoulder abduction, elbow flexion and extension, wrist flexion and extension, and intrinsic hand musculature  - Sensation intact to light touch bilaterally in axillary, median, radial, and ulnar distributions  - Symmetrical radial pulses      Imaging:  X-Ray Shoulder Complete Bilateral  Narrative: EXAMINATION:  XR SHOULDER COMPLETE 2 OR MORE VIEWS BILATERAL    CLINICAL HISTORY:  Pain in right shoulder    TECHNIQUE:  Four views were obtained of the bilateral shoulders.    COMPARISON:  None    FINDINGS:  Small ossific density noted adjacent to the margins of the greater tuberosity on the right which can be associated with calcific tendinosis.  No acute fractures or dislocations visualized.  There is mild-to-moderate bilateral AC joint arthrosis.  Mild degenerative marginal productive changes are present at the glenohumeral joints bilaterally.  Impression: As above    Electronically signed by: Lito Malone  MD  Date:    02/08/2022  Time:    10:52  X-Ray Hand 3 View Bilateral  Narrative: EXAMINATION:  XR HAND COMPLETE 3 VIEWS BILATERAL    CLINICAL HISTORY:  . Pain in right hand    TECHNIQUE:  AP, lateral, and oblique views of both hands were performed.    COMPARISON:  11/19/2019    FINDINGS:  There is no radiographic evidence of acute osseous, articular, or soft tissue abnormality.  Mild scattered degenerative findings are seen throughout the IP and MCP joints bilaterally.  Mild degenerative findings are also present at the bilateral 1st CMC joints.  No definite osseous erosion visualized.  Impression: Degenerative findings as above    Electronically signed by: Lito Malone MD  Date:    02/08/2022  Time:    09:53       Physician Read: I agree with the above impression.      Impression:  66 y.o. female with bilateral shoulder pain, right shoulder rotator cuff tendinosis and suspected left shoulder rotator cuff tear.      Plan:  1. Discussed diagnosis and treatment options with patient today. Her x-rays show evidence of chronic tendonitis on the right shoulder, including calcific tendinitis. I suspect she has a tear in her left rotator cuff.  2. She has tried multiple nonoperative treatments including rest, activity modification, and oral anti-inflammatories. Despite these interventions, she continues to be symptomatic  3. I would like to get an MRI of her left shoulder to evaluate for rotator cuff tear  4. Follow up with me after MRI           Garry Lundy MD

## 2022-03-11 ENCOUNTER — CLINICAL SUPPORT (OUTPATIENT)
Dept: REHABILITATION | Facility: HOSPITAL | Age: 67
End: 2022-03-11
Payer: MEDICARE

## 2022-03-11 DIAGNOSIS — I89.0 LYMPHEDEMA OF LEFT ARM: Primary | ICD-10-CM

## 2022-03-11 PROCEDURE — 97016 VASOPNEUMATIC DEVICE THERAPY: CPT

## 2022-03-11 PROCEDURE — 97535 SELF CARE MNGMENT TRAINING: CPT

## 2022-03-11 NOTE — PROGRESS NOTES
Physical Therapy Treatment Note     Name: Raquel Pickard  [facility] Number: [id number]    Therapy Diagnosis:   Encounter Diagnosis   Name Primary?    Lymphedema of left arm Yes     Physician: Mark Rashid,*    Visit Date: 3/11/2022    Physician Orders: PT Eval and Treat   Medical Diagnosis from Referral: lymphedema   Evaluation Date: 2/16/2022  Authorization Period Expiration: 2/23/2023  Plan of Care Expiration: 3/16/2022  Visit # / Visits authorized: 2/ 20    Time In: 0805  Time Out: 0910  Total Billable Time: 50 minutes timed; 20 minutes untimed    Precautions: Standard    Subjective     Pt reports: had to order a different sleeve due to the one ordered being too small; has been practicing on MLD but has some questions about the technique  She was compliant with home exercise program.  Response to previous treatment: good  Functional change: none at this time    Pain: 0/10  Location:  n/a      Objective       Raquel received the following self care/home management techniques applied for (50) minutes, including:    Intervention Performed Today    Self MLD traininig left arm x Performed Diaphragmatic breathing (needed cues)  Performed short neck series  Established Axilla-Inguinal Anastomoses (A-I-A)- needed cueing  Established Axilla-Axilla Anastomoses (A-A-A)- needed cueing  Performed MLD on anterior and posterior LUE- needed cueing       Plan for Next Visit: review of MLD if needed        Raquel received the following supervised modalities after being cleared for contradictions: Vasopneumatic Compression x 20 minutes    Home Exercises Provided and Patient Education Provided     Education provided:   - see self care/home management section    Written Home Exercises Provided: yes.  Exercises were reviewed and Raquel was able to demonstrate them prior to the end of the session.  Raquel demonstrated good  understanding of the education provided.     See EMR under Patient Instructions for exercises provided  2/23/2022.    Assessment     Raquel needed cueing for creating the A-A-A and A-I-A anastomosis. She also needed cueing to stretch the skin during the technique. Self MLD was performed with instruction for correct technique. She also had some difficulty performing diaphragmatic breathing. When laying down, she was able to perform the breathing technique correctly vs when sitting in a chair. She received vasopneuatic compression on her left arm after MLD and practiced on diaphragmatic breathing during the pump time. She we return to one more session after she receives her compression sleeve.  Raquel Is progressing well towards her goals.   Pt prognosis is Excellent.     Pt will continue to benefit from skilled outpatient physical therapy to address the deficits listed in the problem list box on initial evaluation, provide pt/family education and to maximize pt's level of independence in the home and community environment.     Pt's spiritual, cultural and educational needs considered and pt agreeable to plan of care and goals.     Anticipated barriers to physical therapy: none    Goals:  Short Term Goals: (2 weeks)     1. Patient will perform self lymph drainage techniques to areas within reach to enhance lymphatic drainage and skin condition.  (progressing, not met)        Long Term Goals: (4 weeks)  1. Patient to neyda/doff compression garment with daily compliance to assist in lymphedema management, skin elasticity, and tissue density.  (progressing, not met)  2. Pt to be I and compliant with HEP to allow for increased function in affected limb.   (progressing, not met)        Plan   Plan of care Certification: 2/16/2022 to 3/16/2022.     Outpatient Physical Therapy 1 times weekly for up to 4 weeks to include the following interventions: Patient Education, Self Care and Therapeutic Exercise. Complete Decongestive Therapy- compression and home equipment needs to be addressed and assisted.      Vesta Hurd, PT,  CLT-SACHI

## 2022-03-29 NOTE — PROGRESS NOTES
"  Physical Therapy Treatment Note/DISCHARGE     Name: Raquel Pickard  [facility] Number: [id number]    Therapy Diagnosis:   Encounter Diagnosis   Name Primary?    Lymphedema of left arm Yes     Physician: Mark Rashid,*    Visit Date: 3/30/2022    Physician Orders: PT Eval and Treat   Medical Diagnosis from Referral: lymphedema   Evaluation Date: 2/16/2022  Visit # / Visits authorized: 3/ 20    Time In: 0800 am  Time Out: 0900 am  Total Billable Time: 25 minutes timed; 30 minutes untimed    Precautions: Standard    Subjective     Pt reports: getting her sleeve last week and wearing it since she got it. States that pain in the left arm has improved since wearing the sleeve; able to perform independent MLD.    Pain: 0/10  Location:  n/a      Objective     Objective measures:    STAGE Secondary Stage I Lymphedema  Amount of Swelling: mild  Location of Swelling: above wrist, hand, upper arm  Skin Integrity: intact  Palpation/Texture: good skin elasticity   Circulation: adequate  Posture: forward head and shoulders     Range of Motion - UE/LE  (R) 150 degrees shoulder flexion and extension  (L) 130 degrees shoulder flexion and extension (decrease in ROM since eval). She reports having less movement in the morning vs afternoon     Strength:   (R) 4+/5 shoulder flexion  (L) 4+/5 shoulder flexion     Sensation: intact           LANDMARK RIGHT UE  2/16/2022 LEFT UE  2/16/2022 DIFF  At eval   E + 8" -  cm - cm - cm   E + 6" 31 cm 34 cm 3 cm   E + 4" 30 cm 33 cm 3 cm   E + 2" 29 cm 31 cm 2 cm   Elbow 27 cm 28.5 cm 1.5 cm   W+ 8" 25 cm 26 cm 1 cm   W +  6" 23 cm 23 cm 0 cm   W + 4" 19 cm 20 cm 1 cm   Wrist 20 cm 19.5 cm - cm   DPC 20.5 cm 20.5 cm 0 cm   IP Thumb 7.5 cm 7.5 cm 0 cm        LANDMARK RIGHT UE  2/16/2022 LEFT UE  3/30/2022 DIFF at discharge   E + 8" -  cm - cm - cm   E + 6" 31 cm 34 cm -3 cm   E + 4" 30 cm 32 cm -2 cm   E + 2" 29 cm 31 cm -2 cm   Elbow 27 cm 29 cm +0.5 cm   W+ 8" 25 cm 26 cm -1 cm   W +  6" 23 cm " "23.5 cm +0.5 cm   W + 4" 19 cm 20.5 cm +0.5 cm   Wrist 20 cm 19 cm -0.5 cm   DPC 20.5 cm 20.5 cm 0 cm   IP Thumb 7.5 cm 7.0 cm -0.5 cm       Raquel received the following self care/home management techniques applied for (25) minutes, including:    Intervention Performed Today    Reviewed exercises for BUE ROM x Open book, windmills, chicken wing, overhead shoulder flexion   Measurements obtained x    Self MLD traininig left arm x Reviewed; instructed to focus on hand, wrist, and fingers due to increased swelling in those areas.     Plan for Next Visit: D/C        Raquel received the following supervised modalities after being cleared for contradictions: Vasopneumatic Compression x 30 minutes    Home Exercises Provided and Patient Education Provided     Education provided:   - see self care/home management section    Written Home Exercises Provided: yes.  Exercises were reviewed and Raquel was able to demonstrate them prior to the end of the session.  Raquel demonstrated good  understanding of the education provided.     See EMR under Patient Instructions for exercises provided 3/30/2022 and prior visits    Assessment   Raquel's swelling in the left arm has improved in some areas and increased slightly in other areas minimally. She is now wearing a compression sleeve on her left arm to manage swelling. She owns a glove and wears the glove as needed for swelling in the hands and fingers. She is knowledgeable about MLD and benefits of compression to manage the lymphedema in the left arm/hand.     Goals:  Short Term Goals: (2 weeks)     1. Patient will perform self lymph drainage techniques to areas within reach to enhance lymphatic drainage and skin condition.  (GOAL MET)        Long Term Goals: (4 weeks)  1. Patient to neyda/doff compression garment with daily compliance to assist in lymphedema management, skin elasticity, and tissue density.  (GOAL MET)  2. Pt to be I and compliant with HEP to allow for increased function in " affected limb.   (GOAL MET)        Plan   DISCHARGE FROM LYMPHEDEMA THERAPY DUE TO GOALS MET.      Vesta Hurd, PT, PARMINDER[facility]SACHI

## 2022-03-30 ENCOUNTER — CLINICAL SUPPORT (OUTPATIENT)
Dept: REHABILITATION | Facility: HOSPITAL | Age: 67
End: 2022-03-30
Payer: MEDICARE

## 2022-03-30 DIAGNOSIS — I89.0 LYMPHEDEMA OF LEFT ARM: Primary | ICD-10-CM

## 2022-03-30 PROCEDURE — 97535 SELF CARE MNGMENT TRAINING: CPT

## 2022-03-30 PROCEDURE — 97016 VASOPNEUMATIC DEVICE THERAPY: CPT

## 2022-04-07 ENCOUNTER — HOSPITAL ENCOUNTER (OUTPATIENT)
Dept: RADIOLOGY | Facility: HOSPITAL | Age: 67
Discharge: HOME | End: 2022-04-07
Attending: STUDENT IN AN ORGANIZED HEALTH CARE EDUCATION/TRAINING PROGRAM
Payer: MEDICARE

## 2022-04-07 ENCOUNTER — PATIENT OUTREACH (OUTPATIENT)
Dept: ADMINISTRATIVE | Facility: OTHER | Age: 67
End: 2022-04-07
Payer: MEDICARE

## 2022-04-07 DIAGNOSIS — M25.512 CHRONIC LEFT SHOULDER PAIN: ICD-10-CM

## 2022-04-07 DIAGNOSIS — G89.29 CHRONIC LEFT SHOULDER PAIN: ICD-10-CM

## 2022-04-07 PROCEDURE — 73221 MRI JOINT UPR EXTREM W/O DYE: CPT | Mod: 26,LT,, | Performed by: RADIOLOGY

## 2022-04-07 PROCEDURE — 73221 MRI JOINT UPR EXTREM W/O DYE: CPT | Mod: TC,LT

## 2022-04-07 PROCEDURE — 73221 MRI SHOULDER WITHOUT CONTRAST LEFT: ICD-10-PCS | Mod: 26,LT,, | Performed by: RADIOLOGY

## 2022-04-08 ENCOUNTER — OFFICE VISIT (OUTPATIENT)
Dept: ORTHOPEDICS | Facility: CLINIC | Age: 67
End: 2022-04-08
Payer: MEDICARE

## 2022-04-08 VITALS — HEIGHT: 65 IN | WEIGHT: 190 LBS | BODY MASS INDEX: 31.65 KG/M2

## 2022-04-08 DIAGNOSIS — M75.82 ROTATOR CUFF TENDINITIS, LEFT: Primary | ICD-10-CM

## 2022-04-08 DIAGNOSIS — M75.81 ROTATOR CUFF TENDINITIS, RIGHT: ICD-10-CM

## 2022-04-08 PROCEDURE — 1125F AMNT PAIN NOTED PAIN PRSNT: CPT | Mod: CPTII,S$GLB,, | Performed by: STUDENT IN AN ORGANIZED HEALTH CARE EDUCATION/TRAINING PROGRAM

## 2022-04-08 PROCEDURE — 1101F PR PT FALLS ASSESS DOC 0-1 FALLS W/OUT INJ PAST YR: ICD-10-PCS | Mod: CPTII,S$GLB,, | Performed by: STUDENT IN AN ORGANIZED HEALTH CARE EDUCATION/TRAINING PROGRAM

## 2022-04-08 PROCEDURE — 1125F PR PAIN SEVERITY QUANTIFIED, PAIN PRESENT: ICD-10-PCS | Mod: CPTII,S$GLB,, | Performed by: STUDENT IN AN ORGANIZED HEALTH CARE EDUCATION/TRAINING PROGRAM

## 2022-04-08 PROCEDURE — 20610 DRAIN/INJ JOINT/BURSA W/O US: CPT | Mod: 50,S$GLB,, | Performed by: STUDENT IN AN ORGANIZED HEALTH CARE EDUCATION/TRAINING PROGRAM

## 2022-04-08 PROCEDURE — 99999 PR PBB SHADOW E&M-EST. PATIENT-LVL III: ICD-10-PCS | Mod: PBBFAC,,, | Performed by: STUDENT IN AN ORGANIZED HEALTH CARE EDUCATION/TRAINING PROGRAM

## 2022-04-08 PROCEDURE — 3072F LOW RISK FOR RETINOPATHY: CPT | Mod: CPTII,S$GLB,, | Performed by: STUDENT IN AN ORGANIZED HEALTH CARE EDUCATION/TRAINING PROGRAM

## 2022-04-08 PROCEDURE — 3288F FALL RISK ASSESSMENT DOCD: CPT | Mod: CPTII,S$GLB,, | Performed by: STUDENT IN AN ORGANIZED HEALTH CARE EDUCATION/TRAINING PROGRAM

## 2022-04-08 PROCEDURE — 1160F PR REVIEW ALL MEDS BY PRESCRIBER/CLIN PHARMACIST DOCUMENTED: ICD-10-PCS | Mod: CPTII,S$GLB,, | Performed by: STUDENT IN AN ORGANIZED HEALTH CARE EDUCATION/TRAINING PROGRAM

## 2022-04-08 PROCEDURE — 3288F PR FALLS RISK ASSESSMENT DOCUMENTED: ICD-10-PCS | Mod: CPTII,S$GLB,, | Performed by: STUDENT IN AN ORGANIZED HEALTH CARE EDUCATION/TRAINING PROGRAM

## 2022-04-08 PROCEDURE — 1159F PR MEDICATION LIST DOCUMENTED IN MEDICAL RECORD: ICD-10-PCS | Mod: CPTII,S$GLB,, | Performed by: STUDENT IN AN ORGANIZED HEALTH CARE EDUCATION/TRAINING PROGRAM

## 2022-04-08 PROCEDURE — 99213 OFFICE O/P EST LOW 20 MIN: CPT | Mod: 25,S$GLB,, | Performed by: STUDENT IN AN ORGANIZED HEALTH CARE EDUCATION/TRAINING PROGRAM

## 2022-04-08 PROCEDURE — 3072F PR LOW RISK FOR RETINOPATHY: ICD-10-PCS | Mod: CPTII,S$GLB,, | Performed by: STUDENT IN AN ORGANIZED HEALTH CARE EDUCATION/TRAINING PROGRAM

## 2022-04-08 PROCEDURE — 3008F PR BODY MASS INDEX (BMI) DOCUMENTED: ICD-10-PCS | Mod: CPTII,S$GLB,, | Performed by: STUDENT IN AN ORGANIZED HEALTH CARE EDUCATION/TRAINING PROGRAM

## 2022-04-08 PROCEDURE — 1159F MED LIST DOCD IN RCRD: CPT | Mod: CPTII,S$GLB,, | Performed by: STUDENT IN AN ORGANIZED HEALTH CARE EDUCATION/TRAINING PROGRAM

## 2022-04-08 PROCEDURE — 1101F PT FALLS ASSESS-DOCD LE1/YR: CPT | Mod: CPTII,S$GLB,, | Performed by: STUDENT IN AN ORGANIZED HEALTH CARE EDUCATION/TRAINING PROGRAM

## 2022-04-08 PROCEDURE — 1160F RVW MEDS BY RX/DR IN RCRD: CPT | Mod: CPTII,S$GLB,, | Performed by: STUDENT IN AN ORGANIZED HEALTH CARE EDUCATION/TRAINING PROGRAM

## 2022-04-08 PROCEDURE — 99213 PR OFFICE/OUTPT VISIT, EST, LEVL III, 20-29 MIN: ICD-10-PCS | Mod: 25,S$GLB,, | Performed by: STUDENT IN AN ORGANIZED HEALTH CARE EDUCATION/TRAINING PROGRAM

## 2022-04-08 PROCEDURE — 3008F BODY MASS INDEX DOCD: CPT | Mod: CPTII,S$GLB,, | Performed by: STUDENT IN AN ORGANIZED HEALTH CARE EDUCATION/TRAINING PROGRAM

## 2022-04-08 PROCEDURE — 99999 PR PBB SHADOW E&M-EST. PATIENT-LVL III: CPT | Mod: PBBFAC,,, | Performed by: STUDENT IN AN ORGANIZED HEALTH CARE EDUCATION/TRAINING PROGRAM

## 2022-04-08 PROCEDURE — 20610 LARGE JOINT ASPIRATION/INJECTION: BILATERAL SUBACROMIAL BURSA: ICD-10-PCS | Mod: 50,S$GLB,, | Performed by: STUDENT IN AN ORGANIZED HEALTH CARE EDUCATION/TRAINING PROGRAM

## 2022-04-08 RX ADMIN — TRIAMCINOLONE ACETONIDE 40 MG: 40 INJECTION, SUSPENSION INTRA-ARTICULAR; INTRAMUSCULAR at 10:04

## 2022-04-08 NOTE — PROGRESS NOTES
Orthopaedic Follow-Up Visit    Last Appointment: 03/01/2022  Diagnosis: Nontraumatic tear of left rotator cuff, unspecified tear extent           Bilateral shoulder pain, unspecified chronicity           Tear of right rotator cuff  Prior Procedure: none    Raquel Pickard is a 66 y.o. female who is here for f/u evaluation of bilateral shoulder pain. The patient was last seen here by me on 03/01/2022 at which point we decided to send her for an MRI of the left shoulder prior to considering further treatment options. The patient returns today reporting that the symptoms are unchanged and is interested in proceeding with further options.     To review her history, Raquel Pickard is a 66 y.o. right-hand dominant female who presents with bilateral shoulder pain and dysfunction. Onset of the symptoms was several years ago, worsened over the past 2 months. Inciting event: No specific injury, gradual onset of symptoms. Current symptoms include pain in the shoulders, localized laterally, pain quality is intermittent aching, sharp, and shooting pain. Pain is aggravated by pressure, lifting, overuse, and nighttime.       Patient's medications, allergies, past medical, surgical, social and family histories were reviewed and updated as appropriate.    Review of Systems   All systems reviewed were negative.  Specifically, the patient denies fever, chills, weight loss, chest pain, shortness of breath, or dyspnea on exertion.      Past Medical History:   Diagnosis Date    Breast cancer 2011    L    Diabetes mellitus     Glaucoma     Hyperlipidemia     Hypertension     MVP (mitral valve prolapse)     Obesity        Past Surgical History:   Procedure Laterality Date    BREAST LUMPECTOMY      CATARACT EXTRACTION W/  INTRAOCULAR LENS IMPLANT Right 07/22/2020    w/ goniotomy    CATARACT EXTRACTION W/  INTRAOCULAR LENS IMPLANT Left 06/16/2021    w/ goniotomy    COLONOSCOPY W/ POLYPECTOMY  04/18/2017    DR. CHARLENE NATH/GI  ASS[facility]. POLYP X 3. REPEAT 5 YRS.    PCIOL Right     DR. MENDOZA    SLT - OU - BOTH EYES      TRIGGER FINGER RELEASE      Thumb    TUBAL LIGATION         Patient's Medications   New Prescriptions    No medications on file   Previous Medications    ACCU-CHEK CIRILO PLUS TEST STRP STRP    CHECK BLOOD GLUCOSE ONE TIME DAILY    AMLODIPINE (NORVASC) 5 MG TABLET    TAKE 1 TABLET BY MOUTH EVERY DAY    BENAZEPRIL-HYDROCHLORTHIAZIDE (LOTENSIN HCT) 20-25 MG TAB    Take 1 tablet by mouth once daily.    BLOOD-GLUCOSE METER KIT    To check BG 1 times daily, Accuchek Cirilo meter    BRIMONIDINE 0.15 % OPTH DROP (ALPHAGAN) 0.15 % OPHTHALMIC SOLUTION    Place 1 drop into both eyes 2 (two) times daily. Dispense 90 days supply    CYCLOBENZAPRINE (FLEXERIL) 10 MG TABLET    Take 1 tablet as needed at night for muscle spasm    DORZOLAMIDE-TIMOLOL 2-0.5% (COSOPT) 22.3-6.8 MG/ML OPHTHALMIC SOLUTION    Place 1 drop into both eyes 2 (two) times daily.    FLUAD QUAD 2021-22,65Y UP,,PF, 60 MCG (15 MCG X 4)/0.5 ML SYRG        GLIMEPIRIDE (AMARYL) 2 MG TABLET    Take 1 tablet (2 mg total) by mouth daily as needed (BS >150). ADMINISTER 30 MINUTES BEFORE MEAL(S).    IBUPROFEN (ADVIL,MOTRIN) 800 MG TABLET    Take 800 mg by mouth every 6 (six) hours as needed for Pain.    LANCETS (ACCU-CHEK SOFTCLIX LANCETS) MISC    USE  TO  CHECK BLOOD GLUCOSE ONE TIME DAILY    LATANOPROST 0.005 % OPHTHALMIC SOLUTION    INSTILL 1 DROP INTO BOTH EYES EVERY EVENING    METFORMIN (GLUCOPHAGE-XR) 500 MG ER 24HR TABLET    TAKE 3 TABLETS ONE TIME DAILY    POTASSIUM CHLORIDE SA (K-DUR,KLOR-CON) 20 MEQ TABLET    Take 1 tablet (20 mEq total) by mouth once daily. Take with food    PROPRANOLOL (INDERAL LA) 80 MG 24 HR CAPSULE    TAKE 1 CAPSULE EVERY DAY    ROSUVASTATIN (CRESTOR) 5 MG TABLET    Take 1 tablet (5 mg total) by mouth once daily.    TRAMADOL (ULTRAM) 50 MG TABLET    Take 50 mg by mouth every 6 (six) hours as needed.   Modified Medications    No medications on file    Discontinued Medications    No medications on file       Family History   Problem Relation Age of Onset    Glaucoma Brother     Macular degeneration Brother     Esophageal cancer Father     Heart disease Father     Hypertension Father     Liver cancer Mother     Cancer Mother     Hypertension Sister     Cataracts Sister     Diabetes Sister     Cholelithiasis Sister     Blindness Neg Hx     Retinal detachment Neg Hx     Strabismus Neg Hx     Stroke Neg Hx     Thyroid disease Neg Hx        Review of patient's allergies indicates:   Allergen Reactions    Travatan z [travoprost]          Objective:      Physical Exam  Patient is alert and oriented, no distress. Skin is intact. Neuro is normal with no focal motor or sensory findings.    Cervical exam is unremarkable. Intact cervical ROM. Negative Spurling's test    Physical Exam:  RIGHT    LEFT    Scap Dyskinesis/Winging (-)    (-)    Tenderness:          Greater Tuberosity             +    +  Bicipital Groove  (-)    +  AC joint   (-)    (-)  Other:     ROM:  Forward Elevation 160    170  Abduction  90    30  ER (at side)  80    60  IR   T8    T8    Strength:   Supraspinatus  4/5    4/5  Infraspinatus  4+/5    4/5  Subscap / IR  5/5    5/5     Special Tests:   Neer:    +    +   Jerez:   +    +   SS Stress:   +    +   Bear Hug:   (-)    (-)   Novinger's:   +    +   Resisted Thrower's:   +    +   Cross Arm Abduction:  (-)    (-)      Imaging:    MRI Shoulder Without Contrast Left  Narrative: EXAMINATION:  MRI SHOULDER WITHOUT CONTRAST LEFT    CLINICAL HISTORY:  Shoulder pain, rotator cuff disorder suspected, xray done;  Pain in left shoulder    TECHNIQUE:  Multiplanar multisequence images were performed through the left shoulder.  Contrast was not administered    COMPARISON:  None    FINDINGS:  Tendinosis and low-grade partial-thickness tearing of the supraspinatus and infraspinatus tendons.  The subscapularis and teres minor tendons are grossly  intact.  Partial-thickness tearing of the long head of the biceps tendon.    Glenohumeral osteoarthritis with anterior superior and superior labral tearing.    Osteoarthritis of the acromioclavicular joint with subacromial enthesophyte.    Small joint effusion without significant bursal fluid collection.  No acute stress or traumatic fracture is present.  No muscle atrophy or edema.  Impression: Tendinosis and low-grade partial-thickness tearing of the supraspinatus and infraspinatus tendons.    Partial-thickness tearing of the intra-articular long head of the biceps tendon.    Superior and anterior superior labral tear.    Osteoarthritis.    Nonspecific supraclavicular lymph nodes measuring up to 11 x 8 mm in size.    Electronically signed by: Konrad Tejeda  Date:    04/07/2022  Time:    17:49       Physician read: I agree with the above impression.    Assessment/Plan:   Raquel Pickard is a 66 y.o. female with left shoulder rotator cuff and long head biceps tendinosis    Plan:    1. Discussed diagnosis and treatment options with her today.  She has MRI evidence of rotator cuff tendinosis as well as long head biceps tendinosis.  There is some low-grade partial-thickness tearing of the rotator cuff and some intra-articular her tearing of the long head of biceps tendon.  2. Recommend corticosteroid injection into the right and left shoulders today for treatment of biceps tendinosis and partial articular sided cuff tearing.  3. Follow up with me as needed          Garry Lundy MD

## 2022-04-11 RX ORDER — TRIAMCINOLONE ACETONIDE 40 MG/ML
40 INJECTION, SUSPENSION INTRA-ARTICULAR; INTRAMUSCULAR
Status: DISCONTINUED | OUTPATIENT
Start: 2022-04-08 | End: 2022-04-11 | Stop reason: HOSPADM

## 2022-04-25 DIAGNOSIS — E11.9 TYPE 2 DIABETES MELLITUS WITHOUT COMPLICATION, WITHOUT LONG-TERM CURRENT USE OF INSULIN: ICD-10-CM

## 2022-04-25 NOTE — TELEPHONE ENCOUNTER
Care Due:                  Date            Visit Type   Department     Provider  --------------------------------------------------------------------------------                                EP -                              PRIMARY      [facility] INTERNAL  Last Visit: 11-      CARE ([facility])   MEDICINE       Debra  Jensen                              EP -                              PRIMARY      [facility] INTERNAL  Next Visit: 05-      CARE ([facility])   MEDICINE       Debra  Jensen                                                            Last  Test          Frequency    Reason                     Performed    Due Date  --------------------------------------------------------------------------------    CMP.........  12 months..  benazepril-hydrochlorthia  07- 07-                             zide, glimepiride,                             metFORMIN, potassium,                             rosuvastatin.............    HBA1C.......  6 months...  glimepiride, metFORMIN...  11- 05-    Lipid Panel.  12 months..  rosuvastatin.............  07- 07-    Powered by [employer]. Reference number: [id number].   4/25/2022 2:44:38 PM CDT

## 2022-04-26 RX ORDER — METFORMIN HYDROCHLORIDE 500 MG/1
TABLET, EXTENDED RELEASE ORAL
Qty: 270 TABLET | Refills: 0 | Status: SHIPPED | OUTPATIENT
Start: 2022-04-26 | End: 2022-10-15

## 2022-04-28 DIAGNOSIS — E11.9 TYPE 2 DIABETES MELLITUS WITHOUT COMPLICATION, WITHOUT LONG-TERM CURRENT USE OF INSULIN: ICD-10-CM

## 2022-04-28 RX ORDER — GLIMEPIRIDE 2 MG/1
TABLET ORAL
Qty: 90 TABLET | Refills: 0 | Status: SHIPPED | OUTPATIENT
Start: 2022-04-28 | End: 2022-09-19

## 2022-04-29 NOTE — TELEPHONE ENCOUNTER
Refill Authorization Note   Raquel Pickard  is requesting a refill authorization.  Brief Assessment and Rationale for Refill:  Approve     Medication Therapy Plan:       Medication Reconciliation Completed: No   Comments:     No Care Gaps recommended.     Note composed:9:03 PM 04/28/2022

## 2022-04-29 NOTE — TELEPHONE ENCOUNTER
No new care gaps identified.  Powered by [employer]. Reference number: [id number].   4/28/2022 8:53:40 PM CDT

## 2022-05-11 ENCOUNTER — OFFICE VISIT (OUTPATIENT)
Dept: INTERNAL MEDICINE | Facility: CLINIC | Age: 67
End: 2022-05-11
Payer: MEDICARE

## 2022-05-11 ENCOUNTER — PATIENT MESSAGE (OUTPATIENT)
Dept: PHARMACY | Facility: CLINIC | Age: 67
End: 2022-05-11
Payer: MEDICARE

## 2022-05-11 VITALS
SYSTOLIC BLOOD PRESSURE: 122 MMHG | DIASTOLIC BLOOD PRESSURE: 80 MMHG | RESPIRATION RATE: 18 BRPM | HEART RATE: 84 BPM | BODY MASS INDEX: 30.34 KG/M2 | WEIGHT: 182.13 LBS | HEIGHT: 65 IN | OXYGEN SATURATION: 96 %

## 2022-05-11 DIAGNOSIS — I10 ESSENTIAL HYPERTENSION: Primary | ICD-10-CM

## 2022-05-11 DIAGNOSIS — E11.36 TYPE 2 DIABETES MELLITUS WITH DIABETIC CATARACT, WITHOUT LONG-TERM CURRENT USE OF INSULIN: ICD-10-CM

## 2022-05-11 DIAGNOSIS — E11.9 TYPE 2 DIABETES MELLITUS WITHOUT COMPLICATION, WITHOUT LONG-TERM CURRENT USE OF INSULIN: ICD-10-CM

## 2022-05-11 DIAGNOSIS — T50.2X5A DIURETIC-INDUCED HYPOKALEMIA: ICD-10-CM

## 2022-05-11 DIAGNOSIS — E87.6 DIURETIC-INDUCED HYPOKALEMIA: ICD-10-CM

## 2022-05-11 PROBLEM — Z17.0 MALIGNANT NEOPLASM OF LEFT BREAST IN FEMALE, ESTROGEN RECEPTOR POSITIVE: Status: RESOLVED | Noted: 2021-04-09 | Resolved: 2022-05-11

## 2022-05-11 PROBLEM — C50.912 MALIGNANT NEOPLASM OF LEFT BREAST IN FEMALE, ESTROGEN RECEPTOR POSITIVE: Status: RESOLVED | Noted: 2021-04-09 | Resolved: 2022-05-11

## 2022-05-11 PROCEDURE — 99213 OFFICE O/P EST LOW 20 MIN: CPT | Mod: S$GLB,,, | Performed by: FAMILY MEDICINE

## 2022-05-11 PROCEDURE — 3072F LOW RISK FOR RETINOPATHY: CPT | Mod: CPTII,S$GLB,, | Performed by: FAMILY MEDICINE

## 2022-05-11 PROCEDURE — 3079F DIAST BP 80-89 MM HG: CPT | Mod: CPTII,S$GLB,, | Performed by: FAMILY MEDICINE

## 2022-05-11 PROCEDURE — 3074F SYST BP LT 130 MM HG: CPT | Mod: CPTII,S$GLB,, | Performed by: FAMILY MEDICINE

## 2022-05-11 PROCEDURE — 3072F PR LOW RISK FOR RETINOPATHY: ICD-10-PCS | Mod: CPTII,S$GLB,, | Performed by: FAMILY MEDICINE

## 2022-05-11 PROCEDURE — 3074F PR MOST RECENT SYSTOLIC BLOOD PRESSURE < 130 MM HG: ICD-10-PCS | Mod: CPTII,S$GLB,, | Performed by: FAMILY MEDICINE

## 2022-05-11 PROCEDURE — 1126F AMNT PAIN NOTED NONE PRSNT: CPT | Mod: CPTII,S$GLB,, | Performed by: FAMILY MEDICINE

## 2022-05-11 PROCEDURE — 99999 PR PBB SHADOW E&M-EST. PATIENT-LVL IV: ICD-10-PCS | Mod: PBBFAC,,, | Performed by: FAMILY MEDICINE

## 2022-05-11 PROCEDURE — 1101F PR PT FALLS ASSESS DOC 0-1 FALLS W/OUT INJ PAST YR: ICD-10-PCS | Mod: CPTII,S$GLB,, | Performed by: FAMILY MEDICINE

## 2022-05-11 PROCEDURE — 1126F PR PAIN SEVERITY QUANTIFIED, NO PAIN PRESENT: ICD-10-PCS | Mod: CPTII,S$GLB,, | Performed by: FAMILY MEDICINE

## 2022-05-11 PROCEDURE — 99213 PR OFFICE/OUTPT VISIT, EST, LEVL III, 20-29 MIN: ICD-10-PCS | Mod: S$GLB,,, | Performed by: FAMILY MEDICINE

## 2022-05-11 PROCEDURE — 99499 RISK ADDL DX/OHS AUDIT: ICD-10-PCS | Mod: ,,, | Performed by: FAMILY MEDICINE

## 2022-05-11 PROCEDURE — 3008F PR BODY MASS INDEX (BMI) DOCUMENTED: ICD-10-PCS | Mod: CPTII,S$GLB,, | Performed by: FAMILY MEDICINE

## 2022-05-11 PROCEDURE — 1159F MED LIST DOCD IN RCRD: CPT | Mod: CPTII,S$GLB,, | Performed by: FAMILY MEDICINE

## 2022-05-11 PROCEDURE — 3079F PR MOST RECENT DIASTOLIC BLOOD PRESSURE 80-89 MM HG: ICD-10-PCS | Mod: CPTII,S$GLB,, | Performed by: FAMILY MEDICINE

## 2022-05-11 PROCEDURE — 4010F ACE/ARB THERAPY RXD/TAKEN: CPT | Mod: CPTII,S$GLB,, | Performed by: FAMILY MEDICINE

## 2022-05-11 PROCEDURE — 1159F PR MEDICATION LIST DOCUMENTED IN MEDICAL RECORD: ICD-10-PCS | Mod: CPTII,S$GLB,, | Performed by: FAMILY MEDICINE

## 2022-05-11 PROCEDURE — 3288F PR FALLS RISK ASSESSMENT DOCUMENTED: ICD-10-PCS | Mod: CPTII,S$GLB,, | Performed by: FAMILY MEDICINE

## 2022-05-11 PROCEDURE — 1101F PT FALLS ASSESS-DOCD LE1/YR: CPT | Mod: CPTII,S$GLB,, | Performed by: FAMILY MEDICINE

## 2022-05-11 PROCEDURE — 3008F BODY MASS INDEX DOCD: CPT | Mod: CPTII,S$GLB,, | Performed by: FAMILY MEDICINE

## 2022-05-11 PROCEDURE — 99999 PR PBB SHADOW E&M-EST. PATIENT-LVL IV: CPT | Mod: PBBFAC,,, | Performed by: FAMILY MEDICINE

## 2022-05-11 PROCEDURE — 99499 UNLISTED E&M SERVICE: CPT | Mod: ,,, | Performed by: FAMILY MEDICINE

## 2022-05-11 PROCEDURE — 3288F FALL RISK ASSESSMENT DOCD: CPT | Mod: CPTII,S$GLB,, | Performed by: FAMILY MEDICINE

## 2022-05-11 PROCEDURE — 4010F PR ACE/ARB THEARPY RXD/TAKEN: ICD-10-PCS | Mod: CPTII,S$GLB,, | Performed by: FAMILY MEDICINE

## 2022-05-11 RX ORDER — BENAZEPRIL/HYDROCHLOROTHIAZIDE 20 MG-25MG
1 TABLET ORAL DAILY
Qty: 90 TABLET | Refills: 1 | Status: SHIPPED | OUTPATIENT
Start: 2022-05-11 | End: 2022-08-17

## 2022-05-11 RX ORDER — POTASSIUM CHLORIDE 20 MEQ/1
20 TABLET, EXTENDED RELEASE ORAL DAILY
Qty: 90 TABLET | Refills: 3 | Status: SHIPPED | OUTPATIENT
Start: 2022-05-11 | End: 2023-02-03 | Stop reason: SDUPTHER

## 2022-05-11 NOTE — PROGRESS NOTES
Subjective:       Patient ID: Raquel Pickard is a 66 y.o. female.    Chief Complaint: Follow-up    HPI Ms. Pickard presents today for follow up    Request Lotensin HCT brand during summer months   She has intense itching with the generic when it is hot    Would like to get shingles today but not pneumonia     [facility]     Review of Systems   Constitutional: Negative for activity change, appetite change, fatigue and fever.   HENT: Negative for congestion, ear pain and sinus pressure.    Eyes: Negative for pain and visual disturbance.   Respiratory: Negative for cough, chest tightness and wheezing.    Cardiovascular: Negative for chest pain, palpitations and leg swelling.   Gastrointestinal: Negative for abdominal distention, abdominal pain, constipation, diarrhea, nausea and vomiting.   Endocrine: Negative for polydipsia and polyuria.   Genitourinary: Negative for difficulty urinating, dyspareunia, dysuria, flank pain and hematuria.   Musculoskeletal: Negative for arthralgias and back pain.   Skin: Negative for rash.   Neurological: Negative for dizziness, tremors, syncope, weakness, numbness and headaches.   Psychiatric/Behavioral: Negative for agitation and confusion.           Past Medical History:   Diagnosis Date    Breast cancer 2011    L    Diabetes mellitus     Glaucoma     Hyperlipidemia     Hypertension     MVP (mitral valve prolapse)     Obesity      Past Surgical History:   Procedure Laterality Date    BREAST LUMPECTOMY      CATARACT EXTRACTION W/  INTRAOCULAR LENS IMPLANT Right 07/22/2020    w/ goniotomy    CATARACT EXTRACTION W/  INTRAOCULAR LENS IMPLANT Left 06/16/2021    w/ goniotomy    COLONOSCOPY W/ POLYPECTOMY  04/18/2017    DR. CHARLENE NATH/GI [facility]. POLYP X 3. REPEAT 5 YRS.    PCIOL Right     DR. MENDOZA    SLT - OU - BOTH EYES      TRIGGER FINGER RELEASE      Thumb    TUBAL LIGATION       Family History   Problem Relation Age of Onset    Glaucoma  Brother     Macular degeneration Brother     Esophageal cancer Father     Heart disease Father     Hypertension Father     Liver cancer Mother     Cancer Mother     Hypertension Sister     Cataracts Sister     Diabetes Sister     Cholelithiasis Sister     Blindness Neg Hx     Retinal detachment Neg Hx     Strabismus Neg Hx     Stroke Neg Hx     Thyroid disease Neg Hx      Social History     Socioeconomic History    Marital status:     Number of children: 2   Occupational History    Occupation: retired teacher   Tobacco Use    Smoking status: Never Smoker    Smokeless tobacco: Never Used   Substance and Sexual Activity    Alcohol use: Yes     Comment: very rare    Drug use: No    Sexual activity: Yes     Partners: Male       Objective:        Physical Exam  Vitals reviewed.   Constitutional:       Appearance: Normal appearance.   HENT:      Head: Normocephalic and atraumatic.   Pulmonary:      Effort: Pulmonary effort is normal.   Neurological:      Mental Status: She is alert and oriented to person, place, and time.   Psychiatric:         Mood and Affect: Mood normal.         Behavior: Behavior normal.         Protective Sensation (w/ 10 gram monofilament):  Right: Intact  Left: Intact    Visual Inspection:  Normal -  Bilateral and Nails Intact - without Evidence of Foot Deformity- Bilateral    Pedal Pulses:   Right: Present  Left: Present    Posterior tibialis:   Right:Present  Left: Present      Results for orders placed or performed in visit on 02/08/22   Rheumatoid Factor   Result Value Ref Range    Rheumatoid Factor 67.0 (H) 0.0 - 15.0 IU/mL   NANDA   Result Value Ref Range    NANDA Screen Positive (A) Negative <1:80   CBC Auto Differential   Result Value Ref Range    WBC 8.15 3.90 - 12.70 K/uL    RBC 4.01 4.00 - 5.40 M/uL    Hemoglobin 10.6 (L) 12.0 - 16.0 g/dL    Hematocrit 36.4 (L) 37.0 - 48.5 %    MCV 91 82 - 98 fL    MCH 26.4 (L) 27.0 - 31.0 pg    MCHC 29.1 (L) 32.0 - 36.0 g/dL     RDW 16.4 (H) 11.5 - 14.5 %    Platelets 364 150 - 450 K/uL    MPV 9.2 9.2 - 12.9 fL    Immature Granulocytes 0.4 0.0 - 0.5 %    Gran # (ANC) 5.6 1.8 - 7.7 K/uL    Immature Grans (Abs) 0.03 0.00 - 0.04 K/uL    Lymph # 1.7 1.0 - 4.8 K/uL    Mono # 0.6 0.3 - 1.0 K/uL    Eos # 0.2 0.0 - 0.5 K/uL    Baso # 0.05 0.00 - 0.20 K/uL    nRBC 0 0 /100 WBC    Gran % 69.1 38.0 - 73.0 %    Lymph % 21.2 18.0 - 48.0 %    Mono % 6.9 4.0 - 15.0 %    Eosinophil % 1.8 0.0 - 8.0 %    Basophil % 0.6 0.0 - 1.9 %    Differential Method Automated    Sedimentation rate   Result Value Ref Range    Sed Rate >120 (H) 0 - 36 mm/Hr   C-reactive protein   Result Value Ref Range    CRP 71.8 (H) 0.0 - 8.2 mg/L   NANDA Pattern 1   Result Value Ref Range    NANDA PATTERN 1 Homogeneous    NANDA Profile   Result Value Ref Range    Anti Sm Antibody 0.08 0.00 - 0.99 Ratio    Anti-Sm Interpretation Negative Negative    Anti-SSA Antibody 0.43 0.00 - 0.99 Ratio    Anti-SSA Interpretation Negative Negative    Anti-SSB Antibody 0.08 0.00 - 0.99 Ratio    Anti-SSB Interpretation Negative Negative    ds DNA Ab Negative 1:10 Negative 1:10    Anti Sm/RNP Antibody 0.08 0.00 - 0.99 Ratio    Anti-Sm/RNP Interpretation Negative Negative   NANDA Titer 1   Result Value Ref Range    NANDA Titer 1 1:160        Assessment/Plan:     Essential hypertension  -     benazepril-hydrochlorthiazide (LOTENSIN HCT) 20-25 mg Tab; Take 1 tablet by mouth once daily. Request LOTENSIN HCT  Dispense: 90 tablet; Refill: 1  -     Comprehensive Metabolic Panel; Future; Expected date: 05/11/2022  -     Lipid Panel; Future; Expected date: 05/11/2022    Diuretic-induced hypokalemia  -     potassium chloride SA (K-DUR,KLOR-CON) 20 MEQ tablet; Take 1 tablet (20 mEq total) by mouth once daily. Take with food  Dispense: 90 tablet; Refill: 3  -     CBC Auto Differential; Future; Expected date: 05/11/2022    Type 2 diabetes mellitus without complication, without long-term current use of insulin  -     Hemoglobin  A1C; Future; Expected date: 05/11/2022  -     Lipid Panel; Future; Expected date: 05/11/2022    Type 2 diabetes mellitus with diabetic cataract, without long-term current use of insulin          Follow up as needed    Debra Jensen MD  [facility]   Family Medicine

## 2022-05-31 ENCOUNTER — TELEPHONE (OUTPATIENT)
Dept: INTERNAL MEDICINE | Facility: CLINIC | Age: 67
End: 2022-05-31
Payer: MEDICARE

## 2022-05-31 NOTE — TELEPHONE ENCOUNTER
----- Message from Tabitha Orlando sent at 5/31/2022  7:47 AM CDT -----  Regarding: appt  Contact: pt  Type:  Same Day Appointment Request    Caller is requesting a same day appointment.  Caller declined first available appointment listed below.    Name of Caller: pt  When is the first available appointment? 07/28  Symptoms:ear pain, dry mouth  Best Call Back Number:[phone]  Additional Information: n/a

## 2022-06-01 ENCOUNTER — OFFICE VISIT (OUTPATIENT)
Dept: INTERNAL MEDICINE | Facility: CLINIC | Age: 67
End: 2022-06-01
Payer: MEDICARE

## 2022-06-01 VITALS
TEMPERATURE: 98 F | WEIGHT: 182.75 LBS | DIASTOLIC BLOOD PRESSURE: 72 MMHG | OXYGEN SATURATION: 95 % | HEART RATE: 86 BPM | SYSTOLIC BLOOD PRESSURE: 124 MMHG | BODY MASS INDEX: 30.45 KG/M2 | HEIGHT: 65 IN | RESPIRATION RATE: 18 BRPM

## 2022-06-01 DIAGNOSIS — J06.9 URI WITH COUGH AND CONGESTION: Primary | ICD-10-CM

## 2022-06-01 DIAGNOSIS — E11.36 TYPE 2 DIABETES MELLITUS WITH DIABETIC CATARACT, WITHOUT LONG-TERM CURRENT USE OF INSULIN: ICD-10-CM

## 2022-06-01 DIAGNOSIS — I10 ESSENTIAL HYPERTENSION: Chronic | ICD-10-CM

## 2022-06-01 PROCEDURE — 3072F LOW RISK FOR RETINOPATHY: CPT | Mod: CPTII,S$GLB,, | Performed by: PHYSICIAN ASSISTANT

## 2022-06-01 PROCEDURE — U0005 INFEC AGEN DETEC AMPLI PROBE: HCPCS | Performed by: PHYSICIAN ASSISTANT

## 2022-06-01 PROCEDURE — 99214 OFFICE O/P EST MOD 30 MIN: CPT | Mod: S$GLB,,, | Performed by: PHYSICIAN ASSISTANT

## 2022-06-01 PROCEDURE — 3008F BODY MASS INDEX DOCD: CPT | Mod: CPTII,S$GLB,, | Performed by: PHYSICIAN ASSISTANT

## 2022-06-01 PROCEDURE — 3078F PR MOST RECENT DIASTOLIC BLOOD PRESSURE < 80 MM HG: ICD-10-PCS | Mod: CPTII,S$GLB,, | Performed by: PHYSICIAN ASSISTANT

## 2022-06-01 PROCEDURE — 3072F PR LOW RISK FOR RETINOPATHY: ICD-10-PCS | Mod: CPTII,S$GLB,, | Performed by: PHYSICIAN ASSISTANT

## 2022-06-01 PROCEDURE — 99999 PR PBB SHADOW E&M-EST. PATIENT-LVL IV: ICD-10-PCS | Mod: PBBFAC,,, | Performed by: PHYSICIAN ASSISTANT

## 2022-06-01 PROCEDURE — 3074F SYST BP LT 130 MM HG: CPT | Mod: CPTII,S$GLB,, | Performed by: PHYSICIAN ASSISTANT

## 2022-06-01 PROCEDURE — 99999 PR PBB SHADOW E&M-EST. PATIENT-LVL IV: CPT | Mod: PBBFAC,,, | Performed by: PHYSICIAN ASSISTANT

## 2022-06-01 PROCEDURE — 3008F PR BODY MASS INDEX (BMI) DOCUMENTED: ICD-10-PCS | Mod: CPTII,S$GLB,, | Performed by: PHYSICIAN ASSISTANT

## 2022-06-01 PROCEDURE — 3074F PR MOST RECENT SYSTOLIC BLOOD PRESSURE < 130 MM HG: ICD-10-PCS | Mod: CPTII,S$GLB,, | Performed by: PHYSICIAN ASSISTANT

## 2022-06-01 PROCEDURE — 1160F PR REVIEW ALL MEDS BY PRESCRIBER/CLIN PHARMACIST DOCUMENTED: ICD-10-PCS | Mod: CPTII,S$GLB,, | Performed by: PHYSICIAN ASSISTANT

## 2022-06-01 PROCEDURE — 99214 PR OFFICE/OUTPT VISIT, EST, LEVL IV, 30-39 MIN: ICD-10-PCS | Mod: S$GLB,,, | Performed by: PHYSICIAN ASSISTANT

## 2022-06-01 PROCEDURE — 1159F MED LIST DOCD IN RCRD: CPT | Mod: CPTII,S$GLB,, | Performed by: PHYSICIAN ASSISTANT

## 2022-06-01 PROCEDURE — 4010F ACE/ARB THERAPY RXD/TAKEN: CPT | Mod: CPTII,S$GLB,, | Performed by: PHYSICIAN ASSISTANT

## 2022-06-01 PROCEDURE — U0003 INFECTIOUS AGENT DETECTION BY NUCLEIC ACID (DNA OR RNA); SEVERE ACUTE RESPIRATORY SYNDROME CORONAVIRUS 2 (SARS-COV-2) (CORONAVIRUS DISEASE [COVID-19]), AMPLIFIED PROBE TECHNIQUE, MAKING USE OF HIGH THROUGHPUT TECHNOLOGIES AS DESCRIBED BY CMS-2020-01-R: HCPCS | Performed by: PHYSICIAN ASSISTANT

## 2022-06-01 PROCEDURE — 3078F DIAST BP <80 MM HG: CPT | Mod: CPTII,S$GLB,, | Performed by: PHYSICIAN ASSISTANT

## 2022-06-01 PROCEDURE — 4010F PR ACE/ARB THEARPY RXD/TAKEN: ICD-10-PCS | Mod: CPTII,S$GLB,, | Performed by: PHYSICIAN ASSISTANT

## 2022-06-01 PROCEDURE — 1159F PR MEDICATION LIST DOCUMENTED IN MEDICAL RECORD: ICD-10-PCS | Mod: CPTII,S$GLB,, | Performed by: PHYSICIAN ASSISTANT

## 2022-06-01 PROCEDURE — 1160F RVW MEDS BY RX/DR IN RCRD: CPT | Mod: CPTII,S$GLB,, | Performed by: PHYSICIAN ASSISTANT

## 2022-06-01 RX ORDER — SODIUM PICOSULFATE, MAGNESIUM OXIDE, AND ANHYDROUS CITRIC ACID 10; 3.5; 12 MG/160ML; G/160ML; G/160ML
LIQUID ORAL
COMMUNITY
Start: 2022-05-20 | End: 2022-07-21

## 2022-06-01 RX ORDER — PROMETHAZINE HYDROCHLORIDE AND DEXTROMETHORPHAN HYDROBROMIDE 6.25; 15 MG/5ML; MG/5ML
5 SYRUP ORAL NIGHTLY PRN
Qty: 120 ML | Refills: 0 | Status: SHIPPED | OUTPATIENT
Start: 2022-06-01 | End: 2023-06-08

## 2022-06-01 RX ORDER — MONTELUKAST SODIUM 10 MG/1
10 TABLET ORAL NIGHTLY
Qty: 30 TABLET | Refills: 0 | Status: SHIPPED | OUTPATIENT
Start: 2022-06-01 | End: 2022-06-28 | Stop reason: SDUPTHER

## 2022-06-01 RX ORDER — FLUTICASONE PROPIONATE 50 MCG
2 SPRAY, SUSPENSION (ML) NASAL DAILY
Qty: 9.9 ML | Refills: 0 | Status: SHIPPED | OUTPATIENT
Start: 2022-06-01 | End: 2022-07-26 | Stop reason: SDUPTHER

## 2022-06-01 RX ORDER — BENZONATATE 200 MG/1
200 CAPSULE ORAL 3 TIMES DAILY PRN
Qty: 30 CAPSULE | Refills: 0 | Status: SHIPPED | OUTPATIENT
Start: 2022-06-01 | End: 2022-06-11

## 2022-06-01 NOTE — PROGRESS NOTES
"Subjective:      Patient ID: Raquel Pickard is a 66 y.o. female.    Chief Complaint: dry mouth, Otalgia, and Sinus Problem    Patient is new to me, being seen today for sinus symptoms.     Last visit May 2022 with Dr. Jensen.     Sinus Problem  This is a new problem. The current episode started in the past 7 days. The problem has been gradually improving since onset. Associated symptoms include congestion, coughing (d/t PND, productive in the morning, streaks of blood x1), ear pain (L ), sinus pressure (L side), sneezing and a sore throat. Pertinent negatives include no chills, diaphoresis, headaches or shortness of breath. Past treatments include nothing.     Review of Systems   Constitutional: Negative for chills, diaphoresis and fever.   HENT: Positive for congestion, ear pain (L ), sinus pressure (L side), sneezing and sore throat. Negative for rhinorrhea.    Respiratory: Positive for cough (d/t PND, productive in the morning, streaks of blood x1). Negative for shortness of breath and wheezing.    Gastrointestinal: Negative for abdominal pain, constipation, diarrhea, nausea and vomiting.   Skin: Negative for rash.   Neurological: Negative for dizziness, light-headedness and headaches.       Objective:   /72   Pulse 86   Temp 97.6 °F (36.4 °C) (Temporal)   Resp 18   Ht 5' 5" (1.651 m)   Wt 82.9 kg (182 lb 12.2 oz)   SpO2 95%   BMI 30.41 kg/m²   Physical Exam  Constitutional:       General: She is not in acute distress.     Appearance: She is well-developed. She is not ill-appearing or diaphoretic.   HENT:      Head: Normocephalic and atraumatic.      Right Ear: Tympanic membrane and ear canal normal. No middle ear effusion. Tympanic membrane is not erythematous.      Left Ear: Tympanic membrane and ear canal normal.  No middle ear effusion. Tympanic membrane is not erythematous.      Nose: Mucosal edema present.      Right Sinus: No maxillary sinus tenderness or frontal sinus tenderness.      Left " Sinus: No maxillary sinus tenderness or frontal sinus tenderness.      Mouth/Throat:      Pharynx: Uvula midline. No posterior oropharyngeal erythema.      Comments: Signs of PND   Cardiovascular:      Rate and Rhythm: Normal rate and regular rhythm.      Heart sounds: Normal heart sounds. No murmur heard.  Pulmonary:      Effort: Pulmonary effort is normal. No respiratory distress.      Breath sounds: Normal breath sounds. No decreased breath sounds, wheezing, rhonchi or rales.   Lymphadenopathy:      Cervical: No cervical adenopathy.   Skin:     General: Skin is warm and dry.      Findings: No rash.   Psychiatric:         Speech: Speech normal.         Behavior: Behavior normal.         Thought Content: Thought content normal.       Assessment:      1. URI with cough and congestion    2. Type 2 diabetes mellitus with diabetic cataract, without long-term current use of insulin    3. Essential hypertension       Plan:   URI with cough and congestion  -     POCT COVID-19 Rapid Screening  -     montelukast (SINGULAIR) 10 mg tablet; Take 1 tablet (10 mg total) by mouth every evening.  Dispense: 30 tablet; Refill: 0  -     fluticasone propionate (FLONASE) 50 mcg/actuation nasal spray; 2 sprays (100 mcg total) by Each Nostril route once daily.  Dispense: 9.9 mL; Refill: 0  -     promethazine-dextromethorphan (PROMETHAZINE-DM) 6.25-15 mg/5 mL Syrp; Take 5 mLs by mouth nightly as needed (Cough).  Dispense: 120 mL; Refill: 0  -     benzonatate (TESSALON) 200 MG capsule; Take 1 capsule (200 mg total) by mouth 3 (three) times daily as needed for Cough.  Dispense: 30 capsule; Refill: 0    Type 2 diabetes mellitus with diabetic cataract, without long-term current use of insulin  -     Microalbumin/Creatinine Ratio, Urine; Future; Expected date: 06/01/2022    Essential hypertension   Controlled     Fasting labs per PCP to be shceduled     Advised patient based on time frame and symptomology this is likely a viral upper  respiratory infection.  It does not require antibiotics at this time.    Will treat symptoms with OTC meds.  If no improvement, or if condition worsens, follow up in office.    Discussed worsening signs/symptoms and when to return to clinic or go to ED.   Patient expresses understanding and agrees with treatment plan.

## 2022-06-02 ENCOUNTER — TELEPHONE (OUTPATIENT)
Dept: INTERNAL MEDICINE | Facility: CLINIC | Age: 67
End: 2022-06-02
Payer: MEDICARE

## 2022-06-02 DIAGNOSIS — U07.1 COVID-19 VIRUS DETECTED: ICD-10-CM

## 2022-06-02 LAB
CRC RECOMMENDATION EXT: NORMAL
SARS-COV-2 RNA RESP QL NAA+PROBE: DETECTED
SARS-COV-2- CYCLE NUMBER: 22

## 2022-06-02 NOTE — TELEPHONE ENCOUNTER
----- Message from [place] sent at 6/2/2022 10:22 AM CDT -----  Contact: LOTUS PARKS [[id number]]  .Type:  Patient Returning Call    Who Called:LOTUS PARKS [[id number]]  Who Left Message for Patient:  Does the patient know what this is regarding?:  Would the patient rather a call back or a response via [facility]? Call   Best Call Back Number:..[phone] ([place])    Additional Information:

## 2022-06-04 ENCOUNTER — PATIENT MESSAGE (OUTPATIENT)
Dept: INTERNAL MEDICINE | Facility: CLINIC | Age: 67
End: 2022-06-04
Payer: MEDICARE

## 2022-06-04 DIAGNOSIS — R91.8 OPACITY OF LUNG ON IMAGING STUDY: Primary | ICD-10-CM

## 2022-06-06 ENCOUNTER — PATIENT MESSAGE (OUTPATIENT)
Dept: INTERNAL MEDICINE | Facility: CLINIC | Age: 67
End: 2022-06-06
Payer: MEDICARE

## 2022-06-06 ENCOUNTER — PATIENT OUTREACH (OUTPATIENT)
Dept: INFECTIOUS DISEASES | Facility: HOSPITAL | Age: 67
End: 2022-06-06
Payer: MEDICARE

## 2022-06-06 DIAGNOSIS — R07.9 CHEST PAIN, UNSPECIFIED TYPE: ICD-10-CM

## 2022-06-07 ENCOUNTER — HOSPITAL ENCOUNTER (OUTPATIENT)
Dept: RADIOLOGY | Facility: CLINIC | Age: 67
Discharge: HOME | End: 2022-06-07
Attending: PHYSICIAN ASSISTANT

## 2022-06-07 ENCOUNTER — TELEPHONE (OUTPATIENT)
Dept: INTERNAL MEDICINE | Facility: CLINIC | Age: 67
End: 2022-06-07
Payer: MEDICARE

## 2022-06-07 ENCOUNTER — OFFICE VISIT (OUTPATIENT)
Dept: URGENT CARE | Facility: CLINIC | Age: 67
End: 2022-06-07
Payer: MEDICARE

## 2022-06-07 ENCOUNTER — TELEPHONE (OUTPATIENT)
Dept: INFECTIOUS DISEASES | Facility: HOSPITAL | Age: 67
End: 2022-06-07
Payer: MEDICARE

## 2022-06-07 VITALS
BODY MASS INDEX: 30.32 KG/M2 | WEIGHT: 182 LBS | RESPIRATION RATE: 16 BRPM | HEART RATE: 75 BPM | TEMPERATURE: 99 F | HEIGHT: 65 IN | SYSTOLIC BLOOD PRESSURE: 146 MMHG | OXYGEN SATURATION: 97 % | DIASTOLIC BLOOD PRESSURE: 66 MMHG

## 2022-06-07 DIAGNOSIS — R05.9 COUGH: ICD-10-CM

## 2022-06-07 DIAGNOSIS — U07.1 PNEUMONIA DUE TO COVID-19 VIRUS: Primary | ICD-10-CM

## 2022-06-07 DIAGNOSIS — J12.82 PNEUMONIA DUE TO COVID-19 VIRUS: Primary | ICD-10-CM

## 2022-06-07 PROCEDURE — 3078F PR MOST RECENT DIASTOLIC BLOOD PRESSURE < 80 MM HG: ICD-10-PCS | Mod: CPTII,S$GLB,, | Performed by: PHYSICIAN ASSISTANT

## 2022-06-07 PROCEDURE — 4010F PR ACE/ARB THEARPY RXD/TAKEN: ICD-10-PCS | Mod: CPTII,S$GLB,, | Performed by: PHYSICIAN ASSISTANT

## 2022-06-07 PROCEDURE — 4010F ACE/ARB THERAPY RXD/TAKEN: CPT | Mod: CPTII,S$GLB,, | Performed by: PHYSICIAN ASSISTANT

## 2022-06-07 PROCEDURE — 3077F PR MOST RECENT SYSTOLIC BLOOD PRESSURE >= 140 MM HG: ICD-10-PCS | Mod: CPTII,S$GLB,, | Performed by: PHYSICIAN ASSISTANT

## 2022-06-07 PROCEDURE — 3078F DIAST BP <80 MM HG: CPT | Mod: CPTII,S$GLB,, | Performed by: PHYSICIAN ASSISTANT

## 2022-06-07 PROCEDURE — 99214 OFFICE O/P EST MOD 30 MIN: CPT | Mod: S$GLB,,, | Performed by: PHYSICIAN ASSISTANT

## 2022-06-07 PROCEDURE — 3072F LOW RISK FOR RETINOPATHY: CPT | Mod: CPTII,S$GLB,, | Performed by: PHYSICIAN ASSISTANT

## 2022-06-07 PROCEDURE — 1159F MED LIST DOCD IN RCRD: CPT | Mod: CPTII,S$GLB,, | Performed by: PHYSICIAN ASSISTANT

## 2022-06-07 PROCEDURE — 3077F SYST BP >= 140 MM HG: CPT | Mod: CPTII,S$GLB,, | Performed by: PHYSICIAN ASSISTANT

## 2022-06-07 PROCEDURE — 1160F PR REVIEW ALL MEDS BY PRESCRIBER/CLIN PHARMACIST DOCUMENTED: ICD-10-PCS | Mod: CPTII,S$GLB,, | Performed by: PHYSICIAN ASSISTANT

## 2022-06-07 PROCEDURE — 1126F AMNT PAIN NOTED NONE PRSNT: CPT | Mod: CPTII,S$GLB,, | Performed by: PHYSICIAN ASSISTANT

## 2022-06-07 PROCEDURE — 3008F PR BODY MASS INDEX (BMI) DOCUMENTED: ICD-10-PCS | Mod: CPTII,S$GLB,, | Performed by: PHYSICIAN ASSISTANT

## 2022-06-07 PROCEDURE — 1160F RVW MEDS BY RX/DR IN RCRD: CPT | Mod: CPTII,S$GLB,, | Performed by: PHYSICIAN ASSISTANT

## 2022-06-07 PROCEDURE — 3008F BODY MASS INDEX DOCD: CPT | Mod: CPTII,S$GLB,, | Performed by: PHYSICIAN ASSISTANT

## 2022-06-07 PROCEDURE — 99214 PR OFFICE/OUTPT VISIT, EST, LEVL IV, 30-39 MIN: ICD-10-PCS | Mod: S$GLB,,, | Performed by: PHYSICIAN ASSISTANT

## 2022-06-07 PROCEDURE — 1159F PR MEDICATION LIST DOCUMENTED IN MEDICAL RECORD: ICD-10-PCS | Mod: CPTII,S$GLB,, | Performed by: PHYSICIAN ASSISTANT

## 2022-06-07 PROCEDURE — 71046 X-RAY EXAM CHEST 2 VIEWS: CPT | Mod: S$GLB,,, | Performed by: RADIOLOGY

## 2022-06-07 PROCEDURE — 3072F PR LOW RISK FOR RETINOPATHY: ICD-10-PCS | Mod: CPTII,S$GLB,, | Performed by: PHYSICIAN ASSISTANT

## 2022-06-07 PROCEDURE — 1126F PR PAIN SEVERITY QUANTIFIED, NO PAIN PRESENT: ICD-10-PCS | Mod: CPTII,S$GLB,, | Performed by: PHYSICIAN ASSISTANT

## 2022-06-07 PROCEDURE — 71046 XR CHEST PA AND LATERAL: ICD-10-PCS | Mod: S$GLB,,, | Performed by: RADIOLOGY

## 2022-06-07 RX ORDER — DOXYCYCLINE 100 MG/1
100 CAPSULE ORAL EVERY 12 HOURS
Qty: 14 CAPSULE | Refills: 0 | Status: SHIPPED | OUTPATIENT
Start: 2022-06-07 | End: 2022-06-14

## 2022-06-07 RX ORDER — AMOXICILLIN AND CLAVULANATE POTASSIUM 875; 125 MG/1; MG/1
1 TABLET, FILM COATED ORAL 2 TIMES DAILY
Qty: 14 TABLET | Refills: 0 | Status: SHIPPED | OUTPATIENT
Start: 2022-06-07 | End: 2022-06-14

## 2022-06-07 NOTE — TELEPHONE ENCOUNTER
Called pt back in regards to infusion tx.  Pt states feeling better overall with covid but was coughing very hard and now bloody sputum.  States contacted pcp on this as well.  Sent my chart message to pcp to notify as well.  Pt is out of window for paxlovid.  States sky le to dime size amount of blood occasionally not every time with cough.

## 2022-06-07 NOTE — PROGRESS NOTES
"Subjective:       Patient ID: Raquel Pickard is a 66 y.o. female.    Vitals:  height is 5' 5" (1.651 m) and weight is 82.6 kg (182 lb). Her tympanic temperature is 98.9 °F (37.2 °C). Her blood pressure is 146/66 (abnormal) and her pulse is 75. Her respiration is 16 and oxygen saturation is 97%.     Chief Complaint: Otalgia and Cough    Raquel Pickard is a 66 year old female who tested positive for COVID 19 on 6/1/2022 and recently started to experiences left ear pain and cough with bloody mucus (starting yesterday).  Denies fever and states overall symptoms have improved.  No SOB, CP, or dizziness.  Denies N/V.  Did not receive EUA MAB infusion per note (no reason).    Otalgia   There is pain in the left ear. This is a new problem. The current episode started 1 to 4 weeks ago (1). The problem has been gradually improving. There has been no fever. The pain is at a severity of 1/10. The pain is mild. Associated symptoms include coughing, neck pain and a sore throat. Pertinent negatives include no abdominal pain, diarrhea, ear discharge, headaches, hearing loss, rash, rhinorrhea or vomiting.   Cough  This is a new problem. The current episode started yesterday. The problem has been unchanged. The cough is productive of blood-tinged sputum. Associated symptoms include ear pain and a sore throat. Pertinent negatives include no headaches, rash or rhinorrhea.       HENT: Positive for ear pain and sore throat. Negative for ear discharge and hearing loss.    Neck: Positive for neck pain.   Respiratory: Positive for cough.    Gastrointestinal: Negative for abdominal pain, vomiting and diarrhea.   Skin: Negative for rash.   Neurological: Negative for headaches.       Objective:      Physical Exam   Constitutional: She is oriented to person, place, and time. She appears well-developed.   HENT:   Head: Normocephalic and atraumatic.   Ears:   Right Ear: Tympanic membrane, external ear and ear canal normal.   Left Ear: Tympanic " membrane, external ear and ear canal normal.   Mouth/Throat: Oropharynx is clear and moist.   Eyes: EOM are normal. Pupils are equal, round, and reactive to light.   Neck: Neck supple.   Cardiovascular: Normal rate and regular rhythm.   Pulmonary/Chest: Effort normal and breath sounds normal. She has no decreased breath sounds. She has no wheezes. She has no rhonchi. She has no rales.   Abdominal: Bowel sounds are normal. Soft.   Musculoskeletal: Normal range of motion.         General: Normal range of motion.   Neurological: She is alert and oriented to person, place, and time.   Skin: Skin is warm and dry.   Vitals reviewed.        Assessment:       1. Pneumonia due to COVID-19 virus    2. Cough          Plan:         Pneumonia due to COVID-19 virus  -     doxycycline (VIBRAMYCIN) 100 MG Cap; Take 1 capsule (100 mg total) by mouth every 12 (twelve) hours. for 7 days  Dispense: 14 capsule; Refill: 0  -     amoxicillin-clavulanate 875-125mg (AUGMENTIN) 875-125 mg per tablet; Take 1 tablet by mouth 2 (two) times daily. for 7 days  Dispense: 14 tablet; Refill: 0    Cough  -     XR CHEST PA AND LATERAL; Future; Expected date: 06/07/2022    XR CHEST PA AND LATERAL    Result Date: 6/7/2022  EXAMINATION: XR CHEST PA AND LATERAL CLINICAL HISTORY: Cough, unspecified TECHNIQUE: PA and lateral views of the chest were performed. COMPARISON: Prior radiographs FINDINGS: Cardiac silhouette and mediastinal contours are normal.  Lungs demonstrated focal opacity within the right suprahilar location.  No pleural effusion.  Osseous structures are intact.     Focal right suprahilar opacity which could represent pneumonia.  Mass lesion not excluded.  Short-term follow-up chest x-ray and/or CT chest recommended.  This report was flagged in Epic as abnormal. Electronically signed by: Zachery Fowler MD Date:    06/07/2022 Time:    10:32    Discussed findings with the patient.  Will tx for pneumonia due to recent Covid 19 dx and  symptoms.  I advised patient to follow up with PCP for further CT scan which is recommended per radiological findings.      Increase fluids.  Get plenty of rest.   Normal saline nasal wash to irrigate sinuses and for congestion/runny nose.  Cool mist humidifier/vaporizer.  Practice good handwashing.  Mucinex for cough and chest congestion.  Tylenol or Ibuprofen for fever, headache and body aches.  Warm salt water gargles for throat comfort.  Chloraseptic spray or lozenges for throat comfort.  See PCP or go to ER if symptoms worsen or fail to improve with treatment.

## 2022-06-07 NOTE — TELEPHONE ENCOUNTER
----- Message from Julia Ware sent at 6/7/2022  7:24 AM CDT -----  .Type:  Same Day Appointment Request    Caller is requesting a same day appointment.  Caller declined first available appointment listed below.    Name of Caller: SELF  When is the first available appointment?   Symptoms:BLOOD WHEN COUGHING  Best Call Back Number:.[phone] (home)     Additional Information: POSITIVE COVID//VIRTUAL//PREFERS [place] ALTHOUGH OFFERED OTHER 'S

## 2022-06-08 NOTE — TELEPHONE ENCOUNTER
Spoke with the patient states she feels much better and she is no longer coughing up blood  MD spoke to pt and ok with holding off on the scans unless she feels worse

## 2022-06-10 ENCOUNTER — TELEPHONE (OUTPATIENT)
Dept: URGENT CARE | Facility: CLINIC | Age: 67
End: 2022-06-10
Payer: MEDICARE

## 2022-06-10 ENCOUNTER — PATIENT OUTREACH (OUTPATIENT)
Dept: ADMINISTRATIVE | Facility: HOSPITAL | Age: 67
End: 2022-06-10
Payer: MEDICARE

## 2022-06-10 NOTE — TELEPHONE ENCOUNTER
Courtesy call to check and see how they doing , Pt is feeling better. Also said if they have any question or concerns give us jimmie back or come back in   Pt presents for covid test, exposed to a +covid today, pt c/o sorethroat

## 2022-06-10 NOTE — PROGRESS NOTES
Working [employer] Report:     Annual labs done in July 2021. She is due again. Scheduled for November. Called to get patient o do labs sooner, no answer.

## 2022-06-21 ENCOUNTER — PATIENT OUTREACH (OUTPATIENT)
Dept: ADMINISTRATIVE | Facility: HOSPITAL | Age: 67
End: 2022-06-21
Payer: MEDICARE

## 2022-06-28 DIAGNOSIS — J06.9 URI WITH COUGH AND CONGESTION: ICD-10-CM

## 2022-06-29 RX ORDER — MONTELUKAST SODIUM 10 MG/1
10 TABLET ORAL NIGHTLY
Qty: 30 TABLET | Refills: 0 | Status: SHIPPED | OUTPATIENT
Start: 2022-06-29 | End: 2022-07-29

## 2022-07-04 DIAGNOSIS — E11.9 TYPE 2 DIABETES MELLITUS WITHOUT COMPLICATION, WITHOUT LONG-TERM CURRENT USE OF INSULIN: ICD-10-CM

## 2022-07-04 NOTE — TELEPHONE ENCOUNTER
Care Due:                  Date            Visit Type   Department     Provider  --------------------------------------------------------------------------------                                EP -                              PRIMARY      [facility] INTER[facility]  Last Visit: 05-      CARE ([facility])   MEDICINE       [facility]                              EP -                              PRIMARY      [facility] INTER[facility]  Next Visit: 11-      CARE ([facility])   MEDICINE       Debra  Jensen                                                            Last  Test          Frequency    Reason                     Performed    Due Date  --------------------------------------------------------------------------------    CMP.........  12 months..  benazepril-hydrochlorthia  07- 07-                             zide, glimepiride,                             metFORMIN, potassium,                             rosuvastatin.............    HBA1C.......  6 months...  glimepiride, metFORMIN...  11- 05-    Lipid Panel.  12 months..  rosuvastatin.............  07- 07-    [facility] Embedded Care Gaps. Reference number: [id number]. 7/04/2022   2:05:24 PM CDT

## 2022-07-05 RX ORDER — BLOOD SUGAR DIAGNOSTIC
STRIP MISCELLANEOUS
Qty: 100 STRIP | Refills: 3 | Status: SHIPPED | OUTPATIENT
Start: 2022-07-05

## 2022-07-05 RX ORDER — LANCETS
EACH MISCELLANEOUS
Qty: 100 EACH | Refills: 3 | Status: SHIPPED | OUTPATIENT
Start: 2022-07-05

## 2022-07-08 ENCOUNTER — HOSPITAL ENCOUNTER (OUTPATIENT)
Dept: RADIOLOGY | Facility: HOSPITAL | Age: 67
Discharge: HOME | End: 2022-07-08
Attending: PHYSICIAN ASSISTANT
Payer: MEDICARE

## 2022-07-08 ENCOUNTER — TELEPHONE (OUTPATIENT)
Dept: INTERNAL MEDICINE | Facility: CLINIC | Age: 67
End: 2022-07-08
Payer: MEDICARE

## 2022-07-08 DIAGNOSIS — R91.1 SOLITARY PULMONARY NODULE: ICD-10-CM

## 2022-07-08 DIAGNOSIS — R93.89 CHEST X-RAY ABNORMALITY: Primary | ICD-10-CM

## 2022-07-08 DIAGNOSIS — R91.8 OPACITY OF LUNG ON IMAGING STUDY: ICD-10-CM

## 2022-07-08 PROCEDURE — 71046 XR CHEST PA AND LATERAL: ICD-10-PCS | Mod: 26,,, | Performed by: RADIOLOGY

## 2022-07-08 PROCEDURE — 71046 X-RAY EXAM CHEST 2 VIEWS: CPT | Mod: 26,,, | Performed by: RADIOLOGY

## 2022-07-08 PROCEDURE — 71046 X-RAY EXAM CHEST 2 VIEWS: CPT | Mod: TC

## 2022-07-11 ENCOUNTER — OFFICE VISIT (OUTPATIENT)
Dept: OPHTHALMOLOGY | Facility: CLINIC | Age: 67
End: 2022-07-11
Payer: MEDICARE

## 2022-07-11 DIAGNOSIS — Z96.1 PSEUDOPHAKIA: ICD-10-CM

## 2022-07-11 DIAGNOSIS — H40.1133 PRIMARY OPEN ANGLE GLAUCOMA OF BOTH EYES, SEVERE STAGE: Primary | ICD-10-CM

## 2022-07-11 PROCEDURE — 1160F PR REVIEW ALL MEDS BY PRESCRIBER/CLIN PHARMACIST DOCUMENTED: ICD-10-PCS | Mod: CPTII,S$GLB,, | Performed by: OPHTHALMOLOGY

## 2022-07-11 PROCEDURE — 4010F PR ACE/ARB THEARPY RXD/TAKEN: ICD-10-PCS | Mod: CPTII,S$GLB,, | Performed by: OPHTHALMOLOGY

## 2022-07-11 PROCEDURE — 99999 PR PBB SHADOW E&M-EST. PATIENT-LVL III: ICD-10-PCS | Mod: PBBFAC,,, | Performed by: OPHTHALMOLOGY

## 2022-07-11 PROCEDURE — 1159F MED LIST DOCD IN RCRD: CPT | Mod: CPTII,S$GLB,, | Performed by: OPHTHALMOLOGY

## 2022-07-11 PROCEDURE — 99999 PR PBB SHADOW E&M-EST. PATIENT-LVL III: CPT | Mod: PBBFAC,,, | Performed by: OPHTHALMOLOGY

## 2022-07-11 PROCEDURE — 4010F ACE/ARB THERAPY RXD/TAKEN: CPT | Mod: CPTII,S$GLB,, | Performed by: OPHTHALMOLOGY

## 2022-07-11 PROCEDURE — 99214 PR OFFICE/OUTPT VISIT, EST, LEVL IV, 30-39 MIN: ICD-10-PCS | Mod: S$GLB,,, | Performed by: OPHTHALMOLOGY

## 2022-07-11 PROCEDURE — 99214 OFFICE O/P EST MOD 30 MIN: CPT | Mod: S$GLB,,, | Performed by: OPHTHALMOLOGY

## 2022-07-11 PROCEDURE — 1160F RVW MEDS BY RX/DR IN RCRD: CPT | Mod: CPTII,S$GLB,, | Performed by: OPHTHALMOLOGY

## 2022-07-11 PROCEDURE — 1159F PR MEDICATION LIST DOCUMENTED IN MEDICAL RECORD: ICD-10-PCS | Mod: CPTII,S$GLB,, | Performed by: OPHTHALMOLOGY

## 2022-07-11 NOTE — PROGRESS NOTES
SUBJECTIVE  Raquel Pickard is 66 y.o. female  Uncorrected distance visual acuity was 20/25 -2 in the right eye and 20/30 -2 in the left eye.   Chief Complaint   Patient presents with    Glaucoma     Patient reports for 5 month IOP check. Using gtts as advised. Denies pain or irritation at this time. Patient reports of visual stability since previous visit.             HPI     Glaucoma      Additional comments: Patient reports for 5 month IOP check. Using gtts   as advised. Denies pain or irritation at this time. Patient reports of   visual stability since previous visit.                 Comments     1. Severe COAG DX 7-10 (Tmax 23/25) Goal = 16   SLT OS 4/23/14(18-16)   SLT OD 2/12/14(17-16)   HYPEREMIA WITH XALATAN AND TRAVATAN      2. DM SINCE 2012 NO DBR   3. PCIOL w/ goniotomy OS 6/16/21 (+19.0 SN60WF)   PC IOL + goniotomy OD 7/22/20 (+18.5 SN60WF)   4. Dry Eyes   5. Floater OS    COSOPT BID OU   ALPHAGAN BID OU   LATANOPROST QHS OU    [place] BID OU prn            Last edited by Drew Rollins on 7/11/2022 10:46 AM. (History)         Assessment /Plan :  1. Primary open angle glaucoma of both eyes, severe stage Intraocular pressure (IOP) not within acceptable range relative to target IOP with risk of irreversible visual loss. Better IOP control is recommended. Treatment options may include change or additional medications, Selective Laser Trabeculoplasty (SLT laser), and/or incisional glaucoma surgery. Reviewed importance of continued compliance with treatment and follow up.     Patient chooses defer and recheck IOP next visit      Patient instructed to continue using the following glaucoma medication as follows:  Latanoprost one drop in each eye nightly, Alphagan one drop in each eye every 12 hours and Dorzolamide/Timolol (Cosopt) one drop in each eye every 12 hours    Return to clinic in 4 months  or as needed.  With IOP Check and GOCT       2. Pseudophakia  -- Condition stable, no therapeutic change  required. Monitoring routinely.

## 2022-07-12 ENCOUNTER — TELEPHONE (OUTPATIENT)
Dept: INTERNAL MEDICINE | Facility: CLINIC | Age: 67
End: 2022-07-12
Payer: MEDICARE

## 2022-07-12 NOTE — TELEPHONE ENCOUNTER
Spoke with pt appt scheduled for 7/14/22 at 940a   Subjective:     Patient ID: Michael Ramirez is a 35 y.o. male.    Chief Complaint: Diabetic Foot Exam (c/o right arch pain. rates pain 6/10 c/o left medial foot pain. rates pain 10/10. patient describes the pain as a throbbing pain. while walking or standing. wears tennis shoes with socks. diabetic Pt. last seen in 06/06/19 with PCP Dr. Ordonez.)    Michael is a 35 y.o. male who presents to the clinic for evaluation and treatment of high risk feet. Michael has a past medical history of Hypertension and Uncontrolled type 2 diabetes mellitus with hyperglycemia (5/9/2019). The patient's chief complaint is right arch pain (6/10) and left medial foot pain (10/10). Patient denies any injury. This patient has documented high risk feet requiring routine maintenance secondary to diabetes mellitis and those secondary complications of diabetes, as mentioned..    PCP: Yolette Ordonez MD    Date Last Seen by PCP: 06/06/19    Current shoe gear:  Affected Foot: Tennis shoes     Unaffected Foot: Tennis shoes    Hemoglobin A1C   Date Value Ref Range Status   05/23/2019 7.2 % Final           Patient Active Problem List   Diagnosis    Essential hypertension, malignant    Uncontrolled type 2 diabetes mellitus with hyperglycemia    Other hyperlipidemia    Other proteinuria    Localized osteoarthritis of left knee    Pseudotumor cerebri       Medication List with Changes/Refills   New Medications    CLOTRIMAZOLE-BETAMETHASONE 1-0.05% (LOTRISONE) CREAM    Apply topically 2 (two) times daily.    TERBINAFINE HCL (LAMISIL) 250 MG TABLET    Take 1 tablet (250 mg total) by mouth once daily.   Current Medications    ATORVASTATIN (LIPITOR) 40 MG TABLET    One tab po qhs    NEBIVOLOL (BYSTOLIC) 20 MG TAB    One tab po qhs    OLMESARTAN-HYDROCHLOROTHIAZIDE (BENICAR HCT) 40-25 MG PER TABLET    Take 1 tablet by mouth every morning.    SITAGLIPTAN-METFORMIN (JANUMET XR) 100-1,000 MG TM24    One tab po daily       Review of patient's  "allergies indicates:   Allergen Reactions    Pcn [penicillins] Itching       Past Surgical History:   Procedure Laterality Date    SHOULDER SURGERY         Family History   Problem Relation Age of Onset    Diabetes Mother     Hypertension Mother     Diabetes Father     Heart disease Father     Hypertension Father     Lupus Sister     Heart disease Maternal Grandfather     Cancer Neg Hx        Social History     Socioeconomic History    Marital status:      Spouse name: Not on file    Number of children: 0    Years of education: Not on file    Highest education level: Not on file   Occupational History    Not on file   Social Needs    Financial resource strain: Not on file    Food insecurity:     Worry: Not on file     Inability: Not on file    Transportation needs:     Medical: Not on file     Non-medical: Not on file   Tobacco Use    Smoking status: Former Smoker     Types: Cigars     Last attempt to quit: 5/1/2016     Years since quitting: 3.2    Smokeless tobacco: Never Used   Substance and Sexual Activity    Alcohol use: Yes     Comment: Social    Drug use: Never    Sexual activity: Yes     Partners: Female   Lifestyle    Physical activity:     Days per week: Not on file     Minutes per session: Not on file    Stress: Not on file   Relationships    Social connections:     Talks on phone: Not on file     Gets together: Not on file     Attends [employer] service: Not on file     Active member of club or organization: Not on file     Attends meetings of clubs or organizations: Not on file     Relationship status: Not on file   Other Topics Concern    Not on file   Social History Narrative    Not on file       Vitals:    07/23/19 1621   BP: (!) 153/83   Pulse: 75   Weight: (!) 152.4 kg (336 lb)   Height: 5' 7" (1.702 m)   PainSc:   6   PainLoc: Foot       Hemoglobin A1C   Date Value Ref Range Status   05/23/2019 7.2 % Final       Review of Systems   Constitutional: Negative for " chills and fever.   Respiratory: Negative for shortness of breath.    Cardiovascular: Negative for chest pain, palpitations, orthopnea, claudication and leg swelling.   Gastrointestinal: Negative for diarrhea, nausea and vomiting.   Musculoskeletal: Negative for joint pain.   Skin: Negative for rash.   Neurological: Positive for tingling and sensory change.   Psychiatric/Behavioral: Negative.              Objective:   PHYSICAL EXAM: Apperance: Alert and orient in no distress,well developed, and with good attention to grooming and body habits  Patient presents ambulating in tennis shoes.   LOWER EXTREMITY EXAM:  VASCULAR: Dorsalis pedis pulses 2/4 bilateral and Posterior Tibial pulses 2/4 bilateral. Capillary fill time <4 seconds bilateral. Mild edema observed bilateral. Varicosities absent bilateral. Skin temperature of the lower extremities is warm to warm, proximal to distal. Hair growth WNL bilateral.  DERMATOLOGICAL: No skin rashes, subcutaneous nodules, lesions, or ulcers observed bilateral. Nails 1,2,3,4,5 bilateral thickened. Webspaces 1,2,3,4 bilateral maceration. Dry and scaly skin noted plantarly bilateral in moccasin distribution.   NEUROLOGICAL: Light touch, sharp-dull, proprioception all present and equal bilaterally.  Vibratory sensation diminished at bilateral hallux. Protective sensation intact sites as tested with a Lutz-Rebecca 5.07 monofilament.   MUSCULOSKELETAL: Muscle strength is 5/5 for foot inverters, everters, plantarflexors, and dorsiflexors. Muscle tone is normal. Ankle joint ROM diminished  with no pain or crepitus bilateral ankle joints. Ankle joints bilateral demonstrate no evidence of subluxation, dislocation or laxity.  STJ bilateral demonstrates diminished ROM with no pain or crepitus. STJ bilateral demonstrates no evidence of subluxation, dislocation or laxity.  MTJ ROM bilateral is diminished, pain free and without crepitus.  MTJ bilateral demonstrates no evidence of  subluxation, dislocation or laxity. Pain to palpation bilateral medial foot at arch. Medial aspect of bilateral foot shows tenderness to palpation. No pain on along posterior tibial tendon. The general morphology of the foot is decreased arch height off weight bearing bilateral. + Hubscher maneuver bilateral.    Assessment:   Tinea pedis of both feet  -     clotrimazole-betamethasone 1-0.05% (LOTRISONE) cream; Apply topically 2 (two) times daily.  Dispense: 45 g; Refill: 1  -     terbinafine HCl (LAMISIL) 250 mg tablet; Take 1 tablet (250 mg total) by mouth once daily.  Dispense: 14 tablet; Refill: 0    Posterior tibial tendon dysfunction (PTTD) of both lower extremities    Uncontrolled type 2 diabetes mellitus with hyperglycemia          Plan:   Tinea pedis of both feet  -     clotrimazole-betamethasone 1-0.05% (LOTRISONE) cream; Apply topically 2 (two) times daily.  Dispense: 45 g; Refill: 1  -     terbinafine HCl (LAMISIL) 250 mg tablet; Take 1 tablet (250 mg total) by mouth once daily.  Dispense: 14 tablet; Refill: 0    Posterior tibial tendon dysfunction (PTTD) of both lower extremities    Uncontrolled type 2 diabetes mellitus with hyperglycemia      I counseled the patient on his conditions, regarding findings of my examination, my impressions, and usual treatment plan.   Greater than 50% of this visit spent on counseling and coordination of care.  Greater than 15 minutes of a 20 minute appointment spent on education about the diabetic foot, neuropathy, and prevention of limb loss.  Shoe inspection. Diabetic Foot Education. Patient reminded of the importance of good nutrition and blood sugar control to help prevent podiatric complications of diabetes. Patient instructed on proper foot hygeine. We discussed wearing proper shoe gear, daily foot inspections, never walking without protective shoe gear, never putting sharp instruments to feet.    Discuss treatment options for tinea pedis.  I explained that fungus  lives in a warm dark moist environment and therefore patient should make every attempt to keep feet clean and dry.  We discussed drying feet thoroughly after shower particularly between the toes.   Patient instructed to spray all shoes with Lysol disinfectant spray and let dry before wearing. Patient instructed to wash all socks in hot water and bleach.  We discussed using Lamisil for tinea pedis. This drug offers a fairly high but not universal cure rate. A 2-4 week treatment course is recommended. The patient is aware that rare cases of liver injury have been reported; and agrees to have a liver function tests performed. The symptoms of liver disease have been discussed; call if such occurs. Other side effects, such as headaches and rashes, have also been discussed.  Ordered liver function test.  Prescribed Lamisil 250mg to be taken once daily with food. Patient was instructed on dosing information. Discontinue if adverse effects occur   Prescribed Lotrisone cream to be applied twice daily to areas of feet.  I explained to the patient that etiology and treatment options for arch pain including ice message, new shoegear, orthotic inserts, NSAIDs, rest, strappings and tapings, stretching/strengthening including number and amounts of time each application.    Patient shown proper execution of stretching /strengthening exercise and instructed on correct number and amounts of time each stretch.    The patient and I reviewed the types of shoes he should be wearing, my recommendation includes generally the best time of the day for a shoe fitting is the afternoon, shoes with a wide toe box, very good cushion, and tennis shoes with removable inner soles.  The patient and I reviewed my recommendations for over-the-counter orthotic inserts. Patient instructed to try over-the-counter orthotics before custom orthotics.   Patient  will continue to monitor the areas daily, inspect feet, wear protective shoe gear when ambulatory,  moisturizer to maintain skin integrity. Patient reminded of the importance of good nutrition and blood sugar control to help prevent podiatric complications of diabetes.  Patient to return 4 months or sooner if needed.                 Alex Whitman DPM  [facility]

## 2022-07-12 NOTE — TELEPHONE ENCOUNTER
----- Message from Arik De Souza sent at 7/12/2022  3:28 PM CDT -----  Contact: self  Type:  Sooner Apoointment Request    Caller is requesting a sooner appointment.  Caller declined first available appointment listed below.  Caller will not accept being placed on the waitlist and is requesting a message be sent to doctor.  Name of Caller:Raquel Pickard   When is the first available appointment?2022  Symptoms:sore throat  Would the patient rather a call back or a response via [employer]? Call back  Best Call Back Number:[phone]  Additional Information:

## 2022-07-15 ENCOUNTER — PES CALL (OUTPATIENT)
Dept: ADMINISTRATIVE | Facility: CLINIC | Age: 67
End: 2022-07-15
Payer: MEDICARE

## 2022-07-18 NOTE — TELEPHONE ENCOUNTER
----- Message from Charles Lucas sent at 7/18/2022  9:05 AM CDT -----  Contact: Raquel Torres is requesting a call to ask questions. Please call her back at [phone].          Thanks  DD

## 2022-07-21 ENCOUNTER — OFFICE VISIT (OUTPATIENT)
Dept: INTERNAL MEDICINE | Facility: CLINIC | Age: 67
End: 2022-07-21
Payer: MEDICARE

## 2022-07-21 VITALS
WEIGHT: 183 LBS | DIASTOLIC BLOOD PRESSURE: 80 MMHG | HEIGHT: 65 IN | RESPIRATION RATE: 18 BRPM | HEART RATE: 101 BPM | BODY MASS INDEX: 30.49 KG/M2 | SYSTOLIC BLOOD PRESSURE: 128 MMHG | OXYGEN SATURATION: 99 %

## 2022-07-21 DIAGNOSIS — H93.8X2 SENSATION OF FULLNESS IN LEFT EAR: Primary | ICD-10-CM

## 2022-07-21 PROCEDURE — 3079F DIAST BP 80-89 MM HG: CPT | Mod: CPTII,S$GLB,, | Performed by: FAMILY MEDICINE

## 2022-07-21 PROCEDURE — 3072F LOW RISK FOR RETINOPATHY: CPT | Mod: CPTII,S$GLB,, | Performed by: FAMILY MEDICINE

## 2022-07-21 PROCEDURE — 3288F PR FALLS RISK ASSESSMENT DOCUMENTED: ICD-10-PCS | Mod: CPTII,S$GLB,, | Performed by: FAMILY MEDICINE

## 2022-07-21 PROCEDURE — 3288F FALL RISK ASSESSMENT DOCD: CPT | Mod: CPTII,S$GLB,, | Performed by: FAMILY MEDICINE

## 2022-07-21 PROCEDURE — 3008F BODY MASS INDEX DOCD: CPT | Mod: CPTII,S$GLB,, | Performed by: FAMILY MEDICINE

## 2022-07-21 PROCEDURE — 99999 PR PBB SHADOW E&M-EST. PATIENT-LVL IV: CPT | Mod: PBBFAC,,, | Performed by: FAMILY MEDICINE

## 2022-07-21 PROCEDURE — 1159F PR MEDICATION LIST DOCUMENTED IN MEDICAL RECORD: ICD-10-PCS | Mod: CPTII,S$GLB,, | Performed by: FAMILY MEDICINE

## 2022-07-21 PROCEDURE — 99213 PR OFFICE/OUTPT VISIT, EST, LEVL III, 20-29 MIN: ICD-10-PCS | Mod: S$GLB,,, | Performed by: FAMILY MEDICINE

## 2022-07-21 PROCEDURE — 4010F ACE/ARB THERAPY RXD/TAKEN: CPT | Mod: CPTII,S$GLB,, | Performed by: FAMILY MEDICINE

## 2022-07-21 PROCEDURE — 3079F PR MOST RECENT DIASTOLIC BLOOD PRESSURE 80-89 MM HG: ICD-10-PCS | Mod: CPTII,S$GLB,, | Performed by: FAMILY MEDICINE

## 2022-07-21 PROCEDURE — 4010F PR ACE/ARB THEARPY RXD/TAKEN: ICD-10-PCS | Mod: CPTII,S$GLB,, | Performed by: FAMILY MEDICINE

## 2022-07-21 PROCEDURE — 1101F PR PT FALLS ASSESS DOC 0-1 FALLS W/OUT INJ PAST YR: ICD-10-PCS | Mod: CPTII,S$GLB,, | Performed by: FAMILY MEDICINE

## 2022-07-21 PROCEDURE — 1126F PR PAIN SEVERITY QUANTIFIED, NO PAIN PRESENT: ICD-10-PCS | Mod: CPTII,S$GLB,, | Performed by: FAMILY MEDICINE

## 2022-07-21 PROCEDURE — 3072F PR LOW RISK FOR RETINOPATHY: ICD-10-PCS | Mod: CPTII,S$GLB,, | Performed by: FAMILY MEDICINE

## 2022-07-21 PROCEDURE — 1126F AMNT PAIN NOTED NONE PRSNT: CPT | Mod: CPTII,S$GLB,, | Performed by: FAMILY MEDICINE

## 2022-07-21 PROCEDURE — 3074F PR MOST RECENT SYSTOLIC BLOOD PRESSURE < 130 MM HG: ICD-10-PCS | Mod: CPTII,S$GLB,, | Performed by: FAMILY MEDICINE

## 2022-07-21 PROCEDURE — 1159F MED LIST DOCD IN RCRD: CPT | Mod: CPTII,S$GLB,, | Performed by: FAMILY MEDICINE

## 2022-07-21 PROCEDURE — 99213 OFFICE O/P EST LOW 20 MIN: CPT | Mod: S$GLB,,, | Performed by: FAMILY MEDICINE

## 2022-07-21 PROCEDURE — 3008F PR BODY MASS INDEX (BMI) DOCUMENTED: ICD-10-PCS | Mod: CPTII,S$GLB,, | Performed by: FAMILY MEDICINE

## 2022-07-21 PROCEDURE — 99999 PR PBB SHADOW E&M-EST. PATIENT-LVL IV: ICD-10-PCS | Mod: PBBFAC,,, | Performed by: FAMILY MEDICINE

## 2022-07-21 PROCEDURE — 3074F SYST BP LT 130 MM HG: CPT | Mod: CPTII,S$GLB,, | Performed by: FAMILY MEDICINE

## 2022-07-21 PROCEDURE — 1101F PT FALLS ASSESS-DOCD LE1/YR: CPT | Mod: CPTII,S$GLB,, | Performed by: FAMILY MEDICINE

## 2022-07-21 RX ORDER — LEVOCETIRIZINE DIHYDROCHLORIDE 5 MG/1
5 TABLET, FILM COATED ORAL NIGHTLY
Qty: 14 TABLET | Refills: 0 | Status: SHIPPED | OUTPATIENT
Start: 2022-07-21 | End: 2022-08-17

## 2022-07-21 NOTE — PROGRESS NOTES
Subjective:       Patient ID: Raquel Pickard is a 66 y.o. female.    Chief Complaint: Follow-up    HPI Ms. Pickard presents today for follow up.     COVID end of May   Feels she has been having ear drainage   Feels like it is going down the throat on the left side   This has been going on since COVID feels like it is getting better     Not taking anything  Flonase for about 7-10 days     Review of Systems   Constitutional: Negative for activity change, appetite change, fatigue and fever.   HENT: Negative for congestion, ear pain and sinus pressure.    Eyes: Negative for pain and visual disturbance.   Respiratory: Negative for cough, chest tightness and wheezing.    Cardiovascular: Negative for chest pain, palpitations and leg swelling.   Gastrointestinal: Negative for abdominal distention, abdominal pain, constipation, diarrhea, nausea and vomiting.   Endocrine: Negative for polydipsia and polyuria.   Genitourinary: Negative for difficulty urinating, dyspareunia, dysuria, flank pain and hematuria.   Musculoskeletal: Negative for arthralgias and back pain.   Skin: Negative for rash.   Neurological: Negative for dizziness, tremors, syncope, weakness, numbness and headaches.   Psychiatric/Behavioral: Negative for agitation and confusion.           Past Medical History:   Diagnosis Date    Breast cancer 2011    L    Diabetes mellitus     Glaucoma     Hyperlipidemia     Hypertension     MVP (mitral valve prolapse)     Obesity      Past Surgical History:   Procedure Laterality Date    BREAST LUMPECTOMY      CATARACT EXTRACTION W/  INTRAOCULAR LENS IMPLANT Right 07/22/2020    w/ goniotomy    CATARACT EXTRACTION W/  INTRAOCULAR LENS IMPLANT Left 06/16/2021    w/ goniotomy    COLONOSCOPY W/ POLYPECTOMY  04/18/2017    DR. CHARLENE NATH/GI ASSO. POLYP X 3. REPEAT 5 YRS.    PCIOL Right     DR. MENDOZA    SLT - OU - BOTH EYES      TRIGGER FINGER RELEASE      Thumb    TUBAL LIGATION           Objective:         Physical Exam  Vitals reviewed.   Constitutional:       Appearance: Normal appearance.   HENT:      Head: Normocephalic and atraumatic.      Right Ear: Tympanic membrane, ear canal and external ear normal.      Left Ear: Tympanic membrane, ear canal and external ear normal.   Neurological:      Mental Status: She is alert and oriented to person, place, and time.           Results for orders placed or performed in visit on 06/21/22    COLONOSCOPY   Result Value Ref Range    CRC Recommendation External Repeat colonoscopy in 5 years        Assessment/Plan:     Sensation of fullness in left ear  -     levocetirizine (XYZAL) 5 MG tablet; Take 1 tablet (5 mg total) by mouth every evening.  Dispense: 14 tablet; Refill: 0          Follow up as needed     Debra Jensen MD  [facility]   Family Medicine

## 2022-07-26 DIAGNOSIS — J06.9 URI WITH COUGH AND CONGESTION: ICD-10-CM

## 2022-07-26 RX ORDER — FLUTICASONE PROPIONATE 50 MCG
2 SPRAY, SUSPENSION (ML) NASAL DAILY
Qty: 9.9 ML | Refills: 0 | Status: SHIPPED | OUTPATIENT
Start: 2022-07-26 | End: 2023-02-03 | Stop reason: SDUPTHER

## 2022-08-09 ENCOUNTER — HOSPITAL ENCOUNTER (OUTPATIENT)
Dept: RADIOLOGY | Facility: HOSPITAL | Age: 67
Discharge: HOME | End: 2022-08-09
Attending: PHYSICIAN ASSISTANT
Payer: MEDICARE

## 2022-08-09 DIAGNOSIS — R91.1 SOLITARY PULMONARY NODULE: ICD-10-CM

## 2022-08-09 DIAGNOSIS — R93.89 CHEST X-RAY ABNORMALITY: ICD-10-CM

## 2022-08-09 DIAGNOSIS — R91.8 OPACITY OF LUNG ON IMAGING STUDY: ICD-10-CM

## 2022-08-09 PROCEDURE — 25500020 PHARM REV CODE 255: Performed by: PHYSICIAN ASSISTANT

## 2022-08-09 PROCEDURE — 71260 CT CHEST WITH CONTRAST: ICD-10-PCS | Mod: 26,,, | Performed by: RADIOLOGY

## 2022-08-09 PROCEDURE — 71260 CT THORAX DX C+: CPT | Mod: 26,,, | Performed by: RADIOLOGY

## 2022-08-09 PROCEDURE — 71260 CT THORAX DX C+: CPT | Mod: TC

## 2022-08-09 RX ADMIN — IOHEXOL 75 ML: 350 INJECTION, SOLUTION INTRAVENOUS at 09:08

## 2022-08-10 ENCOUNTER — TELEPHONE (OUTPATIENT)
Dept: INTERNAL MEDICINE | Facility: CLINIC | Age: 67
End: 2022-08-10
Payer: MEDICARE

## 2022-08-10 DIAGNOSIS — R91.8 MASS OF UPPER LOBE OF RIGHT LUNG: Primary | ICD-10-CM

## 2022-08-10 NOTE — TELEPHONE ENCOUNTER
Spoke with patient 8/9 regarding recent concerning CT results.  Patient reports she has external Oncologist (h/o breast cancer 10yrs ago).  Oncologist is Dr. Benji Wallace.     Will also place referral for Pulm.

## 2022-08-10 NOTE — TELEPHONE ENCOUNTER
Please contact oncologist, Dr. Benji Wallace's office.  She is a current/past patient of theirs.  Patient with recent R lung mass on CT concerning for malignancy.  Please make them aware and fax CT results.      Please also schedule Pulm appt.  Soonest available.  May needs to message explaining CT shows possible malignancy if they do not have any appts within the next week or two.      Thanks

## 2022-08-15 ENCOUNTER — PATIENT MESSAGE (OUTPATIENT)
Dept: INTERNAL MEDICINE | Facility: CLINIC | Age: 67
End: 2022-08-15
Payer: MEDICARE

## 2022-08-15 NOTE — TELEPHONE ENCOUNTER
Faxed CT results to Dr. Wallace. And called Pulm to reschedule oct appt for a sooner appt which rescheduled to 08/18. Per Dr. Jensen not to call her will discuss during appt on 08/17

## 2022-08-16 ENCOUNTER — PATIENT OUTREACH (OUTPATIENT)
Dept: ADMINISTRATIVE | Facility: HOSPITAL | Age: 67
End: 2022-08-16
Payer: MEDICARE

## 2022-08-16 NOTE — PROGRESS NOTES
Chuy HA1c Report: Called Pt to schedule overdue HA1c, Pt declines saying she will discuss with PCP first before scheduling anything. PCP Virtual Visit is 08/17/2022.

## 2022-08-17 ENCOUNTER — OFFICE VISIT (OUTPATIENT)
Dept: INTERNAL MEDICINE | Facility: CLINIC | Age: 67
End: 2022-08-17
Payer: MEDICARE

## 2022-08-17 VITALS — SYSTOLIC BLOOD PRESSURE: 127 MMHG | DIASTOLIC BLOOD PRESSURE: 76 MMHG

## 2022-08-17 DIAGNOSIS — E11.36 TYPE 2 DIABETES MELLITUS WITH DIABETIC CATARACT, WITHOUT LONG-TERM CURRENT USE OF INSULIN: ICD-10-CM

## 2022-08-17 DIAGNOSIS — Z71.2 ENCOUNTER TO DISCUSS TEST RESULTS: Primary | ICD-10-CM

## 2022-08-17 DIAGNOSIS — H40.1133 PRIMARY OPEN ANGLE GLAUCOMA OF BOTH EYES, SEVERE STAGE: ICD-10-CM

## 2022-08-17 PROCEDURE — 99213 OFFICE O/P EST LOW 20 MIN: CPT | Mod: 95,,, | Performed by: FAMILY MEDICINE

## 2022-08-17 PROCEDURE — 3288F FALL RISK ASSESSMENT DOCD: CPT | Mod: CPTII,95,, | Performed by: FAMILY MEDICINE

## 2022-08-17 PROCEDURE — 3074F SYST BP LT 130 MM HG: CPT | Mod: CPTII,95,, | Performed by: FAMILY MEDICINE

## 2022-08-17 PROCEDURE — 3074F PR MOST RECENT SYSTOLIC BLOOD PRESSURE < 130 MM HG: ICD-10-PCS | Mod: CPTII,95,, | Performed by: FAMILY MEDICINE

## 2022-08-17 PROCEDURE — 3072F PR LOW RISK FOR RETINOPATHY: ICD-10-PCS | Mod: CPTII,95,, | Performed by: FAMILY MEDICINE

## 2022-08-17 PROCEDURE — 3072F LOW RISK FOR RETINOPATHY: CPT | Mod: CPTII,95,, | Performed by: FAMILY MEDICINE

## 2022-08-17 PROCEDURE — 3288F PR FALLS RISK ASSESSMENT DOCUMENTED: ICD-10-PCS | Mod: CPTII,95,, | Performed by: FAMILY MEDICINE

## 2022-08-17 PROCEDURE — 99213 PR OFFICE/OUTPT VISIT, EST, LEVL III, 20-29 MIN: ICD-10-PCS | Mod: 95,,, | Performed by: FAMILY MEDICINE

## 2022-08-17 PROCEDURE — 3078F PR MOST RECENT DIASTOLIC BLOOD PRESSURE < 80 MM HG: ICD-10-PCS | Mod: CPTII,95,, | Performed by: FAMILY MEDICINE

## 2022-08-17 PROCEDURE — 3078F DIAST BP <80 MM HG: CPT | Mod: CPTII,95,, | Performed by: FAMILY MEDICINE

## 2022-08-17 PROCEDURE — 1101F PT FALLS ASSESS-DOCD LE1/YR: CPT | Mod: CPTII,95,, | Performed by: FAMILY MEDICINE

## 2022-08-17 PROCEDURE — 1101F PR PT FALLS ASSESS DOC 0-1 FALLS W/OUT INJ PAST YR: ICD-10-PCS | Mod: CPTII,95,, | Performed by: FAMILY MEDICINE

## 2022-08-17 PROCEDURE — 4010F ACE/ARB THERAPY RXD/TAKEN: CPT | Mod: CPTII,95,, | Performed by: FAMILY MEDICINE

## 2022-08-17 PROCEDURE — 4010F PR ACE/ARB THEARPY RXD/TAKEN: ICD-10-PCS | Mod: CPTII,95,, | Performed by: FAMILY MEDICINE

## 2022-08-17 RX ORDER — DORZOLAMIDE HYDROCHLORIDE AND TIMOLOL MALEATE 20; 5 MG/ML; MG/ML
1 SOLUTION/ DROPS OPHTHALMIC 2 TIMES DAILY
Qty: 30 ML | Refills: 4 | Status: SHIPPED | OUTPATIENT
Start: 2022-08-17 | End: 2023-03-07 | Stop reason: SDUPTHER

## 2022-08-17 NOTE — TELEPHONE ENCOUNTER
----- Message from Cinthya Kulkarni sent at 8/17/2022  9:26 AM CDT -----  Contact: [facility]  .Type:  RX Refill Request    Who Called: [facility]  Refill or New Rx:refill  RX Name and Strength:dorzolamide-timolol 2-0.5% (COSOPT) 22.3-6.8 mg/mL ophthalmic solution  How is the patient currently taking it? (ex. 1XDay):as proscribed   Is this a 30 day or 90 day RX:90  Preferred Pharmacy with phone number:.  [facility] (Now [facility]) - [facility] [address]  [address] [address]  Phone: [phone] Fax: [phone]  Local or Mail Order:mail  Ordering Provider:Dr. Alberto  Would the patient rather a call back or a response via [facility]? call  Best Call Back Number:[phone] Fax# [phone]  Additional Information:   Thanks  LR

## 2022-08-17 NOTE — PROGRESS NOTES
The patient location is: [place] ([place])  The chief complaint leading to consultation is: CT results     Visit type: audiovisual    Face to Face time with patient: 28 minutes  28 minutes of total time spent on the encounter, which includes face to face time and non-face to face time preparing to see the patient (eg, review of tests), Obtaining and/or reviewing separately obtained history, Documenting clinical information in the electronic or other health record, Independently interpreting results (not separately reported) and communicating results to the patient/family/caregiver, or Care coordination (not separately reported).         Each patient to whom he or she provides medical services by telemedicine is:  (1) informed of the relationship between the physician and patient and the respective role of any other health care provider with respect to management of the patient; and (2) notified that he or she may decline to receive medical services by telemedicine and may withdraw from such care at any time.    Subjective:       Patient ID: Raquel Pickard is a 66 y.o. female.    Chief Complaint: No chief complaint on file.    HPI Ms. Pickard presents today via telemedicine for CT results    Seen previously by and had a CT scan ordered after abnormal xray results   Concerning results as below   Impression:     1. Right upper lobe lung mass highly suspicious for bronchogenic malignancy.  2. Regional right hilar and mediastinal lymphadenopathy.  3. Remainder as above.    Patient has contacting her oncologist and he has a disc of the scan    She is very disappointed in not having communication about her results and the plan   She has an appt with pulmonary on tomorrow that was initially scheduled for the 18th         Review of Systems   Constitutional: Negative for activity change and unexpected weight change.   HENT: Negative for hearing loss, rhinorrhea and trouble swallowing.    Eyes: Negative for discharge and visual  disturbance.   Respiratory: Negative for chest tightness and wheezing.    Cardiovascular: Negative for chest pain and palpitations.   Gastrointestinal: Negative for blood in stool, constipation, diarrhea and vomiting.   Endocrine: Negative for polydipsia and polyuria.   Genitourinary: Negative for difficulty urinating, dysuria, hematuria and menstrual problem.   Musculoskeletal: Negative for arthralgias, joint swelling and neck pain.   Neurological: Negative for weakness and headaches.   Psychiatric/Behavioral: Negative for confusion and dysphoric mood.           Past Medical History:   Diagnosis Date    Breast cancer 2011    L    Diabetes mellitus     Glaucoma     Hyperlipidemia     Hypertension     MVP (mitral valve prolapse)     Obesity      Past Surgical History:   Procedure Laterality Date    BREAST LUMPECTOMY      CATARACT EXTRACTION W/  INTRAOCULAR LENS IMPLANT Right 07/22/2020    w/ goniotomy    CATARACT EXTRACTION W/  INTRAOCULAR LENS IMPLANT Left 06/16/2021    w/ goniotomy    COLONOSCOPY W/ POLYPECTOMY  04/18/2017    DR. CHARLENE NATH/GI ASSO. POLYP X 3. REPEAT 5 YRS.    PCIOL Right     DR. MENDOZA    SLT - OU - BOTH EYES      TRIGGER FINGER RELEASE      Thumb    TUBAL LIGATION       Family History   Problem Relation Age of Onset    Glaucoma Brother     Macular degeneration Brother     Esophageal cancer Father     Heart disease Father     Hypertension Father     Liver cancer Mother     Cancer Mother     Hypertension Sister     Cataracts Sister     Diabetes Sister     Cholelithiasis Sister     Blindness Neg Hx     Retinal detachment Neg Hx     Strabismus Neg Hx     Stroke Neg Hx     Thyroid disease Neg Hx      Social History     Socioeconomic History    Marital status:     Number of children: 2   Occupational History    Occupation: retired teacher   Tobacco Use    Smoking status: Never Smoker    Smokeless tobacco: Never Used   Substance and Sexual Activity     Alcohol use: Yes     Comment: very rare    Drug use: No    Sexual activity: Yes     Partners: Male       Objective:        Physical Exam  Vitals reviewed.   Constitutional:       Appearance: Normal appearance.   HENT:      Head: Atraumatic.   Pulmonary:      Effort: Pulmonary effort is normal.   Neurological:      Mental Status: She is alert and oriented to person, place, and time.   Psychiatric:         Mood and Affect: Mood normal.         Behavior: Behavior normal.           Results for orders placed or performed in visit on 08/09/22   Creatinine, Serum   Result Value Ref Range    Creatinine 0.8 0.5 - 1.4 mg/dL    eGFR >60 >60 mL/min/1.73 m^2       Assessment/Plan:     Encounter to discuss test results    Type 2 diabetes mellitus with diabetic cataract, without long-term current use of insulin  -     HEMOGLOBIN A1C; Future; Expected date: 08/17/2022      Discussed what happened with the CT results   Will follow up with her oncologist and she will see pulmonary on tomorrow      Follow up as needed     Debra Jensen MD  [facility]   Family Medicine

## 2022-08-18 ENCOUNTER — OFFICE VISIT (OUTPATIENT)
Dept: PULMONOLOGY | Facility: CLINIC | Age: 67
End: 2022-08-18
Payer: MEDICARE

## 2022-08-18 ENCOUNTER — LAB VISIT (OUTPATIENT)
Dept: LAB | Facility: HOSPITAL | Age: 67
End: 2022-08-18
Attending: FAMILY MEDICINE
Payer: MEDICARE

## 2022-08-18 VITALS
BODY MASS INDEX: 29.99 KG/M2 | HEIGHT: 65 IN | WEIGHT: 180 LBS | OXYGEN SATURATION: 97 % | RESPIRATION RATE: 14 BRPM | HEART RATE: 75 BPM

## 2022-08-18 DIAGNOSIS — Z85.3 HISTORY OF BREAST CANCER: ICD-10-CM

## 2022-08-18 DIAGNOSIS — R91.8 MASS OF RIGHT LUNG: Primary | ICD-10-CM

## 2022-08-18 DIAGNOSIS — R59.0 MEDIASTINAL LYMPHADENOPATHY: ICD-10-CM

## 2022-08-18 DIAGNOSIS — R59.0 HILAR LYMPHADENOPATHY: ICD-10-CM

## 2022-08-18 DIAGNOSIS — E11.36 TYPE 2 DIABETES MELLITUS WITH DIABETIC CATARACT, WITHOUT LONG-TERM CURRENT USE OF INSULIN: ICD-10-CM

## 2022-08-18 LAB
ESTIMATED AVG GLUCOSE: 137 MG/DL (ref 68–131)
HBA1C MFR BLD: 6.4 % (ref 4–5.6)

## 2022-08-18 PROCEDURE — 1159F PR MEDICATION LIST DOCUMENTED IN MEDICAL RECORD: ICD-10-PCS | Mod: CPTII,S$GLB,, | Performed by: INTERNAL MEDICINE

## 2022-08-18 PROCEDURE — 3288F FALL RISK ASSESSMENT DOCD: CPT | Mod: CPTII,S$GLB,, | Performed by: INTERNAL MEDICINE

## 2022-08-18 PROCEDURE — 1101F PT FALLS ASSESS-DOCD LE1/YR: CPT | Mod: CPTII,S$GLB,, | Performed by: INTERNAL MEDICINE

## 2022-08-18 PROCEDURE — 1159F MED LIST DOCD IN RCRD: CPT | Mod: CPTII,S$GLB,, | Performed by: INTERNAL MEDICINE

## 2022-08-18 PROCEDURE — 3008F BODY MASS INDEX DOCD: CPT | Mod: CPTII,S$GLB,, | Performed by: INTERNAL MEDICINE

## 2022-08-18 PROCEDURE — 1101F PR PT FALLS ASSESS DOC 0-1 FALLS W/OUT INJ PAST YR: ICD-10-PCS | Mod: CPTII,S$GLB,, | Performed by: INTERNAL MEDICINE

## 2022-08-18 PROCEDURE — 3072F PR LOW RISK FOR RETINOPATHY: ICD-10-PCS | Mod: CPTII,S$GLB,, | Performed by: INTERNAL MEDICINE

## 2022-08-18 PROCEDURE — 83036 HEMOGLOBIN GLYCOSYLATED A1C: CPT | Performed by: FAMILY MEDICINE

## 2022-08-18 PROCEDURE — 99999 PR PBB SHADOW E&M-EST. PATIENT-LVL IV: CPT | Mod: PBBFAC,,, | Performed by: INTERNAL MEDICINE

## 2022-08-18 PROCEDURE — 4010F PR ACE/ARB THEARPY RXD/TAKEN: ICD-10-PCS | Mod: CPTII,S$GLB,, | Performed by: INTERNAL MEDICINE

## 2022-08-18 PROCEDURE — 99205 OFFICE O/P NEW HI 60 MIN: CPT | Mod: S$GLB,,, | Performed by: INTERNAL MEDICINE

## 2022-08-18 PROCEDURE — 36415 COLL VENOUS BLD VENIPUNCTURE: CPT | Performed by: FAMILY MEDICINE

## 2022-08-18 PROCEDURE — 99999 PR PBB SHADOW E&M-EST. PATIENT-LVL IV: ICD-10-PCS | Mod: PBBFAC,,, | Performed by: INTERNAL MEDICINE

## 2022-08-18 PROCEDURE — 3008F PR BODY MASS INDEX (BMI) DOCUMENTED: ICD-10-PCS | Mod: CPTII,S$GLB,, | Performed by: INTERNAL MEDICINE

## 2022-08-18 PROCEDURE — 3288F PR FALLS RISK ASSESSMENT DOCUMENTED: ICD-10-PCS | Mod: CPTII,S$GLB,, | Performed by: INTERNAL MEDICINE

## 2022-08-18 PROCEDURE — 3072F LOW RISK FOR RETINOPATHY: CPT | Mod: CPTII,S$GLB,, | Performed by: INTERNAL MEDICINE

## 2022-08-18 PROCEDURE — 4010F ACE/ARB THERAPY RXD/TAKEN: CPT | Mod: CPTII,S$GLB,, | Performed by: INTERNAL MEDICINE

## 2022-08-18 PROCEDURE — 99205 PR OFFICE/OUTPT VISIT, NEW, LEVL V, 60-74 MIN: ICD-10-PCS | Mod: S$GLB,,, | Performed by: INTERNAL MEDICINE

## 2022-08-18 NOTE — H&P (VIEW-ONLY)
Initial Outpatient Pulmonary Evaluation       SUBJECTIVE:     Chief Complaint   Patient presents with    Abnormal Ct Scan       History of Present Illness:    Patient is a 66 y.o. female referred for evaluation for abnormal CT scan of the chest.      Does have history of breast cancer 2011, status post lumpectomy and axillary lymph node dissection, followed by postoperative chemo and radiation.      She does follow with Dr. Benji RONDON although Oncology.  She has had her procedure done at [facility].      Around Memorial Day 2022 she complained of respiratory symptoms attributed to COVID and had obtained a chest x-ray followed by CT scan of the chest that showed a right lung mass.      No fever no chills.  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          Review of Systems   Constitutional: Negative for fever and chills.   HENT: Negative for nosebleeds.    Eyes: Negative for redness.   Respiratory: Negative for choking.    Cardiovascular: Negative for chest pain.   Genitourinary: Negative for hematuria.   Endocrine: Negative for cold intolerance.    Musculoskeletal: Negative for arthralgias and gait problem.   Gastrointestinal: Negative for vomiting.   Neurological: Negative for syncope.   Hematological: Negative for adenopathy.        History of breast CA 2011   Psychiatric/Behavioral: Negative for confusion.       Review of patient's allergies indicates:   Allergen Reactions    Travatan z [travoprost]        Current Outpatient Medications   Medication Sig Dispense Refill    ACCU-CHEK CIRILO PLUS TEST STRP Strp CHECK BLOOD GLUCOSE ONE TIME DAILY 100 strip 3    blood-glucose meter kit To check BG 1 times daily, Accuchek Cirilo meter 1 each 0    brimonidine 0.15 % OPTH DROP (ALPHAGAN) 0.15 % ophthalmic solution Place 1 drop into both eyes 2 (two) times daily. Dispense 90 days supply 15 mL 3    cyclobenzaprine (FLEXERIL) 10 MG tablet Take 1 tablet as needed at night for  muscle spasm 30 tablet 1    dorzolamide-timolol 2-0.5% (COSOPT) 22.3-6.8 mg/mL ophthalmic solution Place 1 drop into both eyes 2 (two) times daily. Dispense 90 days supply 30 mL 4    latanoprost 0.005 % ophthalmic solution INSTILL 1 DROP INTO BOTH EYES EVERY EVENING 7.5 mL 3    metFORMIN (GLUCOPHAGE-XR) 500 MG ER 24hr tablet TAKE 3 TABLETS EVERY  tablet 0    potassium chloride SA (K-DUR,KLOR-CON) 20 MEQ tablet Take 1 tablet (20 mEq total) by mouth once daily. Take with food 90 tablet 3    rosuvastatin (CRESTOR) 5 MG tablet Take 1 tablet (5 mg total) by mouth once daily. 90 tablet 3    fluticasone propionate (FLONASE) 50 mcg/actuation nasal spray 2 sprays (100 mcg total) by Each Nostril route once daily. (Patient not taking: Reported on 8/18/2022) 9.9 mL 0    glimepiride (AMARYL) 2 MG tablet TAKE 1 TABLET DAILY 30 MINUTES BEFORE A MEAL AS NEEDED FOR BLOOD SUGAR GREATER THAN 150. 90 tablet 0    ibuprofen (ADVIL,MOTRIN) 800 MG tablet Take 800 mg by mouth every 6 (six) hours as needed for Pain.      lancets (ACCU-CHEK SOFTCLIX LANCETS) Misc USE  TO  CHECK BLOOD GLUCOSE ONE TIME DAILY 100 each 3    promethazine-dextromethorphan (PROMETHAZINE-DM) 6.25-15 mg/5 mL Syrp Take 5 mLs by mouth nightly as needed (Cough). (Patient not taking: Reported on 8/18/2022) 120 mL 0    propranoloL (INDERAL LA) 80 MG 24 hr capsule TAKE 1 CAPSULE EVERY DAY 90 capsule 0    traMADoL (ULTRAM) 50 mg tablet Take 50 mg by mouth every 6 (six) hours as needed.       No current facility-administered medications for this visit.     Facility-Administered Medications Ordered in Other Visits   Medication Dose Route Frequency Provider Last Rate Last Admin    triamcinolone acetonide injection 40 mg  40 mg Intra-articular  Mark Rashid MD   40 mg at 10/30/20 0840    triamcinolone acetonide injection 40 mg  40 mg Intra-articular  Mark Rashid MD   40 mg at 10/30/20 0840       Past Medical History:   Diagnosis Date     "Breast cancer 2011    L    Diabetes mellitus     Glaucoma     Hyperlipidemia     Hypertension     MVP (mitral valve prolapse)     Obesity      Past Surgical History:   Procedure Laterality Date    BREAST LUMPECTOMY      CATARACT EXTRACTION W/  INTRAOCULAR LENS IMPLANT Right 07/22/2020    w/ goniotomy    CATARACT EXTRACTION W/  INTRAOCULAR LENS IMPLANT Left 06/16/2021    w/ goniotomy    COLONOSCOPY W/ POLYPECTOMY  04/18/2017    DR. CHARLENE NATH/GI ASSO. POLYP X 3. REPEAT 5 YRS.    PCIOL Right     DR. MENDOZA    SLT - OU - BOTH EYES      TRIGGER FINGER RELEASE      Thumb    TUBAL LIGATION       Family History   Problem Relation Age of Onset    Glaucoma Brother     Macular degeneration Brother     Esophageal cancer Father     Heart disease Father     Hypertension Father     Liver cancer Mother     Cancer Mother     Hypertension Sister     Cataracts Sister     Diabetes Sister     Cholelithiasis Sister     Blindness Neg Hx     Retinal detachment Neg Hx     Strabismus Neg Hx     Stroke Neg Hx     Thyroid disease Neg Hx      Social History     Tobacco Use    Smoking status: Never Smoker    Smokeless tobacco: Never Used   Substance Use Topics    Alcohol use: Yes     Comment: very rare    Drug use: No          OBJECTIVE:     Vital Signs (Most Recent)  Vital Signs  Pulse: 75  Resp: 14  SpO2: 97 %  Height and Weight  Height: 5' 5" (165.1 cm)  Weight: 81.7 kg (180 lb 0.1 oz)  BSA (Calculated - sq m): 1.93 sq meters  BMI (Calculated): 30  Weight in (lb) to have BMI = 25: 149.9]  Wt Readings from Last 2 Encounters:   08/18/22 81.7 kg (180 lb 0.1 oz)   07/21/22 83 kg (182 lb 15.7 oz)         Physical Exam:  Physical Exam   Constitutional: She is oriented to person, place, and time. She appears well-developed. No distress.   HENT:   Head: Normocephalic.   Cardiovascular: Normal rate.   Pulmonary/Chest: Normal expansion. No stridor. No respiratory distress. She exhibits no tenderness. "   Abdominal: She exhibits no distension.   Musculoskeletal:         General: No tenderness.      Cervical back: Neck supple.   Lymphadenopathy:     She has no cervical adenopathy.   Neurological: She is alert and oriented to person, place, and time. Gait normal.   Skin: Skin is warm.   Psychiatric: She has a normal mood and affect. Her behavior is normal. Judgment and thought content normal.   Nursing note and vitals reviewed.      Laboratory  Lab Results   Component Value Date    WBC 8.15 02/08/2022    RBC 4.01 02/08/2022    HGB 10.6 (L) 02/08/2022    HCT 36.4 (L) 02/08/2022    MCV 91 02/08/2022    MCH 26.4 (L) 02/08/2022    MCHC 29.1 (L) 02/08/2022    RDW 16.4 (H) 02/08/2022     02/08/2022    MPV 9.2 02/08/2022    GRAN 5.6 02/08/2022    GRAN 69.1 02/08/2022    LYMPH 1.7 02/08/2022    LYMPH 21.2 02/08/2022    MONO 0.6 02/08/2022    MONO 6.9 02/08/2022    EOS 0.2 02/08/2022    BASO 0.05 02/08/2022    EOSINOPHIL 1.8 02/08/2022    BASOPHIL 0.6 02/08/2022       BMP  Lab Results   Component Value Date     07/07/2021    K 3.7 07/07/2021     07/07/2021    CO2 27 07/07/2021    BUN 16 07/07/2021    CREATININE 0.8 08/09/2022    CALCIUM 9.9 07/07/2021    ANIONGAP 10 07/07/2021    ESTGFRAFRICA >60.0 07/07/2021    EGFRNONAA >60.0 07/07/2021    AST 16 07/07/2021    ALT 11 07/07/2021    PROT 7.7 07/07/2021       No results found for: BNP    No results found for: TSH    Lab Results   Component Value Date    SEDRATE >120 (H) 02/08/2022       Lab Results   Component Value Date    CRP 71.8 (H) 02/08/2022       No results found for: IGE    No results found for: ASPERGILLUS  No results found for: AFUMIGATUSCL     No results found for: ACE    Diagnostic Results:  I have personally reviewed today the following studies :    CT chest with contrast August 9, 2022   EXAMINATION:  CT CHEST WITH CONTRAST     CLINICAL HISTORY:  Abnormal xray - lung nodule, >= 1 cm;Suprahilar opacity; Solitary pulmonary  nodule     TECHNIQUE:  Low dose axial images, sagittal and coronal reformations were obtained from the thoracic inlet to the lung bases following the IV administration of 75 mL of Omnipaque 350.     COMPARISON:  None.     FINDINGS:  Base of Neck: No significant abnormality.     Thoracic soft tissues: Unremarkable.     Aorta: Left-sided aortic arch.  No aneurysm and no significant atherosclerosis     Heart: Normal size. No effusion.     Pulmonary vasculature: Pulmonary arteries distribute normally.  There are four pulmonary veins.     Kaitlin/Mediastinum: Right hilar lymphadenopathy measuring 2.3 x 1.9 cm.  Subcarinal lymphadenopathy measuring 2.7 x 1.3 cm.  Borderline enlarged right paratracheal nodes measuring up to 11 mm in short axis.     Airways: Patent.     Lungs/Pleura: Mass opacity with spiculated margins identified in the suprahilar anterior right upper lobe measuring 5.4 x 2.7 cm.  Associated partial encasement of the central bronchovascular structures.  Small 4 mm solid nodule in the right lower lobe on series 4, image 270.  Incidental calcified granuloma in the lingula.     Esophagus: Unremarkable.     Upper Abdomen: Several tiny subcentimeter hypodense foci in the liver, too small to accurately characterize though favored to represent cysts.  Small cyst in the upper pole of the spleen.  Probable tiny cortical cyst upper pole left kidney.     Bones: No acute fracture. No suspicious lytic or sclerotic lesions.     Impression:     1. Right upper lobe lung mass highly suspicious for bronchogenic malignancy.  2. Regional right hilar and mediastinal lymphadenopathy.  3. Remainder as above.            ASSESSMENT/PLAN:     Mass of right lung  -     Case Request Endoscopy: ENDOBRONCHIAL ULTRASOUND (EBUS)  -     Vital signs; Standing  -     Verify informed consent; Standing  -     Assess per unit routine; Standing  -     Insert peripheral IV if not present; Standing  -     Remove glasses and/or dentures; Standing  -      Undress from the waist up; Standing  -     Transport patient on stretcher with oxygen available; Standing  -     Notify Physician; Standing  -     Pulse Oximetry Q4H; Standing  -     Full code; Standing  -     Place in Outpatient; Standing    Mediastinal lymphadenopathy  -     Case Request Endoscopy: ENDOBRONCHIAL ULTRASOUND (EBUS)    Hilar lymphadenopathy  -     Case Request Endoscopy: ENDOBRONCHIAL ULTRASOUND (EBUS)    History of breast cancer  -     Case Request Endoscopy: ENDOBRONCHIAL ULTRASOUND (EBUS)        Discussed with patient and  nature of bronchoscopy and endobronchial ultrasound bronchoscopy.      Explained the risks associated with the procedure.      Advised patient to remain NPO after midnight prior to surgery.      Further recommendations to follow bronchoscopy and path and cytology results.        Follow up in about 1 month (around 9/18/2022).    This note was prepared using voice recognition system and is likely to have sound alike errors that may have been overlooked even after proof reading.  Please call me with any questions    Discussed diagnosis, its evaluation, treatment and usual course. All questions answered.    Thank you for the courtesy of participating in the care of this patient    Musa Phillips MD

## 2022-08-18 NOTE — PROGRESS NOTES
Initial Outpatient Pulmonary Evaluation       SUBJECTIVE:     Chief Complaint   Patient presents with    Abnormal Ct Scan       History of Present Illness:    Patient is a 66 y.o. female referred for evaluation for abnormal CT scan of the chest.      Does have history of breast cancer 2011, status post lumpectomy and axillary lymph node dissection, followed by postoperative chemo and radiation.      She does follow with Dr. Benji RONDON although Oncology.  She has had her procedure done at [facility].      Around Memorial Day 2022 she complained of respiratory symptoms attributed to COVID and had obtained a chest x-ray followed by CT scan of the chest that showed a right lung mass.      No fever no chills.  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          Review of Systems   Constitutional: Negative for fever and chills.   HENT: Negative for nosebleeds.    Eyes: Negative for redness.   Respiratory: Negative for choking.    Cardiovascular: Negative for chest pain.   Genitourinary: Negative for hematuria.   Endocrine: Negative for cold intolerance.    Musculoskeletal: Negative for arthralgias and gait problem.   Gastrointestinal: Negative for vomiting.   Neurological: Negative for syncope.   Hematological: Negative for adenopathy.        History of breast CA 2011   Psychiatric/Behavioral: Negative for confusion.       Review of patient's allergies indicates:   Allergen Reactions    Travatan z [travoprost]        Current Outpatient Medications   Medication Sig Dispense Refill    ACCU-CHEK CIRILO PLUS TEST STRP Strp CHECK BLOOD GLUCOSE ONE TIME DAILY 100 strip 3    blood-glucose meter kit To check BG 1 times daily, Accuchek Cirilo meter 1 each 0    brimonidine 0.15 % OPTH DROP (ALPHAGAN) 0.15 % ophthalmic solution Place 1 drop into both eyes 2 (two) times daily. Dispense 90 days supply 15 mL 3    cyclobenzaprine (FLEXERIL) 10 MG tablet Take 1 tablet as needed at night for  muscle spasm 30 tablet 1    dorzolamide-timolol 2-0.5% (COSOPT) 22.3-6.8 mg/mL ophthalmic solution Place 1 drop into both eyes 2 (two) times daily. Dispense 90 days supply 30 mL 4    latanoprost 0.005 % ophthalmic solution INSTILL 1 DROP INTO BOTH EYES EVERY EVENING 7.5 mL 3    metFORMIN (GLUCOPHAGE-XR) 500 MG ER 24hr tablet TAKE 3 TABLETS EVERY  tablet 0    potassium chloride SA (K-DUR,KLOR-CON) 20 MEQ tablet Take 1 tablet (20 mEq total) by mouth once daily. Take with food 90 tablet 3    rosuvastatin (CRESTOR) 5 MG tablet Take 1 tablet (5 mg total) by mouth once daily. 90 tablet 3    fluticasone propionate (FLONASE) 50 mcg/actuation nasal spray 2 sprays (100 mcg total) by Each Nostril route once daily. (Patient not taking: Reported on 8/18/2022) 9.9 mL 0    glimepiride (AMARYL) 2 MG tablet TAKE 1 TABLET DAILY 30 MINUTES BEFORE A MEAL AS NEEDED FOR BLOOD SUGAR GREATER THAN 150. 90 tablet 0    ibuprofen (ADVIL,MOTRIN) 800 MG tablet Take 800 mg by mouth every 6 (six) hours as needed for Pain.      lancets (ACCU-CHEK SOFTCLIX LANCETS) Misc USE  TO  CHECK BLOOD GLUCOSE ONE TIME DAILY 100 each 3    promethazine-dextromethorphan (PROMETHAZINE-DM) 6.25-15 mg/5 mL Syrp Take 5 mLs by mouth nightly as needed (Cough). (Patient not taking: Reported on 8/18/2022) 120 mL 0    propranoloL (INDERAL LA) 80 MG 24 hr capsule TAKE 1 CAPSULE EVERY DAY 90 capsule 0    traMADoL (ULTRAM) 50 mg tablet Take 50 mg by mouth every 6 (six) hours as needed.       No current facility-administered medications for this visit.     Facility-Administered Medications Ordered in Other Visits   Medication Dose Route Frequency Provider Last Rate Last Admin    triamcinolone acetonide injection 40 mg  40 mg Intra-articular  Mark Rashid MD   40 mg at 10/30/20 0840    triamcinolone acetonide injection 40 mg  40 mg Intra-articular  Mark Rashid MD   40 mg at 10/30/20 0840       Past Medical History:   Diagnosis Date     "Breast cancer 2011    L    Diabetes mellitus     Glaucoma     Hyperlipidemia     Hypertension     MVP (mitral valve prolapse)     Obesity      Past Surgical History:   Procedure Laterality Date    BREAST LUMPECTOMY      CATARACT EXTRACTION W/  INTRAOCULAR LENS IMPLANT Right 07/22/2020    w/ goniotomy    CATARACT EXTRACTION W/  INTRAOCULAR LENS IMPLANT Left 06/16/2021    w/ goniotomy    COLONOSCOPY W/ POLYPECTOMY  04/18/2017    DR. CHARLENE NATH/GI ASSO. POLYP X 3. REPEAT 5 YRS.    PCIOL Right     DR. MENDOZA    SLT - OU - BOTH EYES      TRIGGER FINGER RELEASE      Thumb    TUBAL LIGATION       Family History   Problem Relation Age of Onset    Glaucoma Brother     Macular degeneration Brother     Esophageal cancer Father     Heart disease Father     Hypertension Father     Liver cancer Mother     Cancer Mother     Hypertension Sister     Cataracts Sister     Diabetes Sister     Cholelithiasis Sister     Blindness Neg Hx     Retinal detachment Neg Hx     Strabismus Neg Hx     Stroke Neg Hx     Thyroid disease Neg Hx      Social History     Tobacco Use    Smoking status: Never Smoker    Smokeless tobacco: Never Used   Substance Use Topics    Alcohol use: Yes     Comment: very rare    Drug use: No          OBJECTIVE:     Vital Signs (Most Recent)  Vital Signs  Pulse: 75  Resp: 14  SpO2: 97 %  Height and Weight  Height: 5' 5" (165.1 cm)  Weight: 81.7 kg (180 lb 0.1 oz)  BSA (Calculated - sq m): 1.93 sq meters  BMI (Calculated): 30  Weight in (lb) to have BMI = 25: 149.9]  Wt Readings from Last 2 Encounters:   08/18/22 81.7 kg (180 lb 0.1 oz)   07/21/22 83 kg (182 lb 15.7 oz)         Physical Exam:  Physical Exam   Constitutional: She is oriented to person, place, and time. She appears well-developed. No distress.   HENT:   Head: Normocephalic.   Cardiovascular: Normal rate.   Pulmonary/Chest: Normal expansion. No stridor. No respiratory distress. She exhibits no tenderness. "   Abdominal: She exhibits no distension.   Musculoskeletal:         General: No tenderness.      Cervical back: Neck supple.   Lymphadenopathy:     She has no cervical adenopathy.   Neurological: She is alert and oriented to person, place, and time. Gait normal.   Skin: Skin is warm.   Psychiatric: She has a normal mood and affect. Her behavior is normal. Judgment and thought content normal.   Nursing note and vitals reviewed.      Laboratory  Lab Results   Component Value Date    WBC 8.15 02/08/2022    RBC 4.01 02/08/2022    HGB 10.6 (L) 02/08/2022    HCT 36.4 (L) 02/08/2022    MCV 91 02/08/2022    MCH 26.4 (L) 02/08/2022    MCHC 29.1 (L) 02/08/2022    RDW 16.4 (H) 02/08/2022     02/08/2022    MPV 9.2 02/08/2022    GRAN 5.6 02/08/2022    GRAN 69.1 02/08/2022    LYMPH 1.7 02/08/2022    LYMPH 21.2 02/08/2022    MONO 0.6 02/08/2022    MONO 6.9 02/08/2022    EOS 0.2 02/08/2022    BASO 0.05 02/08/2022    EOSINOPHIL 1.8 02/08/2022    BASOPHIL 0.6 02/08/2022       BMP  Lab Results   Component Value Date     07/07/2021    K 3.7 07/07/2021     07/07/2021    CO2 27 07/07/2021    BUN 16 07/07/2021    CREATININE 0.8 08/09/2022    CALCIUM 9.9 07/07/2021    ANIONGAP 10 07/07/2021    ESTGFRAFRICA >60.0 07/07/2021    EGFRNONAA >60.0 07/07/2021    AST 16 07/07/2021    ALT 11 07/07/2021    PROT 7.7 07/07/2021       No results found for: BNP    No results found for: TSH    Lab Results   Component Value Date    SEDRATE >120 (H) 02/08/2022       Lab Results   Component Value Date    CRP 71.8 (H) 02/08/2022       No results found for: IGE    No results found for: ASPERGILLUS  No results found for: AFUMIGATUSCL     No results found for: ACE    Diagnostic Results:  I have personally reviewed today the following studies :    CT chest with contrast August 9, 2022   EXAMINATION:  CT CHEST WITH CONTRAST     CLINICAL HISTORY:  Abnormal xray - lung nodule, >= 1 cm;Suprahilar opacity; Solitary pulmonary  nodule     TECHNIQUE:  Low dose axial images, sagittal and coronal reformations were obtained from the thoracic inlet to the lung bases following the IV administration of 75 mL of Omnipaque 350.     COMPARISON:  None.     FINDINGS:  Base of Neck: No significant abnormality.     Thoracic soft tissues: Unremarkable.     Aorta: Left-sided aortic arch.  No aneurysm and no significant atherosclerosis     Heart: Normal size. No effusion.     Pulmonary vasculature: Pulmonary arteries distribute normally.  There are four pulmonary veins.     Kaitlin/Mediastinum: Right hilar lymphadenopathy measuring 2.3 x 1.9 cm.  Subcarinal lymphadenopathy measuring 2.7 x 1.3 cm.  Borderline enlarged right paratracheal nodes measuring up to 11 mm in short axis.     Airways: Patent.     Lungs/Pleura: Mass opacity with spiculated margins identified in the suprahilar anterior right upper lobe measuring 5.4 x 2.7 cm.  Associated partial encasement of the central bronchovascular structures.  Small 4 mm solid nodule in the right lower lobe on series 4, image 270.  Incidental calcified granuloma in the lingula.     Esophagus: Unremarkable.     Upper Abdomen: Several tiny subcentimeter hypodense foci in the liver, too small to accurately characterize though favored to represent cysts.  Small cyst in the upper pole of the spleen.  Probable tiny cortical cyst upper pole left kidney.     Bones: No acute fracture. No suspicious lytic or sclerotic lesions.     Impression:     1. Right upper lobe lung mass highly suspicious for bronchogenic malignancy.  2. Regional right hilar and mediastinal lymphadenopathy.  3. Remainder as above.            ASSESSMENT/PLAN:     Mass of right lung  -     Case Request Endoscopy: ENDOBRONCHIAL ULTRASOUND (EBUS)  -     Vital signs; Standing  -     Verify informed consent; Standing  -     Assess per unit routine; Standing  -     Insert peripheral IV if not present; Standing  -     Remove glasses and/or dentures; Standing  -      Undress from the waist up; Standing  -     Transport patient on stretcher with oxygen available; Standing  -     Notify Physician; Standing  -     Pulse Oximetry Q4H; Standing  -     Full code; Standing  -     Place in Outpatient; Standing    Mediastinal lymphadenopathy  -     Case Request Endoscopy: ENDOBRONCHIAL ULTRASOUND (EBUS)    Hilar lymphadenopathy  -     Case Request Endoscopy: ENDOBRONCHIAL ULTRASOUND (EBUS)    History of breast cancer  -     Case Request Endoscopy: ENDOBRONCHIAL ULTRASOUND (EBUS)        Discussed with patient and  nature of bronchoscopy and endobronchial ultrasound bronchoscopy.      Explained the risks associated with the procedure.      Advised patient to remain NPO after midnight prior to surgery.      Further recommendations to follow bronchoscopy and path and cytology results.        Follow up in about 1 month (around 9/18/2022).    This note was prepared using voice recognition system and is likely to have sound alike errors that may have been overlooked even after proof reading.  Please call me with any questions    Discussed diagnosis, its evaluation, treatment and usual course. All questions answered.    Thank you for the courtesy of participating in the care of this patient    Musa Phillips MD

## 2022-08-18 NOTE — PATIENT INSTRUCTIONS
8/25 at 11 am arrive [address] [place] [address] hospital   DO NOT EAT AFTER MIDNIOGHT

## 2022-08-19 ENCOUNTER — PES CALL (OUTPATIENT)
Dept: ADMINISTRATIVE | Facility: CLINIC | Age: 67
End: 2022-08-19
Payer: MEDICARE

## 2022-08-24 ENCOUNTER — PATIENT MESSAGE (OUTPATIENT)
Dept: INTERNAL MEDICINE | Facility: CLINIC | Age: 67
End: 2022-08-24
Payer: MEDICARE

## 2022-08-24 RX ORDER — BENAZEPRIL HYDROCHLORIDE AND HYDROCHLOROTHIAZIDE 20; 12.5 MG/1; MG/1
1 TABLET ORAL DAILY
Qty: 90 TABLET | Refills: 0 | Status: SHIPPED | OUTPATIENT
Start: 2022-08-24 | End: 2022-11-18

## 2022-08-25 ENCOUNTER — ANESTHESIA EVENT (OUTPATIENT)
Dept: ENDOSCOPY | Facility: HOSPITAL | Age: 67
End: 2022-08-25
Payer: MEDICARE

## 2022-08-25 ENCOUNTER — ANESTHESIA (OUTPATIENT)
Dept: ENDOSCOPY | Facility: HOSPITAL | Age: 67
End: 2022-08-25
Payer: MEDICARE

## 2022-08-25 ENCOUNTER — HOSPITAL ENCOUNTER (OUTPATIENT)
Facility: HOSPITAL | Age: 67
Discharge: HOME | End: 2022-08-25
Attending: INTERNAL MEDICINE | Admitting: INTERNAL MEDICINE
Payer: MEDICARE

## 2022-08-25 DIAGNOSIS — R91.8 MASS OF RIGHT LUNG: ICD-10-CM

## 2022-08-25 DIAGNOSIS — R59.0 MEDIASTINAL LYMPHADENOPATHY: ICD-10-CM

## 2022-08-25 LAB
GLUCOSE SERPL-MCNC: 94 MG/DL (ref 70–110)
POCT GLUCOSE: 94 MG/DL (ref 70–110)

## 2022-08-25 PROCEDURE — 37000009 HC ANESTHESIA EA ADD 15 MINS: Performed by: INTERNAL MEDICINE

## 2022-08-25 PROCEDURE — 63600175 PHARM REV CODE 636 W HCPCS: Performed by: NURSE ANESTHETIST, CERTIFIED REGISTERED

## 2022-08-25 PROCEDURE — 37000008 HC ANESTHESIA 1ST 15 MINUTES: Performed by: INTERNAL MEDICINE

## 2022-08-25 PROCEDURE — 25000003 PHARM REV CODE 250: Performed by: NURSE ANESTHETIST, CERTIFIED REGISTERED

## 2022-08-25 PROCEDURE — 31652 BRONCH EBUS SAMPLNG 1/2 NODE: CPT | Mod: ,,, | Performed by: INTERNAL MEDICINE

## 2022-08-25 PROCEDURE — 31652 PR BRONCH W/ EBUS, SAMPLING 1 OR 2 NODES, INCL GUIDE: ICD-10-PCS | Mod: ,,, | Performed by: INTERNAL MEDICINE

## 2022-08-25 PROCEDURE — 31652 BRONCH EBUS SAMPLNG 1/2 NODE: CPT | Performed by: INTERNAL MEDICINE

## 2022-08-25 PROCEDURE — 27202059 HC NEEDLE, FNA (ANY): Performed by: INTERNAL MEDICINE

## 2022-08-25 RX ORDER — PROPOFOL 10 MG/ML
VIAL (ML) INTRAVENOUS
Status: DISCONTINUED | OUTPATIENT
Start: 2022-08-25 | End: 2022-08-25

## 2022-08-25 RX ORDER — ONDANSETRON 2 MG/ML
INJECTION INTRAMUSCULAR; INTRAVENOUS
Status: DISCONTINUED | OUTPATIENT
Start: 2022-08-25 | End: 2022-08-25

## 2022-08-25 RX ORDER — ROCURONIUM BROMIDE 10 MG/ML
INJECTION, SOLUTION INTRAVENOUS
Status: DISCONTINUED | OUTPATIENT
Start: 2022-08-25 | End: 2022-08-25

## 2022-08-25 RX ORDER — AMLODIPINE BESYLATE 5 MG/1
5 TABLET ORAL DAILY
COMMUNITY
End: 2022-11-11

## 2022-08-25 RX ADMIN — PROPOFOL 150 MG: 10 INJECTION, EMULSION INTRAVENOUS at 12:08

## 2022-08-25 RX ADMIN — ONDANSETRON 4 MG: 2 INJECTION, SOLUTION INTRAMUSCULAR; INTRAVENOUS at 12:08

## 2022-08-25 RX ADMIN — ROCURONIUM BROMIDE 50 MG: 10 INJECTION, SOLUTION INTRAVENOUS at 12:08

## 2022-08-25 RX ADMIN — SODIUM CHLORIDE, POTASSIUM CHLORIDE, SODIUM LACTATE AND CALCIUM CHLORIDE: 600; 310; 30; 20 INJECTION, SOLUTION INTRAVENOUS at 12:08

## 2022-08-25 RX ADMIN — SUGAMMADEX 200 MG: 100 INJECTION, SOLUTION INTRAVENOUS at 01:08

## 2022-08-25 NOTE — INTERVAL H&P NOTE
The patient has been examined and the H&P has been reviewed:    I concur with the findings and no changes have occurred since H&P was written.    Surgery risks, benefits and alternative options discussed and understood by patient/family.        Lung mass

## 2022-08-25 NOTE — ANESTHESIA POSTPROCEDURE EVALUATION
Anesthesia Post Evaluation    Patient: Raquelchris Pickard    Procedure(s) Performed: Procedure(s) (LRB):  ENDOBRONCHIAL ULTRASOUND (EBUS) (Bilateral)    Final Anesthesia Type: general      Patient location during evaluation: GI PACU  Patient participation: Yes- Able to Participate  Level of consciousness: awake and alert and awake  Post-procedure vital signs: reviewed and stable  Pain management: adequate  Airway patency: patent  RAFFY mitigation strategies: Multimodal analgesia  PONV status at discharge: No PONV  Anesthetic complications: no      Cardiovascular status: blood pressure returned to baseline and hemodynamically stable  Respiratory status: unassisted and spontaneous ventilation  Hydration status: euvolemic  Follow-up not needed.          Vitals Value Taken Time   BP  08/25/22 1322   Temp  08/25/22 1322   Pulse  08/25/22 1322   Resp  08/25/22 1322   SpO2  08/25/22 1322         No case tracking events are documented in the log.      Pain/Manuel Score: No data recorded

## 2022-08-25 NOTE — PLAN OF CARE
Family at b/s   Dr elizondo came to bedside to discuss findings. Vital signs stable, no patient c/o nausea/vomitting, no abdominal pain, no gi bleeding. Patient to be discharged from unit.

## 2022-08-25 NOTE — PLAN OF CARE
Family at b/s while patient dresses. Pt sitting on side of bed w/o difficulty. Pt instructed not to bend over toward floor or stand up quickly, and to call immediately for assistance.

## 2022-08-25 NOTE — TRANSFER OF CARE
"Anesthesia Transfer of Care Note    Patient: Raquel Pickard    Procedure(s) Performed: Procedure(s) (LRB):  ENDOBRONCHIAL ULTRASOUND (EBUS) (Bilateral)    Patient location: GI    Anesthesia Type: general    Transport from OR: Transported from OR on room air with adequate spontaneous ventilation    Post pain: adequate analgesia    Post assessment: no apparent anesthetic complications and tolerated procedure well    Post vital signs: stable    Level of consciousness: awake and alert    Nausea/Vomiting: no nausea/vomiting    Complications: none    Transfer of care protocol was followed      Last vitals:   Visit Vitals  BP (!) 149/86 (BP Location: Left arm, Patient Position: Lying)   Pulse 76   Temp 36.8 °C (98.2 °F) (Temporal)   Resp 16   Ht 5' 5" (1.651 m)   Wt 82.6 kg (182 lb)   SpO2 98%   Breastfeeding No   BMI 30.29 kg/m²     "

## 2022-08-25 NOTE — DISCHARGE SUMMARY
[facility] ([facility])  Pulmonology  Discharge Summary      Patient Name: Raquel Pickard  MRN: [id number]  Admission Date: 8/25/2022  Hospital Length of Stay: 0 days  Discharge Date and Time:  08/25/2022 1:47 PM  Attending Physician: Musa Phillips MD   Discharging Provider: Musa Phillips MD  Primary Care Provider: Debra Jensen MD    HPI:  History of breast CA lung mass and mediastinal and paratracheal lymphadenopathy        Procedure(s) (LRB):  ENDOBRONCHIAL ULTRASOUND (EBUS) (Bilateral)    Indwelling Lines/Drains at Time of Discharge:   Lines/Drains/Airways     None                     Significant Imaging:  Spiculated right lung mass with right paratracheal and subcarinal mediastinal lymphadenopathy status post EBUS TBNA biopsy    Pending Diagnostic Studies:     Procedure Component Value Units Date/Time    Cytology-FNA Non-Radiology Clinician Performed w/o on site [[id number]] Collected: 08/25/22 1328    Order Status: Sent Lab Status: In process Updated: 08/25/22 1329        Final Active Diagnoses:    Diagnosis Date Noted POA    Mediastinal lymphadenopathy [R59.0]  Unknown      Problems Resolved During this Admission:       Discharged Condition: good    Disposition: Home or Self Care    Follow Up:   Follow-up Information     Luis Antonio Wallace MD Follow up on 9/2/2022.    Specialty: Hematology and Oncology  Contact information:  [address] [facility]  [address]  [place] [address]  [phone]             Musa Phillips MD Follow up on 9/29/2022.    Specialty: Pulmonary Disease  Contact information:  [id number] [place] [address] [address] [place] [id number] [phone]                       Medications:  Reconciled Home Medications:      Medication List      CONTINUE taking these medications    ACCU-CHEK CIRILO PLUS TEST STRP Strp  Generic drug: blood sugar diagnostic  CHECK BLOOD GLUCOSE ONE TIME DAILY     amLODIPine 5 MG tablet  Commonly known as: NORVASC  Take 5 mg by mouth once daily.      benazepril-hydrochlorthiazide 20-12.5 mg per tablet  Commonly known as: LOTENSIN HCT  Take 1 tablet by mouth once daily.     blood-glucose meter kit  To check BG 1 times daily, Accuchek Monica meter     brimonidine 0.15 % OPTH DROP 0.15 % ophthalmic solution  Commonly known as: ALPHAGAN  Place 1 drop into both eyes 2 (two) times daily. Dispense 90 days supply     cyclobenzaprine 10 MG tablet  Commonly known as: FLEXERIL  Take 1 tablet as needed at night for muscle spasm     dorzolamide-timolol 2-0.5% 22.3-6.8 mg/mL ophthalmic solution  Commonly known as: COSOPT  Place 1 drop into both eyes 2 (two) times daily. Dispense 90 days supply     fluticasone propionate 50 mcg/actuation nasal spray  Commonly known as: FLONASE  2 sprays (100 mcg total) by Each Nostril route once daily.     glimepiride 2 MG tablet  Commonly known as: AMARYL  TAKE 1 TABLET DAILY 30 MINUTES BEFORE A MEAL AS NEEDED FOR BLOOD SUGAR GREATER THAN 150.     ibuprofen 800 MG tablet  Commonly known as: ADVIL,MOTRIN  Take 800 mg by mouth every 6 (six) hours as needed for Pain.     lancets Misc  Commonly known as: ACCU-CHEK SOFTCLIX LANCETS  USE  TO  CHECK BLOOD GLUCOSE ONE TIME DAILY     latanoprost 0.005 % ophthalmic solution  INSTILL 1 DROP INTO BOTH EYES EVERY EVENING     metFORMIN 500 MG ER 24hr tablet  Commonly known as: GLUCOPHAGE-XR  TAKE 3 TABLETS EVERY DAY     potassium chloride SA 20 MEQ tablet  Commonly known as: K-DUR,KLOR-CON  Take 1 tablet (20 mEq total) by mouth once daily. Take with food     promethazine-dextromethorphan 6.25-15 mg/5 mL Syrp  Commonly known as: PROMETHAZINE-DM  Take 5 mLs by mouth nightly as needed (Cough).     propranoloL 80 MG 24 hr capsule  Commonly known as: INDERAL LA  TAKE 1 CAPSULE EVERY DAY     rosuvastatin 5 MG tablet  Commonly known as: CRESTOR  Take 1 tablet (5 mg total) by mouth once daily.     traMADoL 50 mg tablet  Commonly known as: ULTRAM  Take 50 mg by mouth every 6 (six) hours as needed.            Musa  CHICO Phillips MD  Pulmonology  [facility] ([facility])

## 2022-08-25 NOTE — DISCHARGE INSTRUCTIONS
You might experience low-grade fever you can take Tylenol every 6-8 hours 650 mg       You might experience blood-tinged sputum this should resolve within the next 24-48 hours

## 2022-08-25 NOTE — OP NOTE
Bronchoscopy with EBUS TBNA biopsy of station 7 subcarinal lymph node and station 4R right paratracheal lymph node with needle/21.      Please refer to procedure note.        Musa Phillips M.D

## 2022-08-25 NOTE — PATIENT INSTRUCTIONS
You might  Low-grade fever today take 650 milligram Tylenol every 6-8 hours    You might experience blood tinged sputum which should resolve within the next 24-48 hours

## 2022-08-25 NOTE — ANESTHESIA PREPROCEDURE EVALUATION
08/25/2022  Raquel Pickard is a 66 y.o., female.      Patient is a 66 y.o. female referred for evaluation for abnormal CT scan of the chest.       Does have history of breast cancer 2011, status post lumpectomy and axillary lymph node dissection, followed by postoperative chemo and radiation.       She does follow with Dr. Benji RONDON although Oncology.  She has had her procedure done at [facility].       Around Memorial Day 2022 she complained of respiratory symptoms attributed to COVID and had obtained a chest x-ray followed by CT scan of the chest that showed a right lung mass.       No fever no chills.  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     Pre-op Assessment    I have reviewed the Patient Summary Reports.     I have reviewed the Nursing Notes. I have reviewed the NPO Status.      Review of Systems  Cardiovascular:   Hypertension    Pulmonary:  Chest Tumor/Mass:    Endocrine:   Diabetes, type 2        Physical Exam  General: Well nourished    Airway:  Mallampati: II   Mouth Opening: Normal  Neck ROM: Normal ROM    Dental:  Intact        Anesthesia Plan  Type of Anesthesia, risks & benefits discussed:    Anesthesia Type: Gen ETT  Intra-op Monitoring Plan: Standard ASA Monitors  Post Op Pain Control Plan: multimodal analgesia  Induction:  IV  Airway Plan: Direct  Informed Consent: Informed consent signed with the Patient and all parties understand the risks and agree with anesthesia plan.  All questions answered.   ASA Score: 2  Day of Surgery Review of History & Physical: I have interviewed and examined the patient. I have reviewed the patient's H&P dated:     Ready For Surgery From Anesthesia Perspective.     .

## 2022-08-29 VITALS
OXYGEN SATURATION: 96 % | DIASTOLIC BLOOD PRESSURE: 72 MMHG | BODY MASS INDEX: 30.32 KG/M2 | HEART RATE: 77 BPM | WEIGHT: 182 LBS | SYSTOLIC BLOOD PRESSURE: 117 MMHG | RESPIRATION RATE: 20 BRPM | TEMPERATURE: 98 F | HEIGHT: 65 IN

## 2022-08-31 ENCOUNTER — OFFICE VISIT (OUTPATIENT)
Dept: RHEUMATOLOGY | Facility: CLINIC | Age: 67
End: 2022-08-31
Payer: MEDICARE

## 2022-08-31 ENCOUNTER — LAB VISIT (OUTPATIENT)
Dept: LAB | Facility: HOSPITAL | Age: 67
End: 2022-08-31
Attending: INTERNAL MEDICINE
Payer: MEDICARE

## 2022-08-31 VITALS
HEART RATE: 75 BPM | SYSTOLIC BLOOD PRESSURE: 112 MMHG | BODY MASS INDEX: 30.16 KG/M2 | WEIGHT: 181 LBS | DIASTOLIC BLOOD PRESSURE: 71 MMHG | HEIGHT: 65 IN

## 2022-08-31 DIAGNOSIS — M06.4 UNDIFFERENTIATED INFLAMMATORY ARTHRITIS: ICD-10-CM

## 2022-08-31 DIAGNOSIS — M06.4 UNDIFFERENTIATED INFLAMMATORY ARTHRITIS: Primary | ICD-10-CM

## 2022-08-31 LAB — CRP SERPL-MCNC: 4.4 MG/L (ref 0–8.2)

## 2022-08-31 PROCEDURE — 1160F PR REVIEW ALL MEDS BY PRESCRIBER/CLIN PHARMACIST DOCUMENTED: ICD-10-PCS | Mod: CPTII,S$GLB,, | Performed by: INTERNAL MEDICINE

## 2022-08-31 PROCEDURE — 3078F PR MOST RECENT DIASTOLIC BLOOD PRESSURE < 80 MM HG: ICD-10-PCS | Mod: CPTII,S$GLB,, | Performed by: INTERNAL MEDICINE

## 2022-08-31 PROCEDURE — 36415 COLL VENOUS BLD VENIPUNCTURE: CPT | Performed by: INTERNAL MEDICINE

## 2022-08-31 PROCEDURE — 86334 IMMUNOFIX E-PHORESIS SERUM: CPT | Performed by: INTERNAL MEDICINE

## 2022-08-31 PROCEDURE — 3072F PR LOW RISK FOR RETINOPATHY: ICD-10-PCS | Mod: CPTII,S$GLB,, | Performed by: INTERNAL MEDICINE

## 2022-08-31 PROCEDURE — 4010F PR ACE/ARB THEARPY RXD/TAKEN: ICD-10-PCS | Mod: CPTII,S$GLB,, | Performed by: INTERNAL MEDICINE

## 2022-08-31 PROCEDURE — 3044F PR MOST RECENT HEMOGLOBIN A1C LEVEL <7.0%: ICD-10-PCS | Mod: CPTII,S$GLB,, | Performed by: INTERNAL MEDICINE

## 2022-08-31 PROCEDURE — 85652 RBC SED RATE AUTOMATED: CPT | Performed by: INTERNAL MEDICINE

## 2022-08-31 PROCEDURE — 3288F FALL RISK ASSESSMENT DOCD: CPT | Mod: CPTII,S$GLB,, | Performed by: INTERNAL MEDICINE

## 2022-08-31 PROCEDURE — 1160F RVW MEDS BY RX/DR IN RCRD: CPT | Mod: CPTII,S$GLB,, | Performed by: INTERNAL MEDICINE

## 2022-08-31 PROCEDURE — 1159F MED LIST DOCD IN RCRD: CPT | Mod: CPTII,S$GLB,, | Performed by: INTERNAL MEDICINE

## 2022-08-31 PROCEDURE — 86140 C-REACTIVE PROTEIN: CPT | Performed by: INTERNAL MEDICINE

## 2022-08-31 PROCEDURE — 99999 PR PBB SHADOW E&M-EST. PATIENT-LVL III: ICD-10-PCS | Mod: PBBFAC,,, | Performed by: INTERNAL MEDICINE

## 2022-08-31 PROCEDURE — 3074F PR MOST RECENT SYSTOLIC BLOOD PRESSURE < 130 MM HG: ICD-10-PCS | Mod: CPTII,S$GLB,, | Performed by: INTERNAL MEDICINE

## 2022-08-31 PROCEDURE — 84165 PATHOLOGIST INTERPRETATION SPE: ICD-10-PCS | Mod: 26,,, | Performed by: PATHOLOGY

## 2022-08-31 PROCEDURE — 3072F LOW RISK FOR RETINOPATHY: CPT | Mod: CPTII,S$GLB,, | Performed by: INTERNAL MEDICINE

## 2022-08-31 PROCEDURE — 1159F PR MEDICATION LIST DOCUMENTED IN MEDICAL RECORD: ICD-10-PCS | Mod: CPTII,S$GLB,, | Performed by: INTERNAL MEDICINE

## 2022-08-31 PROCEDURE — 99205 OFFICE O/P NEW HI 60 MIN: CPT | Mod: S$GLB,,, | Performed by: INTERNAL MEDICINE

## 2022-08-31 PROCEDURE — 1101F PT FALLS ASSESS-DOCD LE1/YR: CPT | Mod: CPTII,S$GLB,, | Performed by: INTERNAL MEDICINE

## 2022-08-31 PROCEDURE — 99999 PR PBB SHADOW E&M-EST. PATIENT-LVL III: CPT | Mod: PBBFAC,,, | Performed by: INTERNAL MEDICINE

## 2022-08-31 PROCEDURE — 86334 PATHOLOGIST INTERPRETATION IFE: ICD-10-PCS | Mod: 26,,, | Performed by: PATHOLOGY

## 2022-08-31 PROCEDURE — 3288F PR FALLS RISK ASSESSMENT DOCUMENTED: ICD-10-PCS | Mod: CPTII,S$GLB,, | Performed by: INTERNAL MEDICINE

## 2022-08-31 PROCEDURE — 3074F SYST BP LT 130 MM HG: CPT | Mod: CPTII,S$GLB,, | Performed by: INTERNAL MEDICINE

## 2022-08-31 PROCEDURE — 1101F PR PT FALLS ASSESS DOC 0-1 FALLS W/OUT INJ PAST YR: ICD-10-PCS | Mod: CPTII,S$GLB,, | Performed by: INTERNAL MEDICINE

## 2022-08-31 PROCEDURE — 3008F PR BODY MASS INDEX (BMI) DOCUMENTED: ICD-10-PCS | Mod: CPTII,S$GLB,, | Performed by: INTERNAL MEDICINE

## 2022-08-31 PROCEDURE — 86334 IMMUNOFIX E-PHORESIS SERUM: CPT | Mod: 26,,, | Performed by: PATHOLOGY

## 2022-08-31 PROCEDURE — 4010F ACE/ARB THERAPY RXD/TAKEN: CPT | Mod: CPTII,S$GLB,, | Performed by: INTERNAL MEDICINE

## 2022-08-31 PROCEDURE — 99205 PR OFFICE/OUTPT VISIT, NEW, LEVL V, 60-74 MIN: ICD-10-PCS | Mod: S$GLB,,, | Performed by: INTERNAL MEDICINE

## 2022-08-31 PROCEDURE — 84165 PROTEIN E-PHORESIS SERUM: CPT | Mod: 26,,, | Performed by: PATHOLOGY

## 2022-08-31 PROCEDURE — 86200 CCP ANTIBODY: CPT | Performed by: INTERNAL MEDICINE

## 2022-08-31 PROCEDURE — 84165 PROTEIN E-PHORESIS SERUM: CPT | Performed by: INTERNAL MEDICINE

## 2022-08-31 PROCEDURE — 3008F BODY MASS INDEX DOCD: CPT | Mod: CPTII,S$GLB,, | Performed by: INTERNAL MEDICINE

## 2022-08-31 PROCEDURE — 3044F HG A1C LEVEL LT 7.0%: CPT | Mod: CPTII,S$GLB,, | Performed by: INTERNAL MEDICINE

## 2022-08-31 PROCEDURE — 3078F DIAST BP <80 MM HG: CPT | Mod: CPTII,S$GLB,, | Performed by: INTERNAL MEDICINE

## 2022-08-31 RX ORDER — HYDROXYCHLOROQUINE SULFATE 200 MG/1
200 TABLET, FILM COATED ORAL 2 TIMES DAILY
Qty: 60 TABLET | Refills: 6 | Status: SHIPPED | OUTPATIENT
Start: 2022-08-31 | End: 2022-08-31 | Stop reason: SDUPTHER

## 2022-08-31 RX ORDER — HYDROXYCHLOROQUINE SULFATE 200 MG/1
200 TABLET, FILM COATED ORAL 2 TIMES DAILY
Qty: 60 TABLET | Refills: 6 | Status: SHIPPED | OUTPATIENT
Start: 2022-08-31 | End: 2023-06-08

## 2022-08-31 NOTE — PROGRESS NOTES
RHEUMATOLOGY CLINIC INITIAL VISIT    Reason for consult:-  Rheumatoid arthritis  Chief complaints, HPI, ROS, EXAM, Assessment & Plans:-  Raquel Lucas a 66 y.o. pleasant female comes in with arthritis.  Back in February she was having severe joint pain, stiffness primarily involving bilateral shoulders and hands.  Workup showed significantly elevated inflammatory markers with positive NANDA and rheumatoid factor.  She was evaluated at orthopedic clinic.  For shoulder pain she received corticosteroid injection with significantly improved her pain all over the body.  When she had COVID in June she had a chest x-ray done which showed possible pneumonia/right lung mass.  Further workup showed spiculated mass right lung status post biopsy.  She is waiting on the biopsy result.  She never smoked cigarettes.  She denies any significant joint pain today.  No prolonged morning stiffness.  Rheumatological review of system negative otherwise.  Physical examination shows mild synovitis over right 2nd 3rd and 4th PIP joints.  Nontender MCP and D IP joints.  No tenderness of wrists.  No effusion otherwise.    1. Undifferentiated inflammatory arthritis      Problem List Items Addressed This Visit    None  Visit Diagnoses       Undifferentiated inflammatory arthritis    -  Primary    Relevant Medications    hydrOXYchloroQUINE (PLAQUENIL) 200 mg tablet    Other Relevant Orders    Cyclic Citrullinated Peptide Antibody, IgG    Sedimentation rate    C-Reactive Protein    Protein Electrophoresis, Serum    Immunofixation Electrophoresis            Presentation of inflammatory arthritis with significantly elevated inflammatory markers and rheumatoid factor raises possibility of underlying rheumatoid arthritis.  But the newly diagnosed right lung mass also raises possibility of paraneoplastic inflammatory arthritis.  With the amount of inflammatory markers she has I would expect more arthritis than what she presents today unless she has  more of a palindromic presentation.  Repeat inflammatory markers today and check CCP antibody panel.  X-ray showed no evidence of erosions.  Start Plaquenil for low-grade inflammatory arthritis affecting PIP joints.  Await pathology report from lung biopsy.    # Follow up in about 3 months (around 11/30/2022).      Past Medical History:   Diagnosis Date    Breast cancer 2011    L    Diabetes mellitus     Glaucoma     Hyperlipidemia     Hypertension     MVP (mitral valve prolapse)     Obesity        Past Surgical History:   Procedure Laterality Date    BREAST LUMPECTOMY      CATARACT EXTRACTION W/  INTRAOCULAR LENS IMPLANT Right 07/22/2020    w/ goniotomy    CATARACT EXTRACTION W/  INTRAOCULAR LENS IMPLANT Left 06/16/2021    w/ goniotomy    COLONOSCOPY W/ POLYPECTOMY  04/18/2017    DR. CHARLENE NATH/ANTONIO SALTER. POLYP X 3. REPEAT 5 YRS.    ENDOBRONCHIAL ULTRASOUND Bilateral 8/25/2022    Procedure: ENDOBRONCHIAL ULTRASOUND (EBUS);  Surgeon: Musa Phillips MD;  [facility];  Service: Pulmonary;  Laterality: Bilateral;    PCIOL Right     DR. MENDOZA    SLT - OU - BOTH EYES      TRIGGER FINGER RELEASE      Thumb    TUBAL LIGATION          Social History     Tobacco Use    Smoking status: Never    Smokeless tobacco: Never   Substance Use Topics    Alcohol use: Yes     Comment: very rare    Drug use: No       Family History   Problem Relation Age of Onset    Glaucoma Brother     Macular degeneration Brother     Esophageal cancer Father     Heart disease Father     Hypertension Father     Liver cancer Mother     Cancer Mother     Hypertension Sister     Cataracts Sister     Diabetes Sister     Cholelithiasis Sister     Blindness Neg Hx     Retinal detachment Neg Hx     Strabismus Neg Hx     Stroke Neg Hx     Thyroid disease Neg Hx        Review of patient's allergies indicates:   Allergen Reactions    Travatan z [travoprost]        Medication List with Changes/Refills   New Medications    HYDROXYCHLOROQUINE  (PLAQUENIL) 200 MG TABLET    Take 1 tablet (200 mg total) by mouth 2 (two) times daily.   Current Medications    ACCU-CHEK CIRILO PLUS TEST STRP STRP    CHECK BLOOD GLUCOSE ONE TIME DAILY    AMLODIPINE (NORVASC) 5 MG TABLET    Take 5 mg by mouth once daily.    BENAZEPRIL-HYDROCHLORTHIAZIDE (LOTENSIN HCT) 20-12.5 MG PER TABLET    Take 1 tablet by mouth once daily.    BLOOD-GLUCOSE METER KIT    To check BG 1 times daily, Accuchek Cirilo meter    BRIMONIDINE 0.15 % OPTH DROP (ALPHAGAN) 0.15 % OPHTHALMIC SOLUTION    Place 1 drop into both eyes 2 (two) times daily. Dispense 90 days supply    CYCLOBENZAPRINE (FLEXERIL) 10 MG TABLET    Take 1 tablet as needed at night for muscle spasm    DORZOLAMIDE-TIMOLOL 2-0.5% (COSOPT) 22.3-6.8 MG/ML OPHTHALMIC SOLUTION    Place 1 drop into both eyes 2 (two) times daily. Dispense 90 days supply    FLUTICASONE PROPIONATE (FLONASE) 50 MCG/ACTUATION NASAL SPRAY    2 sprays (100 mcg total) by Each Nostril route once daily.    GLIMEPIRIDE (AMARYL) 2 MG TABLET    TAKE 1 TABLET DAILY 30 MINUTES BEFORE A MEAL AS NEEDED FOR BLOOD SUGAR GREATER THAN 150.    IBUPROFEN (ADVIL,MOTRIN) 800 MG TABLET    Take 800 mg by mouth every 6 (six) hours as needed for Pain.    LANCETS (ACCU-CHEK SOFTCLIX LANCETS) MISC    USE  TO  CHECK BLOOD GLUCOSE ONE TIME DAILY    LATANOPROST 0.005 % OPHTHALMIC SOLUTION    INSTILL 1 DROP INTO BOTH EYES EVERY EVENING    METFORMIN (GLUCOPHAGE-XR) 500 MG ER 24HR TABLET    TAKE 3 TABLETS EVERY DAY    POTASSIUM CHLORIDE SA (K-DUR,KLOR-CON) 20 MEQ TABLET    Take 1 tablet (20 mEq total) by mouth once daily. Take with food    PROMETHAZINE-DEXTROMETHORPHAN (PROMETHAZINE-DM) 6.25-15 MG/5 ML SYRP    Take 5 mLs by mouth nightly as needed (Cough).    PROPRANOLOL (INDERAL LA) 80 MG 24 HR CAPSULE    TAKE 1 CAPSULE EVERY DAY    ROSUVASTATIN (CRESTOR) 5 MG TABLET    Take 1 tablet (5 mg total) by mouth once daily.    TRAMADOL (ULTRAM) 50 MG TABLET    Take 50 mg by mouth every 6 (six) hours as  needed.         Thank you for allowing me to participate in the care ofFerchris Pickard.    Disclaimer: This note was prepared using voice recognition system and is likely to have sound alike errors and is not proof read.  Please call me with any questions.

## 2022-09-01 LAB
ALBUMIN SERPL ELPH-MCNC: 3.63 G/DL (ref 3.35–5.55)
ALPHA1 GLOB SERPL ELPH-MCNC: 0.42 G/DL (ref 0.17–0.41)
ALPHA2 GLOB SERPL ELPH-MCNC: 0.79 G/DL (ref 0.43–0.99)
B-GLOBULIN SERPL ELPH-MCNC: 1.23 G/DL (ref 0.5–1.1)
CCP AB SER IA-ACNC: 13 U/ML
ERYTHROCYTE [SEDIMENTATION RATE] IN BLOOD BY PHOTOMETRIC METHOD: 60 MM/HR (ref 0–36)
GAMMA GLOB SERPL ELPH-MCNC: 1.24 G/DL (ref 0.67–1.58)
INTERPRETATION SERPL IFE-IMP: NORMAL
PATHOLOGIST INTERPRETATION IFE: NORMAL
PATHOLOGIST INTERPRETATION SPE: NORMAL
PROT SERPL-MCNC: 7.3 G/DL (ref 6–8.4)

## 2022-09-02 ENCOUNTER — TELEPHONE (OUTPATIENT)
Dept: INTERNAL MEDICINE | Facility: CLINIC | Age: 67
End: 2022-09-02
Payer: MEDICARE

## 2022-09-02 NOTE — TELEPHONE ENCOUNTER
Pt said Dr Wallace was going to send us an order for a MRI she wants to do it here because Dr Wallace wants it before Wednesday   Dr Wallace [phone] I called and left a message for his nurse

## 2022-09-02 NOTE — TELEPHONE ENCOUNTER
----- Message from Tabitha Orlando sent at 9/2/2022  2:06 PM CDT -----  Contact: pt  The pt request a return call, no additional info given and can be reached at [phone]///[id number]

## 2022-09-06 ENCOUNTER — HOSPITAL ENCOUNTER (OUTPATIENT)
Dept: RADIOLOGY | Facility: HOSPITAL | Age: 67
Discharge: HOME | End: 2022-09-06
Attending: INTERNAL MEDICINE
Payer: MEDICARE

## 2022-09-06 DIAGNOSIS — C50.912 MALIGNANT NEOPLASM OF LEFT BREAST: ICD-10-CM

## 2022-09-06 PROCEDURE — A9585 GADOBUTROL INJECTION: HCPCS | Mod: PN

## 2022-09-06 PROCEDURE — 70553 MRI BRAIN STEM W/O & W/DYE: CPT | Mod: TC,PN

## 2022-09-06 PROCEDURE — 25500020 PHARM REV CODE 255: Mod: PN

## 2022-09-06 RX ORDER — GADOBUTROL 604.72 MG/ML
7.5 INJECTION INTRAVENOUS
Status: COMPLETED | OUTPATIENT
Start: 2022-09-06 | End: 2022-09-06

## 2022-09-06 RX ADMIN — GADOBUTROL 7.5 ML: 604.72 INJECTION INTRAVENOUS at 01:09

## 2022-09-09 LAB
COMMENT: ABNORMAL
FINAL PATHOLOGIC DIAGNOSIS: ABNORMAL
Lab: ABNORMAL
SUPPLEMENTAL DIAGNOSIS: ABNORMAL

## 2022-09-21 ENCOUNTER — TELEPHONE (OUTPATIENT)
Dept: ADMINISTRATIVE | Facility: HOSPITAL | Age: 67
End: 2022-09-21
Payer: MEDICARE

## 2022-09-23 ENCOUNTER — PES CALL (OUTPATIENT)
Dept: ADMINISTRATIVE | Facility: CLINIC | Age: 67
End: 2022-09-23
Payer: MEDICARE

## 2022-09-29 ENCOUNTER — OFFICE VISIT (OUTPATIENT)
Dept: PULMONOLOGY | Facility: CLINIC | Age: 67
End: 2022-09-29
Payer: MEDICARE

## 2022-09-29 VITALS
DIASTOLIC BLOOD PRESSURE: 74 MMHG | HEIGHT: 65 IN | WEIGHT: 176.56 LBS | RESPIRATION RATE: 16 BRPM | HEART RATE: 76 BPM | BODY MASS INDEX: 29.42 KG/M2 | SYSTOLIC BLOOD PRESSURE: 118 MMHG | OXYGEN SATURATION: 97 %

## 2022-09-29 DIAGNOSIS — R06.02 SOB (SHORTNESS OF BREATH): Primary | ICD-10-CM

## 2022-09-29 DIAGNOSIS — C34.91 NON-SMALL CELL CANCER OF RIGHT LUNG: ICD-10-CM

## 2022-09-29 DIAGNOSIS — Z79.899 ON ANTINEOPLASTIC CHEMOTHERAPY: ICD-10-CM

## 2022-09-29 DIAGNOSIS — R91.8 MASS OF UPPER LOBE OF RIGHT LUNG: ICD-10-CM

## 2022-09-29 PROCEDURE — 3288F PR FALLS RISK ASSESSMENT DOCUMENTED: ICD-10-PCS | Mod: CPTII,S$GLB,, | Performed by: INTERNAL MEDICINE

## 2022-09-29 PROCEDURE — 3078F PR MOST RECENT DIASTOLIC BLOOD PRESSURE < 80 MM HG: ICD-10-PCS | Mod: CPTII,S$GLB,, | Performed by: INTERNAL MEDICINE

## 2022-09-29 PROCEDURE — 3044F PR MOST RECENT HEMOGLOBIN A1C LEVEL <7.0%: ICD-10-PCS | Mod: CPTII,S$GLB,, | Performed by: INTERNAL MEDICINE

## 2022-09-29 PROCEDURE — 99214 PR OFFICE/OUTPT VISIT, EST, LEVL IV, 30-39 MIN: ICD-10-PCS | Mod: S$GLB,,, | Performed by: INTERNAL MEDICINE

## 2022-09-29 PROCEDURE — 3008F BODY MASS INDEX DOCD: CPT | Mod: CPTII,S$GLB,, | Performed by: INTERNAL MEDICINE

## 2022-09-29 PROCEDURE — 4010F ACE/ARB THERAPY RXD/TAKEN: CPT | Mod: CPTII,S$GLB,, | Performed by: INTERNAL MEDICINE

## 2022-09-29 PROCEDURE — 3074F PR MOST RECENT SYSTOLIC BLOOD PRESSURE < 130 MM HG: ICD-10-PCS | Mod: CPTII,S$GLB,, | Performed by: INTERNAL MEDICINE

## 2022-09-29 PROCEDURE — 1159F MED LIST DOCD IN RCRD: CPT | Mod: CPTII,S$GLB,, | Performed by: INTERNAL MEDICINE

## 2022-09-29 PROCEDURE — 3078F DIAST BP <80 MM HG: CPT | Mod: CPTII,S$GLB,, | Performed by: INTERNAL MEDICINE

## 2022-09-29 PROCEDURE — 1101F PT FALLS ASSESS-DOCD LE1/YR: CPT | Mod: CPTII,S$GLB,, | Performed by: INTERNAL MEDICINE

## 2022-09-29 PROCEDURE — 99214 OFFICE O/P EST MOD 30 MIN: CPT | Mod: S$GLB,,, | Performed by: INTERNAL MEDICINE

## 2022-09-29 PROCEDURE — 99999 PR PBB SHADOW E&M-EST. PATIENT-LVL V: ICD-10-PCS | Mod: PBBFAC,,, | Performed by: INTERNAL MEDICINE

## 2022-09-29 PROCEDURE — 3072F PR LOW RISK FOR RETINOPATHY: ICD-10-PCS | Mod: CPTII,S$GLB,, | Performed by: INTERNAL MEDICINE

## 2022-09-29 PROCEDURE — 3074F SYST BP LT 130 MM HG: CPT | Mod: CPTII,S$GLB,, | Performed by: INTERNAL MEDICINE

## 2022-09-29 PROCEDURE — 1160F PR REVIEW ALL MEDS BY PRESCRIBER/CLIN PHARMACIST DOCUMENTED: ICD-10-PCS | Mod: CPTII,S$GLB,, | Performed by: INTERNAL MEDICINE

## 2022-09-29 PROCEDURE — 1159F PR MEDICATION LIST DOCUMENTED IN MEDICAL RECORD: ICD-10-PCS | Mod: CPTII,S$GLB,, | Performed by: INTERNAL MEDICINE

## 2022-09-29 PROCEDURE — 3044F HG A1C LEVEL LT 7.0%: CPT | Mod: CPTII,S$GLB,, | Performed by: INTERNAL MEDICINE

## 2022-09-29 PROCEDURE — 4010F PR ACE/ARB THEARPY RXD/TAKEN: ICD-10-PCS | Mod: CPTII,S$GLB,, | Performed by: INTERNAL MEDICINE

## 2022-09-29 PROCEDURE — 3288F FALL RISK ASSESSMENT DOCD: CPT | Mod: CPTII,S$GLB,, | Performed by: INTERNAL MEDICINE

## 2022-09-29 PROCEDURE — 99499 UNLISTED E&M SERVICE: CPT | Mod: S$GLB,,, | Performed by: INTERNAL MEDICINE

## 2022-09-29 PROCEDURE — 1101F PR PT FALLS ASSESS DOC 0-1 FALLS W/OUT INJ PAST YR: ICD-10-PCS | Mod: CPTII,S$GLB,, | Performed by: INTERNAL MEDICINE

## 2022-09-29 PROCEDURE — 3008F PR BODY MASS INDEX (BMI) DOCUMENTED: ICD-10-PCS | Mod: CPTII,S$GLB,, | Performed by: INTERNAL MEDICINE

## 2022-09-29 PROCEDURE — 99999 PR PBB SHADOW E&M-EST. PATIENT-LVL V: CPT | Mod: PBBFAC,,, | Performed by: INTERNAL MEDICINE

## 2022-09-29 PROCEDURE — 1160F RVW MEDS BY RX/DR IN RCRD: CPT | Mod: CPTII,S$GLB,, | Performed by: INTERNAL MEDICINE

## 2022-09-29 PROCEDURE — 3072F LOW RISK FOR RETINOPATHY: CPT | Mod: CPTII,S$GLB,, | Performed by: INTERNAL MEDICINE

## 2022-09-29 RX ORDER — ONDANSETRON 4 MG/1
4 TABLET, ORALLY DISINTEGRATING ORAL EVERY 8 HOURS PRN
COMMUNITY
Start: 2022-09-12 | End: 2023-06-08

## 2022-09-29 RX ORDER — PROMETHAZINE HYDROCHLORIDE 25 MG/1
25 TABLET ORAL EVERY 6 HOURS PRN
COMMUNITY
Start: 2022-09-12

## 2022-09-29 NOTE — PROGRESS NOTES
Pulmonary Outpatient Follow Up Visit     Subjective:       Patient ID: Raquel Pickard is a 66 y.o. female.    Chief Complaint: Mass      HPI records with  Dr. Benji Wallace oncology [facility] reviewed        Patient is a 66 y.o. female presenting for 1 month follow-up.      Initially referred August 2020 to for evaluation for abnormal CT scan of the chest.  Status post bronchoscopy EBUS TBNA biopsy of station 7 and station 4R hilar lymphadenopathy.  Positive non-small cell lung CA in 4R.  Following with Dr. Dr. Benji WallaceOncology.  Completing chemotherapy.     Does have history of breast cancer 2011, status post lumpectomy and axillary lymph node dissection, followed by postoperative chemo and radiation.       She does follow with Dr. Benji hill [facility].  She has had her procedure done at [facility].       Around Memorial Day 2022 she complained of respiratory symptoms attributed to COVID and had obtained a chest x-ray followed by CT scan of the chest that showed a right lung mass.       No fever no chills.  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       Review of Systems   Constitutional:  Negative for fever and chills.   HENT:  Negative for nosebleeds.    Eyes:  Negative for redness.   Respiratory:  Negative for choking.    Cardiovascular:  Negative for chest pain.   Genitourinary:  Negative for hematuria.   Endocrine:  Negative for cold intolerance.    Musculoskeletal:  Negative for arthralgias and gait problem.   Gastrointestinal:  Negative for vomiting.   Neurological:  Negative for syncope.   Hematological:  Negative for adenopathy.        Immunocompromised on antineoplastic chemotherapy.     History of breast CA 2011   Psychiatric/Behavioral:  Negative for confusion.      Outpatient Encounter Medications as of 9/29/2022   Medication Sig Dispense Refill    ACCU-CHEK CIRILO PLUS TEST STRP Strp CHECK BLOOD GLUCOSE ONE TIME DAILY 100 strip 3     amLODIPine (NORVASC) 5 MG tablet Take 5 mg by mouth once daily.      benazepril-hydrochlorthiazide (LOTENSIN HCT) 20-12.5 mg per tablet Take 1 tablet by mouth once daily. 90 tablet 0    blood-glucose meter kit To check BG 1 times daily, Accuchek Monica meter 1 each 0    brimonidine 0.15 % OPTH DROP (ALPHAGAN) 0.15 % ophthalmic solution INSTILL 1 DROP INTO BOTH EYES TWICE DAILY 20 mL 3    cyclobenzaprine (FLEXERIL) 10 MG tablet Take 1 tablet as needed at night for muscle spasm 30 tablet 1    dorzolamide-timolol 2-0.5% (COSOPT) 22.3-6.8 mg/mL ophthalmic solution Place 1 drop into both eyes 2 (two) times daily. Dispense 90 days supply 30 mL 4    fluticasone propionate (FLONASE) 50 mcg/actuation nasal spray 2 sprays (100 mcg total) by Each Nostril route once daily. 9.9 mL 0    glimepiride (AMARYL) 2 MG tablet TAKE 1 TABLET DAILY 30 MINUTES BEFORE A MEAL AS NEEDED FOR BLOOD SUGAR GREATER THAN 150. . 90 tablet 0    hydrOXYchloroQUINE (PLAQUENIL) 200 mg tablet Take 1 tablet (200 mg total) by mouth 2 (two) times daily. 60 tablet 6    ibuprofen (ADVIL,MOTRIN) 800 MG tablet Take 800 mg by mouth every 6 (six) hours as needed for Pain.      lancets (ACCU-CHEK SOFTCLIX LANCETS) Misc USE  TO  CHECK BLOOD GLUCOSE ONE TIME DAILY 100 each 3    latanoprost 0.005 % ophthalmic solution INSTILL 1 DROP INTO BOTH EYES EVERY EVENING 7.5 mL 3    metFORMIN (GLUCOPHAGE-XR) 500 MG ER 24hr tablet TAKE 3 TABLETS EVERY  tablet 0    ondansetron (ZOFRAN-ODT) 4 MG TbDL Take 4 mg by mouth every 8 (eight) hours as needed.      potassium chloride SA (K-DUR,KLOR-CON) 20 MEQ tablet Take 1 tablet (20 mEq total) by mouth once daily. Take with food 90 tablet 3    promethazine (PHENERGAN) 25 MG tablet Take 25 mg by mouth every 6 (six) hours as needed.      promethazine-dextromethorphan (PROMETHAZINE-DM) 6.25-15 mg/5 mL Syrp Take 5 mLs by mouth nightly as needed (Cough). 120 mL 0    propranoloL (INDERAL LA) 80 MG 24 hr capsule TAKE 1 CAPSULE EVERY DAY  "90 capsule 0    rosuvastatin (CRESTOR) 5 MG tablet TAKE 1 TABLET EVERY DAY 90 tablet 3    traMADoL (ULTRAM) 50 mg tablet Take 50 mg by mouth every 6 (six) hours as needed.       Facility-Administered Encounter Medications as of 9/29/2022   Medication Dose Route Frequency Provider Last Rate Last Admin    triamcinolone acetonide injection 40 mg  40 mg Intra-articular  Mark Rashid MD   40 mg at 10/30/20 0840    triamcinolone acetonide injection 40 mg  40 mg Intra-articular  Mark Rashid MD   40 mg at 10/30/20 0840       Objective:     Vital Signs (Most Recent)  Vital Signs  Pulse: 76  Resp: 16  SpO2: 97 %  BP: 118/74  Height and Weight  Height: 5' 5" (165.1 cm)  Weight: 80.1 kg (176 lb 9.4 oz)  BSA (Calculated - sq m): 1.92 sq meters  BMI (Calculated): 29.4  Weight in (lb) to have BMI = 25: 149.9]  Wt Readings from Last 2 Encounters:   09/29/22 80.1 kg (176 lb 9.4 oz)   08/31/22 82.1 kg (181 lb)       Physical Exam   Constitutional: She is oriented to person, place, and time. She appears well-developed. No distress.   HENT:   Head: Normocephalic.   Cardiovascular: Normal rate.   Pulmonary/Chest: Normal expansion. No stridor. No respiratory distress. She exhibits no tenderness.   Abdominal: She exhibits no distension.   Musculoskeletal:         General: No tenderness.      Cervical back: Neck supple.   Lymphadenopathy:     She has no cervical adenopathy.   Neurological: She is alert and oriented to person, place, and time. Gait normal.   Skin: Skin is warm.   Psychiatric: She has a normal mood and affect. Her behavior is normal. Judgment and thought content normal.   Nursing note and vitals reviewed.    Laboratory  Lab Results   Component Value Date    WBC 8.15 02/08/2022    RBC 4.01 02/08/2022    HGB 10.6 (L) 02/08/2022    HCT 36.4 (L) 02/08/2022    MCV 91 02/08/2022    MCH 26.4 (L) 02/08/2022    MCHC 29.1 (L) 02/08/2022    RDW 16.4 (H) 02/08/2022     02/08/2022    MPV 9.2 02/08/2022    GRAN " 5.6 02/08/2022    GRAN 69.1 02/08/2022    LYMPH 1.7 02/08/2022    LYMPH 21.2 02/08/2022    MONO 0.6 02/08/2022    MONO 6.9 02/08/2022    EOS 0.2 02/08/2022    BASO 0.05 02/08/2022    EOSINOPHIL 1.8 02/08/2022    BASOPHIL 0.6 02/08/2022       BMP  Lab Results   Component Value Date     07/07/2021    K 3.7 07/07/2021     07/07/2021    CO2 27 07/07/2021    BUN 16 07/07/2021    CREATININE 0.8 08/09/2022    CALCIUM 9.9 07/07/2021    ANIONGAP 10 07/07/2021    ESTGFRAFRICA >60.0 07/07/2021    EGFRNONAA >60.0 07/07/2021    AST 16 07/07/2021    ALT 11 07/07/2021    PROT 7.7 07/07/2021       No results found for: BNP    No results found for: TSH    Lab Results   Component Value Date    SEDRATE 60 (H) 08/31/2022       Lab Results   Component Value Date    CRP 4.4 08/31/2022     No results found for: IGE     No results found for: ASPERGILLUS  No results found for: AFUMIGATUSCL     No results found for: ACE     Diagnostic Results:  I have personally reviewed today the following studies:    CT chest with contrast August 2022   COMPARISON:  None.     FINDINGS:  Base of Neck: No significant abnormality.     Thoracic soft tissues: Unremarkable.     Aorta: Left-sided aortic arch.  No aneurysm and no significant atherosclerosis     Heart: Normal size. No effusion.     Pulmonary vasculature: Pulmonary arteries distribute normally.  There are four pulmonary veins.     Kaitlin/Mediastinum: Right hilar lymphadenopathy measuring 2.3 x 1.9 cm.  Subcarinal lymphadenopathy measuring 2.7 x 1.3 cm.  Borderline enlarged right paratracheal nodes measuring up to 11 mm in short axis.     Airways: Patent.     Lungs/Pleura: Mass opacity with spiculated margins identified in the suprahilar anterior right upper lobe measuring 5.4 x 2.7 cm.  Associated partial encasement of the central bronchovascular structures.  Small 4 mm solid nodule in the right lower lobe on series 4, image 270.  Incidental calcified granuloma in the lingula.      Esophagus: Unremarkable.     Upper Abdomen: Several tiny subcentimeter hypodense foci in the liver, too small to accurately characterize though favored to represent cysts.  Small cyst in the upper pole of the spleen.  Probable tiny cortical cyst upper pole left kidney.     Bones: No acute fracture. No suspicious lytic or sclerotic lesions.     Impression:     1. Right upper lobe lung mass highly suspicious for bronchogenic malignancy.  2. Regional right hilar and mediastinal lymphadenopathy.        Assessment/Plan:   SOB (shortness of breath)  -     Spirometry without Bronchodilator; Future; Expected date: 12/29/2022  -     Stress test, pulmonary; Future; Expected date: 12/29/2022    Mass of upper lobe of right lung  -     Ambulatory referral/consult to Pulmonology    Non-small cell cancer of right lung    On antineoplastic chemotherapy      Continue chemotherapy.  Surveillance CT chest post chemotherapy.     Follow-up with Oncology Dr. Benji Wallace  Up-to-date on COVID influenza and pneumococcal vaccines.      Spirometry and 6 minute walking test in 3 months.          Follow up in about 3 months (around 12/29/2022).    This note was prepared using voice recognition system and is likely to have sound alike errors that may have been overlooked even after proof reading.  Please call me with any questions    Discussed diagnosis, its evaluation, treatment and usual course. All questions answered.      Musa Phillips MD

## 2022-10-13 LAB — RECOMMENDATION:: NORMAL

## 2022-10-26 ENCOUNTER — TELEPHONE (OUTPATIENT)
Dept: INTERNAL MEDICINE | Facility: CLINIC | Age: 67
End: 2022-10-26
Payer: MEDICARE

## 2022-10-26 NOTE — TELEPHONE ENCOUNTER
----- Message from Shruthi Weston sent at 10/26/2022 10:51 AM CDT -----  Contact: Barbie/Teodora [phone]  Pharmacy is calling to clarify an RX.  RX name:  metFORMIN (GLUCOPHAGE-XR) 500 MG ER 24hr tablet  What do they need to clarify:  Pt has been talking  metFORMIN (GLUCOPHAGE-XR) 500 MG ER 24hr tablet along with hydrOXYchloroQUINE (PLAQUENIL) 200 mg tablet which pharmacy states can cause severe hypoglycemia. Is is okay for pt to continue taking together?   Comments:

## 2022-10-26 NOTE — TELEPHONE ENCOUNTER
Spoke with pt and she has a glucometer and states her sugar has been running fine. Notifying pharmacy that she is ok to continue medications.

## 2022-11-11 ENCOUNTER — LAB VISIT (OUTPATIENT)
Dept: LAB | Facility: HOSPITAL | Age: 67
End: 2022-11-11
Attending: FAMILY MEDICINE
Payer: MEDICARE

## 2022-11-11 ENCOUNTER — IMMUNIZATION (OUTPATIENT)
Dept: PRIMARY CARE CLINIC | Facility: CLINIC | Age: 67
End: 2022-11-11
Payer: MEDICARE

## 2022-11-11 ENCOUNTER — OFFICE VISIT (OUTPATIENT)
Dept: INTERNAL MEDICINE | Facility: CLINIC | Age: 67
End: 2022-11-11
Payer: MEDICARE

## 2022-11-11 VITALS
HEART RATE: 87 BPM | WEIGHT: 175.06 LBS | SYSTOLIC BLOOD PRESSURE: 112 MMHG | BODY MASS INDEX: 29.17 KG/M2 | RESPIRATION RATE: 18 BRPM | TEMPERATURE: 98 F | HEIGHT: 65 IN | DIASTOLIC BLOOD PRESSURE: 76 MMHG | OXYGEN SATURATION: 97 %

## 2022-11-11 DIAGNOSIS — Z79.899 DRUG-INDUCED IMMUNODEFICIENCY: ICD-10-CM

## 2022-11-11 DIAGNOSIS — E11.9 TYPE 2 DIABETES MELLITUS WITHOUT COMPLICATION, WITHOUT LONG-TERM CURRENT USE OF INSULIN: ICD-10-CM

## 2022-11-11 DIAGNOSIS — E87.6 DIURETIC-INDUCED HYPOKALEMIA: ICD-10-CM

## 2022-11-11 DIAGNOSIS — I10 ESSENTIAL HYPERTENSION: ICD-10-CM

## 2022-11-11 DIAGNOSIS — Z23 NEED FOR VACCINATION: Primary | ICD-10-CM

## 2022-11-11 DIAGNOSIS — T50.2X5A DIURETIC-INDUCED HYPOKALEMIA: ICD-10-CM

## 2022-11-11 DIAGNOSIS — D84.821 DRUG-INDUCED IMMUNODEFICIENCY: ICD-10-CM

## 2022-11-11 DIAGNOSIS — Z00.00 ROUTINE PHYSICAL EXAMINATION: ICD-10-CM

## 2022-11-11 DIAGNOSIS — E11.36 TYPE 2 DIABETES MELLITUS WITH DIABETIC CATARACT, WITHOUT LONG-TERM CURRENT USE OF INSULIN: Primary | ICD-10-CM

## 2022-11-11 LAB
ALBUMIN SERPL BCP-MCNC: 3.7 G/DL (ref 3.5–5.2)
ALP SERPL-CCNC: 52 U/L (ref 55–135)
ALT SERPL W/O P-5'-P-CCNC: 11 U/L (ref 10–44)
ANION GAP SERPL CALC-SCNC: 11 MMOL/L (ref 8–16)
ANISOCYTOSIS BLD QL SMEAR: SLIGHT
AST SERPL-CCNC: 17 U/L (ref 10–40)
BASOPHILS # BLD AUTO: 0.02 K/UL (ref 0–0.2)
BASOPHILS NFR BLD: 0.6 % (ref 0–1.9)
BILIRUB SERPL-MCNC: 0.6 MG/DL (ref 0.1–1)
BUN SERPL-MCNC: 10 MG/DL (ref 8–23)
CALCIUM SERPL-MCNC: 10.1 MG/DL (ref 8.7–10.5)
CHLORIDE SERPL-SCNC: 103 MMOL/L (ref 95–110)
CHOLEST SERPL-MCNC: 159 MG/DL (ref 120–199)
CHOLEST/HDLC SERPL: 4.8 {RATIO} (ref 2–5)
CO2 SERPL-SCNC: 27 MMOL/L (ref 23–29)
CREAT SERPL-MCNC: 0.6 MG/DL (ref 0.5–1.4)
DIFFERENTIAL METHOD: ABNORMAL
EOSINOPHIL # BLD AUTO: 0 K/UL (ref 0–0.5)
EOSINOPHIL NFR BLD: 1 % (ref 0–8)
ERYTHROCYTE [DISTWIDTH] IN BLOOD BY AUTOMATED COUNT: 17.1 % (ref 11.5–14.5)
EST. GFR  (NO RACE VARIABLE): >60 ML/MIN/1.73 M^2
ESTIMATED AVG GLUCOSE: 123 MG/DL (ref 68–131)
GLUCOSE SERPL-MCNC: 112 MG/DL (ref 70–110)
HBA1C MFR BLD: 5.9 % (ref 4–5.6)
HCT VFR BLD AUTO: 36.5 % (ref 37–48.5)
HDLC SERPL-MCNC: 33 MG/DL (ref 40–75)
HDLC SERPL: 20.8 % (ref 20–50)
HGB BLD-MCNC: 10.9 G/DL (ref 12–16)
HYPOCHROMIA BLD QL SMEAR: ABNORMAL
IMM GRANULOCYTES # BLD AUTO: 0.03 K/UL (ref 0–0.04)
IMM GRANULOCYTES NFR BLD AUTO: 1 % (ref 0–0.5)
LDLC SERPL CALC-MCNC: 97.8 MG/DL (ref 63–159)
LYMPHOCYTES # BLD AUTO: 1.6 K/UL (ref 1–4.8)
LYMPHOCYTES NFR BLD: 52.9 % (ref 18–48)
MCH RBC QN AUTO: 29.4 PG (ref 27–31)
MCHC RBC AUTO-ENTMCNC: 29.9 G/DL (ref 32–36)
MCV RBC AUTO: 98 FL (ref 82–98)
MONOCYTES # BLD AUTO: 0.7 K/UL (ref 0.3–1)
MONOCYTES NFR BLD: 21 % (ref 4–15)
NEUTROPHILS # BLD AUTO: 0.7 K/UL (ref 1.8–7.7)
NEUTROPHILS NFR BLD: 23.5 % (ref 38–73)
NONHDLC SERPL-MCNC: 126 MG/DL
NRBC BLD-RTO: 1 /100 WBC
OVALOCYTES BLD QL SMEAR: ABNORMAL
PLATELET # BLD AUTO: 257 K/UL (ref 150–450)
PLATELET BLD QL SMEAR: ABNORMAL
PMV BLD AUTO: 9.5 FL (ref 9.2–12.9)
POIKILOCYTOSIS BLD QL SMEAR: SLIGHT
POTASSIUM SERPL-SCNC: 3.7 MMOL/L (ref 3.5–5.1)
PROT SERPL-MCNC: 7.5 G/DL (ref 6–8.4)
RBC # BLD AUTO: 3.71 M/UL (ref 4–5.4)
SODIUM SERPL-SCNC: 141 MMOL/L (ref 136–145)
TRIGL SERPL-MCNC: 141 MG/DL (ref 30–150)
WBC # BLD AUTO: 3.1 K/UL (ref 3.9–12.7)

## 2022-11-11 PROCEDURE — 3008F BODY MASS INDEX DOCD: CPT | Mod: CPTII,S$GLB,, | Performed by: FAMILY MEDICINE

## 2022-11-11 PROCEDURE — 3074F SYST BP LT 130 MM HG: CPT | Mod: CPTII,S$GLB,, | Performed by: FAMILY MEDICINE

## 2022-11-11 PROCEDURE — 85025 COMPLETE CBC W/AUTO DIFF WBC: CPT | Performed by: FAMILY MEDICINE

## 2022-11-11 PROCEDURE — 3044F PR MOST RECENT HEMOGLOBIN A1C LEVEL <7.0%: ICD-10-PCS | Mod: CPTII,S$GLB,, | Performed by: FAMILY MEDICINE

## 2022-11-11 PROCEDURE — 82570 ASSAY OF URINE CREATININE: CPT | Performed by: PHYSICIAN ASSISTANT

## 2022-11-11 PROCEDURE — 3078F PR MOST RECENT DIASTOLIC BLOOD PRESSURE < 80 MM HG: ICD-10-PCS | Mod: CPTII,S$GLB,, | Performed by: FAMILY MEDICINE

## 2022-11-11 PROCEDURE — 99213 PR OFFICE/OUTPT VISIT, EST, LEVL III, 20-29 MIN: ICD-10-PCS | Mod: S$GLB,,, | Performed by: FAMILY MEDICINE

## 2022-11-11 PROCEDURE — 1126F AMNT PAIN NOTED NONE PRSNT: CPT | Mod: CPTII,S$GLB,, | Performed by: FAMILY MEDICINE

## 2022-11-11 PROCEDURE — 83036 HEMOGLOBIN GLYCOSYLATED A1C: CPT | Performed by: FAMILY MEDICINE

## 2022-11-11 PROCEDURE — 3072F PR LOW RISK FOR RETINOPATHY: ICD-10-PCS | Mod: CPTII,S$GLB,, | Performed by: FAMILY MEDICINE

## 2022-11-11 PROCEDURE — 99213 OFFICE O/P EST LOW 20 MIN: CPT | Mod: S$GLB,,, | Performed by: FAMILY MEDICINE

## 2022-11-11 PROCEDURE — 1126F PR PAIN SEVERITY QUANTIFIED, NO PAIN PRESENT: ICD-10-PCS | Mod: CPTII,S$GLB,, | Performed by: FAMILY MEDICINE

## 2022-11-11 PROCEDURE — 1101F PR PT FALLS ASSESS DOC 0-1 FALLS W/OUT INJ PAST YR: ICD-10-PCS | Mod: CPTII,S$GLB,, | Performed by: FAMILY MEDICINE

## 2022-11-11 PROCEDURE — 0124A COVID-19, MRNA, LNP-S, BIVALENT BOOSTER, PF, 30 MCG/0.3 ML DOSE: CPT | Mod: CV19,PBBFAC | Performed by: FAMILY MEDICINE

## 2022-11-11 PROCEDURE — 1159F PR MEDICATION LIST DOCUMENTED IN MEDICAL RECORD: ICD-10-PCS | Mod: CPTII,S$GLB,, | Performed by: FAMILY MEDICINE

## 2022-11-11 PROCEDURE — 1101F PT FALLS ASSESS-DOCD LE1/YR: CPT | Mod: CPTII,S$GLB,, | Performed by: FAMILY MEDICINE

## 2022-11-11 PROCEDURE — 3008F PR BODY MASS INDEX (BMI) DOCUMENTED: ICD-10-PCS | Mod: CPTII,S$GLB,, | Performed by: FAMILY MEDICINE

## 2022-11-11 PROCEDURE — 1159F MED LIST DOCD IN RCRD: CPT | Mod: CPTII,S$GLB,, | Performed by: FAMILY MEDICINE

## 2022-11-11 PROCEDURE — 3288F PR FALLS RISK ASSESSMENT DOCUMENTED: ICD-10-PCS | Mod: CPTII,S$GLB,, | Performed by: FAMILY MEDICINE

## 2022-11-11 PROCEDURE — 80061 LIPID PANEL: CPT | Performed by: FAMILY MEDICINE

## 2022-11-11 PROCEDURE — 3044F HG A1C LEVEL LT 7.0%: CPT | Mod: CPTII,S$GLB,, | Performed by: FAMILY MEDICINE

## 2022-11-11 PROCEDURE — 91312 COVID-19, MRNA, LNP-S, BIVALENT BOOSTER, PF, 30 MCG/0.3 ML DOSE: CPT | Mod: PBBFAC | Performed by: FAMILY MEDICINE

## 2022-11-11 PROCEDURE — 99999 PR PBB SHADOW E&M-EST. PATIENT-LVL IV: ICD-10-PCS | Mod: PBBFAC,,, | Performed by: FAMILY MEDICINE

## 2022-11-11 PROCEDURE — 80053 COMPREHEN METABOLIC PANEL: CPT | Performed by: FAMILY MEDICINE

## 2022-11-11 PROCEDURE — 3288F FALL RISK ASSESSMENT DOCD: CPT | Mod: CPTII,S$GLB,, | Performed by: FAMILY MEDICINE

## 2022-11-11 PROCEDURE — 99999 PR PBB SHADOW E&M-EST. PATIENT-LVL IV: CPT | Mod: PBBFAC,,, | Performed by: FAMILY MEDICINE

## 2022-11-11 PROCEDURE — 4010F PR ACE/ARB THEARPY RXD/TAKEN: ICD-10-PCS | Mod: CPTII,S$GLB,, | Performed by: FAMILY MEDICINE

## 2022-11-11 PROCEDURE — 36415 COLL VENOUS BLD VENIPUNCTURE: CPT | Performed by: FAMILY MEDICINE

## 2022-11-11 PROCEDURE — 82043 UR ALBUMIN QUANTITATIVE: CPT | Performed by: PHYSICIAN ASSISTANT

## 2022-11-11 PROCEDURE — 3072F LOW RISK FOR RETINOPATHY: CPT | Mod: CPTII,S$GLB,, | Performed by: FAMILY MEDICINE

## 2022-11-11 PROCEDURE — 99499 UNLISTED E&M SERVICE: CPT | Mod: S$GLB,,, | Performed by: FAMILY MEDICINE

## 2022-11-11 PROCEDURE — 4010F ACE/ARB THERAPY RXD/TAKEN: CPT | Mod: CPTII,S$GLB,, | Performed by: FAMILY MEDICINE

## 2022-11-11 PROCEDURE — 3078F DIAST BP <80 MM HG: CPT | Mod: CPTII,S$GLB,, | Performed by: FAMILY MEDICINE

## 2022-11-11 PROCEDURE — 3074F PR MOST RECENT SYSTOLIC BLOOD PRESSURE < 130 MM HG: ICD-10-PCS | Mod: CPTII,S$GLB,, | Performed by: FAMILY MEDICINE

## 2022-11-11 NOTE — PROGRESS NOTES
Raquel Pickard  11/11/2022  [id number]    Debra Jensen MD  Patient Care Team:  Debra Jensen MD as PCP - General (Internal Medicine)  Garry Alberto MD as Consulting Physician (Ophthalmology)  Cyril Lundberg MD as Consulting Physician (Gastroenterology)  Kassy Hernandez MD (General Surgery)  Luis Antonio Wallace MD as Consulting Physician (Hematology and Oncology)    Has the patient seen any provider outside of the network since the last visit ? (no). If yes, HIPPA forms completed and records requested.      Visit Type:a scheduled routine follow-up visit    Chief Complaint:  Chief Complaint   Patient presents with    Annual Exam       History of Present Illness:  HPI  Ms pickard presents today for her annual exam     She has not had a change in her medical history, family history or surgical history    Recently completed chemotherapy  Mammogram at [facility] signed     No complaint today     COVID vaccine to be completed today       Review of Systems   Constitutional:  Negative for chills and fever.   HENT:  Negative for congestion and tinnitus.    Eyes:  Negative for blurred vision, pain and discharge.   Respiratory:  Negative for cough and wheezing.    Cardiovascular:  Negative for chest pain, palpitations, orthopnea and leg swelling.   Gastrointestinal:  Negative for abdominal pain, blood in stool, constipation, diarrhea, heartburn, nausea and vomiting.   Genitourinary:  Negative for dysuria, flank pain, frequency, hematuria and urgency.   Skin:  Negative for itching and rash.   Neurological:  Negative for dizziness, tingling and headaches.   Psychiatric/Behavioral:  Negative for depression.        Screening Questionnaires:    In the last two weeks how often have you felt down, depressed, or hopeless ( no )    In the last two weeks how often have you had little interest or pleasure in doing  (no )    In the last two weeks how often have you been bothered by the following problems:  1. Feeling  nervous, anxious, or on edge ( no )    2. Not being able to stop or control worrying ( no)    3. Worrying too much about different things ( no)    4. Trouble relaxing ( no )    5. Being so restless that it is hard to sit still  (no )    6. Becoming easily annoyed or irritable (no)    7. Feeling afraid as if something awful might happen (no )    How often do you have a drink containing Alcohol? denied     Do you exercise  (yes ) moderately active    Do you take a baby Aspirin daily ( no)    Do you have an advance directive ( no ) The patient was given information regarding Living Will/Durable Power-of- if requested.     The following were reviewed: Active problem list, medication list, allergies, family history, social history, and Health Maintenance.     History:  Past Medical History:   Diagnosis Date    Breast cancer 2011    L    Diabetes mellitus     Glaucoma     Hyperlipidemia     Hypertension     MVP (mitral valve prolapse)     Obesity      Past Surgical History:   Procedure Laterality Date    BREAST LUMPECTOMY      CATARACT EXTRACTION W/  INTRAOCULAR LENS IMPLANT Right 07/22/2020    w/ goniotomy    CATARACT EXTRACTION W/  INTRAOCULAR LENS IMPLANT Left 06/16/2021    w/ goniotomy    COLONOSCOPY W/ POLYPECTOMY  04/18/2017    DR. CHARLENE NATH/GI JOIE. POLYP X 3. REPEAT 5 YRS.    ENDOBRONCHIAL ULTRASOUND Bilateral 8/25/2022    Procedure: ENDOBRONCHIAL ULTRASOUND (EBUS);  Surgeon: Musa Phillips MD;  Location: [facility];  Service: Pulmonary;  Laterality: Bilateral;    PCIOL Right     DR. MENDOZA    SLT - OU - BOTH EYES      TRIGGER FINGER RELEASE      Thumb    TUBAL LIGATION       Family History   Problem Relation Age of Onset    Glaucoma Brother     Macular degeneration Brother     Esophageal cancer Father     Heart disease Father     Hypertension Father     Liver cancer Mother     Cancer Mother     Hypertension Sister     Cataracts Sister     Diabetes Sister     Cholelithiasis Sister     Blindness  Neg Hx     Retinal detachment Neg Hx     Strabismus Neg Hx     Stroke Neg Hx     Thyroid disease Neg Hx      Social History     Socioeconomic History    Marital status:     Number of children: 2   Occupational History    Occupation: retired teacher   Tobacco Use    Smoking status: Never    Smokeless tobacco: Never   Substance and Sexual Activity    Alcohol use: Yes     Comment: very rare    Drug use: No    Sexual activity: Yes     Partners: Male     Patient Active Problem List   Diagnosis    Low tension glaucoma - Both Eyes    Essential hypertension    Primary open angle glaucoma of both eyes, severe stage    Nuclear sclerosis of both eyes    Severe stage chronic open angle glaucoma    Dry eye    Type 2 diabetes mellitus with diabetic cataract, without long-term current use of insulin    History of left breast cancer    Plantar fasciitis    Lymphedema of left arm    Mediastinal lymphadenopathy    Drug-induced immunodeficiency     Review of patient's allergies indicates:   Allergen Reactions    Travatan z [travoprost]        Health Maintenance  Health Maintenance Topics with due status: Not Due       Topic Last Completion Date    TETANUS VACCINE 11/08/2016    DEXA Scan 02/24/2020    Eye Exam 02/03/2022    Foot Exam 05/11/2022    Colorectal Cancer Screening 06/02/2022    Hemoglobin A1c 08/18/2022    Low Dose Statin 11/11/2022     Health Maintenance Due   Topic Date Due    Pneumococcal Vaccines (Age 65+) (3 - PPSV23 if available, else PCV20) 02/07/2021    Diabetes Urine Screening  07/07/2022    Lipid Panel  07/07/2022    Influenza Vaccine (1) 09/01/2022    Mammogram  10/12/2022       Medications:  Current Outpatient Medications on File Prior to Visit   Medication Sig Dispense Refill    ACCU-CHEK CIRILO PLUS TEST STRP Strp CHECK BLOOD GLUCOSE ONE TIME DAILY 100 strip 3    benazepril-hydrochlorthiazide (LOTENSIN HCT) 20-12.5 mg per tablet Take 1 tablet by mouth once daily. 90 tablet 0    blood-glucose meter kit To  check BG 1 times daily, Accuchek Monica meter 1 each 0    brimonidine 0.15 % OPTH DROP (ALPHAGAN) 0.15 % ophthalmic solution INSTILL 1 DROP INTO BOTH EYES TWICE DAILY 20 mL 3    cyclobenzaprine (FLEXERIL) 10 MG tablet Take 1 tablet as needed at night for muscle spasm 30 tablet 1    dorzolamide-timolol 2-0.5% (COSOPT) 22.3-6.8 mg/mL ophthalmic solution Place 1 drop into both eyes 2 (two) times daily. Dispense 90 days supply 30 mL 4    fluticasone propionate (FLONASE) 50 mcg/actuation nasal spray 2 sprays (100 mcg total) by Each Nostril route once daily. 9.9 mL 0    glimepiride (AMARYL) 2 MG tablet TAKE 1 TABLET DAILY 30 MINUTES BEFORE A MEAL AS NEEDED FOR BLOOD SUGAR GREATER THAN 150. . 90 tablet 0    hydrOXYchloroQUINE (PLAQUENIL) 200 mg tablet Take 1 tablet (200 mg total) by mouth 2 (two) times daily. 60 tablet 6    ibuprofen (ADVIL,MOTRIN) 800 MG tablet Take 800 mg by mouth every 6 (six) hours as needed for Pain.      lancets (ACCU-CHEK SOFTCLIX LANCETS) Misc USE  TO  CHECK BLOOD GLUCOSE ONE TIME DAILY 100 each 3    latanoprost 0.005 % ophthalmic solution INSTILL 1 DROP INTO BOTH EYES EVERY EVENING 7.5 mL 3    metFORMIN (GLUCOPHAGE-XR) 500 MG ER 24hr tablet TAKE 3 TABLETS EVERY  tablet 1    ondansetron (ZOFRAN-ODT) 4 MG TbDL Take 4 mg by mouth every 8 (eight) hours as needed.      potassium chloride SA (K-DUR,KLOR-CON) 20 MEQ tablet Take 1 tablet (20 mEq total) by mouth once daily. Take with food 90 tablet 3    promethazine (PHENERGAN) 25 MG tablet Take 25 mg by mouth every 6 (six) hours as needed.      promethazine-dextromethorphan (PROMETHAZINE-DM) 6.25-15 mg/5 mL Syrp Take 5 mLs by mouth nightly as needed (Cough). 120 mL 0    propranoloL (INDERAL LA) 80 MG 24 hr capsule TAKE 1 CAPSULE EVERY DAY 90 capsule 0    rosuvastatin (CRESTOR) 5 MG tablet TAKE 1 TABLET EVERY DAY 90 tablet 3    traMADoL (ULTRAM) 50 mg tablet Take 50 mg by mouth every 6 (six) hours as needed.      [DISCONTINUED] amLODIPine (NORVASC) 5  MG tablet Take 5 mg by mouth once daily.       Current Facility-Administered Medications on File Prior to Visit   Medication Dose Route Frequency Provider Last Rate Last Admin    triamcinolone acetonide injection 40 mg  40 mg Intra-articular  Mark Rashid MD   40 mg at 10/30/20 0840    triamcinolone acetonide injection 40 mg  40 mg Intra-articular  Mark Rashid MD   40 mg at 10/30/20 0840       Medications have been reviewed and reconciled with patient at visit today.    Barriers to medications present (no )    Adverse reactions to current medications (no)    Over the counter medications reviewed (Yes) and if needed added to active Medication list.    Exam:  Vitals:    11/11/22 0714   BP: 112/76   Pulse: 87   Resp: 18   Temp: 98.3 °F (36.8 °C)     Weight: 79.4 kg (175 lb 0.7 oz)   Body mass index is 29.13 kg/m².      Physical Exam  Vitals reviewed.   Constitutional:       General: She is not in acute distress.     Appearance: Normal appearance.   HENT:      Head: Normocephalic and atraumatic.      Right Ear: External ear normal.      Left Ear: External ear normal.      Nose: Nose normal.   Eyes:      General:         Right eye: No discharge.         Left eye: No discharge.      Pupils: Pupils are equal, round, and reactive to light.   Neck:      Thyroid: No thyromegaly.   Cardiovascular:      Rate and Rhythm: Normal rate and regular rhythm.      Heart sounds: Normal heart sounds. No murmur heard.  Pulmonary:      Effort: Pulmonary effort is normal. No respiratory distress.      Breath sounds: Normal breath sounds. No wheezing.   Abdominal:      General: Bowel sounds are normal. There is no distension.      Palpations: Abdomen is soft.      Tenderness: There is no abdominal tenderness.   Musculoskeletal:         General: Normal range of motion.      Cervical back: Normal range of motion and neck supple.   Skin:     General: Skin is warm and dry.      Findings: No rash.   Neurological:      Mental  Status: She is alert and oriented to person, place, and time.      Coordination: Coordination normal.   Psychiatric:         Behavior: Behavior normal.       Laboratory Reviewed: (Yes)  Lab Results   Component Value Date    WBC 8.15 02/08/2022    HGB 10.6 (L) 02/08/2022    HCT 36.4 (L) 02/08/2022     02/08/2022    CHOL 131 07/07/2021    TRIG 130 07/07/2021    HDL 43 07/07/2021    ALT 11 07/07/2021    AST 16 07/07/2021     07/07/2021    K 3.7 07/07/2021     07/07/2021    CREATININE 0.8 08/09/2022    BUN 16 07/07/2021    CO2 27 07/07/2021    HGBA1C 6.4 (H) 08/18/2022       Assessment:  The primary encounter diagnosis was Type 2 diabetes mellitus with diabetic cataract, without long-term current use of insulin. Diagnoses of Drug-induced immunodeficiency and Routine physical examination were also pertinent to this visit.    Plan:  Type 2 diabetes mellitus with diabetic cataract, without long-term current use of insulin-controlled  -     Microalbumin/Creatinine Ratio, Urine    Drug-induced immunodeficiency-monitor labs   Followed by hematology/oncology     Routine physical examination    -Patient's lab results were reviewed and discussed with patient  -Treatment options and alternatives were discussed with the patient. Patient expressed understanding. Patient was given the opportunity to ask questions and be an active participant in their medical care. Patient had no further questions or concerns at this time.   -Documentation of patient's health and condition was obtained from family member who was present during visit.  -Patient is an overall moderate risk for health complications from their medical conditions.     Follow up: follow up 6 months      Care Plan/Goals: Reviewed N/A   Goals    None             After visit summary printed and given to patient upon discharge.  Patient goals and care plan are included in After visit summary.

## 2022-11-12 LAB
ALBUMIN/CREAT UR: 11 UG/MG (ref 0–30)
CREAT UR-MCNC: 91 MG/DL (ref 15–325)
MICROALBUMIN UR DL<=1MG/L-MCNC: 10 UG/ML

## 2022-11-14 ENCOUNTER — TELEPHONE (OUTPATIENT)
Dept: ADMINISTRATIVE | Facility: HOSPITAL | Age: 67
End: 2022-11-14
Payer: MEDICARE

## 2022-11-21 ENCOUNTER — IMMUNIZATION (OUTPATIENT)
Dept: PHARMACY | Facility: CLINIC | Age: 67
End: 2022-11-21
Payer: MEDICARE

## 2022-11-21 ENCOUNTER — PATIENT OUTREACH (OUTPATIENT)
Dept: ADMINISTRATIVE | Facility: HOSPITAL | Age: 67
End: 2022-11-21
Payer: MEDICARE

## 2022-11-21 ENCOUNTER — OFFICE VISIT (OUTPATIENT)
Dept: OPHTHALMOLOGY | Facility: CLINIC | Age: 67
End: 2022-11-21
Payer: MEDICARE

## 2022-11-21 DIAGNOSIS — H40.1133 PRIMARY OPEN ANGLE GLAUCOMA OF BOTH EYES, SEVERE STAGE: Primary | ICD-10-CM

## 2022-11-21 DIAGNOSIS — Z96.1 PSEUDOPHAKIA: ICD-10-CM

## 2022-11-21 PROCEDURE — 3066F PR DOCUMENTATION OF TREATMENT FOR NEPHROPATHY: ICD-10-PCS | Mod: CPTII,S$GLB,, | Performed by: OPHTHALMOLOGY

## 2022-11-21 PROCEDURE — 99214 OFFICE O/P EST MOD 30 MIN: CPT | Mod: S$GLB,,, | Performed by: OPHTHALMOLOGY

## 2022-11-21 PROCEDURE — 3044F HG A1C LEVEL LT 7.0%: CPT | Mod: CPTII,S$GLB,, | Performed by: OPHTHALMOLOGY

## 2022-11-21 PROCEDURE — 1159F MED LIST DOCD IN RCRD: CPT | Mod: CPTII,S$GLB,, | Performed by: OPHTHALMOLOGY

## 2022-11-21 PROCEDURE — 92133 POSTERIOR SEGMENT OCT OPTIC NERVE(OCULAR COHERENCE TOMOGRAPHY) - OU - BOTH EYES: ICD-10-PCS | Mod: S$GLB,,, | Performed by: OPHTHALMOLOGY

## 2022-11-21 PROCEDURE — 1160F RVW MEDS BY RX/DR IN RCRD: CPT | Mod: CPTII,S$GLB,, | Performed by: OPHTHALMOLOGY

## 2022-11-21 PROCEDURE — 92133 CPTRZD OPH DX IMG PST SGM ON: CPT | Mod: S$GLB,,, | Performed by: OPHTHALMOLOGY

## 2022-11-21 PROCEDURE — 3044F PR MOST RECENT HEMOGLOBIN A1C LEVEL <7.0%: ICD-10-PCS | Mod: CPTII,S$GLB,, | Performed by: OPHTHALMOLOGY

## 2022-11-21 PROCEDURE — 3061F PR NEG MICROALBUMINURIA RESULT DOCUMENTED/REVIEW: ICD-10-PCS | Mod: CPTII,S$GLB,, | Performed by: OPHTHALMOLOGY

## 2022-11-21 PROCEDURE — 99999 PR PBB SHADOW E&M-EST. PATIENT-LVL III: CPT | Mod: PBBFAC,,, | Performed by: OPHTHALMOLOGY

## 2022-11-21 PROCEDURE — 1159F PR MEDICATION LIST DOCUMENTED IN MEDICAL RECORD: ICD-10-PCS | Mod: CPTII,S$GLB,, | Performed by: OPHTHALMOLOGY

## 2022-11-21 PROCEDURE — 3061F NEG MICROALBUMINURIA REV: CPT | Mod: CPTII,S$GLB,, | Performed by: OPHTHALMOLOGY

## 2022-11-21 PROCEDURE — 99999 PR PBB SHADOW E&M-EST. PATIENT-LVL III: ICD-10-PCS | Mod: PBBFAC,,, | Performed by: OPHTHALMOLOGY

## 2022-11-21 PROCEDURE — 99214 PR OFFICE/OUTPT VISIT, EST, LEVL IV, 30-39 MIN: ICD-10-PCS | Mod: S$GLB,,, | Performed by: OPHTHALMOLOGY

## 2022-11-21 PROCEDURE — 1160F PR REVIEW ALL MEDS BY PRESCRIBER/CLIN PHARMACIST DOCUMENTED: ICD-10-PCS | Mod: CPTII,S$GLB,, | Performed by: OPHTHALMOLOGY

## 2022-11-21 PROCEDURE — 3066F NEPHROPATHY DOC TX: CPT | Mod: CPTII,S$GLB,, | Performed by: OPHTHALMOLOGY

## 2022-11-21 PROCEDURE — 4010F PR ACE/ARB THEARPY RXD/TAKEN: ICD-10-PCS | Mod: CPTII,S$GLB,, | Performed by: OPHTHALMOLOGY

## 2022-11-21 PROCEDURE — 4010F ACE/ARB THERAPY RXD/TAKEN: CPT | Mod: CPTII,S$GLB,, | Performed by: OPHTHALMOLOGY

## 2022-11-21 NOTE — PROGRESS NOTES
SUBJECTIVE  Raquelchris Pickard is 67 y.o. female  Uncorrected distance visual acuity was 20/25 -2 in the right eye and 20/25 -2 in the left eye.   Chief Complaint   Patient presents with    Glaucoma     Pt reports for IOP and gOCT, no pain or irritations. Pt states 100% compliance with COSOPT BID OU   ALPHAGAN BID OU   LATANOPROST QHS OU            HPI     Glaucoma     Additional comments: Pt reports for IOP and gOCT, no pain or irritations.   Pt states 100% compliance with COSOPT BID OU   ALPHAGAN BID OU   LATANOPROST QHS OU             Comments    1. Severe COAG DX 7-10 (Tmax 23/25) Goal = 16   SLT OS 4/23/14(18-16)   SLT OD 2/12/14(17-16)   HYPEREMIA WITH XALATAN AND TRAVATAN      2. DM SINCE 2012 NO DBR   3. PCIOL w/ goniotomy OS 6/16/21 (+19.0 SN60WF)   PC IOL + goniotomy OD 7/22/20 (+18.5 SN60WF)   4. Dry Eyes   5. Floater OS    COSOPT BID OU   ALPHAGAN BID OU   LATANOPROST QHS OU    [place] BID OU prn            Last edited by Mninie aGnnon MA on 11/21/2022  8:14 AM.         Assessment /Plan :  1. Primary open angle glaucoma of both eyes, severe stage Doing well, intraocular pressure (IOP) within acceptable range relative to target IOP and no evidence of progression. Continue current treatment. Reviewed importance of continued compliance with treatment and follow up.      Patient instructed to continue using the following glaucoma medication as follows:  Latanoprost one drop in each eye nightly, Brimoninide one drop in each eye every 12 hours, and Dorzolamide/Timolol (Cosopt) one drop in each eye every 12 hours    Return to clinic in 4 months  or as needed.  With Dilation and HVF 24-2     2. Pseudophakia  -- Condition stable, no therapeutic change required. Monitoring routinely.

## 2022-11-21 NOTE — LETTER
AUTHORIZATION FOR RELEASE OF   CONFIDENTIAL INFORMATION      We are seeing Raquel Pickard, date of birth 1955, in the clinic at [facility]. Debra Jensen MD is the patient's PCP. Raquel Pickard has an outstanding lab/procedure at the time we reviewed her chart. In order to help keep her health information updated, she has authorized us to request the following medical record(s):        ( X )  MAMMOGRAM                                      (  )  COLONOSCOPY      (  )  PAP SMEAR                                          (  )  OUTSIDE LAB RESULTS     (  )  DEXA SCAN                                          (  )  EYE EXAM            (  )  FOOT EXAM                                          (  )  ENTIRE RECORD     (  )  OUTSIDE IMMUNIZATIONS                 (  )  _______________         Please fax records to [facility], Yolonda D Webb, MD, [phone]     If you have any questions, please contact Angela Hamilton          Patient Name: Raquel Pickard  : 1955  Patient Phone #: [phone]

## 2022-12-02 ENCOUNTER — TELEPHONE (OUTPATIENT)
Dept: ORTHOPEDICS | Facility: CLINIC | Age: 67
End: 2022-12-02
Payer: MEDICARE

## 2022-12-02 ENCOUNTER — OFFICE VISIT (OUTPATIENT)
Dept: ORTHOPEDICS | Facility: CLINIC | Age: 67
End: 2022-12-02
Payer: MEDICARE

## 2022-12-02 VITALS — HEIGHT: 65 IN | BODY MASS INDEX: 29.16 KG/M2 | WEIGHT: 175 LBS

## 2022-12-02 DIAGNOSIS — M67.911 TENDINOPATHY OF ROTATOR CUFF, RIGHT: Primary | ICD-10-CM

## 2022-12-02 DIAGNOSIS — M67.912 TENDINOPATHY OF ROTATOR CUFF, LEFT: ICD-10-CM

## 2022-12-02 PROCEDURE — 1125F AMNT PAIN NOTED PAIN PRSNT: CPT | Mod: CPTII,S$GLB,, | Performed by: PHYSICIAN ASSISTANT

## 2022-12-02 PROCEDURE — 1159F MED LIST DOCD IN RCRD: CPT | Mod: CPTII,S$GLB,, | Performed by: PHYSICIAN ASSISTANT

## 2022-12-02 PROCEDURE — 4010F ACE/ARB THERAPY RXD/TAKEN: CPT | Mod: CPTII,S$GLB,, | Performed by: PHYSICIAN ASSISTANT

## 2022-12-02 PROCEDURE — 99999 PR PBB SHADOW E&M-EST. PATIENT-LVL III: ICD-10-PCS | Mod: PBBFAC,,, | Performed by: PHYSICIAN ASSISTANT

## 2022-12-02 PROCEDURE — 3066F NEPHROPATHY DOC TX: CPT | Mod: CPTII,S$GLB,, | Performed by: PHYSICIAN ASSISTANT

## 2022-12-02 PROCEDURE — 99999 PR PBB SHADOW E&M-EST. PATIENT-LVL III: CPT | Mod: PBBFAC,,, | Performed by: PHYSICIAN ASSISTANT

## 2022-12-02 PROCEDURE — 3044F PR MOST RECENT HEMOGLOBIN A1C LEVEL <7.0%: ICD-10-PCS | Mod: CPTII,S$GLB,, | Performed by: PHYSICIAN ASSISTANT

## 2022-12-02 PROCEDURE — 20610 LARGE JOINT ASPIRATION/INJECTION: BILATERAL SUBACROMIAL BURSA: ICD-10-PCS | Mod: 50,S$GLB,, | Performed by: PHYSICIAN ASSISTANT

## 2022-12-02 PROCEDURE — 1160F PR REVIEW ALL MEDS BY PRESCRIBER/CLIN PHARMACIST DOCUMENTED: ICD-10-PCS | Mod: CPTII,S$GLB,, | Performed by: PHYSICIAN ASSISTANT

## 2022-12-02 PROCEDURE — 1125F PR PAIN SEVERITY QUANTIFIED, PAIN PRESENT: ICD-10-PCS | Mod: CPTII,S$GLB,, | Performed by: PHYSICIAN ASSISTANT

## 2022-12-02 PROCEDURE — 3288F PR FALLS RISK ASSESSMENT DOCUMENTED: ICD-10-PCS | Mod: CPTII,S$GLB,, | Performed by: PHYSICIAN ASSISTANT

## 2022-12-02 PROCEDURE — 1160F RVW MEDS BY RX/DR IN RCRD: CPT | Mod: CPTII,S$GLB,, | Performed by: PHYSICIAN ASSISTANT

## 2022-12-02 PROCEDURE — 3066F PR DOCUMENTATION OF TREATMENT FOR NEPHROPATHY: ICD-10-PCS | Mod: CPTII,S$GLB,, | Performed by: PHYSICIAN ASSISTANT

## 2022-12-02 PROCEDURE — 3061F PR NEG MICROALBUMINURIA RESULT DOCUMENTED/REVIEW: ICD-10-PCS | Mod: CPTII,S$GLB,, | Performed by: PHYSICIAN ASSISTANT

## 2022-12-02 PROCEDURE — 3288F FALL RISK ASSESSMENT DOCD: CPT | Mod: CPTII,S$GLB,, | Performed by: PHYSICIAN ASSISTANT

## 2022-12-02 PROCEDURE — 3008F BODY MASS INDEX DOCD: CPT | Mod: CPTII,S$GLB,, | Performed by: PHYSICIAN ASSISTANT

## 2022-12-02 PROCEDURE — 3008F PR BODY MASS INDEX (BMI) DOCUMENTED: ICD-10-PCS | Mod: CPTII,S$GLB,, | Performed by: PHYSICIAN ASSISTANT

## 2022-12-02 PROCEDURE — 1101F PT FALLS ASSESS-DOCD LE1/YR: CPT | Mod: CPTII,S$GLB,, | Performed by: PHYSICIAN ASSISTANT

## 2022-12-02 PROCEDURE — 3061F NEG MICROALBUMINURIA REV: CPT | Mod: CPTII,S$GLB,, | Performed by: PHYSICIAN ASSISTANT

## 2022-12-02 PROCEDURE — 1159F PR MEDICATION LIST DOCUMENTED IN MEDICAL RECORD: ICD-10-PCS | Mod: CPTII,S$GLB,, | Performed by: PHYSICIAN ASSISTANT

## 2022-12-02 PROCEDURE — 99213 OFFICE O/P EST LOW 20 MIN: CPT | Mod: 25,S$GLB,, | Performed by: PHYSICIAN ASSISTANT

## 2022-12-02 PROCEDURE — 4010F PR ACE/ARB THEARPY RXD/TAKEN: ICD-10-PCS | Mod: CPTII,S$GLB,, | Performed by: PHYSICIAN ASSISTANT

## 2022-12-02 PROCEDURE — 1101F PR PT FALLS ASSESS DOC 0-1 FALLS W/OUT INJ PAST YR: ICD-10-PCS | Mod: CPTII,S$GLB,, | Performed by: PHYSICIAN ASSISTANT

## 2022-12-02 PROCEDURE — 3044F HG A1C LEVEL LT 7.0%: CPT | Mod: CPTII,S$GLB,, | Performed by: PHYSICIAN ASSISTANT

## 2022-12-02 PROCEDURE — 3072F PR LOW RISK FOR RETINOPATHY: ICD-10-PCS | Mod: CPTII,S$GLB,, | Performed by: PHYSICIAN ASSISTANT

## 2022-12-02 PROCEDURE — 3072F LOW RISK FOR RETINOPATHY: CPT | Mod: CPTII,S$GLB,, | Performed by: PHYSICIAN ASSISTANT

## 2022-12-02 PROCEDURE — 20610 DRAIN/INJ JOINT/BURSA W/O US: CPT | Mod: 50,S$GLB,, | Performed by: PHYSICIAN ASSISTANT

## 2022-12-02 PROCEDURE — 99213 PR OFFICE/OUTPT VISIT, EST, LEVL III, 20-29 MIN: ICD-10-PCS | Mod: 25,S$GLB,, | Performed by: PHYSICIAN ASSISTANT

## 2022-12-02 RX ORDER — TRIAMCINOLONE ACETONIDE 40 MG/ML
40 INJECTION, SUSPENSION INTRA-ARTICULAR; INTRAMUSCULAR
Status: DISCONTINUED | OUTPATIENT
Start: 2022-12-02 | End: 2022-12-02 | Stop reason: HOSPADM

## 2022-12-02 RX ADMIN — TRIAMCINOLONE ACETONIDE 40 MG: 40 INJECTION, SUSPENSION INTRA-ARTICULAR; INTRAMUSCULAR at 01:12

## 2022-12-02 NOTE — PROGRESS NOTES
Orthopaedic Follow-Up Visit    Last Appointment: 04/08/2022  Diagnosis: Rotator cuff tendinitis, left          Rotator cuff tendinitis, right  Prior Procedure: Bilateral corticosteroid injection    Raquel Pickard is a 67 y.o. female who is here for f/u evaluation of bilateral shoulder pain. The patient was last seen here by Dr. Lundy on 04/08/2022 at which point we decided to proceed with bilateral corticosteroid injections prior to considering further treatment options. The patient returns today reporting that the symptoms have returned and is interested in discussing further treatment options.     To review her history, Raquel Pickard is a 66 y.o. right-hand dominant female who presents with bilateral shoulder pain and dysfunction. Onset of the symptoms was several years ago, worsened over the past 2 months. Inciting event: No specific injury, gradual onset of symptoms. Current symptoms include pain in the shoulders, localized laterally, pain quality is intermittent aching, sharp, and shooting pain. Pain is aggravated by pressure, lifting, overuse, and nighttime.     Patient's medications, allergies, past medical, surgical, social and family histories were reviewed and updated as appropriate.    Review of Systems   All systems reviewed were negative.  Specifically, the patient denies fever, chills, weight loss, chest pain, shortness of breath, or dyspnea on exertion.      Past Medical History:   Diagnosis Date    Breast cancer 2011    L    Diabetes mellitus     Glaucoma     Hyperlipidemia     Hypertension     MVP (mitral valve prolapse)     Obesity        Past Surgical History:   Procedure Laterality Date    BREAST LUMPECTOMY      CATARACT EXTRACTION W/  INTRAOCULAR LENS IMPLANT Right 07/22/2020    w/ goniotomy    CATARACT EXTRACTION W/  INTRAOCULAR LENS IMPLANT Left 06/16/2021    w/ goniotomy    COLONOSCOPY W/ POLYPECTOMY  04/18/2017    DR. CHARLENE NATH/GI JOIE. POLYP X 3. REPEAT 5 YRS.    ENDOBRONCHIAL  ULTRASOUND Bilateral 8/25/2022    Procedure: ENDOBRONCHIAL ULTRASOUND (EBUS);  Surgeon: Musa Phillips MD;  Location: [facility] ENDO;  Service: Pulmonary;  Laterality: Bilateral;    PCIOL Right     DR. MENDOZA    SLT - OU - BOTH EYES      TRIGGER FINGER RELEASE      Thumb    TUBAL LIGATION         Patient's Medications   New Prescriptions    No medications on file   Previous Medications    ACCU-CHEK CIRILO PLUS TEST STRP STRP    CHECK BLOOD GLUCOSE ONE TIME DAILY    BENAZEPRIL-HYDROCHLORTHIAZIDE (LOTENSIN HCT) 20-12.5 MG PER TABLET    TAKE 1 TABLET EVERY DAY    BLOOD-GLUCOSE METER KIT    To check BG 1 times daily, Accuchek Cirilo meter    BRIMONIDINE 0.15 % OPTH DROP (ALPHAGAN) 0.15 % OPHTHALMIC SOLUTION    INSTILL 1 DROP INTO BOTH EYES TWICE DAILY    CYCLOBENZAPRINE (FLEXERIL) 10 MG TABLET    Take 1 tablet as needed at night for muscle spasm    DORZOLAMIDE-TIMOLOL 2-0.5% (COSOPT) 22.3-6.8 MG/ML OPHTHALMIC SOLUTION    Place 1 drop into both eyes 2 (two) times daily. Dispense 90 days supply    FLU VAC 2022 [id number],PF, (FLUAD QUAD 2022-23,65Y UP,,PF,) 60 MCG (15 MCG X 4)/0.5 ML SYRG    Inject 0.5 mLs into the muscle.    FLUTICASONE PROPIONATE (FLONASE) 50 MCG/ACTUATION NASAL SPRAY    2 sprays (100 mcg total) by Each Nostril route once daily.    GLIMEPIRIDE (AMARYL) 2 MG TABLET    TAKE 1 TABLET DAILY 30 MINUTES BEFORE A MEAL AS NEEDED FOR BLOOD SUGAR GREATER THAN 150. .    HYDROXYCHLOROQUINE (PLAQUENIL) 200 MG TABLET    Take 1 tablet (200 mg total) by mouth 2 (two) times daily.    IBUPROFEN (ADVIL,MOTRIN) 800 MG TABLET    Take 800 mg by mouth every 6 (six) hours as needed for Pain.    LANCETS (ACCU-CHEK SOFTCLIX LANCETS) MISC    USE  TO  CHECK BLOOD GLUCOSE ONE TIME DAILY    LATANOPROST 0.005 % OPHTHALMIC SOLUTION    INSTILL 1 DROP INTO BOTH EYES EVERY EVENING    METFORMIN (GLUCOPHAGE-XR) 500 MG ER 24HR TABLET    TAKE 3 TABLETS EVERY DAY    ONDANSETRON (ZOFRAN-ODT) 4 MG TBDL    Take 4 mg by mouth every 8 (eight)  hours as needed.    POTASSIUM CHLORIDE SA (K-DUR,KLOR-CON) 20 MEQ TABLET    Take 1 tablet (20 mEq total) by mouth once daily. Take with food    PROMETHAZINE (PHENERGAN) 25 MG TABLET    Take 25 mg by mouth every 6 (six) hours as needed.    PROMETHAZINE-DEXTROMETHORPHAN (PROMETHAZINE-DM) 6.25-15 MG/5 ML SYRP    Take 5 mLs by mouth nightly as needed (Cough).    PROPRANOLOL (INDERAL LA) 80 MG 24 HR CAPSULE    TAKE 1 CAPSULE EVERY DAY    ROSUVASTATIN (CRESTOR) 5 MG TABLET    TAKE 1 TABLET EVERY DAY    TRAMADOL (ULTRAM) 50 MG TABLET    Take 50 mg by mouth every 6 (six) hours as needed.   Modified Medications    No medications on file   Discontinued Medications    No medications on file       Family History   Problem Relation Age of Onset    Glaucoma Brother     Macular degeneration Brother     Esophageal cancer Father     Heart disease Father     Hypertension Father     Liver cancer Mother     Cancer Mother     Hypertension Sister     Cataracts Sister     Diabetes Sister     Cholelithiasis Sister     Blindness Neg Hx     Retinal detachment Neg Hx     Strabismus Neg Hx     Stroke Neg Hx     Thyroid disease Neg Hx        Review of patient's allergies indicates:   Allergen Reactions    Travatan z [travoprost]          Objective:      Physical Exam  Patient is alert and oriented, no distress. Skin is intact. Neuro is normal with no focal motor or sensory findings.    Cervical exam is unremarkable. Intact cervical ROM. Negative Spurling's test    Physical Exam:  RIGHT    LEFT    Scap Dyskinesis/Winging (-)    (-)    Tenderness:          Greater Tuberosity  +    +  Bicipital Groove  (-)    (-)  AC joint   (-)    (-)  Other:     ROM:  Forward Elevation 160    170  Abduction  90    90  ER (at side)  70    70  IR   T8    T8    Strength:   Supraspinatus  4/5    4/5  Infraspinatus  5/5    5/5  Subscap / IR  5/5    5/5     Special Tests:     Neer:    +    +   Jerez:   +    +   SS Stress:   +    +   Bear  Hug:   (-)    (-)   Wellman's:   +    +   Resisted Thrower's:   +    (-)   Cross Arm Abduction:  (-)    (-)    Neurovascular examination  - Motor grossly intact bilaterally to shoulder abduction, elbow flexion and extension, wrist flexion and extension, and intrinsic hand musculature  - Sensation intact to light touch bilaterally in axillary, median, radial, and ulnar distributions  - Symmetrical radial pulses    Imaging:    XR Results:  Results for orders placed during the hospital encounter of 02/08/22    X-Ray Shoulder Complete Bilateral    Narrative  EXAMINATION:  XR SHOULDER COMPLETE 2 OR MORE VIEWS BILATERAL    CLINICAL HISTORY:  Pain in right shoulder    TECHNIQUE:  Four views were obtained of the bilateral shoulders.    COMPARISON:  None    FINDINGS:  Small ossific density noted adjacent to the margins of the greater tuberosity on the right which can be associated with calcific tendinosis.  No acute fractures or dislocations visualized.  There is mild-to-moderate bilateral AC joint arthrosis.  Mild degenerative marginal productive changes are present at the glenohumeral joints bilaterally.    Impression  As above      Electronically signed by: Lito Malone MD  Date:    02/08/2022  Time:    10:52      MRI Results:  Results for orders placed during the hospital encounter of 04/07/22    MRI Shoulder Without Contrast Left    Narrative  EXAMINATION:  MRI SHOULDER WITHOUT CONTRAST LEFT    CLINICAL HISTORY:  Shoulder pain, rotator cuff disorder suspected, xray done;  Pain in left shoulder    TECHNIQUE:  Multiplanar multisequence images were performed through the left shoulder.  Contrast was not administered    COMPARISON:  None    FINDINGS:  Tendinosis and low-grade partial-thickness tearing of the supraspinatus and infraspinatus tendons.  The subscapularis and teres minor tendons are grossly intact.  Partial-thickness tearing of the long head of the biceps tendon.    Glenohumeral osteoarthritis with anterior  superior and superior labral tearing.    Osteoarthritis of the acromioclavicular joint with subacromial enthesophyte.    Small joint effusion without significant bursal fluid collection.  No acute stress or traumatic fracture is present.  No muscle atrophy or edema.    Impression  Tendinosis and low-grade partial-thickness tearing of the supraspinatus and infraspinatus tendons.    Partial-thickness tearing of the intra-articular long head of the biceps tendon.    Superior and anterior superior labral tear.    Osteoarthritis.    Nonspecific supraclavicular lymph nodes measuring up to 11 x 8 mm in size.      Electronically signed by: Konrad Tejeda  Date:    04/07/2022  Time:    17:49      CT Results:  No results found for this or any previous visit.      Physician read: I agree with the above impression.    Assessment/Plan:   Raquel Pickard is a 67 y.o. female with left shoulder rotator cuff and long head biceps tendinosis    Plan:    Discussed diagnosis and treatment options with patient today.  Her history, physical exam, and imaging is consistent with bilateral rotator cuff tendinitis  She has a previous MRI from April that shows evidence of a partial rotator cuff tear as well as tendinosis of her rotator cuff in her left shoulder.  She got bilateral subacromial space steroid injections back in April, these provided her with greater than 6 months of pain relief.  I recommend we proceed with bilateral corticosteroid injections into the subacromial space.  Patient tolerated the procedure well with no immediate complications.  Follow up with me as needed        Kiera Linton PA-C  Sports Medicine Physician Assistant       Disclaimer: This note was prepared using a voice recognition system and is likely to have sound alike errors within the text.

## 2022-12-02 NOTE — TELEPHONE ENCOUNTER
Spoke with patient and was able to get her scheduled today with Kiera Linton PA-C, Dr. Lundy's PA. Patient was grateful for the call. -NS    ----- Message from Flakita Flaherty sent at 12/2/2022  8:13 AM CST -----  Contact: Raquel  Type:  Same Day Appointment Request    Caller is requesting a same day appointment.  Caller declined first available appointment listed below.    Name of Caller:Raquel  When is the first available appointment? 12/2022  Symptoms: Pain in shoulders needing Cortisone injection today as she is having surgery on 12/9/22 and was told she'd have to have it today  Best Call Back Number: [phone]  Additional Information:

## 2022-12-02 NOTE — PROCEDURES
Large Joint Aspiration/Injection: bilateral subacromial bursa    Date/Time: 12/2/2022 1:30 PM  Performed by: Kiera Linton PA-C  Authorized by: Kiera Linton PA-C     Consent Done?:  Yes (Verbal)  Indications:  Pain  Site marked: the procedure site was marked    Timeout: prior to procedure the correct patient, procedure, and site was verified    Prep: patient was prepped and draped in usual sterile fashion      Local anesthesia used?: Yes    Anesthesia:  Local infiltration  Local anesthetic:  Lidocaine 1% without epinephrine    Details:  Needle Size:  22 G  Ultrasonic Guidance for needle placement?: No    Approach:  Posterior  Location:  Shoulder  Laterality:  Bilateral  Site:  Bilateral subacromial bursa  Medications (Right):  40 mg triamcinolone acetonide 40 mg/mL  Medications (Left):  40 mg triamcinolone acetonide 40 mg/mL  Patient tolerance:  Patient tolerated the procedure well with no immediate complications

## 2023-02-03 DIAGNOSIS — I10 ESSENTIAL HYPERTENSION: Primary | ICD-10-CM

## 2023-02-03 DIAGNOSIS — E87.6 DIURETIC-INDUCED HYPOKALEMIA: ICD-10-CM

## 2023-02-03 DIAGNOSIS — T50.2X5A DIURETIC-INDUCED HYPOKALEMIA: ICD-10-CM

## 2023-02-03 DIAGNOSIS — J06.9 URI WITH COUGH AND CONGESTION: ICD-10-CM

## 2023-02-03 DIAGNOSIS — E11.9 TYPE 2 DIABETES MELLITUS WITHOUT COMPLICATION, WITHOUT LONG-TERM CURRENT USE OF INSULIN: ICD-10-CM

## 2023-02-03 DIAGNOSIS — Z87.898 HISTORY OF PALPITATIONS: ICD-10-CM

## 2023-02-03 NOTE — TELEPHONE ENCOUNTER
----- Message from Michelle Winslow sent at 2/3/2023  2:32 PM CST -----  Please call pt @ [phone] regarding medication and insurance.

## 2023-02-05 RX ORDER — GLIMEPIRIDE 2 MG/1
TABLET ORAL
Qty: 90 TABLET | Refills: 1 | Status: SHIPPED | OUTPATIENT
Start: 2023-02-05 | End: 2023-06-08 | Stop reason: SDUPTHER

## 2023-02-05 RX ORDER — METFORMIN HYDROCHLORIDE 500 MG/1
1500 TABLET, EXTENDED RELEASE ORAL DAILY
Qty: 270 TABLET | Refills: 1 | Status: SHIPPED | OUTPATIENT
Start: 2023-02-05 | End: 2023-11-30 | Stop reason: SDUPTHER

## 2023-02-05 RX ORDER — FLUTICASONE PROPIONATE 50 MCG
2 SPRAY, SUSPENSION (ML) NASAL DAILY
Qty: 9.9 ML | Refills: 1 | Status: SHIPPED | OUTPATIENT
Start: 2023-02-05

## 2023-02-05 RX ORDER — BENAZEPRIL HYDROCHLORIDE AND HYDROCHLOROTHIAZIDE 20; 12.5 MG/1; MG/1
1 TABLET ORAL DAILY
Qty: 90 TABLET | Refills: 1 | Status: SHIPPED | OUTPATIENT
Start: 2023-02-05 | End: 2023-08-29 | Stop reason: SDUPTHER

## 2023-02-05 RX ORDER — PROPRANOLOL HYDROCHLORIDE 80 MG/1
80 CAPSULE, EXTENDED RELEASE ORAL DAILY
Qty: 90 CAPSULE | Refills: 1 | Status: SHIPPED | OUTPATIENT
Start: 2023-02-05 | End: 2023-11-30 | Stop reason: SDUPTHER

## 2023-02-05 RX ORDER — ROSUVASTATIN CALCIUM 5 MG/1
5 TABLET, COATED ORAL DAILY
Qty: 90 TABLET | Refills: 1 | Status: SHIPPED | OUTPATIENT
Start: 2023-02-05 | End: 2023-11-30 | Stop reason: SDUPTHER

## 2023-02-05 RX ORDER — POTASSIUM CHLORIDE 20 MEQ/1
20 TABLET, EXTENDED RELEASE ORAL DAILY
Qty: 90 TABLET | Refills: 1 | Status: SHIPPED | OUTPATIENT
Start: 2023-02-05

## 2023-03-07 DIAGNOSIS — H40.1133 PRIMARY OPEN ANGLE GLAUCOMA OF BOTH EYES, SEVERE STAGE: ICD-10-CM

## 2023-03-07 RX ORDER — LATANOPROST 50 UG/ML
SOLUTION/ DROPS OPHTHALMIC
Qty: 7.5 ML | Refills: 3 | Status: SHIPPED | OUTPATIENT
Start: 2023-03-07 | End: 2023-03-27 | Stop reason: SDUPTHER

## 2023-03-07 RX ORDER — BRIMONIDINE TARTRATE 1.5 MG/ML
1 SOLUTION/ DROPS OPHTHALMIC 2 TIMES DAILY
Qty: 20 ML | Refills: 3 | Status: SHIPPED | OUTPATIENT
Start: 2023-03-07 | End: 2023-03-27 | Stop reason: SDUPTHER

## 2023-03-07 RX ORDER — DORZOLAMIDE HYDROCHLORIDE AND TIMOLOL MALEATE 20; 5 MG/ML; MG/ML
1 SOLUTION/ DROPS OPHTHALMIC 2 TIMES DAILY
Qty: 30 ML | Refills: 4 | Status: SHIPPED | OUTPATIENT
Start: 2023-03-07 | End: 2023-03-27 | Stop reason: SDUPTHER

## 2023-03-27 ENCOUNTER — OFFICE VISIT (OUTPATIENT)
Dept: OPHTHALMOLOGY | Facility: CLINIC | Age: 68
End: 2023-03-27
Payer: MEDICARE

## 2023-03-27 DIAGNOSIS — H40.1133 PRIMARY OPEN ANGLE GLAUCOMA OF BOTH EYES, SEVERE STAGE: Primary | ICD-10-CM

## 2023-03-27 DIAGNOSIS — Z96.1 PSEUDOPHAKIA: ICD-10-CM

## 2023-03-27 DIAGNOSIS — E11.9 DIABETES MELLITUS TYPE 2 WITHOUT RETINOPATHY: ICD-10-CM

## 2023-03-27 PROCEDURE — 92014 COMPRE OPH EXAM EST PT 1/>: CPT | Mod: S$GLB,,, | Performed by: OPHTHALMOLOGY

## 2023-03-27 PROCEDURE — 4010F ACE/ARB THERAPY RXD/TAKEN: CPT | Mod: CPTII,S$GLB,, | Performed by: OPHTHALMOLOGY

## 2023-03-27 PROCEDURE — 92083 EXTENDED VISUAL FIELD XM: CPT | Mod: S$GLB,,, | Performed by: OPHTHALMOLOGY

## 2023-03-27 PROCEDURE — 99999 PR PBB SHADOW E&M-EST. PATIENT-LVL III: CPT | Mod: PBBFAC,,, | Performed by: OPHTHALMOLOGY

## 2023-03-27 PROCEDURE — 1159F MED LIST DOCD IN RCRD: CPT | Mod: CPTII,S$GLB,, | Performed by: OPHTHALMOLOGY

## 2023-03-27 PROCEDURE — 2023F PR DILATED RETINAL EXAM W/O EVID OF RETINOPATHY: ICD-10-PCS | Mod: CPTII,S$GLB,, | Performed by: OPHTHALMOLOGY

## 2023-03-27 PROCEDURE — 92083 HUMPHREY VISUAL FIELD - OU - BOTH EYES: ICD-10-PCS | Mod: S$GLB,,, | Performed by: OPHTHALMOLOGY

## 2023-03-27 PROCEDURE — 2023F DILAT RTA XM W/O RTNOPTHY: CPT | Mod: CPTII,S$GLB,, | Performed by: OPHTHALMOLOGY

## 2023-03-27 PROCEDURE — 1160F RVW MEDS BY RX/DR IN RCRD: CPT | Mod: CPTII,S$GLB,, | Performed by: OPHTHALMOLOGY

## 2023-03-27 PROCEDURE — 99999 PR PBB SHADOW E&M-EST. PATIENT-LVL III: ICD-10-PCS | Mod: PBBFAC,,, | Performed by: OPHTHALMOLOGY

## 2023-03-27 PROCEDURE — 1159F PR MEDICATION LIST DOCUMENTED IN MEDICAL RECORD: ICD-10-PCS | Mod: CPTII,S$GLB,, | Performed by: OPHTHALMOLOGY

## 2023-03-27 PROCEDURE — 4010F PR ACE/ARB THEARPY RXD/TAKEN: ICD-10-PCS | Mod: CPTII,S$GLB,, | Performed by: OPHTHALMOLOGY

## 2023-03-27 PROCEDURE — 92014 PR EYE EXAM, EST PATIENT,COMPREHESV: ICD-10-PCS | Mod: S$GLB,,, | Performed by: OPHTHALMOLOGY

## 2023-03-27 PROCEDURE — 1160F PR REVIEW ALL MEDS BY PRESCRIBER/CLIN PHARMACIST DOCUMENTED: ICD-10-PCS | Mod: CPTII,S$GLB,, | Performed by: OPHTHALMOLOGY

## 2023-03-27 RX ORDER — DORZOLAMIDE HYDROCHLORIDE AND TIMOLOL MALEATE 20; 5 MG/ML; MG/ML
1 SOLUTION/ DROPS OPHTHALMIC 2 TIMES DAILY
Qty: 30 ML | Refills: 4 | Status: SHIPPED | OUTPATIENT
Start: 2023-03-27 | End: 2024-03-25 | Stop reason: SDUPTHER

## 2023-03-27 RX ORDER — BRIMONIDINE TARTRATE 1.5 MG/ML
1 SOLUTION/ DROPS OPHTHALMIC 2 TIMES DAILY
Qty: 20 ML | Refills: 3 | Status: SHIPPED | OUTPATIENT
Start: 2023-03-27 | End: 2024-02-05

## 2023-03-27 RX ORDER — LATANOPROST 50 UG/ML
SOLUTION/ DROPS OPHTHALMIC
Qty: 7.5 ML | Refills: 3 | Status: SHIPPED | OUTPATIENT
Start: 2023-03-27 | End: 2024-03-25 | Stop reason: SDUPTHER

## 2023-03-27 NOTE — PROGRESS NOTES
SUBJECTIVE  Raquelchris Pickard is 67 y.o. female  Uncorrected distance visual acuity was 20/25 +1 in the right eye and 20/25 -1 in the left eye.   Chief Complaint   Patient presents with    Glaucoma     Pt reports for 4m HVF and dil. Denies any pain or irritation. Va stable. 100% compliant with gtts.           HPI     Glaucoma     Additional comments: Pt reports for 4m HVF and dil. Denies any pain or   irritation. Va stable. 100% compliant with gtts.            Comments    1. Severe COAG DX 7-10 (Tmax 23/25) Goal = 16   SLT OS 4/23/14(18-16)   SLT OD 2/12/14(17-16)   HYPEREMIA WITH XALATAN AND TRAVATAN      2. DM SINCE 2012 NO DBR   3. PCIOL w/ goniotomy OS 6/16/21 (+19.0 SN60WF)   PC IOL + goniotomy OD 7/22/20 (+18.5 SN60WF)   4. Dry Eyes   5. Floater OS    COSOPT BID OU   ALPHAGAN BID OU   LATANOPROST QHS OU    [place] BID OU prn            Last edited by Lucas Ramos on 3/27/2023  8:03 AM.         Assessment /Plan :  1. Primary open angle glaucoma of both eyes, severe stage Doing well, intraocular pressure (IOP) within acceptable range relative to target IOP and no evidence of progression. Continue current treatment. Reviewed importance of continued compliance with treatment and follow up.      Patient instructed to continue using the following glaucoma medication as follows:  Latanoprost one drop in each eye nightly, Brimoninide one drop in each eye every 12 hours, and Dorzolamide/Timolol (Cosopt) one drop in each eye every 12 hours    Return to clinic in 4 months  or as needed.  With IOP Check and GOCT     2. Diabetes mellitus type 2 without retinopathy No diabetic retinopathy at this time. Reviewed diabetic eye precautions including avoiding tobacco use,  Good glucose control, and importance of regular follow up.      3. Pseudophakia  -- Condition stable, no therapeutic change required. Monitoring routinely.

## 2023-03-29 ENCOUNTER — OFFICE VISIT (OUTPATIENT)
Dept: ORTHOPEDICS | Facility: CLINIC | Age: 68
End: 2023-03-29
Payer: MEDICARE

## 2023-03-29 VITALS — HEIGHT: 65 IN | BODY MASS INDEX: 29.16 KG/M2 | WEIGHT: 175 LBS

## 2023-03-29 DIAGNOSIS — M67.911 TENDINOPATHY OF ROTATOR CUFF, RIGHT: Primary | ICD-10-CM

## 2023-03-29 DIAGNOSIS — M67.912 TENDINOPATHY OF ROTATOR CUFF, LEFT: ICD-10-CM

## 2023-03-29 PROCEDURE — 99213 OFFICE O/P EST LOW 20 MIN: CPT | Mod: 25,S$GLB,, | Performed by: STUDENT IN AN ORGANIZED HEALTH CARE EDUCATION/TRAINING PROGRAM

## 2023-03-29 PROCEDURE — 99213 PR OFFICE/OUTPT VISIT, EST, LEVL III, 20-29 MIN: ICD-10-PCS | Mod: 25,S$GLB,, | Performed by: STUDENT IN AN ORGANIZED HEALTH CARE EDUCATION/TRAINING PROGRAM

## 2023-03-29 PROCEDURE — 3008F BODY MASS INDEX DOCD: CPT | Mod: CPTII,S$GLB,, | Performed by: STUDENT IN AN ORGANIZED HEALTH CARE EDUCATION/TRAINING PROGRAM

## 2023-03-29 PROCEDURE — 99999 PR PBB SHADOW E&M-EST. PATIENT-LVL III: CPT | Mod: PBBFAC,,, | Performed by: STUDENT IN AN ORGANIZED HEALTH CARE EDUCATION/TRAINING PROGRAM

## 2023-03-29 PROCEDURE — 3072F PR LOW RISK FOR RETINOPATHY: ICD-10-PCS | Mod: CPTII,S$GLB,, | Performed by: STUDENT IN AN ORGANIZED HEALTH CARE EDUCATION/TRAINING PROGRAM

## 2023-03-29 PROCEDURE — 1101F PT FALLS ASSESS-DOCD LE1/YR: CPT | Mod: CPTII,S$GLB,, | Performed by: STUDENT IN AN ORGANIZED HEALTH CARE EDUCATION/TRAINING PROGRAM

## 2023-03-29 PROCEDURE — 1125F AMNT PAIN NOTED PAIN PRSNT: CPT | Mod: CPTII,S$GLB,, | Performed by: STUDENT IN AN ORGANIZED HEALTH CARE EDUCATION/TRAINING PROGRAM

## 2023-03-29 PROCEDURE — 4010F PR ACE/ARB THEARPY RXD/TAKEN: ICD-10-PCS | Mod: CPTII,S$GLB,, | Performed by: STUDENT IN AN ORGANIZED HEALTH CARE EDUCATION/TRAINING PROGRAM

## 2023-03-29 PROCEDURE — 3288F PR FALLS RISK ASSESSMENT DOCUMENTED: ICD-10-PCS | Mod: CPTII,S$GLB,, | Performed by: STUDENT IN AN ORGANIZED HEALTH CARE EDUCATION/TRAINING PROGRAM

## 2023-03-29 PROCEDURE — 3072F LOW RISK FOR RETINOPATHY: CPT | Mod: CPTII,S$GLB,, | Performed by: STUDENT IN AN ORGANIZED HEALTH CARE EDUCATION/TRAINING PROGRAM

## 2023-03-29 PROCEDURE — 1160F RVW MEDS BY RX/DR IN RCRD: CPT | Mod: CPTII,S$GLB,, | Performed by: STUDENT IN AN ORGANIZED HEALTH CARE EDUCATION/TRAINING PROGRAM

## 2023-03-29 PROCEDURE — 20610 DRAIN/INJ JOINT/BURSA W/O US: CPT | Mod: 50,S$GLB,, | Performed by: STUDENT IN AN ORGANIZED HEALTH CARE EDUCATION/TRAINING PROGRAM

## 2023-03-29 PROCEDURE — 1125F PR PAIN SEVERITY QUANTIFIED, PAIN PRESENT: ICD-10-PCS | Mod: CPTII,S$GLB,, | Performed by: STUDENT IN AN ORGANIZED HEALTH CARE EDUCATION/TRAINING PROGRAM

## 2023-03-29 PROCEDURE — 1159F MED LIST DOCD IN RCRD: CPT | Mod: CPTII,S$GLB,, | Performed by: STUDENT IN AN ORGANIZED HEALTH CARE EDUCATION/TRAINING PROGRAM

## 2023-03-29 PROCEDURE — 4010F ACE/ARB THERAPY RXD/TAKEN: CPT | Mod: CPTII,S$GLB,, | Performed by: STUDENT IN AN ORGANIZED HEALTH CARE EDUCATION/TRAINING PROGRAM

## 2023-03-29 PROCEDURE — 99999 PR PBB SHADOW E&M-EST. PATIENT-LVL III: ICD-10-PCS | Mod: PBBFAC,,, | Performed by: STUDENT IN AN ORGANIZED HEALTH CARE EDUCATION/TRAINING PROGRAM

## 2023-03-29 PROCEDURE — 1101F PR PT FALLS ASSESS DOC 0-1 FALLS W/OUT INJ PAST YR: ICD-10-PCS | Mod: CPTII,S$GLB,, | Performed by: STUDENT IN AN ORGANIZED HEALTH CARE EDUCATION/TRAINING PROGRAM

## 2023-03-29 PROCEDURE — 3288F FALL RISK ASSESSMENT DOCD: CPT | Mod: CPTII,S$GLB,, | Performed by: STUDENT IN AN ORGANIZED HEALTH CARE EDUCATION/TRAINING PROGRAM

## 2023-03-29 PROCEDURE — 20610 LARGE JOINT ASPIRATION/INJECTION: BILATERAL SUBACROMIAL BURSA: ICD-10-PCS | Mod: 50,S$GLB,, | Performed by: STUDENT IN AN ORGANIZED HEALTH CARE EDUCATION/TRAINING PROGRAM

## 2023-03-29 PROCEDURE — 3008F PR BODY MASS INDEX (BMI) DOCUMENTED: ICD-10-PCS | Mod: CPTII,S$GLB,, | Performed by: STUDENT IN AN ORGANIZED HEALTH CARE EDUCATION/TRAINING PROGRAM

## 2023-03-29 PROCEDURE — 1159F PR MEDICATION LIST DOCUMENTED IN MEDICAL RECORD: ICD-10-PCS | Mod: CPTII,S$GLB,, | Performed by: STUDENT IN AN ORGANIZED HEALTH CARE EDUCATION/TRAINING PROGRAM

## 2023-03-29 PROCEDURE — 1160F PR REVIEW ALL MEDS BY PRESCRIBER/CLIN PHARMACIST DOCUMENTED: ICD-10-PCS | Mod: CPTII,S$GLB,, | Performed by: STUDENT IN AN ORGANIZED HEALTH CARE EDUCATION/TRAINING PROGRAM

## 2023-03-29 RX ADMIN — TRIAMCINOLONE ACETONIDE 40 MG: 40 INJECTION, SUSPENSION INTRA-ARTICULAR; INTRAMUSCULAR at 03:03

## 2023-03-29 NOTE — PROGRESS NOTES
Orthopaedic Follow-Up Visit    Last Appointment: 12/2/22  Diagnosis: Bilateral rotator cuff tendinopathy  Prior Procedure: [facility]-MICHELLE HOLLOWAYI    Raquel Pickard is a 67 y.o. female who is here for f/u evaluation of her bilateral shoulders. The patient was last seen here by Kiera Linton on 12/2/22 at which point she proceeded with bilateral shoulder subacromial space corticosteroid injections. The patient returns today reporting that the symptoms did improve after these injections have since returned and is interested in discussing further treatment options.     To review her history, Raquel Pickard is a 67 y.o. right-hand dominant female who presented with bilateral shoulder pain and dysfunction that initially began several years ago but began to worsen about one year ago with no specific injury. She had tried multiple nonoperative treatments including rest, activity modification, and oral anti-inflammatories but continued to experience symptoms. An MRI of the left shoulder was ordered due to concern for rotator cuff tear. It ultimately showed rotator cuff tendinopathy. We proceeded with bilateral shoulder subacromial space corticosteroid injections on 4/8/22 which gave her more than 6 months of relief. She returned to clinic on 12/2/22 with reports of worsening pain and received repeat injections.     Patient's medications, allergies, past medical, surgical, social and family histories were reviewed and updated as appropriate.    Review of Systems   All systems reviewed were negative.  Specifically, the patient denies fever, chills, weight loss, chest pain, shortness of breath, or dyspnea on exertion.      Past Medical History:   Diagnosis Date    Breast cancer 2011    L    Diabetes mellitus     Glaucoma     Hyperlipidemia     Hypertension     MVP (mitral valve prolapse)     Obesity        Past Surgical History:   Procedure Laterality Date    BREAST LUMPECTOMY      CATARACT EXTRACTION W/  INTRAOCULAR LENS IMPLANT Right  07/22/2020    w/ goniotomy    CATARACT EXTRACTION W/  INTRAOCULAR LENS IMPLANT Left 06/16/2021    w/ goniotomy    COLONOSCOPY W/ POLYPECTOMY  04/18/2017    DR. CHARLENE NATH/ANTONIO SALTER. POLYP X 3. REPEAT 5 YRS.    ENDOBRONCHIAL ULTRASOUND Bilateral 8/25/2022    Procedure: ENDOBRONCHIAL ULTRASOUND (EBUS);  Surgeon: Musa Phillips MD;  Location: [facility];  Service: Pulmonary;  Laterality: Bilateral;    PCIOL Right     DR. MENDOZA    SLT - OU - BOTH EYES      TRIGGER FINGER RELEASE      Thumb    TUBAL LIGATION         Patient's Medications   New Prescriptions    No medications on file   Previous Medications    ACCU-CHEK MONICA PLUS TEST STRP STRP    CHECK BLOOD GLUCOSE ONE TIME DAILY    BENAZEPRIL-HYDROCHLORTHIAZIDE (LOTENSIN HCT) 20-12.5 MG PER TABLET    Take 1 tablet by mouth once daily.    BLOOD-GLUCOSE METER KIT    To check BG 1 times daily, Accuchek Monica meter    BRIMONIDINE 0.15 % OPTH DROP (ALPHAGAN) 0.15 % OPHTHALMIC SOLUTION    Place 1 drop into both eyes 2 (two) times daily.    CYCLOBENZAPRINE (FLEXERIL) 10 MG TABLET    Take 1 tablet as needed at night for muscle spasm    DORZOLAMIDE-TIMOLOL 2-0.5% (COSOPT) 22.3-6.8 MG/ML OPHTHALMIC SOLUTION    Place 1 drop into both eyes 2 (two) times daily. Dispense 90 days supply    FLU VAC 2022 [id number],PF, (FLUAD QUAD 2022-23,65Y UP,,PF,) 60 MCG (15 MCG X 4)/0.5 ML SYRG    Inject 0.5 mLs into the muscle.    FLUTICASONE PROPIONATE (FLONASE) 50 MCG/ACTUATION NASAL SPRAY    2 sprays (100 mcg total) by Each Nostril route once daily.    GLIMEPIRIDE (AMARYL) 2 MG TABLET    TAKE 1 TABLET DAILY 30 MINUTES BEFORE A MEAL AS NEEDED FOR BLOOD SUGAR GREATER THAN 150. .    HYDROXYCHLOROQUINE (PLAQUENIL) 200 MG TABLET    Take 1 tablet (200 mg total) by mouth 2 (two) times daily.    IBUPROFEN (ADVIL,MOTRIN) 800 MG TABLET    Take 800 mg by mouth every 6 (six) hours as needed for Pain.    LANCETS (ACCU-CHEK SOFTCLIX LANCETS) MISC    USE  TO  CHECK BLOOD GLUCOSE ONE TIME DAILY     LATANOPROST 0.005 % OPHTHALMIC SOLUTION    INSTILL 1 DROP INTO BOTH EYES EVERY EVENING    METFORMIN (GLUCOPHAGE-XR) 500 MG ER 24HR TABLET    Take 3 tablets (1,500 mg total) by mouth once daily.    ONDANSETRON (ZOFRAN-ODT) 4 MG TBDL    Take 4 mg by mouth every 8 (eight) hours as needed.    POTASSIUM CHLORIDE SA (K-DUR,KLOR-CON) 20 MEQ TABLET    Take 1 tablet (20 mEq total) by mouth once daily. Take with food    PROMETHAZINE (PHENERGAN) 25 MG TABLET    Take 25 mg by mouth every 6 (six) hours as needed.    PROMETHAZINE-DEXTROMETHORPHAN (PROMETHAZINE-DM) 6.25-15 MG/5 ML SYRP    Take 5 mLs by mouth nightly as needed (Cough).    PROPRANOLOL (INDERAL LA) 80 MG 24 HR CAPSULE    Take 1 capsule (80 mg total) by mouth once daily.    ROSUVASTATIN (CRESTOR) 5 MG TABLET    Take 1 tablet (5 mg total) by mouth once daily.    TRAMADOL (ULTRAM) 50 MG TABLET    Take 50 mg by mouth every 6 (six) hours as needed.   Modified Medications    No medications on file   Discontinued Medications    No medications on file       Family History   Problem Relation Age of Onset    Glaucoma Brother     Macular degeneration Brother     Esophageal cancer Father     Heart disease Father     Hypertension Father     Liver cancer Mother     Cancer Mother     Hypertension Sister     Cataracts Sister     Diabetes Sister     Cholelithiasis Sister     Blindness Neg Hx     Retinal detachment Neg Hx     Strabismus Neg Hx     Stroke Neg Hx     Thyroid disease Neg Hx        Review of patient's allergies indicates:   Allergen Reactions    Travatan z [travoprost]          Objective:      Physical Exam  Patient is alert and oriented, no distress. Skin is intact. Neuro is normal with no focal motor or sensory findings.    Cervical exam is unremarkable. Intact cervical ROM. Negative Spurling's test    Physical Exam:  RIGHT    LEFT    Scap Dyskinesis/Winging (-)    (-)    Tenderness:          Greater Tuberosity  +    +  Bicipital Groove  (-)    (-)  AC  joint   (-)    (-)  Other:     ROM:  Forward Elevation 170    170  Abduction  120    120  ER (at side)  50    50  IR   L5    L5    Strength:   Supraspinatus  4+/5    4+/5  Infraspinatus  5/5    5/5  Subscap / IR  5/5    5/5     Special Tests:   Neer:    (-)    (-)   Jerez:   +    +   SS Stress:   +    +   Bear Hug:   (-)    (-)   Summit's:   +    +   Resisted Thrower's:   +    +   Cross Arm Abduction:  (-)    (-)    Neurovascular examination  - Motor grossly intact bilaterally to shoulder abduction, elbow flexion and extension, wrist flexion and extension, and intrinsic hand musculature  - Sensation intact to light touch bilaterally in axillary, median, radial, and ulnar distributions  - Symmetrical radial pulses    Imaging:    XR Results:  Results for orders placed during the hospital encounter of 02/08/22    X-Ray Shoulder Complete Bilateral    Narrative  EXAMINATION:  XR SHOULDER COMPLETE 2 OR MORE VIEWS BILATERAL    CLINICAL HISTORY:  Pain in right shoulder    TECHNIQUE:  Four views were obtained of the bilateral shoulders.    COMPARISON:  None    FINDINGS:  Small ossific density noted adjacent to the margins of the greater tuberosity on the right which can be associated with calcific tendinosis.  No acute fractures or dislocations visualized.  There is mild-to-moderate bilateral AC joint arthrosis.  Mild degenerative marginal productive changes are present at the glenohumeral joints bilaterally.    Impression  As above      Electronically signed by: Lito Malone MD  Date:    02/08/2022  Time:    10:52      MRI Results:  Results for orders placed during the hospital encounter of 04/07/22    MRI Shoulder Without Contrast Left    Narrative  EXAMINATION:  MRI SHOULDER WITHOUT CONTRAST LEFT    CLINICAL HISTORY:  Shoulder pain, rotator cuff disorder suspected, xray done;  Pain in left shoulder    TECHNIQUE:  Multiplanar multisequence images were performed through the left shoulder.  Contrast was not  administered    COMPARISON:  None    FINDINGS:  Tendinosis and low-grade partial-thickness tearing of the supraspinatus and infraspinatus tendons.  The subscapularis and teres minor tendons are grossly intact.  Partial-thickness tearing of the long head of the biceps tendon.    Glenohumeral osteoarthritis with anterior superior and superior labral tearing.    Osteoarthritis of the acromioclavicular joint with subacromial enthesophyte.    Small joint effusion without significant bursal fluid collection.  No acute stress or traumatic fracture is present.  No muscle atrophy or edema.    Impression  Tendinosis and low-grade partial-thickness tearing of the supraspinatus and infraspinatus tendons.    Partial-thickness tearing of the intra-articular long head of the biceps tendon.    Superior and anterior superior labral tear.    Osteoarthritis.    Nonspecific supraclavicular lymph nodes measuring up to 11 x 8 mm in size.      Electronically signed by: Konrad Tejeda  Date:    04/07/2022  Time:    17:49      CT Results:  No results found for this or any previous visit.      Physician read: I agree with the above impression.    Assessment/Plan:   Raquel Pickard is a 67 y.o. female with bilateral shoulder rotator cuff tendinopathy     Plan:    She has known bilateral shoulder rotator cuff tendinopathy. She has gotten good relief with prior corticosteroid injections and is interested in repeat injections.  She is currently undergoing chemo treatments and would like to get through these treatments but does understand that serial injections are not the best long term solution.   Bilateral shoulder corticosteroid injection into the subacromial space performed today. Patient tolerated the procedure well with no immediate complications.   Follow up with me as needed.           Garry Lundy MD    I, Rashad Jackson, acted as a scribe for Garry Lundy MD for the duration of this office visit.

## 2023-03-30 RX ORDER — TRIAMCINOLONE ACETONIDE 40 MG/ML
40 INJECTION, SUSPENSION INTRA-ARTICULAR; INTRAMUSCULAR
Status: DISCONTINUED | OUTPATIENT
Start: 2023-03-29 | End: 2023-03-30 | Stop reason: HOSPADM

## 2023-03-30 NOTE — PROCEDURES
Large Joint Aspiration/Injection: bilateral subacromial bursa    Date/Time: 3/29/2023 3:45 PM  Performed by: Garry Lundy MD  Authorized by: Garry Lundy MD     Consent Done?:  Yes (Verbal)  Indications:  Pain  Site marked: the procedure site was marked    Timeout: prior to procedure the correct patient, procedure, and site was verified    Prep: patient was prepped and draped in usual sterile fashion      Local anesthesia used?: Yes    Anesthesia:  Local infiltration  Local anesthetic:  Lidocaine 1% without epinephrine    Details:  Needle Size:  22 G  Ultrasonic Guidance for needle placement?: No    Approach:  Posterior  Location:  Shoulder  Laterality:  Bilateral  Site:  Bilateral subacromial bursa  Medications (Right):  40 mg triamcinolone acetonide 40 mg/mL  Medications (Left):  40 mg triamcinolone acetonide 40 mg/mL  Patient tolerance:  Patient tolerated the procedure well with no immediate complications

## 2023-05-24 DIAGNOSIS — E11.9 TYPE 2 DIABETES MELLITUS WITHOUT COMPLICATION: ICD-10-CM

## 2023-06-08 ENCOUNTER — OFFICE VISIT (OUTPATIENT)
Dept: INTERNAL MEDICINE | Facility: CLINIC | Age: 68
End: 2023-06-08
Payer: MEDICARE

## 2023-06-08 VITALS
SYSTOLIC BLOOD PRESSURE: 118 MMHG | HEIGHT: 65 IN | BODY MASS INDEX: 28.02 KG/M2 | TEMPERATURE: 98 F | WEIGHT: 168.19 LBS | DIASTOLIC BLOOD PRESSURE: 82 MMHG | RESPIRATION RATE: 18 BRPM | HEART RATE: 92 BPM

## 2023-06-08 DIAGNOSIS — D84.821 DRUG-INDUCED IMMUNODEFICIENCY: ICD-10-CM

## 2023-06-08 DIAGNOSIS — E11.36 TYPE 2 DIABETES MELLITUS WITH DIABETIC CATARACT, WITHOUT LONG-TERM CURRENT USE OF INSULIN: Primary | ICD-10-CM

## 2023-06-08 DIAGNOSIS — Z78.0 POSTMENOPAUSAL: ICD-10-CM

## 2023-06-08 DIAGNOSIS — E11.9 TYPE 2 DIABETES MELLITUS WITHOUT COMPLICATION, WITHOUT LONG-TERM CURRENT USE OF INSULIN: ICD-10-CM

## 2023-06-08 DIAGNOSIS — Z79.899 DRUG-INDUCED IMMUNODEFICIENCY: ICD-10-CM

## 2023-06-08 PROBLEM — C77.1 SECONDARY MALIGNANT NEOPLASM OF INTRATHORACIC LYMPH NODES: Status: ACTIVE | Noted: 2022-10-06

## 2023-06-08 PROBLEM — D61.810 PANCYTOPENIA DUE TO CHEMOTHERAPY: Status: ACTIVE | Noted: 2023-02-08

## 2023-06-08 PROBLEM — E78.5 HYPERLIPIDEMIA: Status: ACTIVE | Noted: 2023-06-08

## 2023-06-08 PROBLEM — Z86.718 HISTORY OF DVT (DEEP VEIN THROMBOSIS): Status: ACTIVE | Noted: 2020-06-03

## 2023-06-08 PROBLEM — M25.511 PAIN IN JOINT OF RIGHT SHOULDER: Status: ACTIVE | Noted: 2021-04-18

## 2023-06-08 PROBLEM — E03.2 DRUG-INDUCED HYPOTHYROIDISM: Status: ACTIVE | Noted: 2023-04-20

## 2023-06-08 PROBLEM — C34.91 NON-SMALL CELL CANCER OF RIGHT LUNG: Status: ACTIVE | Noted: 2022-09-08

## 2023-06-08 PROBLEM — Z17.0 MALIGNANT NEOPLASM OF LEFT BREAST IN FEMALE, ESTROGEN RECEPTOR POSITIVE: Status: ACTIVE | Noted: 2020-06-03

## 2023-06-08 PROBLEM — C50.912 MALIGNANT NEOPLASM OF LEFT BREAST IN FEMALE, ESTROGEN RECEPTOR POSITIVE: Status: ACTIVE | Noted: 2020-06-03

## 2023-06-08 PROCEDURE — 3288F FALL RISK ASSESSMENT DOCD: CPT | Mod: CPTII,S$GLB,, | Performed by: FAMILY MEDICINE

## 2023-06-08 PROCEDURE — 3008F BODY MASS INDEX DOCD: CPT | Mod: CPTII,S$GLB,, | Performed by: FAMILY MEDICINE

## 2023-06-08 PROCEDURE — 3074F PR MOST RECENT SYSTOLIC BLOOD PRESSURE < 130 MM HG: ICD-10-PCS | Mod: CPTII,S$GLB,, | Performed by: FAMILY MEDICINE

## 2023-06-08 PROCEDURE — 3079F PR MOST RECENT DIASTOLIC BLOOD PRESSURE 80-89 MM HG: ICD-10-PCS | Mod: CPTII,S$GLB,, | Performed by: FAMILY MEDICINE

## 2023-06-08 PROCEDURE — 99214 PR OFFICE/OUTPT VISIT, EST, LEVL IV, 30-39 MIN: ICD-10-PCS | Mod: S$GLB,,, | Performed by: FAMILY MEDICINE

## 2023-06-08 PROCEDURE — 3008F PR BODY MASS INDEX (BMI) DOCUMENTED: ICD-10-PCS | Mod: CPTII,S$GLB,, | Performed by: FAMILY MEDICINE

## 2023-06-08 PROCEDURE — 1159F PR MEDICATION LIST DOCUMENTED IN MEDICAL RECORD: ICD-10-PCS | Mod: CPTII,S$GLB,, | Performed by: FAMILY MEDICINE

## 2023-06-08 PROCEDURE — 4010F ACE/ARB THERAPY RXD/TAKEN: CPT | Mod: CPTII,S$GLB,, | Performed by: FAMILY MEDICINE

## 2023-06-08 PROCEDURE — 3072F LOW RISK FOR RETINOPATHY: CPT | Mod: CPTII,S$GLB,, | Performed by: FAMILY MEDICINE

## 2023-06-08 PROCEDURE — 3079F DIAST BP 80-89 MM HG: CPT | Mod: CPTII,S$GLB,, | Performed by: FAMILY MEDICINE

## 2023-06-08 PROCEDURE — 99999 PR PBB SHADOW E&M-EST. PATIENT-LVL IV: CPT | Mod: PBBFAC,,, | Performed by: FAMILY MEDICINE

## 2023-06-08 PROCEDURE — 1101F PT FALLS ASSESS-DOCD LE1/YR: CPT | Mod: CPTII,S$GLB,, | Performed by: FAMILY MEDICINE

## 2023-06-08 PROCEDURE — 4010F PR ACE/ARB THEARPY RXD/TAKEN: ICD-10-PCS | Mod: CPTII,S$GLB,, | Performed by: FAMILY MEDICINE

## 2023-06-08 PROCEDURE — 99999 PR PBB SHADOW E&M-EST. PATIENT-LVL IV: ICD-10-PCS | Mod: PBBFAC,,, | Performed by: FAMILY MEDICINE

## 2023-06-08 PROCEDURE — 1159F MED LIST DOCD IN RCRD: CPT | Mod: CPTII,S$GLB,, | Performed by: FAMILY MEDICINE

## 2023-06-08 PROCEDURE — 1101F PR PT FALLS ASSESS DOC 0-1 FALLS W/OUT INJ PAST YR: ICD-10-PCS | Mod: CPTII,S$GLB,, | Performed by: FAMILY MEDICINE

## 2023-06-08 PROCEDURE — 1126F AMNT PAIN NOTED NONE PRSNT: CPT | Mod: CPTII,S$GLB,, | Performed by: FAMILY MEDICINE

## 2023-06-08 PROCEDURE — 99214 OFFICE O/P EST MOD 30 MIN: CPT | Mod: S$GLB,,, | Performed by: FAMILY MEDICINE

## 2023-06-08 PROCEDURE — 3072F PR LOW RISK FOR RETINOPATHY: ICD-10-PCS | Mod: CPTII,S$GLB,, | Performed by: FAMILY MEDICINE

## 2023-06-08 PROCEDURE — 3288F PR FALLS RISK ASSESSMENT DOCUMENTED: ICD-10-PCS | Mod: CPTII,S$GLB,, | Performed by: FAMILY MEDICINE

## 2023-06-08 PROCEDURE — 1126F PR PAIN SEVERITY QUANTIFIED, NO PAIN PRESENT: ICD-10-PCS | Mod: CPTII,S$GLB,, | Performed by: FAMILY MEDICINE

## 2023-06-08 PROCEDURE — 3074F SYST BP LT 130 MM HG: CPT | Mod: CPTII,S$GLB,, | Performed by: FAMILY MEDICINE

## 2023-06-08 RX ORDER — LEVOTHYROXINE SODIUM 75 UG/1
75 TABLET ORAL EVERY MORNING
Qty: 90 TABLET | Refills: 1 | Status: SHIPPED | OUTPATIENT
Start: 2023-06-08 | End: 2024-04-03 | Stop reason: DRUGHIGH

## 2023-06-08 RX ORDER — LEVOTHYROXINE SODIUM 75 UG/1
75 TABLET ORAL EVERY MORNING
COMMUNITY
Start: 2023-05-26 | End: 2023-06-08 | Stop reason: SDUPTHER

## 2023-06-08 RX ORDER — GLIMEPIRIDE 2 MG/1
TABLET ORAL
Qty: 90 TABLET | Refills: 1 | Status: SHIPPED | OUTPATIENT
Start: 2023-06-08 | End: 2023-10-02 | Stop reason: ALTCHOICE

## 2023-06-08 RX ORDER — CYCLOBENZAPRINE HCL 10 MG
TABLET ORAL
Qty: 60 TABLET | Refills: 2 | Status: SHIPPED | OUTPATIENT
Start: 2023-06-08

## 2023-06-08 NOTE — PROGRESS NOTES
Subjective:       Patient ID: Raquel Pickard is a 67 y.o. female.    Chief Complaint: Annual Exam    HPI Ms Pickard presents today for follow up.     Anemia   Would like to know how to increase her iron       I  Durvalumab Anti PD L1 monoclonal antibody  Not doing well with it even though it is 1 time a month   She does not like the way this makes her feel       Review of Systems   Constitutional:  Positive for fatigue. Negative for activity change, appetite change and fever.   HENT:  Negative for congestion, ear pain and sinus pressure.    Eyes:  Negative for pain and visual disturbance.   Respiratory:  Negative for cough, chest tightness and wheezing.    Cardiovascular:  Negative for chest pain, palpitations and leg swelling.   Gastrointestinal:  Negative for abdominal distention, abdominal pain, constipation, diarrhea, nausea and vomiting.   Endocrine: Negative for polydipsia and polyuria.   Genitourinary:  Negative for difficulty urinating, dyspareunia, dysuria, flank pain and hematuria.   Musculoskeletal:  Positive for arthralgias. Negative for back pain.   Skin:  Negative for rash.   Neurological:  Negative for dizziness, tremors, syncope, weakness, numbness and headaches.   Psychiatric/Behavioral:  Negative for agitation and confusion.          Past Medical History:   Diagnosis Date    Breast cancer 2011    L    Diabetes mellitus     Glaucoma     Hyperlipidemia     Hypertension     MVP (mitral valve prolapse)     Obesity      Past Surgical History:   Procedure Laterality Date    BREAST LUMPECTOMY      CATARACT EXTRACTION W/  INTRAOCULAR LENS IMPLANT Right 07/22/2020    w/ goniotomy    CATARACT EXTRACTION W/  INTRAOCULAR LENS IMPLANT Left 06/16/2021    w/ goniotomy    COLONOSCOPY W/ POLYPECTOMY  04/18/2017    DR. CHARLENE NATH/ANTONIO SALTER. POLYP X 3. REPEAT 5 YRS.    ENDOBRONCHIAL ULTRASOUND Bilateral 8/25/2022    Procedure: ENDOBRONCHIAL ULTRASOUND (EBUS);  Surgeon: Musa Phillips MD;  Location: [facility] ENDO;   Service: Pulmonary;  Laterality: Bilateral;    PCIOL Right     DR. MENDOZA    SLT - OU - BOTH EYES      TRIGGER FINGER RELEASE      Thumb    TUBAL LIGATION         Social History     Socioeconomic History    Marital status:     Number of children: 2   Occupational History    Occupation: retired teacher   Tobacco Use    Smoking status: Never    Smokeless tobacco: Never   Substance and Sexual Activity    Alcohol use: Yes     Comment: very rare    Drug use: No    Sexual activity: Yes     Partners: Male       Objective:        Physical Exam  Vitals reviewed.   Constitutional:       General: She is not in acute distress.     Appearance: Normal appearance.   HENT:      Head: Normocephalic and atraumatic.      Right Ear: External ear normal.      Left Ear: External ear normal.      Nose: Nose normal.   Eyes:      General:         Right eye: No discharge.         Left eye: No discharge.      Pupils: Pupils are equal, round, and reactive to light.   Neck:      Thyroid: No thyromegaly.   Cardiovascular:      Rate and Rhythm: Normal rate and regular rhythm.      Heart sounds: Normal heart sounds. No murmur heard.  Pulmonary:      Effort: Pulmonary effort is normal. No respiratory distress.      Breath sounds: Normal breath sounds. No wheezing.   Abdominal:      General: Bowel sounds are normal. There is no distension.      Palpations: Abdomen is soft.      Tenderness: There is no abdominal tenderness.   Musculoskeletal:         General: Normal range of motion.      Cervical back: Normal range of motion and neck supple.   Skin:     General: Skin is warm and dry.      Findings: No rash.   Neurological:      Mental Status: She is alert and oriented to person, place, and time.      Coordination: Coordination normal.   Psychiatric:         Behavior: Behavior normal.       Protective Sensation (w/ 10 gram monofilament):  Right: Intact  Left: Intact    Visual Inspection:  Normal -  Bilateral    Pedal Pulses:   Right:  Present  Left: Present    Posterior Tibialis Pulses:   Right:Present  Left: Present    Results for orders placed or performed in visit on 11/21/22   Mammo Digital Diagnostic Bilat with Eric   Result Value Ref Range    Recommendation: 1 year        Assessment/Plan:     Type 2 diabetes mellitus with diabetic cataract, without long-term current use of insulin  -     Lipid Panel; Future; Expected date: 06/08/2023  -     COMPREHENSIVE METABOLIC PANEL; Future; Expected date: 06/08/2023  -     HEMOGLOBIN A1C; Future; Expected date: 06/08/2023    Postmenopausal  -     DXA Bone Density Appendicular Skeleton; Future; Expected date: 06/08/2023    Type 2 diabetes mellitus without complication, without long-term current use of insulin  -     glimepiride (AMARYL) 2 MG tablet; TAKE 1 TABLET DAILY 30 MINUTES BEFORE A MEAL AS NEEDED FOR BLOOD SUGAR GREATER THAN 150. .  Dispense: 90 tablet; Refill: 1    Drug-induced immunodeficiency  -     TSH; Future; Expected date: 06/08/2023    Other orders  -     cyclobenzaprine (FLEXERIL) 10 MG tablet; Take 1 tablet as needed at night for muscle spasm  Dispense: 60 tablet; Refill: 2  -     levothyroxine (SYNTHROID) 75 MCG tablet; Take 1 tablet (75 mcg total) by mouth every morning.  Dispense: 90 tablet; Refill: 1          Follow up 6 months    Debra Jensen MD  [facility]   Family Medicine

## 2023-06-20 ENCOUNTER — OFFICE VISIT (OUTPATIENT)
Dept: ORTHOPEDICS | Facility: CLINIC | Age: 68
End: 2023-06-20
Payer: MEDICARE

## 2023-06-20 VITALS — HEIGHT: 65 IN | WEIGHT: 168 LBS | BODY MASS INDEX: 27.99 KG/M2

## 2023-06-20 DIAGNOSIS — R20.0 NUMBNESS AND TINGLING: Primary | ICD-10-CM

## 2023-06-20 DIAGNOSIS — R20.2 NUMBNESS AND TINGLING: Primary | ICD-10-CM

## 2023-06-20 DIAGNOSIS — G56.03 BILATERAL CARPAL TUNNEL SYNDROME: Primary | ICD-10-CM

## 2023-06-20 PROCEDURE — 4010F ACE/ARB THERAPY RXD/TAKEN: CPT | Mod: CPTII,S$GLB,, | Performed by: ORTHOPAEDIC SURGERY

## 2023-06-20 PROCEDURE — 1160F PR REVIEW ALL MEDS BY PRESCRIBER/CLIN PHARMACIST DOCUMENTED: ICD-10-PCS | Mod: CPTII,S$GLB,, | Performed by: ORTHOPAEDIC SURGERY

## 2023-06-20 PROCEDURE — 3288F PR FALLS RISK ASSESSMENT DOCUMENTED: ICD-10-PCS | Mod: CPTII,S$GLB,, | Performed by: ORTHOPAEDIC SURGERY

## 2023-06-20 PROCEDURE — 99213 PR OFFICE/OUTPT VISIT, EST, LEVL III, 20-29 MIN: ICD-10-PCS | Mod: S$GLB,,, | Performed by: ORTHOPAEDIC SURGERY

## 2023-06-20 PROCEDURE — 1125F PR PAIN SEVERITY QUANTIFIED, PAIN PRESENT: ICD-10-PCS | Mod: CPTII,S$GLB,, | Performed by: ORTHOPAEDIC SURGERY

## 2023-06-20 PROCEDURE — 3288F FALL RISK ASSESSMENT DOCD: CPT | Mod: CPTII,S$GLB,, | Performed by: ORTHOPAEDIC SURGERY

## 2023-06-20 PROCEDURE — 99999 PR PBB SHADOW E&M-EST. PATIENT-LVL III: CPT | Mod: PBBFAC,,, | Performed by: ORTHOPAEDIC SURGERY

## 2023-06-20 PROCEDURE — 3072F LOW RISK FOR RETINOPATHY: CPT | Mod: CPTII,S$GLB,, | Performed by: ORTHOPAEDIC SURGERY

## 2023-06-20 PROCEDURE — 1101F PT FALLS ASSESS-DOCD LE1/YR: CPT | Mod: CPTII,S$GLB,, | Performed by: ORTHOPAEDIC SURGERY

## 2023-06-20 PROCEDURE — 99213 OFFICE O/P EST LOW 20 MIN: CPT | Mod: S$GLB,,, | Performed by: ORTHOPAEDIC SURGERY

## 2023-06-20 PROCEDURE — 3008F PR BODY MASS INDEX (BMI) DOCUMENTED: ICD-10-PCS | Mod: CPTII,S$GLB,, | Performed by: ORTHOPAEDIC SURGERY

## 2023-06-20 PROCEDURE — 1101F PR PT FALLS ASSESS DOC 0-1 FALLS W/OUT INJ PAST YR: ICD-10-PCS | Mod: CPTII,S$GLB,, | Performed by: ORTHOPAEDIC SURGERY

## 2023-06-20 PROCEDURE — 1125F AMNT PAIN NOTED PAIN PRSNT: CPT | Mod: CPTII,S$GLB,, | Performed by: ORTHOPAEDIC SURGERY

## 2023-06-20 PROCEDURE — 1159F MED LIST DOCD IN RCRD: CPT | Mod: CPTII,S$GLB,, | Performed by: ORTHOPAEDIC SURGERY

## 2023-06-20 PROCEDURE — 99999 PR PBB SHADOW E&M-EST. PATIENT-LVL III: ICD-10-PCS | Mod: PBBFAC,,, | Performed by: ORTHOPAEDIC SURGERY

## 2023-06-20 PROCEDURE — 4010F PR ACE/ARB THEARPY RXD/TAKEN: ICD-10-PCS | Mod: CPTII,S$GLB,, | Performed by: ORTHOPAEDIC SURGERY

## 2023-06-20 PROCEDURE — 3008F BODY MASS INDEX DOCD: CPT | Mod: CPTII,S$GLB,, | Performed by: ORTHOPAEDIC SURGERY

## 2023-06-20 PROCEDURE — 1159F PR MEDICATION LIST DOCUMENTED IN MEDICAL RECORD: ICD-10-PCS | Mod: CPTII,S$GLB,, | Performed by: ORTHOPAEDIC SURGERY

## 2023-06-20 PROCEDURE — 3072F PR LOW RISK FOR RETINOPATHY: ICD-10-PCS | Mod: CPTII,S$GLB,, | Performed by: ORTHOPAEDIC SURGERY

## 2023-06-20 PROCEDURE — 1160F RVW MEDS BY RX/DR IN RCRD: CPT | Mod: CPTII,S$GLB,, | Performed by: ORTHOPAEDIC SURGERY

## 2023-06-20 NOTE — PROGRESS NOTES
Subjective:     Patient ID: Raquel Pickard is a 67 y.o. female.    Chief Complaint: Pain of the Right Hand and Pain of the Left Hand      HPI:  The patient is a 67-year-old female who has had chemotherapy.  She has a several year history of numbness in the median nerve distribution of both hands.  She is not had nerve studies.  She is not tried splinting.    Past Medical History:   Diagnosis Date    Breast cancer 2011    L    Diabetes mellitus     Glaucoma     Hyperlipidemia     Hypertension     MVP (mitral valve prolapse)     Obesity      Past Surgical History:   Procedure Laterality Date    BREAST LUMPECTOMY      CATARACT EXTRACTION W/  INTRAOCULAR LENS IMPLANT Right 07/22/2020    w/ goniotomy    CATARACT EXTRACTION W/  INTRAOCULAR LENS IMPLANT Left 06/16/2021    w/ goniotomy    COLONOSCOPY W/ POLYPECTOMY  04/18/2017    DR. CHARLENE NATH/ANTONIO SALTER. POLYP X 3. REPEAT 5 YRS.    ENDOBRONCHIAL ULTRASOUND Bilateral 8/25/2022    Procedure: ENDOBRONCHIAL ULTRASOUND (EBUS);  Surgeon: Musa Phillips MD;  Location: [facility];  Service: Pulmonary;  Laterality: Bilateral;    PCIOL Right     DR. MENDOZA    SLT - OU - BOTH EYES      TRIGGER FINGER RELEASE      Thumb    TUBAL LIGATION       Family History   Problem Relation Age of Onset    Glaucoma Brother     Macular degeneration Brother     Esophageal cancer Father     Heart disease Father     Hypertension Father     Liver cancer Mother     Cancer Mother     Hypertension Sister     Cataracts Sister     Diabetes Sister     Cholelithiasis Sister     Blindness Neg Hx     Retinal detachment Neg Hx     Strabismus Neg Hx     Stroke Neg Hx     Thyroid disease Neg Hx      Social History     Socioeconomic History    Marital status:     Number of children: 2   Occupational History    Occupation: retired teacher   Tobacco Use    Smoking status: Never    Smokeless tobacco: Never   Substance and Sexual Activity    Alcohol use: Yes     Comment: very rare    Drug use: No     Sexual activity: Yes     Partners: Male     Medication List with Changes/Refills   Current Medications    ACCU-CHEK CIRILO PLUS TEST STRP STRP    CHECK BLOOD GLUCOSE ONE TIME DAILY    BENAZEPRIL-HYDROCHLORTHIAZIDE (LOTENSIN HCT) 20-12.5 MG PER TABLET    Take 1 tablet by mouth once daily.    BLOOD-GLUCOSE METER KIT    To check BG 1 times daily, Accuchek Cirilo meter    BRIMONIDINE 0.15 % OPTH DROP (ALPHAGAN) 0.15 % OPHTHALMIC SOLUTION    Place 1 drop into both eyes 2 (two) times daily.    CYCLOBENZAPRINE (FLEXERIL) 10 MG TABLET    Take 1 tablet as needed at night for muscle spasm    DORZOLAMIDE-TIMOLOL 2-0.5% (COSOPT) 22.3-6.8 MG/ML OPHTHALMIC SOLUTION    Place 1 drop into both eyes 2 (two) times daily. Dispense 90 days supply    FLUTICASONE PROPIONATE (FLONASE) 50 MCG/ACTUATION NASAL SPRAY    2 sprays (100 mcg total) by Each Nostril route once daily.    GLIMEPIRIDE (AMARYL) 2 MG TABLET    TAKE 1 TABLET DAILY 30 MINUTES BEFORE A MEAL AS NEEDED FOR BLOOD SUGAR GREATER THAN 150. .    IBUPROFEN (ADVIL,MOTRIN) 800 MG TABLET    Take 800 mg by mouth every 6 (six) hours as needed for Pain.    LANCETS (ACCU-CHEK SOFTCLIX LANCETS) MISC    USE  TO  CHECK BLOOD GLUCOSE ONE TIME DAILY    LATANOPROST 0.005 % OPHTHALMIC SOLUTION    INSTILL 1 DROP INTO BOTH EYES EVERY EVENING    LEVOTHYROXINE (SYNTHROID) 75 MCG TABLET    Take 1 tablet (75 mcg total) by mouth every morning.    METFORMIN (GLUCOPHAGE-XR) 500 MG ER 24HR TABLET    Take 3 tablets (1,500 mg total) by mouth once daily.    POTASSIUM CHLORIDE SA (K-DUR,KLOR-CON) 20 MEQ TABLET    Take 1 tablet (20 mEq total) by mouth once daily. Take with food    PROMETHAZINE (PHENERGAN) 25 MG TABLET    Take 25 mg by mouth every 6 (six) hours as needed.    PROPRANOLOL (INDERAL LA) 80 MG 24 HR CAPSULE    Take 1 capsule (80 mg total) by mouth once daily.    ROSUVASTATIN (CRESTOR) 5 MG TABLET    Take 1 tablet (5 mg total) by mouth once daily.     Review of patient's allergies indicates:   Allergen  Reactions    Pollen extracts     Travatan z [travoprost]      Review of Systems   Constitutional: Negative for malaise/fatigue.   HENT:  Negative for hearing loss.    Eyes:  Positive for visual disturbance. Negative for double vision.   Cardiovascular:  Negative for chest pain.   Respiratory:  Negative for shortness of breath.    Endocrine: Negative for cold intolerance.   Hematologic/Lymphatic: Does not bruise/bleed easily.   Skin:  Negative for poor wound healing and suspicious lesions.   Musculoskeletal:  Negative for gout, joint pain and joint swelling.   Gastrointestinal:  Negative for nausea and vomiting.   Genitourinary:  Negative for dysuria.   Neurological:  Positive for numbness, paresthesias and sensory change.   Psychiatric/Behavioral:  Negative for depression, memory loss and substance abuse. The patient is not nervous/anxious.    Allergic/Immunologic: Negative for persistent infections.     Objective:   Body mass index is 27.96 kg/m².  There were no vitals filed for this visit.             General    Constitutional: She is oriented to person, place, and time. She appears well-developed and well-nourished. No distress.   HENT:   Head: Normocephalic.   Eyes: EOM are normal.   Pulmonary/Chest: Effort normal.   Neurological: She is oriented to person, place, and time.   Psychiatric: She has a normal mood and affect.             Right Hand/Wrist Exam     Inspection   Scars: Wrist - absent Hand -  absent  Effusion: Wrist - absent Hand -  absent    Pain   Wrist - The patient exhibits pain of the flexor/pronator group.    Tests   Phalens sign: positive  Tinel's sign (median nerve): positive  Carpal Tunnel Compression Test: positive    Atrophy   Thenar:  negative  Intrinsic:  negative    Other     Neuorologic Exam    Median Distribution: abnormal  Ulnar Distribution: normal  Radial Distribution: normal    Comments:  The patient has a positive Tinel and positive Phalen sign.  She does have thenar  atrophy.      Left Hand/Wrist Exam     Inspection   Scars: Wrist - absent Hand -  absent  Effusion: Wrist - absent Hand -  absent    Pain   Wrist - The patient exhibits pain of the flexor/pronator group.    Tests   Phalens sign: positive  Tinel's sign (median nerve): positive  Carpal Tunnel Compression Test: positive    Atrophy  Thenar:  Negative  Intrinsic: negative    Other     Sensory Exam  Median Distribution: abnormal  Ulnar Distribution: normal  Radial Distribution: normal    Comments:  The patient has a positive Tinel and positive Phalen sign.  She does have thenar atrophy          Vascular Exam       Capillary Refill  Right Hand: normal capillary refill  Left Hand: normal capillary refill       radiographs were not obtained today  Assessment:     Encounter Diagnosis   Name Primary?    Bilateral carpal tunnel syndrome Yes        Plan:       The patient does have thenar atrophy.  She was given wrist splints.  She was sent for nerve conduction studies and will return with that study              Disclaimer: This note was prepared using a voice recognition system and is likely to have sound alike errors within the text.

## 2023-07-05 ENCOUNTER — APPOINTMENT (OUTPATIENT)
Dept: RADIOLOGY | Facility: HOSPITAL | Age: 68
End: 2023-07-05
Attending: FAMILY MEDICINE
Payer: MEDICARE

## 2023-07-05 DIAGNOSIS — Z78.0 POSTMENOPAUSAL: ICD-10-CM

## 2023-07-05 PROCEDURE — 77080 DXA BONE DENSITY AXIAL: CPT | Mod: 26,,, | Performed by: RADIOLOGY

## 2023-07-05 PROCEDURE — 77080 DXA BONE DENSITY AXIAL SKELETON 1 OR MORE SITES: ICD-10-PCS | Mod: 26,,, | Performed by: RADIOLOGY

## 2023-07-05 PROCEDURE — 77080 DXA BONE DENSITY AXIAL: CPT | Mod: TC

## 2023-07-07 ENCOUNTER — PATIENT MESSAGE (OUTPATIENT)
Dept: INFECTIOUS DISEASES | Facility: CLINIC | Age: 68
End: 2023-07-07
Payer: MEDICARE

## 2023-07-24 ENCOUNTER — OFFICE VISIT (OUTPATIENT)
Dept: OPHTHALMOLOGY | Facility: CLINIC | Age: 68
End: 2023-07-24
Payer: MEDICARE

## 2023-07-24 DIAGNOSIS — H40.1133 PRIMARY OPEN ANGLE GLAUCOMA OF BOTH EYES, SEVERE STAGE: Primary | ICD-10-CM

## 2023-07-24 DIAGNOSIS — Z96.1 PSEUDOPHAKIA: ICD-10-CM

## 2023-07-24 PROCEDURE — 99214 OFFICE O/P EST MOD 30 MIN: CPT | Mod: S$GLB,,, | Performed by: OPHTHALMOLOGY

## 2023-07-24 PROCEDURE — 99999 PR PBB SHADOW E&M-EST. PATIENT-LVL III: CPT | Mod: PBBFAC,,, | Performed by: OPHTHALMOLOGY

## 2023-07-24 PROCEDURE — 92133 CPTRZD OPH DX IMG PST SGM ON: CPT | Mod: S$GLB,,, | Performed by: OPHTHALMOLOGY

## 2023-07-24 PROCEDURE — 92133 POSTERIOR SEGMENT OCT OPTIC NERVE(OCULAR COHERENCE TOMOGRAPHY) - OU - BOTH EYES: ICD-10-PCS | Mod: S$GLB,,, | Performed by: OPHTHALMOLOGY

## 2023-07-24 PROCEDURE — 99214 PR OFFICE/OUTPT VISIT, EST, LEVL IV, 30-39 MIN: ICD-10-PCS | Mod: S$GLB,,, | Performed by: OPHTHALMOLOGY

## 2023-07-24 PROCEDURE — 1160F RVW MEDS BY RX/DR IN RCRD: CPT | Mod: CPTII,S$GLB,, | Performed by: OPHTHALMOLOGY

## 2023-07-24 PROCEDURE — 4010F ACE/ARB THERAPY RXD/TAKEN: CPT | Mod: CPTII,S$GLB,, | Performed by: OPHTHALMOLOGY

## 2023-07-24 PROCEDURE — 1159F PR MEDICATION LIST DOCUMENTED IN MEDICAL RECORD: ICD-10-PCS | Mod: CPTII,S$GLB,, | Performed by: OPHTHALMOLOGY

## 2023-07-24 PROCEDURE — 99999 PR PBB SHADOW E&M-EST. PATIENT-LVL III: ICD-10-PCS | Mod: PBBFAC,,, | Performed by: OPHTHALMOLOGY

## 2023-07-24 PROCEDURE — 1126F PR PAIN SEVERITY QUANTIFIED, NO PAIN PRESENT: ICD-10-PCS | Mod: CPTII,S$GLB,, | Performed by: OPHTHALMOLOGY

## 2023-07-24 PROCEDURE — 4010F PR ACE/ARB THEARPY RXD/TAKEN: ICD-10-PCS | Mod: CPTII,S$GLB,, | Performed by: OPHTHALMOLOGY

## 2023-07-24 PROCEDURE — 1126F AMNT PAIN NOTED NONE PRSNT: CPT | Mod: CPTII,S$GLB,, | Performed by: OPHTHALMOLOGY

## 2023-07-24 PROCEDURE — 1160F PR REVIEW ALL MEDS BY PRESCRIBER/CLIN PHARMACIST DOCUMENTED: ICD-10-PCS | Mod: CPTII,S$GLB,, | Performed by: OPHTHALMOLOGY

## 2023-07-24 PROCEDURE — 1159F MED LIST DOCD IN RCRD: CPT | Mod: CPTII,S$GLB,, | Performed by: OPHTHALMOLOGY

## 2023-07-24 NOTE — PROGRESS NOTES
SUBJECTIVE  Raquelchris Pickard is 67 y.o. female  Uncorrected distance visual acuity was 20/25 +1 in the right eye and 20/25 -2 in the left eye.   Chief Complaint   Patient presents with    Glaucoma     4 month follow up POAG IOP, Goct check. VA is doing well, no changes. No pain or irritation.  Compliant with gtts.          HPI     Glaucoma     Additional comments: 4 month follow up POAG IOP, Goct check. VA is doing   well, no changes. No pain or irritation.  Compliant with gtts.           Comments    1. Severe COAG OD>OS DX 7-10 (Tmax 23/25) Goal = 16   SLT OS 4/23/14(18-16)   SLT OD 2/12/14(17-16)   HYPEREMIA WITH XALATAN AND TRAVATAN      2. DM SINCE 2012 NO DBR   3. PCIOL w/ goniotomy OS 6/16/21 (+19.0 SN60WF)   PC IOL + goniotomy OD 7/22/20 (+18.5 SN60WF)   4. Dry Eyes   5. Floater OS    COSOPT BID OU   ALPHAGAN BID OU   LATANOPROST QHS OU    [place] BID OU prn            Last edited by Tigre Chowdhury on 7/24/2023  7:48 AM.         Assessment /Plan :  1. Primary open angle glaucoma of both eyes, severe stage Doing well, intraocular pressure (IOP) within acceptable range relative to target IOP and no evidence of progression. Continue current treatment. Reviewed importance of continued compliance with treatment and follow up.      Patient instructed to continue using the following glaucoma medication as follows:  Latanoprost one drop in each eye nightly, Brimoninide (Alphagan) one drop in each eye every 12 hours, and Dorzolamide/Timolol (Cosopt) one drop in each eye every 12 hours    Return to clinic in 3-4 months  or as needed.  With IOP Check     2. Pseudophakia  -- Condition stable, no therapeutic change required. Monitoring routinely.

## 2023-08-02 ENCOUNTER — OFFICE VISIT (OUTPATIENT)
Dept: PHYSICAL MEDICINE AND REHAB | Facility: CLINIC | Age: 68
End: 2023-08-02
Payer: MEDICARE

## 2023-08-02 ENCOUNTER — OFFICE VISIT (OUTPATIENT)
Dept: ORTHOPEDICS | Facility: CLINIC | Age: 68
End: 2023-08-02
Payer: MEDICARE

## 2023-08-02 VITALS — BODY MASS INDEX: 27.99 KG/M2 | WEIGHT: 168 LBS | HEIGHT: 65 IN

## 2023-08-02 VITALS — BODY MASS INDEX: 27.99 KG/M2 | HEIGHT: 65 IN | RESPIRATION RATE: 13 BRPM | WEIGHT: 168 LBS

## 2023-08-02 DIAGNOSIS — G56.03 BILATERAL CARPAL TUNNEL SYNDROME: Primary | ICD-10-CM

## 2023-08-02 DIAGNOSIS — G56.03 BILATERAL CARPAL TUNNEL SYNDROME: ICD-10-CM

## 2023-08-02 PROCEDURE — 99213 PR OFFICE/OUTPT VISIT, EST, LEVL III, 20-29 MIN: ICD-10-PCS | Mod: S$GLB,,, | Performed by: ORTHOPAEDIC SURGERY

## 2023-08-02 PROCEDURE — 1160F PR REVIEW ALL MEDS BY PRESCRIBER/CLIN PHARMACIST DOCUMENTED: ICD-10-PCS | Mod: CPTII,S$GLB,, | Performed by: ORTHOPAEDIC SURGERY

## 2023-08-02 PROCEDURE — 99999 PR PBB SHADOW E&M-EST. PATIENT-LVL III: ICD-10-PCS | Mod: PBBFAC,,, | Performed by: ORTHOPAEDIC SURGERY

## 2023-08-02 PROCEDURE — 95911 NRV CNDJ TEST 9-10 STUDIES: CPT | Mod: S$GLB,,, | Performed by: PHYSICAL MEDICINE & REHABILITATION

## 2023-08-02 PROCEDURE — 1101F PR PT FALLS ASSESS DOC 0-1 FALLS W/OUT INJ PAST YR: ICD-10-PCS | Mod: CPTII,S$GLB,, | Performed by: ORTHOPAEDIC SURGERY

## 2023-08-02 PROCEDURE — 3008F BODY MASS INDEX DOCD: CPT | Mod: CPTII,S$GLB,, | Performed by: ORTHOPAEDIC SURGERY

## 2023-08-02 PROCEDURE — 99999 PR PBB SHADOW E&M-EST. PATIENT-LVL III: ICD-10-PCS | Mod: PBBFAC,,, | Performed by: PHYSICAL MEDICINE & REHABILITATION

## 2023-08-02 PROCEDURE — 3288F PR FALLS RISK ASSESSMENT DOCUMENTED: ICD-10-PCS | Mod: CPTII,S$GLB,, | Performed by: ORTHOPAEDIC SURGERY

## 2023-08-02 PROCEDURE — 95911 PR NERVE CONDUCTION STUDY; 9-10 STUDIES: ICD-10-PCS | Mod: S$GLB,,, | Performed by: PHYSICAL MEDICINE & REHABILITATION

## 2023-08-02 PROCEDURE — 3288F FALL RISK ASSESSMENT DOCD: CPT | Mod: CPTII,S$GLB,, | Performed by: ORTHOPAEDIC SURGERY

## 2023-08-02 PROCEDURE — 1160F RVW MEDS BY RX/DR IN RCRD: CPT | Mod: CPTII,S$GLB,, | Performed by: ORTHOPAEDIC SURGERY

## 2023-08-02 PROCEDURE — 3072F PR LOW RISK FOR RETINOPATHY: ICD-10-PCS | Mod: CPTII,S$GLB,, | Performed by: ORTHOPAEDIC SURGERY

## 2023-08-02 PROCEDURE — 99999 PR PBB SHADOW E&M-EST. PATIENT-LVL III: CPT | Mod: PBBFAC,,, | Performed by: PHYSICAL MEDICINE & REHABILITATION

## 2023-08-02 PROCEDURE — 3008F PR BODY MASS INDEX (BMI) DOCUMENTED: ICD-10-PCS | Mod: CPTII,S$GLB,, | Performed by: ORTHOPAEDIC SURGERY

## 2023-08-02 PROCEDURE — 99499 UNLISTED E&M SERVICE: CPT | Mod: S$GLB,,, | Performed by: PHYSICAL MEDICINE & REHABILITATION

## 2023-08-02 PROCEDURE — 4010F ACE/ARB THERAPY RXD/TAKEN: CPT | Mod: CPTII,S$GLB,, | Performed by: ORTHOPAEDIC SURGERY

## 2023-08-02 PROCEDURE — 3072F LOW RISK FOR RETINOPATHY: CPT | Mod: CPTII,S$GLB,, | Performed by: ORTHOPAEDIC SURGERY

## 2023-08-02 PROCEDURE — 99499 NO LOS: ICD-10-PCS | Mod: S$GLB,,, | Performed by: PHYSICAL MEDICINE & REHABILITATION

## 2023-08-02 PROCEDURE — 99213 OFFICE O/P EST LOW 20 MIN: CPT | Mod: S$GLB,,, | Performed by: ORTHOPAEDIC SURGERY

## 2023-08-02 PROCEDURE — 1159F PR MEDICATION LIST DOCUMENTED IN MEDICAL RECORD: ICD-10-PCS | Mod: CPTII,S$GLB,, | Performed by: ORTHOPAEDIC SURGERY

## 2023-08-02 PROCEDURE — 1126F AMNT PAIN NOTED NONE PRSNT: CPT | Mod: CPTII,S$GLB,, | Performed by: ORTHOPAEDIC SURGERY

## 2023-08-02 PROCEDURE — 1159F MED LIST DOCD IN RCRD: CPT | Mod: CPTII,S$GLB,, | Performed by: ORTHOPAEDIC SURGERY

## 2023-08-02 PROCEDURE — 1101F PT FALLS ASSESS-DOCD LE1/YR: CPT | Mod: CPTII,S$GLB,, | Performed by: ORTHOPAEDIC SURGERY

## 2023-08-02 PROCEDURE — 4010F PR ACE/ARB THEARPY RXD/TAKEN: ICD-10-PCS | Mod: CPTII,S$GLB,, | Performed by: ORTHOPAEDIC SURGERY

## 2023-08-02 PROCEDURE — 1126F PR PAIN SEVERITY QUANTIFIED, NO PAIN PRESENT: ICD-10-PCS | Mod: CPTII,S$GLB,, | Performed by: ORTHOPAEDIC SURGERY

## 2023-08-02 PROCEDURE — 99999 PR PBB SHADOW E&M-EST. PATIENT-LVL III: CPT | Mod: PBBFAC,,, | Performed by: ORTHOPAEDIC SURGERY

## 2023-08-02 NOTE — PROGRESS NOTES
Subjective:     Patient ID: Raquel Pickard is a 67 y.o. female.    Chief Complaint: Pain of the Right Hand and Pain of the Left Hand      HPI:  The patient is a 67-year-old female with bilateral carpal tunnel syndrome documented by nerve conduction studies.  She is tried splinting with some improvement.  She is tried injection previously with some improvement.  She wishes to avoid surgery or additional injection today.    Past Medical History:   Diagnosis Date    Breast cancer 2011    L    Diabetes mellitus     Glaucoma     Hyperlipidemia     Hypertension     MVP (mitral valve prolapse)     Obesity      Past Surgical History:   Procedure Laterality Date    BREAST LUMPECTOMY      CATARACT EXTRACTION W/  INTRAOCULAR LENS IMPLANT Right 07/22/2020    w/ goniotomy    CATARACT EXTRACTION W/  INTRAOCULAR LENS IMPLANT Left 06/16/2021    w/ goniotomy    COLONOSCOPY W/ POLYPECTOMY  04/18/2017    DR. CHARLENE NATH/ANTONIO SALTER. POLYP X 3. REPEAT 5 YRS.    ENDOBRONCHIAL ULTRASOUND Bilateral 8/25/2022    Procedure: ENDOBRONCHIAL ULTRASOUND (EBUS);  Surgeon: Musa Phillips MD;  Location: [facility];  Service: Pulmonary;  Laterality: Bilateral;    PCIOL Right     DR. MENDOZA    SLT - OU - BOTH EYES      TRIGGER FINGER RELEASE      Thumb    TUBAL LIGATION       Family History   Problem Relation Age of Onset    Glaucoma Brother     Macular degeneration Brother     Esophageal cancer Father     Heart disease Father     Hypertension Father     Liver cancer Mother     Cancer Mother     Hypertension Sister     Cataracts Sister     Diabetes Sister     Cholelithiasis Sister     Blindness Neg Hx     Retinal detachment Neg Hx     Strabismus Neg Hx     Stroke Neg Hx     Thyroid disease Neg Hx      Social History     Socioeconomic History    Marital status:     Number of children: 2   Occupational History    Occupation: retired teacher   Tobacco Use    Smoking status: Never    Smokeless tobacco: Never   Substance and Sexual Activity     Alcohol use: Yes     Comment: very rare    Drug use: No    Sexual activity: Yes     Partners: Male     Medication List with Changes/Refills   Current Medications    ACCU-CHEK CIRILO PLUS TEST STRP STRP    CHECK BLOOD GLUCOSE ONE TIME DAILY    BENAZEPRIL-HYDROCHLORTHIAZIDE (LOTENSIN HCT) 20-12.5 MG PER TABLET    Take 1 tablet by mouth once daily.    BLOOD-GLUCOSE METER KIT    To check BG 1 times daily, Accuchek Cirilo meter    BRIMONIDINE 0.15 % OPTH DROP (ALPHAGAN) 0.15 % OPHTHALMIC SOLUTION    Place 1 drop into both eyes 2 (two) times daily.    CYCLOBENZAPRINE (FLEXERIL) 10 MG TABLET    Take 1 tablet as needed at night for muscle spasm    DORZOLAMIDE-TIMOLOL 2-0.5% (COSOPT) 22.3-6.8 MG/ML OPHTHALMIC SOLUTION    Place 1 drop into both eyes 2 (two) times daily. Dispense 90 days supply    FLUTICASONE PROPIONATE (FLONASE) 50 MCG/ACTUATION NASAL SPRAY    2 sprays (100 mcg total) by Each Nostril route once daily.    GLIMEPIRIDE (AMARYL) 2 MG TABLET    TAKE 1 TABLET DAILY 30 MINUTES BEFORE A MEAL AS NEEDED FOR BLOOD SUGAR GREATER THAN 150. .    IBUPROFEN (ADVIL,MOTRIN) 800 MG TABLET    Take 800 mg by mouth every 6 (six) hours as needed for Pain.    LANCETS (ACCU-CHEK SOFTCLIX LANCETS) MISC    USE  TO  CHECK BLOOD GLUCOSE ONE TIME DAILY    LATANOPROST 0.005 % OPHTHALMIC SOLUTION    INSTILL 1 DROP INTO BOTH EYES EVERY EVENING    LEVOTHYROXINE (SYNTHROID) 75 MCG TABLET    Take 1 tablet (75 mcg total) by mouth every morning.    METFORMIN (GLUCOPHAGE-XR) 500 MG ER 24HR TABLET    Take 3 tablets (1,500 mg total) by mouth once daily.    POTASSIUM CHLORIDE SA (K-DUR,KLOR-CON) 20 MEQ TABLET    Take 1 tablet (20 mEq total) by mouth once daily. Take with food    PROMETHAZINE (PHENERGAN) 25 MG TABLET    Take 25 mg by mouth every 6 (six) hours as needed.    PROPRANOLOL (INDERAL LA) 80 MG 24 HR CAPSULE    Take 1 capsule (80 mg total) by mouth once daily.    ROSUVASTATIN (CRESTOR) 5 MG TABLET    Take 1 tablet (5 mg total) by mouth once  daily.     Review of patient's allergies indicates:   Allergen Reactions    Pollen extracts     Travatan z [travoprost]      Review of Systems   Constitutional: Negative for malaise/fatigue.   HENT:  Negative for hearing loss.    Eyes:  Positive for visual disturbance. Negative for double vision.   Cardiovascular:  Negative for chest pain.   Respiratory:  Negative for shortness of breath.    Endocrine: Negative for cold intolerance.   Hematologic/Lymphatic: Does not bruise/bleed easily.   Skin:  Negative for poor wound healing and suspicious lesions.   Musculoskeletal:  Negative for gout, joint pain and joint swelling.   Gastrointestinal:  Negative for nausea and vomiting.   Genitourinary:  Negative for dysuria.   Neurological:  Positive for numbness, paresthesias and sensory change.   Psychiatric/Behavioral:  Negative for depression, memory loss and substance abuse. The patient is not nervous/anxious.    Allergic/Immunologic: Negative for persistent infections.       Objective:   Body mass index is 27.96 kg/m².  There were no vitals filed for this visit.             General    Constitutional: She is oriented to person, place, and time. She appears well-developed and well-nourished. No distress.   HENT:   Head: Normocephalic.   Eyes: EOM are normal.   Pulmonary/Chest: Effort normal.   Neurological: She is oriented to person, place, and time.   Psychiatric: She has a normal mood and affect.             Right Hand/Wrist Exam     Inspection   Scars: Wrist - absent Hand -  absent  Effusion: Wrist - absent Hand -  absent    Pain   Wrist - The patient exhibits pain of the flexor/pronator group.    Tests   Phalens sign: positive  Tinel's sign (median nerve): positive  Carpal Tunnel Compression Test: positive    Atrophy   Thenar:  negative  Intrinsic:  positive    Other     Neuorologic Exam    Median Distribution: abnormal  Ulnar Distribution: normal  Radial Distribution: normal    Comments:  The patient had a positive  Tinel and positive Phalen sign.  There is no thenar atrophy noted.      Left Hand/Wrist Exam     Inspection   Scars: Wrist - absent Hand -  absent  Effusion: Wrist - absent Hand -  absent    Pain   Wrist - The patient exhibits pain of the flexor/pronator group.    Tests   Phalens sign: positive  Tinel's sign (median nerve): positive  Carpal Tunnel Compression Test: positive    Atrophy  Thenar:  Negative  Intrinsic: negative    Other     Sensory Exam  Median Distribution: abnormal  Ulnar Distribution: normal  Radial Distribution: normal    Comments:  The patient has a positive Tinel and positive Phalen sign.  There is no thenar atrophy noted.          Vascular Exam       Capillary Refill  Right Hand: normal capillary refill  Left Hand: normal capillary refill          Relevant imaging results reviewed and interpreted by me, discussed with the patient and / or family today radiographs were not obtained today  Assessment:     Encounter Diagnosis   Name Primary?    Bilateral carpal tunnel syndrome Yes        Plan:     The patient declines injection or surgery.  She will return if she becomes more symptomatic.                Disclaimer: This note was prepared using a voice recognition system and is likely to have sound alike errors within the text.

## 2023-08-02 NOTE — PROGRESS NOTES
[facility]   [address]  [place] [address]  Phone: [phone]          Full Name: Raquel Pickard YOB: 1955  Patient ID: [id number]      Visit Date: 8/2/2023 08:50  Age: 67 Years  Examining Physician: Dr García  Referring Physician: Dr Rashid  Conclusion: upper ext    Chief Complaint   Patient presents with    Hand Pain       HPI: This is a 67 y.o.  female being seen in clinic today for evaluation of chronic hand achy pain and numbness with limited  at times.  Shaking, rubbing, or resting her hands provide some relief.      History obtained from patient    Past family, medical, social, and surgical history reviewed in chart    Review of Systems:     General- denies lethargy, weight change, fever, chills  Head/neck- denies swallowing difficulties  ENT- denies hearing changes  Cardiovascular-denies chest pain  Pulmonary- denies shortness of breath  GI- denies constipation or bowel incontinence  - denies bladder incontinence  Skin- denies wounds or rashes  Musculoskeletal- +/- weakness, + pain  Neurologic- + numbness and tingling  Psychiatric- denies depressive or psychotic features, denies anxiety  Lymphatic-denies swelling  Endocrine- denies hypoglycemic symptoms/+DM history  All other pertinent systems negative     Physical Examination:  General: Well developed, well nourished female, NAD  HEENT:NCAT EOMI bilaterally   Pulmonary:Normal respirations    Spinal Examination: CERVICAL  Active ROM is within normal limits.  Inspection: No deformity of spinal alignment.    Musculoskeletal Tests:  Phalen: neg  Elbow compression (ulnar): neg  Tinels at wrist: neg    Bilateral Upper and Lower Extremities:  Pulses are 2+ at radial bilaterally.  Shoulder/Elbow/Wrist/Hand ROM wnl  Hip/Knee/Ankle ROM   Bilateral Extremities show normal capillary refill.  No signs of cyanosis, rubor, edema, skin changes, or dysvascular changes of appendages.  Nails appear intact.    Neurological  Exam:  Cranial Nerves:  II-XII grossly intact    Manual Muscle Testing: (Motor 5=normal)  5/5 strength bilateral upper extremities    No focal atrophy is noted of either upper  extremity.    Bilateral Reflexes:  Greer's response is absent bilaterally.    Sensation: tested to light touch  - intact in arms    Gait: Narrow base and good arm swing.      Entire procedure explained to patient prior to proceeding.  Verbal consent obtained    Sensory NCS      Nerve / Sites Rec. Site Onset Lat Peak Lat NP Amp PP Amp Segments Distance Velocity     ms ms µV µV  mm m/s   R Median - Digit II (Antidromic)      Wrist Dig II 2.54 4.81 10.6 24.8 Wrist - Dig  55   L Median - Digit II (Antidromic)      Wrist Dig II 3.10 4.06 14.4 11.8 Wrist - Dig  45   R Ulnar - Digit V (Antidromic)      Wrist Dig V 2.42 3.38 19.4 7.3 Wrist - Dig V 140 58   L Ulnar - Digit V (Antidromic)      Wrist Dig V 1.92 3.00 10.4 7.1 Wrist - Dig V 140 73   R Radial - Anatomical snuff box (Forearm)      Forearm Wrist 1.06 2.29 12.5 12.7 Forearm - Wrist 100 94   L Radial - Anatomical snuff box (Forearm)      Forearm Wrist 1.38 2.04 11.3 15.0 Forearm - Wrist 100 73                   Motor NCS      Nerve / Sites Muscle Latency Amplitude Amp % Duration Segments Distance Lat Diff Velocity     ms mV % ms  mm ms m/s   R Median - APB      Wrist APB 4.56 6.0 100 6.29 Wrist - APB 80        Elbow APB 9.08 5.7 95.9 6.40 Elbow - Wrist 220 4.52 49   L Median - APB      Wrist APB 4.19 5.5 100 7.29 Wrist - APB 80        Elbow APB 8.75 4.9 89.2 7.85 Elbow - Wrist 230 4.56 50   R Ulnar - ADM      Wrist ADM 2.73 6.7 100 7.81 Wrist - ADM 80        B.Elbow ADM 7.13 6.1 90.3 7.85 B.Elbow - Wrist 250 4.40 57      A.Elbow ADM 9.25 5.9 87.5 8.65 A.Elbow - B.Elbow 110 2.13 52   L Ulnar - ADM      Wrist ADM 2.92 5.8 100 8.04 Wrist - ADM 80        B.Elbow ADM 7.15 5.1 87.8 8.10 B.Elbow - Wrist 230 4.23 54      A.Elbow ADM 9.35 5.1 87.3 9.29 A.Elbow - B.Elbow 110 2.21 50                      INTERPRETATION  -Bilateral median motor nerve conduction study showed prolonged latency on the right, normal amplitude, and dec conduction velocity on the right  -Bilateral median sensory nerve conduction study showed prolonged peak latency and normal amplitude  -Bilateral ulnar motor nerve conduction study showed normal latency, amplitude, and conduction velocity  -Bilateral ulnar sensory nerve conduction study showed normal peak latency and amplitude  -Bilateral radial sensory nerve conduction study showed normal peak latency and amplitude      IMPRESSION  ABNORMAL study  2. 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 Follow up with referring provider: Dr. Mark Frausto*  2. Handouts on CTS provided  3. This study is good for one year. If symptoms worsen or do not improve, please re-consult.    Gifty García M.D.  Physical Medicine and Rehab

## 2023-08-29 DIAGNOSIS — I10 ESSENTIAL HYPERTENSION: ICD-10-CM

## 2023-08-29 NOTE — TELEPHONE ENCOUNTER
Refill Routing Note     Refill Routing Note   Medication(s) are not appropriate for processing by [facility] for the following reason(s):      Responsible provider unclear    [facility] action(s):  Defer Care Due:  None identified     Medication Therapy Plan: The provider responsible for managing the medication isnt PCP; LOV with PCP 8/7/23      Appointments  past 12m or future 3m with PCP    Date Provider   Last Visit   Visit date not found Marychuy Rojas DO   Next Visit   10/2/2023 Marychuy Rojas DO   ED visits in past 90 days: 0        Note composed:4:43 PM 08/29/2023

## 2023-08-29 NOTE — TELEPHONE ENCOUNTER
No care due was identified.  [facility] Embedded Care Due Messages. Reference number: [id number].   8/29/2023 4:38:00 PM CDT

## 2023-08-30 RX ORDER — BENAZEPRIL HYDROCHLORIDE AND HYDROCHLOROTHIAZIDE 20; 12.5 MG/1; MG/1
1 TABLET ORAL DAILY
Qty: 90 TABLET | Refills: 0 | Status: SHIPPED | OUTPATIENT
Start: 2023-08-30 | End: 2023-11-07

## 2023-09-18 ENCOUNTER — PATIENT MESSAGE (OUTPATIENT)
Dept: INTERNAL MEDICINE | Facility: CLINIC | Age: 68
End: 2023-09-18
Payer: MEDICARE

## 2023-10-02 ENCOUNTER — OFFICE VISIT (OUTPATIENT)
Dept: INTERNAL MEDICINE | Facility: CLINIC | Age: 68
End: 2023-10-02
Payer: MEDICARE

## 2023-10-02 ENCOUNTER — LAB VISIT (OUTPATIENT)
Dept: LAB | Facility: HOSPITAL | Age: 68
End: 2023-10-02
Attending: INTERNAL MEDICINE
Payer: MEDICARE

## 2023-10-02 VITALS
DIASTOLIC BLOOD PRESSURE: 82 MMHG | SYSTOLIC BLOOD PRESSURE: 118 MMHG | OXYGEN SATURATION: 95 % | TEMPERATURE: 98 F | WEIGHT: 170.63 LBS | HEIGHT: 65 IN | RESPIRATION RATE: 18 BRPM | BODY MASS INDEX: 28.43 KG/M2 | HEART RATE: 95 BPM

## 2023-10-02 DIAGNOSIS — R80.9 CONTROLLED TYPE 2 DIABETES MELLITUS WITH MICROALBUMINURIA, WITHOUT LONG-TERM CURRENT USE OF INSULIN: ICD-10-CM

## 2023-10-02 DIAGNOSIS — E78.5 HYPERLIPIDEMIA LDL GOAL <70: ICD-10-CM

## 2023-10-02 DIAGNOSIS — Z23 IMMUNIZATION DUE: ICD-10-CM

## 2023-10-02 DIAGNOSIS — E11.29 CONTROLLED TYPE 2 DIABETES MELLITUS WITH MICROALBUMINURIA, WITHOUT LONG-TERM CURRENT USE OF INSULIN: ICD-10-CM

## 2023-10-02 DIAGNOSIS — I10 HYPERTENSION GOAL BP (BLOOD PRESSURE) < 130/80: ICD-10-CM

## 2023-10-02 DIAGNOSIS — C34.91 NON-SMALL CELL CANCER OF RIGHT LUNG: ICD-10-CM

## 2023-10-02 DIAGNOSIS — E03.9 HYPOTHYROIDISM (ACQUIRED): ICD-10-CM

## 2023-10-02 DIAGNOSIS — Z76.89 ENCOUNTER TO ESTABLISH CARE: Primary | ICD-10-CM

## 2023-10-02 LAB
ALBUMIN SERPL BCP-MCNC: 3.6 G/DL (ref 3.5–5.2)
ALBUMIN/CREAT UR: 7.6 UG/MG (ref 0–30)
ALP SERPL-CCNC: 44 U/L (ref 55–135)
ALT SERPL W/O P-5'-P-CCNC: 9 U/L (ref 10–44)
ANION GAP SERPL CALC-SCNC: 10 MMOL/L (ref 8–16)
AST SERPL-CCNC: 15 U/L (ref 10–40)
BILIRUB SERPL-MCNC: 0.5 MG/DL (ref 0.1–1)
BUN SERPL-MCNC: 14 MG/DL (ref 8–23)
CALCIUM SERPL-MCNC: 10 MG/DL (ref 8.7–10.5)
CHLORIDE SERPL-SCNC: 104 MMOL/L (ref 95–110)
CHOLEST SERPL-MCNC: 159 MG/DL (ref 120–199)
CHOLEST/HDLC SERPL: 3.3 {RATIO} (ref 2–5)
CO2 SERPL-SCNC: 28 MMOL/L (ref 23–29)
CREAT SERPL-MCNC: 0.8 MG/DL (ref 0.5–1.4)
CREAT UR-MCNC: 92 MG/DL (ref 15–325)
EST. GFR  (NO RACE VARIABLE): >60 ML/MIN/1.73 M^2
ESTIMATED AVG GLUCOSE: 114 MG/DL (ref 68–131)
GLUCOSE SERPL-MCNC: 99 MG/DL (ref 70–110)
HBA1C MFR BLD: 5.6 % (ref 4–5.6)
HDLC SERPL-MCNC: 48 MG/DL (ref 40–75)
HDLC SERPL: 30.2 % (ref 20–50)
LDLC SERPL CALC-MCNC: 89.6 MG/DL (ref 63–159)
MICROALBUMIN UR DL<=1MG/L-MCNC: 7 UG/ML
NONHDLC SERPL-MCNC: 111 MG/DL
POTASSIUM SERPL-SCNC: 3.5 MMOL/L (ref 3.5–5.1)
PROT SERPL-MCNC: 7.5 G/DL (ref 6–8.4)
SODIUM SERPL-SCNC: 142 MMOL/L (ref 136–145)
TRIGL SERPL-MCNC: 107 MG/DL (ref 30–150)

## 2023-10-02 PROCEDURE — 3008F PR BODY MASS INDEX (BMI) DOCUMENTED: ICD-10-PCS | Mod: CPTII,S$GLB,, | Performed by: INTERNAL MEDICINE

## 2023-10-02 PROCEDURE — 1126F PR PAIN SEVERITY QUANTIFIED, NO PAIN PRESENT: ICD-10-PCS | Mod: CPTII,S$GLB,, | Performed by: INTERNAL MEDICINE

## 2023-10-02 PROCEDURE — 83036 HEMOGLOBIN GLYCOSYLATED A1C: CPT | Performed by: INTERNAL MEDICINE

## 2023-10-02 PROCEDURE — 99214 OFFICE O/P EST MOD 30 MIN: CPT | Mod: 25,S$GLB,, | Performed by: INTERNAL MEDICINE

## 2023-10-02 PROCEDURE — 3079F PR MOST RECENT DIASTOLIC BLOOD PRESSURE 80-89 MM HG: ICD-10-PCS | Mod: CPTII,S$GLB,, | Performed by: INTERNAL MEDICINE

## 2023-10-02 PROCEDURE — 3079F DIAST BP 80-89 MM HG: CPT | Mod: CPTII,S$GLB,, | Performed by: INTERNAL MEDICINE

## 2023-10-02 PROCEDURE — 4010F PR ACE/ARB THEARPY RXD/TAKEN: ICD-10-PCS | Mod: CPTII,S$GLB,, | Performed by: INTERNAL MEDICINE

## 2023-10-02 PROCEDURE — 1160F RVW MEDS BY RX/DR IN RCRD: CPT | Mod: CPTII,S$GLB,, | Performed by: INTERNAL MEDICINE

## 2023-10-02 PROCEDURE — 3288F PR FALLS RISK ASSESSMENT DOCUMENTED: ICD-10-PCS | Mod: CPTII,S$GLB,, | Performed by: INTERNAL MEDICINE

## 2023-10-02 PROCEDURE — 1159F PR MEDICATION LIST DOCUMENTED IN MEDICAL RECORD: ICD-10-PCS | Mod: CPTII,S$GLB,, | Performed by: INTERNAL MEDICINE

## 2023-10-02 PROCEDURE — 3074F PR MOST RECENT SYSTOLIC BLOOD PRESSURE < 130 MM HG: ICD-10-PCS | Mod: CPTII,S$GLB,, | Performed by: INTERNAL MEDICINE

## 2023-10-02 PROCEDURE — 1159F MED LIST DOCD IN RCRD: CPT | Mod: CPTII,S$GLB,, | Performed by: INTERNAL MEDICINE

## 2023-10-02 PROCEDURE — 90677 PNEUMOCOCCAL CONJUGATE VACCINE 20-VALENT: ICD-10-PCS | Mod: S$GLB,,, | Performed by: INTERNAL MEDICINE

## 2023-10-02 PROCEDURE — 82570 ASSAY OF URINE CREATININE: CPT | Performed by: INTERNAL MEDICINE

## 2023-10-02 PROCEDURE — 90677 PCV20 VACCINE IM: CPT | Mod: S$GLB,,, | Performed by: INTERNAL MEDICINE

## 2023-10-02 PROCEDURE — 3288F FALL RISK ASSESSMENT DOCD: CPT | Mod: CPTII,S$GLB,, | Performed by: INTERNAL MEDICINE

## 2023-10-02 PROCEDURE — 4010F ACE/ARB THERAPY RXD/TAKEN: CPT | Mod: CPTII,S$GLB,, | Performed by: INTERNAL MEDICINE

## 2023-10-02 PROCEDURE — G0009 PNEUMOCOCCAL CONJUGATE VACCINE 20-VALENT: ICD-10-PCS | Mod: S$GLB,,, | Performed by: INTERNAL MEDICINE

## 2023-10-02 PROCEDURE — 1160F PR REVIEW ALL MEDS BY PRESCRIBER/CLIN PHARMACIST DOCUMENTED: ICD-10-PCS | Mod: CPTII,S$GLB,, | Performed by: INTERNAL MEDICINE

## 2023-10-02 PROCEDURE — 36415 COLL VENOUS BLD VENIPUNCTURE: CPT | Performed by: INTERNAL MEDICINE

## 2023-10-02 PROCEDURE — 3074F SYST BP LT 130 MM HG: CPT | Mod: CPTII,S$GLB,, | Performed by: INTERNAL MEDICINE

## 2023-10-02 PROCEDURE — 80053 COMPREHEN METABOLIC PANEL: CPT | Performed by: INTERNAL MEDICINE

## 2023-10-02 PROCEDURE — 99214 PR OFFICE/OUTPT VISIT, EST, LEVL IV, 30-39 MIN: ICD-10-PCS | Mod: 25,S$GLB,, | Performed by: INTERNAL MEDICINE

## 2023-10-02 PROCEDURE — 99999 PR PBB SHADOW E&M-EST. PATIENT-LVL V: ICD-10-PCS | Mod: PBBFAC,,, | Performed by: INTERNAL MEDICINE

## 2023-10-02 PROCEDURE — 80061 LIPID PANEL: CPT | Performed by: INTERNAL MEDICINE

## 2023-10-02 PROCEDURE — 3008F BODY MASS INDEX DOCD: CPT | Mod: CPTII,S$GLB,, | Performed by: INTERNAL MEDICINE

## 2023-10-02 PROCEDURE — 1101F PR PT FALLS ASSESS DOC 0-1 FALLS W/OUT INJ PAST YR: ICD-10-PCS | Mod: CPTII,S$GLB,, | Performed by: INTERNAL MEDICINE

## 2023-10-02 PROCEDURE — 1101F PT FALLS ASSESS-DOCD LE1/YR: CPT | Mod: CPTII,S$GLB,, | Performed by: INTERNAL MEDICINE

## 2023-10-02 PROCEDURE — G0009 ADMIN PNEUMOCOCCAL VACCINE: HCPCS | Mod: S$GLB,,, | Performed by: INTERNAL MEDICINE

## 2023-10-02 PROCEDURE — 99999 PR PBB SHADOW E&M-EST. PATIENT-LVL V: CPT | Mod: PBBFAC,,, | Performed by: INTERNAL MEDICINE

## 2023-10-02 PROCEDURE — 1126F AMNT PAIN NOTED NONE PRSNT: CPT | Mod: CPTII,S$GLB,, | Performed by: INTERNAL MEDICINE

## 2023-10-02 RX ORDER — GLIMEPIRIDE 2 MG/1
2 TABLET ORAL
Qty: 90 TABLET | Refills: 3 | Status: SHIPPED | OUTPATIENT
Start: 2023-10-02 | End: 2023-12-11 | Stop reason: RX

## 2023-10-02 NOTE — PROGRESS NOTES
Raquel Pickard  67 y.o. Black or  female  [id number]    Chief Complaint:  Chief Complaint   Patient presents with    \[facility] [place] [facility]\"" Care       History of Present Illness:  New patient to me. Previously a patient of Dr. Jensen.   HTN--stable.   DM--previously controlled. Checks glucoses daily. Takes metformin and glimepiride. She does not take either daily as prescribed. She needs a new prescription for glimepiride. She thinks her insurance prefers brand name.   HLD--compliant with rosuvastatin.   Hypothyroidism--compliant with levothyroxine. This is being managed by her oncologist due to it being caused by her immunotherapy.   Lung cancer--management per oncology.     Laboratory   Lab Results   Component Value Date    WBC 3.10 (L) 11/11/2022    HGB 10.9 (L) 11/11/2022    HCT 36.5 (L) 11/11/2022     11/11/2022    CHOL 159 11/11/2022    TRIG 141 11/11/2022    HDL 33 (L) 11/11/2022    ALT 11 11/11/2022    AST 17 11/11/2022     11/11/2022    K 3.7 11/11/2022     11/11/2022    CREATININE 0.6 11/11/2022    BUN 10 11/11/2022    CO2 27 11/11/2022    TSH 7.334 (H) 09/13/2023    INR 1.0 02/02/2023    HGBA1C 5.9 (H) 11/11/2022     Review of Systems   Constitutional:  Negative for fever.   Respiratory:  Negative for cough, hemoptysis and shortness of breath.    Cardiovascular:  Positive for leg swelling. Negative for chest pain.   Genitourinary:  Negative for dysuria.   Neurological:  Negative for dizziness and headaches.       The following were reviewed: Active problem list, medication list, allergies, family history, social history, and Health Maintenance.     History:  Past Medical History:   Diagnosis Date    Breast cancer 2011    Diabetes mellitus     Glaucoma     Hyperlipidemia     Hypertension     Hypothyroidism     Lung cancer 2022    MVP (mitral valve prolapse)     Obesity      Past Surgical History:   Procedure Laterality Date    BREAST LUMPECTOMY      CATARACT EXTRACTION W/  INTRAOCULAR LENS  IMPLANT Right 07/22/2020    w/ goniotomy    CATARACT EXTRACTION W/  INTRAOCULAR LENS IMPLANT Left 06/16/2021    w/ goniotomy    COLONOSCOPY W/ POLYPECTOMY  04/18/2017    DR. CHARLENE NATH/ANTONIO SALTER. POLYP X 3. REPEAT 5 YRS.    ENDOBRONCHIAL ULTRASOUND Bilateral 8/25/2022    Procedure: ENDOBRONCHIAL ULTRASOUND (EBUS);  Surgeon: Musa Phillips MD;  Location: [facility];  Service: Pulmonary;  Laterality: Bilateral;    PCIOL Right     DR. MENDOZA    SLT - OU - BOTH EYES      TRIGGER FINGER RELEASE      Thumb    TUBAL LIGATION       Family History   Problem Relation Age of Onset    Glaucoma Brother     Macular degeneration Brother     Esophageal cancer Father     Heart disease Father     Hypertension Father     Liver cancer Mother     Cancer Mother     Hypertension Sister     Cataracts Sister     Diabetes Sister     Cholelithiasis Sister     Blindness Neg Hx     Retinal detachment Neg Hx     Strabismus Neg Hx     Stroke Neg Hx     Thyroid disease Neg Hx      Social History     Socioeconomic History    Marital status:     Number of children: 2   Occupational History    Occupation: retired teacher   Tobacco Use    Smoking status: Never    Smokeless tobacco: Never   Substance and Sexual Activity    Alcohol use: Yes     Comment: very rare    Drug use: No    Sexual activity: Yes     Partners: Male     Patient Active Problem List   Diagnosis    Low tension glaucoma - Both Eyes    Hypertension goal BP (blood pressure) < 130/80    Primary open angle glaucoma of both eyes, severe stage    Nuclear sclerosis of both eyes    Severe stage chronic open angle glaucoma    Dry eye    Controlled type 2 diabetes mellitus with microalbuminuria    History of left breast cancer    Malignant neoplasm of left breast in female, estrogen receptor positive    Plantar fasciitis    Lymphedema of left arm    Mediastinal lymphadenopathy    Drug-induced immunodeficiency    Drug-induced hypothyroidism    History of DVT (deep vein thrombosis)     Hyperlipidemia LDL goal <70    Non-small cell cancer of right lung    Pain in joint of right shoulder    Pancytopenia due to chemotherapy    Secondary malignant neoplasm of intrathoracic lymph nodes     Review of patient's allergies indicates:   Allergen Reactions    Pollen extracts     Travatan z [travoprost]        Health Maintenance  Health Maintenance Topics with due status: Not Due       Topic Last Completion Date    TETANUS VACCINE 11/08/2016    Colorectal Cancer Screening 06/02/2022    Diabetes Urine Screening 11/11/2022    Lipid Panel 11/11/2022    Eye Exam 03/27/2023    Foot Exam 06/08/2023    DEXA Scan 07/05/2023    Low Dose Statin 08/02/2023     Health Maintenance Due   Topic Date Due    COVID-19 Vaccine (6 - Pfizer risk series) 01/06/2023    Hemoglobin A1c  05/11/2023    Mammogram  10/13/2023       Medications:  Current Outpatient Medications on File Prior to Visit   Medication Sig Dispense Refill    ACCU-CHEK CIRILO PLUS TEST STRP Strp CHECK BLOOD GLUCOSE ONE TIME DAILY 100 strip 3    benazepril-hydrochlorthiazide (LOTENSIN HCT) 20-12.5 mg per tablet Take 1 tablet by mouth once daily. 90 tablet 0    blood-glucose meter kit To check BG 1 times daily, Accuchek Cirilo meter 1 each 0    brimonidine 0.15 % OPTH DROP (ALPHAGAN) 0.15 % ophthalmic solution Place 1 drop into both eyes 2 (two) times daily. 20 mL 3    cyclobenzaprine (FLEXERIL) 10 MG tablet Take 1 tablet as needed at night for muscle spasm 60 tablet 2    dorzolamide-timolol 2-0.5% (COSOPT) 22.3-6.8 mg/mL ophthalmic solution Place 1 drop into both eyes 2 (two) times daily. Dispense 90 days supply 30 mL 4    fluticasone propionate (FLONASE) 50 mcg/actuation nasal spray 2 sprays (100 mcg total) by Each Nostril route once daily. 9.9 mL 1    ibuprofen (ADVIL,MOTRIN) 800 MG tablet Take 800 mg by mouth every 6 (six) hours as needed for Pain.      lancets (ACCU-CHEK SOFTCLIX LANCETS) Misc USE  TO  CHECK BLOOD GLUCOSE ONE TIME DAILY 100 each 3     latanoprost 0.005 % ophthalmic solution INSTILL 1 DROP INTO BOTH EYES EVERY EVENING 7.5 mL 3    levothyroxine (SYNTHROID) 75 MCG tablet Take 1 tablet (75 mcg total) by mouth every morning. 90 tablet 1    metFORMIN (GLUCOPHAGE-XR) 500 MG ER 24hr tablet Take 3 tablets (1,500 mg total) by mouth once daily. 270 tablet 1    potassium chloride SA (K-DUR,KLOR-CON) 20 MEQ tablet Take 1 tablet (20 mEq total) by mouth once daily. Take with food 90 tablet 1    promethazine (PHENERGAN) 25 MG tablet Take 25 mg by mouth every 6 (six) hours as needed.      propranoloL (INDERAL LA) 80 MG 24 hr capsule Take 1 capsule (80 mg total) by mouth once daily. 90 capsule 1    rosuvastatin (CRESTOR) 5 MG tablet Take 1 tablet (5 mg total) by mouth once daily. 90 tablet 1    [DISCONTINUED] glimepiride (AMARYL) 2 MG tablet TAKE 1 TABLET DAILY 30 MINUTES BEFORE A MEAL AS NEEDED FOR BLOOD SUGAR GREATER THAN 150. . (Patient not taking: Reported on 10/2/2023) 90 tablet 1     Medications have been reviewed and reconciled with patient at visit today.    Exam:  Vitals:    10/02/23 0812   BP: 118/82   Pulse: 95   Resp: 18   Temp: 97.8 °F (36.6 °C)     Weight: 77.4 kg (170 lb 10.2 oz)   Body mass index is 28.4 kg/m².      Physical Exam  Vitals reviewed.   Constitutional:       General: She is not in acute distress.     Appearance: She is well-developed. She is not ill-appearing.   Eyes:      General: No scleral icterus.  Cardiovascular:      Rate and Rhythm: Normal rate and regular rhythm.      Heart sounds: Normal heart sounds.   Pulmonary:      Effort: Pulmonary effort is normal. No respiratory distress.      Breath sounds: Normal breath sounds.   Abdominal:      General: Bowel sounds are normal.      Palpations: Abdomen is soft.   Musculoskeletal:      Right lower leg: Edema present.      Left lower leg: Edema present.   Skin:     General: Skin is warm and dry.   Neurological:      Mental Status: She is alert and oriented to person, place, and time.    Psychiatric:         Behavior: Behavior normal.       Assessment:  The primary encounter diagnosis was Encounter to establish care. Diagnoses of Hypertension goal BP (blood pressure) < 130/80, Controlled type 2 diabetes mellitus with microalbuminuria, without long-term current use of insulin, Hyperlipidemia LDL goal <70, Hypothyroidism (acquired), Non-small cell cancer of right lung, and Immunization due were also pertinent to this visit.    Plan:  Encounter to establish care  -     Counseled regarding age appropriate screenings and immunizations  -     Counseled regarding lifestyle modifications    Hypertension goal BP (blood pressure) < 130/80  -     Continue benazepril-HCTZ  -     Comprehensive Metabolic Panel; Standing  -     Lipid Panel; Standing    Controlled type 2 diabetes mellitus with microalbuminuria, without long-term current use of insulin  -     Comprehensive Metabolic Panel; Standing  -     Lipid Panel; Standing  -     Hemoglobin A1C; Standing  -     Microalbumin/Creatinine Ratio, Urine  -     refill AMARYL 2 mg tablet; Take 1 tablet (2 mg total) by mouth before breakfast.  Dispense: 90 tablet; Refill: 3    Hyperlipidemia LDL goal <70  -     Comprehensive Metabolic Panel; Standing  -     Lipid Panel; Standing    Hypothyroidism (acquired)  -     continue levothyroxine    Non-small cell cancer of right lung  -     f/u with oncology    Immunization due  -     (In Office Administered) Pneumococcal Conjugate Vaccine (20 Valent) (IM) (Preferred)    Labs today.

## 2023-11-02 ENCOUNTER — OFFICE VISIT (OUTPATIENT)
Dept: RADIATION ONCOLOGY | Facility: CLINIC | Age: 68
End: 2023-11-02
Payer: MEDICARE

## 2023-11-02 VITALS
WEIGHT: 167.75 LBS | DIASTOLIC BLOOD PRESSURE: 91 MMHG | BODY MASS INDEX: 27.95 KG/M2 | TEMPERATURE: 98 F | OXYGEN SATURATION: 95 % | RESPIRATION RATE: 18 BRPM | HEART RATE: 83 BPM | SYSTOLIC BLOOD PRESSURE: 137 MMHG | HEIGHT: 65 IN

## 2023-11-02 DIAGNOSIS — C34.91 ADENOSQUAMOUS CARCINOMA OF LUNG, RIGHT: Primary | ICD-10-CM

## 2023-11-02 PROCEDURE — 4010F ACE/ARB THERAPY RXD/TAKEN: CPT | Mod: CPTII,S$GLB,, | Performed by: SPECIALIST

## 2023-11-02 PROCEDURE — 3044F HG A1C LEVEL LT 7.0%: CPT | Mod: CPTII,S$GLB,, | Performed by: SPECIALIST

## 2023-11-02 PROCEDURE — 1126F AMNT PAIN NOTED NONE PRSNT: CPT | Mod: CPTII,S$GLB,, | Performed by: SPECIALIST

## 2023-11-02 PROCEDURE — 1126F PR PAIN SEVERITY QUANTIFIED, NO PAIN PRESENT: ICD-10-PCS | Mod: CPTII,S$GLB,, | Performed by: SPECIALIST

## 2023-11-02 PROCEDURE — 99999 PR PBB SHADOW E&M-EST. PATIENT-LVL IV: CPT | Mod: PBBFAC,,,

## 2023-11-02 PROCEDURE — 3075F SYST BP GE 130 - 139MM HG: CPT | Mod: CPTII,S$GLB,, | Performed by: SPECIALIST

## 2023-11-02 PROCEDURE — 3044F PR MOST RECENT HEMOGLOBIN A1C LEVEL <7.0%: ICD-10-PCS | Mod: CPTII,S$GLB,, | Performed by: SPECIALIST

## 2023-11-02 PROCEDURE — 4010F PR ACE/ARB THEARPY RXD/TAKEN: ICD-10-PCS | Mod: CPTII,S$GLB,, | Performed by: SPECIALIST

## 2023-11-02 PROCEDURE — 1159F PR MEDICATION LIST DOCUMENTED IN MEDICAL RECORD: ICD-10-PCS | Mod: CPTII,S$GLB,, | Performed by: SPECIALIST

## 2023-11-02 PROCEDURE — 3061F NEG MICROALBUMINURIA REV: CPT | Mod: CPTII,S$GLB,, | Performed by: SPECIALIST

## 2023-11-02 PROCEDURE — 3066F NEPHROPATHY DOC TX: CPT | Mod: CPTII,S$GLB,, | Performed by: SPECIALIST

## 2023-11-02 PROCEDURE — 3288F FALL RISK ASSESSMENT DOCD: CPT | Mod: CPTII,S$GLB,, | Performed by: SPECIALIST

## 2023-11-02 PROCEDURE — 1101F PT FALLS ASSESS-DOCD LE1/YR: CPT | Mod: CPTII,S$GLB,, | Performed by: SPECIALIST

## 2023-11-02 PROCEDURE — 99213 PR OFFICE/OUTPT VISIT, EST, LEVL III, 20-29 MIN: ICD-10-PCS | Mod: S$GLB,,, | Performed by: SPECIALIST

## 2023-11-02 PROCEDURE — 3075F PR MOST RECENT SYSTOLIC BLOOD PRESS GE 130-139MM HG: ICD-10-PCS | Mod: CPTII,S$GLB,, | Performed by: SPECIALIST

## 2023-11-02 PROCEDURE — 3066F PR DOCUMENTATION OF TREATMENT FOR NEPHROPATHY: ICD-10-PCS | Mod: CPTII,S$GLB,, | Performed by: SPECIALIST

## 2023-11-02 PROCEDURE — 3061F PR NEG MICROALBUMINURIA RESULT DOCUMENTED/REVIEW: ICD-10-PCS | Mod: CPTII,S$GLB,, | Performed by: SPECIALIST

## 2023-11-02 PROCEDURE — 3288F PR FALLS RISK ASSESSMENT DOCUMENTED: ICD-10-PCS | Mod: CPTII,S$GLB,, | Performed by: SPECIALIST

## 2023-11-02 PROCEDURE — 1101F PR PT FALLS ASSESS DOC 0-1 FALLS W/OUT INJ PAST YR: ICD-10-PCS | Mod: CPTII,S$GLB,, | Performed by: SPECIALIST

## 2023-11-02 PROCEDURE — 3080F DIAST BP >= 90 MM HG: CPT | Mod: CPTII,S$GLB,, | Performed by: SPECIALIST

## 2023-11-02 PROCEDURE — 1159F MED LIST DOCD IN RCRD: CPT | Mod: CPTII,S$GLB,, | Performed by: SPECIALIST

## 2023-11-02 PROCEDURE — 3080F PR MOST RECENT DIASTOLIC BLOOD PRESSURE >= 90 MM HG: ICD-10-PCS | Mod: CPTII,S$GLB,, | Performed by: SPECIALIST

## 2023-11-02 PROCEDURE — 3008F BODY MASS INDEX DOCD: CPT | Mod: CPTII,S$GLB,, | Performed by: SPECIALIST

## 2023-11-02 PROCEDURE — 99213 OFFICE O/P EST LOW 20 MIN: CPT | Mod: S$GLB,,, | Performed by: SPECIALIST

## 2023-11-02 PROCEDURE — 3008F PR BODY MASS INDEX (BMI) DOCUMENTED: ICD-10-PCS | Mod: CPTII,S$GLB,, | Performed by: SPECIALIST

## 2023-11-02 PROCEDURE — 99999 PR PBB SHADOW E&M-EST. PATIENT-LVL IV: ICD-10-PCS | Mod: PBBFAC,,,

## 2023-11-02 NOTE — PROGRESS NOTES
Mrs. 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  Her treatment was complicated by a large pleural effusion which has now resolved.  She continues on infusion therapy per Dr. Wallace.  At the current time she is doing well and has no complaints of chest pain rib pain cough hemoptysis or trouble swallowing.  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  As she was triple negative for that disease she underwent breast conserving therapy followed by TAC x6 followed by irradiation completed 05/01/2012.  Her lung cancer was adenosquamous and was not similar to her initial triple negative breast cancer.  She underwent follow-up PET scanning for 3 2023 which showed some increased areas of uptake and a question regarding superior right 10th rib.  She is scheduled to undergo PET scanning this next week.  She has had a change in the way she feels that she breathes but is not symptom limited in all of her activities of daily living or walking.  On physical examination:  She is alert and oriented.  She has no palpable supraclavicular adenopathy.  Her lungs are clear to auscultation.  I am unable to appreciate any interposition.  Impression:  She is doing well at the current time.  Plan:  I will see her back in 6 months' time.  I certainly appreciate participating in this delightful woman's care.

## 2023-11-05 DIAGNOSIS — I10 ESSENTIAL HYPERTENSION: ICD-10-CM

## 2023-11-05 NOTE — TELEPHONE ENCOUNTER
Refill Routing Note   Medication(s) are not appropriate for processing by [facility] for the following reason(s):      Required vitals abnormal    [facility] action(s):  Defer Care Due:  None identified            Appointments  past 12m or future 3m with PCP    Date Provider   Last Visit   10/2/2023 Marychuy Rojas DO   Next Visit   Visit date not found Marychuy Rojas DO   ED visits in past 90 days: 0        Note composed:12:51 PM 11/05/2023

## 2023-11-05 NOTE — TELEPHONE ENCOUNTER
No care due was identified.  [facility] Embedded Care Due Messages. Reference number: [id number].   11/05/2023 7:03:55 AM CST

## 2023-11-07 RX ORDER — BENAZEPRIL HYDROCHLORIDE AND HYDROCHLOROTHIAZIDE 20; 12.5 MG/1; MG/1
1 TABLET ORAL DAILY
Qty: 90 TABLET | Refills: 3 | Status: SHIPPED | OUTPATIENT
Start: 2023-11-07

## 2023-11-16 ENCOUNTER — OFFICE VISIT (OUTPATIENT)
Dept: OPHTHALMOLOGY | Facility: CLINIC | Age: 68
End: 2023-11-16
Payer: MEDICARE

## 2023-11-16 DIAGNOSIS — H40.1133 PRIMARY OPEN ANGLE GLAUCOMA OF BOTH EYES, SEVERE STAGE: Primary | ICD-10-CM

## 2023-11-16 DIAGNOSIS — Z96.1 PSEUDOPHAKIA: ICD-10-CM

## 2023-11-16 PROCEDURE — 1160F RVW MEDS BY RX/DR IN RCRD: CPT | Mod: CPTII,S$GLB,, | Performed by: OPHTHALMOLOGY

## 2023-11-16 PROCEDURE — 3061F PR NEG MICROALBUMINURIA RESULT DOCUMENTED/REVIEW: ICD-10-PCS | Mod: CPTII,S$GLB,, | Performed by: OPHTHALMOLOGY

## 2023-11-16 PROCEDURE — 3044F PR MOST RECENT HEMOGLOBIN A1C LEVEL <7.0%: ICD-10-PCS | Mod: CPTII,S$GLB,, | Performed by: OPHTHALMOLOGY

## 2023-11-16 PROCEDURE — 4010F PR ACE/ARB THEARPY RXD/TAKEN: ICD-10-PCS | Mod: CPTII,S$GLB,, | Performed by: OPHTHALMOLOGY

## 2023-11-16 PROCEDURE — 3044F HG A1C LEVEL LT 7.0%: CPT | Mod: CPTII,S$GLB,, | Performed by: OPHTHALMOLOGY

## 2023-11-16 PROCEDURE — 99214 PR OFFICE/OUTPT VISIT, EST, LEVL IV, 30-39 MIN: ICD-10-PCS | Mod: S$GLB,,, | Performed by: OPHTHALMOLOGY

## 2023-11-16 PROCEDURE — 99999 PR PBB SHADOW E&M-EST. PATIENT-LVL III: ICD-10-PCS | Mod: PBBFAC,,, | Performed by: OPHTHALMOLOGY

## 2023-11-16 PROCEDURE — 1159F MED LIST DOCD IN RCRD: CPT | Mod: CPTII,S$GLB,, | Performed by: OPHTHALMOLOGY

## 2023-11-16 PROCEDURE — 1160F PR REVIEW ALL MEDS BY PRESCRIBER/CLIN PHARMACIST DOCUMENTED: ICD-10-PCS | Mod: CPTII,S$GLB,, | Performed by: OPHTHALMOLOGY

## 2023-11-16 PROCEDURE — 3066F PR DOCUMENTATION OF TREATMENT FOR NEPHROPATHY: ICD-10-PCS | Mod: CPTII,S$GLB,, | Performed by: OPHTHALMOLOGY

## 2023-11-16 PROCEDURE — 99999 PR PBB SHADOW E&M-EST. PATIENT-LVL III: CPT | Mod: PBBFAC,,, | Performed by: OPHTHALMOLOGY

## 2023-11-16 PROCEDURE — 3061F NEG MICROALBUMINURIA REV: CPT | Mod: CPTII,S$GLB,, | Performed by: OPHTHALMOLOGY

## 2023-11-16 PROCEDURE — 3066F NEPHROPATHY DOC TX: CPT | Mod: CPTII,S$GLB,, | Performed by: OPHTHALMOLOGY

## 2023-11-16 PROCEDURE — 1159F PR MEDICATION LIST DOCUMENTED IN MEDICAL RECORD: ICD-10-PCS | Mod: CPTII,S$GLB,, | Performed by: OPHTHALMOLOGY

## 2023-11-16 PROCEDURE — 4010F ACE/ARB THERAPY RXD/TAKEN: CPT | Mod: CPTII,S$GLB,, | Performed by: OPHTHALMOLOGY

## 2023-11-16 PROCEDURE — 99214 OFFICE O/P EST MOD 30 MIN: CPT | Mod: S$GLB,,, | Performed by: OPHTHALMOLOGY

## 2023-11-16 NOTE — PROGRESS NOTES
SUBJECTIVE  Raquelchris Pickard is 68 y.o. female  Uncorrected distance visual acuity was 20/25 in the right eye and 20/25 -2 in the left eye.   Chief Complaint   Patient presents with    Glaucoma     Pt reports for 3-4wk IOP check. Denies any pain or irritation. Va stable. 100% compliant with gtts.           HPI     Glaucoma     Additional comments: Pt reports for 3-4wk IOP check. Denies any pain or   irritation. Va stable. 100% compliant with gtts.            Comments    1. Severe COAG OD>OS DX 7-10 (Tmax 23/25) Goal = 16   SLT OS 4/23/14(18-16)   SLT OD 2/12/14(17-16)   HYPEREMIA WITH XALATAN AND TRAVATAN      2. DM SINCE 2012 NO DBR   3. PCIOL w/ goniotomy OS 6/16/21 (+19.0 SN60WF)   PC IOL + goniotomy OD 7/22/20 (+18.5 SN60WF)   4. Dry Eyes   5. Floater OS    COSOPT BID OU   ALPHAGAN BID OU   LATANOPROST QHS OU    Alaway BID OU prn             Last edited by Lucas Ramos on 11/16/2023  8:22 AM.         Assessment /Plan :  1. Primary open angle glaucoma of both eyes, severe stage Doing well, intraocular pressure (IOP) within acceptable range relative to target IOP and no evidence of progression. Continue current treatment. Reviewed importance of continued compliance with treatment and follow up.      Patient instructed to continue using the following glaucoma medication as follows:  Latanoprost one drop in each eye nightly, Alphagan one drop in each eye every 12 hours, and Dorzolamide/Timolol (Cosopt) one drop in each eye every 12 hours    Return to clinic in 3-4 months  or as needed.  With GOCT, Dilation, and HVF 24-2     2. Pseudophakia  -- Condition stable, no therapeutic change required. Monitoring routinely.

## 2023-11-21 NOTE — TELEPHONE ENCOUNTER
Is the patient due for a refill? Yes    Was the patient seen the past year? Yes    Date of last office visit: 11/14/2023    Does the patient have an upcoming appointment?  No    Provider to refill:ROSA    Does the patients insurance require a 100 day supply?  No     Patient was contacted and made aware of her Rx being sent to the pharmacy and verbally understood the information given.

## 2023-11-30 DIAGNOSIS — E11.9 TYPE 2 DIABETES MELLITUS WITHOUT COMPLICATION, WITHOUT LONG-TERM CURRENT USE OF INSULIN: ICD-10-CM

## 2023-12-01 RX ORDER — METFORMIN HYDROCHLORIDE 500 MG/1
1500 TABLET, EXTENDED RELEASE ORAL DAILY
Qty: 270 TABLET | Refills: 1 | Status: SHIPPED | OUTPATIENT
Start: 2023-12-01

## 2023-12-01 RX ORDER — ROSUVASTATIN CALCIUM 5 MG/1
5 TABLET, COATED ORAL DAILY
Qty: 90 TABLET | Refills: 1 | Status: SHIPPED | OUTPATIENT
Start: 2023-12-01

## 2023-12-04 ENCOUNTER — PATIENT MESSAGE (OUTPATIENT)
Dept: INTERNAL MEDICINE | Facility: CLINIC | Age: 68
End: 2023-12-04
Payer: MEDICARE

## 2023-12-04 DIAGNOSIS — E11.29 CONTROLLED TYPE 2 DIABETES MELLITUS WITH MICROALBUMINURIA, WITHOUT LONG-TERM CURRENT USE OF INSULIN: ICD-10-CM

## 2023-12-04 DIAGNOSIS — R80.9 CONTROLLED TYPE 2 DIABETES MELLITUS WITH MICROALBUMINURIA, WITHOUT LONG-TERM CURRENT USE OF INSULIN: ICD-10-CM

## 2023-12-11 RX ORDER — GLIMEPIRIDE 2 MG/1
2 TABLET ORAL
Qty: 90 TABLET | Refills: 1 | Status: SHIPPED | OUTPATIENT
Start: 2023-12-11

## 2024-01-24 ENCOUNTER — TELEPHONE (OUTPATIENT)
Dept: OPHTHALMOLOGY | Facility: CLINIC | Age: 69
End: 2024-01-24
Payer: MEDICARE

## 2024-02-05 DIAGNOSIS — H40.1133 PRIMARY OPEN ANGLE GLAUCOMA OF BOTH EYES, SEVERE STAGE: ICD-10-CM

## 2024-02-05 RX ORDER — BRIMONIDINE TARTRATE 1.5 MG/ML
1 SOLUTION/ DROPS OPHTHALMIC 2 TIMES DAILY
Qty: 20 ML | Refills: 3 | Status: SHIPPED | OUTPATIENT
Start: 2024-02-05 | End: 2024-03-25 | Stop reason: SDUPTHER

## 2024-03-18 NOTE — TELEPHONE ENCOUNTER
CERTIFICATE OF SCHOOL       March 18, 2024      Re: Damien Saavedra  [address] [address]      This is to certify that Damien Saavedra has been under my care on 3/18/2024 and is excused from school for the appointment.             SIGNATURE:___________________________________________          Mitra Subramanian MD  [facility]   [address] [place] [facility] [address]  Phone: [phone]  Fax: [phone]   No new care gaps identified.  [facility] Embedded Care Gaps. Reference number: [id number]. 2/03/2023   2:54:25 PM CST

## 2024-03-25 ENCOUNTER — OFFICE VISIT (OUTPATIENT)
Dept: OPHTHALMOLOGY | Facility: CLINIC | Age: 69
End: 2024-03-25
Payer: MEDICARE

## 2024-03-25 DIAGNOSIS — Z96.1 PSEUDOPHAKIA: ICD-10-CM

## 2024-03-25 DIAGNOSIS — H40.1133 PRIMARY OPEN ANGLE GLAUCOMA OF BOTH EYES, SEVERE STAGE: Primary | ICD-10-CM

## 2024-03-25 DIAGNOSIS — E11.9 DIABETES MELLITUS TYPE 2 WITHOUT RETINOPATHY: ICD-10-CM

## 2024-03-25 DIAGNOSIS — H04.129 DRY EYE: ICD-10-CM

## 2024-03-25 DIAGNOSIS — H52.7 REFRACTIVE ERROR: ICD-10-CM

## 2024-03-25 PROCEDURE — 1159F MED LIST DOCD IN RCRD: CPT | Mod: CPTII,S$GLB,, | Performed by: OPHTHALMOLOGY

## 2024-03-25 PROCEDURE — 1160F RVW MEDS BY RX/DR IN RCRD: CPT | Mod: CPTII,S$GLB,, | Performed by: OPHTHALMOLOGY

## 2024-03-25 PROCEDURE — 92083 EXTENDED VISUAL FIELD XM: CPT | Mod: S$GLB,,, | Performed by: OPHTHALMOLOGY

## 2024-03-25 PROCEDURE — 99999 PR PBB SHADOW E&M-EST. PATIENT-LVL III: CPT | Mod: PBBFAC,,, | Performed by: OPHTHALMOLOGY

## 2024-03-25 PROCEDURE — 92133 CPTRZD OPH DX IMG PST SGM ON: CPT | Mod: S$GLB,,, | Performed by: OPHTHALMOLOGY

## 2024-03-25 PROCEDURE — 2023F DILAT RTA XM W/O RTNOPTHY: CPT | Mod: CPTII,S$GLB,, | Performed by: OPHTHALMOLOGY

## 2024-03-25 PROCEDURE — 92015 DETERMINE REFRACTIVE STATE: CPT | Mod: S$GLB,,, | Performed by: OPHTHALMOLOGY

## 2024-03-25 PROCEDURE — 4010F ACE/ARB THERAPY RXD/TAKEN: CPT | Mod: CPTII,S$GLB,, | Performed by: OPHTHALMOLOGY

## 2024-03-25 PROCEDURE — 99214 OFFICE O/P EST MOD 30 MIN: CPT | Mod: S$GLB,,, | Performed by: OPHTHALMOLOGY

## 2024-03-25 RX ORDER — DORZOLAMIDE HYDROCHLORIDE AND TIMOLOL MALEATE 20; 5 MG/ML; MG/ML
1 SOLUTION/ DROPS OPHTHALMIC 2 TIMES DAILY
Qty: 30 ML | Refills: 4 | Status: SHIPPED | OUTPATIENT
Start: 2024-03-25

## 2024-03-25 RX ORDER — BRIMONIDINE TARTRATE 1.5 MG/ML
1 SOLUTION/ DROPS OPHTHALMIC 2 TIMES DAILY
Qty: 20 ML | Refills: 3 | Status: SHIPPED | OUTPATIENT
Start: 2024-03-25

## 2024-03-25 RX ORDER — LATANOPROST 50 UG/ML
SOLUTION/ DROPS OPHTHALMIC
Qty: 7.5 ML | Refills: 3 | Status: SHIPPED | OUTPATIENT
Start: 2024-03-25

## 2024-03-25 NOTE — PROGRESS NOTES
SUBJECTIVE  Raquelchris Pickard is 68 y.o. female  Uncorrected distance visual acuity was 20/30 -1 in the right eye and 20/25 in the left eye.   Chief Complaint   Patient presents with    Glaucoma     Pt reports for 3-4m HVF, GOCT, dil. Denies any pain or irritation. Va stable. 100% compliant with gtts.           HPI     Glaucoma     Additional comments: Pt reports for 3-4m HVF, GOCT, dil. Denies any pain   or irritation. Va stable. 100% compliant with gtts.            Comments    1. Severe COAG OD>OS DX 7-10 (Tmax 23/25) Goal = 16   SLT OS 4/23/14(18-16)   SLT OD 2/12/14(17-16)   HYPEREMIA WITH XALATAN AND TRAVATAN      2. DM SINCE 2012 NO DBR   3. PCIOL w/ goniotomy OS 6/16/21 (+19.0 SN60WF)   PC IOL + goniotomy OD 7/22/20 (+18.5 SN60WF)   4. Dry Eyes   5. Floater OS    COSOPT BID OU   ALPHAGAN BID OU   LATANOPROST QHS OU    Alaway BID OU prn             Last edited by Lucas Ramos on 3/25/2024  8:05 AM.         Assessment /Plan :  1. Primary open angle glaucoma of both eyes, severe stage Doing well, intraocular pressure (IOP) within acceptable range relative to target IOP and no evidence of progression. Continue current treatment. Reviewed importance of continued compliance with treatment and follow up.      Patient instructed to continue using the following glaucoma medication as follows:  Latanoprost one drop in each eye nightly, Brimoninide one drop in each eye every 12 hours, and Dorzolamide/Timolol (Cosopt) one drop in each eye every 12 hours    Return to clinic in 4 months  or as needed.  With IOP Check     2. Diabetes mellitus type 2 without retinopathy - No diabetic retinopathy at this time. Reviewed diabetic eye precautions including avoiding tobacco use,  Good glucose control, and importance of regular follow up.      3. Dry eye - Findings and symptoms consistent with mild dry eyes. Dry eye instruction sheet reviewed with patient recommending: Ivizia QID OU PRN     4. Pseudophakia  -- Condition  stable, no therapeutic change required. Monitoring routinely.     5. Refractive error - Dispensed PAL Rx

## 2024-03-26 ENCOUNTER — LAB VISIT (OUTPATIENT)
Dept: LAB | Facility: HOSPITAL | Age: 69
End: 2024-03-26
Attending: INTERNAL MEDICINE
Payer: MEDICARE

## 2024-03-26 DIAGNOSIS — E11.29 CONTROLLED TYPE 2 DIABETES MELLITUS WITH MICROALBUMINURIA, WITHOUT LONG-TERM CURRENT USE OF INSULIN: ICD-10-CM

## 2024-03-26 DIAGNOSIS — R80.9 CONTROLLED TYPE 2 DIABETES MELLITUS WITH MICROALBUMINURIA, WITHOUT LONG-TERM CURRENT USE OF INSULIN: ICD-10-CM

## 2024-03-26 DIAGNOSIS — E78.5 HYPERLIPIDEMIA LDL GOAL <70: ICD-10-CM

## 2024-03-26 DIAGNOSIS — I10 HYPERTENSION GOAL BP (BLOOD PRESSURE) < 130/80: ICD-10-CM

## 2024-03-26 LAB
ALBUMIN SERPL BCP-MCNC: 3.6 G/DL (ref 3.5–5.2)
ALP SERPL-CCNC: 57 U/L (ref 55–135)
ALT SERPL W/O P-5'-P-CCNC: 9 U/L (ref 10–44)
ANION GAP SERPL CALC-SCNC: 9 MMOL/L (ref 8–16)
AST SERPL-CCNC: 18 U/L (ref 10–40)
BILIRUB SERPL-MCNC: 0.7 MG/DL (ref 0.1–1)
BUN SERPL-MCNC: 16 MG/DL (ref 8–23)
CALCIUM SERPL-MCNC: 9.6 MG/DL (ref 8.7–10.5)
CHLORIDE SERPL-SCNC: 105 MMOL/L (ref 95–110)
CHOLEST SERPL-MCNC: 166 MG/DL (ref 120–199)
CHOLEST/HDLC SERPL: 3.3 {RATIO} (ref 2–5)
CO2 SERPL-SCNC: 28 MMOL/L (ref 23–29)
CREAT SERPL-MCNC: 0.7 MG/DL (ref 0.5–1.4)
EST. GFR  (NO RACE VARIABLE): >60 ML/MIN/1.73 M^2
ESTIMATED AVG GLUCOSE: 111 MG/DL (ref 68–131)
GLUCOSE SERPL-MCNC: 79 MG/DL (ref 70–110)
HBA1C MFR BLD: 5.5 % (ref 4–5.6)
HDLC SERPL-MCNC: 51 MG/DL (ref 40–75)
HDLC SERPL: 30.7 % (ref 20–50)
LDLC SERPL CALC-MCNC: 97.8 MG/DL (ref 63–159)
NONHDLC SERPL-MCNC: 115 MG/DL
POTASSIUM SERPL-SCNC: 4.2 MMOL/L (ref 3.5–5.1)
PROT SERPL-MCNC: 7 G/DL (ref 6–8.4)
SODIUM SERPL-SCNC: 142 MMOL/L (ref 136–145)
TRIGL SERPL-MCNC: 86 MG/DL (ref 30–150)

## 2024-03-26 PROCEDURE — 80061 LIPID PANEL: CPT | Performed by: INTERNAL MEDICINE

## 2024-03-26 PROCEDURE — 83036 HEMOGLOBIN GLYCOSYLATED A1C: CPT | Performed by: INTERNAL MEDICINE

## 2024-03-26 PROCEDURE — 36415 COLL VENOUS BLD VENIPUNCTURE: CPT | Performed by: INTERNAL MEDICINE

## 2024-03-26 PROCEDURE — 80053 COMPREHEN METABOLIC PANEL: CPT | Performed by: INTERNAL MEDICINE

## 2024-04-03 ENCOUNTER — OFFICE VISIT (OUTPATIENT)
Dept: INTERNAL MEDICINE | Facility: CLINIC | Age: 69
End: 2024-04-03
Payer: MEDICARE

## 2024-04-03 VITALS
TEMPERATURE: 96 F | RESPIRATION RATE: 20 BRPM | HEART RATE: 100 BPM | BODY MASS INDEX: 29.61 KG/M2 | SYSTOLIC BLOOD PRESSURE: 128 MMHG | DIASTOLIC BLOOD PRESSURE: 84 MMHG | OXYGEN SATURATION: 98 % | WEIGHT: 177.94 LBS

## 2024-04-03 DIAGNOSIS — I10 HYPERTENSION GOAL BP (BLOOD PRESSURE) < 130/80: ICD-10-CM

## 2024-04-03 DIAGNOSIS — E03.2 DRUG-INDUCED HYPOTHYROIDISM: ICD-10-CM

## 2024-04-03 DIAGNOSIS — C34.91 NON-SMALL CELL CANCER OF RIGHT LUNG: ICD-10-CM

## 2024-04-03 DIAGNOSIS — E78.5 HYPERLIPIDEMIA LDL GOAL <70: ICD-10-CM

## 2024-04-03 DIAGNOSIS — Z00.00 ANNUAL PHYSICAL EXAM: Primary | ICD-10-CM

## 2024-04-03 DIAGNOSIS — E11.29 CONTROLLED TYPE 2 DIABETES MELLITUS WITH MICROALBUMINURIA, WITHOUT LONG-TERM CURRENT USE OF INSULIN: ICD-10-CM

## 2024-04-03 DIAGNOSIS — R80.9 CONTROLLED TYPE 2 DIABETES MELLITUS WITH MICROALBUMINURIA, WITHOUT LONG-TERM CURRENT USE OF INSULIN: ICD-10-CM

## 2024-04-03 DIAGNOSIS — R21 RASH: ICD-10-CM

## 2024-04-03 PROCEDURE — 1160F RVW MEDS BY RX/DR IN RCRD: CPT | Mod: CPTII,S$GLB,, | Performed by: PHYSICIAN ASSISTANT

## 2024-04-03 PROCEDURE — 3074F SYST BP LT 130 MM HG: CPT | Mod: CPTII,S$GLB,, | Performed by: PHYSICIAN ASSISTANT

## 2024-04-03 PROCEDURE — 3079F DIAST BP 80-89 MM HG: CPT | Mod: CPTII,S$GLB,, | Performed by: PHYSICIAN ASSISTANT

## 2024-04-03 PROCEDURE — 99214 OFFICE O/P EST MOD 30 MIN: CPT | Mod: S$GLB,,, | Performed by: PHYSICIAN ASSISTANT

## 2024-04-03 PROCEDURE — 1101F PT FALLS ASSESS-DOCD LE1/YR: CPT | Mod: CPTII,S$GLB,, | Performed by: PHYSICIAN ASSISTANT

## 2024-04-03 PROCEDURE — 3044F HG A1C LEVEL LT 7.0%: CPT | Mod: CPTII,S$GLB,, | Performed by: PHYSICIAN ASSISTANT

## 2024-04-03 PROCEDURE — 99999 PR PBB SHADOW E&M-EST. PATIENT-LVL V: CPT | Mod: PBBFAC,,, | Performed by: PHYSICIAN ASSISTANT

## 2024-04-03 PROCEDURE — 3008F BODY MASS INDEX DOCD: CPT | Mod: CPTII,S$GLB,, | Performed by: PHYSICIAN ASSISTANT

## 2024-04-03 PROCEDURE — 4010F ACE/ARB THERAPY RXD/TAKEN: CPT | Mod: CPTII,S$GLB,, | Performed by: PHYSICIAN ASSISTANT

## 2024-04-03 PROCEDURE — 1126F AMNT PAIN NOTED NONE PRSNT: CPT | Mod: CPTII,S$GLB,, | Performed by: PHYSICIAN ASSISTANT

## 2024-04-03 PROCEDURE — 1159F MED LIST DOCD IN RCRD: CPT | Mod: CPTII,S$GLB,, | Performed by: PHYSICIAN ASSISTANT

## 2024-04-03 PROCEDURE — 3288F FALL RISK ASSESSMENT DOCD: CPT | Mod: CPTII,S$GLB,, | Performed by: PHYSICIAN ASSISTANT

## 2024-04-03 RX ORDER — LEVOTHYROXINE SODIUM 125 UG/1
125 TABLET ORAL EVERY MORNING
COMMUNITY
Start: 2024-03-11

## 2024-04-03 RX ORDER — OSIMERTINIB 80 1/1
1 TABLET, FILM COATED ORAL DAILY
COMMUNITY
Start: 2024-03-07

## 2024-04-03 RX ORDER — CLOTRIMAZOLE AND BETAMETHASONE DIPROPIONATE 10; .64 MG/G; MG/G
CREAM TOPICAL 2 TIMES DAILY
Qty: 15 G | Refills: 0 | Status: SHIPPED | OUTPATIENT
Start: 2024-04-03

## 2024-04-03 RX ORDER — PROMETHAZINE HYDROCHLORIDE AND DEXTROMETHORPHAN HYDROBROMIDE 6.25; 15 MG/5ML; MG/5ML
5 SYRUP ORAL
COMMUNITY
Start: 2023-12-14

## 2024-04-03 NOTE — PROGRESS NOTES
Subjective:      Patient ID: Raquel Pickard is a 68 y.o. female.    Chief Complaint: Annual Exam (She is here for an annual exam. Wants to have a spot on her neck looked at. She also stated that she has been having some tenderness in her left hip area. )    Patient is known to me, being seen today for annual exam.     HTN- benazepril-HCTZ 20-12.5mg, propanolol 80mg   HLD- on statin therapy   DM- A1c 5.5%, glimeperide 2mg, merformin 1500mg   On potassium 20mEq (takes prn for cramping)  Thyroid- synthroid 125mcg     Reports intermittent itching to posterior hairline x1wk, L > R, worse when sweating   Skin slightly darker in the area   Admits wears wigs and could be rubbing     Labs recently completed, within acceptable range     Last visit Oct 2023 with PCP.      Review of Systems   Constitutional:  Negative for chills, diaphoresis and fever.   HENT:  Negative for congestion, rhinorrhea and sore throat.    Respiratory:  Negative for cough, shortness of breath and wheezing.    Gastrointestinal:  Negative for abdominal pain, constipation, diarrhea, nausea and vomiting.   Musculoskeletal:  Positive for arthralgias (L posterior hip at times).   Skin:  Positive for color change. Negative for rash.   Neurological:  Negative for dizziness, light-headedness and headaches.       Objective:   /84 (BP Location: Right arm, Patient Position: Sitting, BP Method: Medium (Manual))   Pulse 100   Temp (!) 95.8 °F (35.4 °C) (Tympanic)   Resp 20   Wt 80.7 kg (177 lb 14.6 oz)   SpO2 98%   BMI 29.61 kg/m²   Physical Exam  Constitutional:       General: She is not in acute distress.     Appearance: Normal appearance. She is well-developed. She is not ill-appearing.   HENT:      Head: Normocephalic and atraumatic.     Cardiovascular:      Rate and Rhythm: Normal rate and regular rhythm.      Heart sounds: Normal heart sounds. No murmur heard.  Pulmonary:      Effort: Pulmonary effort is normal. No respiratory distress.      Breath  sounds: Normal breath sounds. No decreased breath sounds.   Musculoskeletal:      Right lower leg: No edema.      Left lower leg: No edema.        Legs:    Skin:     General: Skin is warm and dry.      Findings: No rash.   Psychiatric:         Speech: Speech normal.         Behavior: Behavior normal.         Thought Content: Thought content normal.       Assessment:      1. Annual physical exam    2. Hyperlipidemia LDL goal <70    3. Hypertension goal BP (blood pressure) < 130/80    4. Non-small cell cancer of right lung    5. Controlled type 2 diabetes mellitus with microalbuminuria, without long-term current use of insulin    6. Drug-induced hypothyroidism    7. Rash       Plan:   Annual physical exam    Hyperlipidemia LDL goal <70    Hypertension goal BP (blood pressure) < 130/80    Non-small cell cancer of right lung   Followed by Hem/Onc q4mths s/p radiation, currently on oral chemotherapy    Controlled type 2 diabetes mellitus with microalbuminuria, without long-term current use of insulin    Drug-induced hypothyroidism    Rash  -     clotrimazole-betamethasone 1-0.05% (LOTRISONE) cream; Apply topically 2 (two) times daily.  Dispense: 15 g; Refill: 0      Recommend ice, topicals NSAIDs for hip pain, f/u if worsening or persistent     Use cream x1wk, if no improvement or worsening consider Derm f/u     6mth f/u PCP w labs prior

## 2024-05-02 ENCOUNTER — OFFICE VISIT (OUTPATIENT)
Dept: RADIATION ONCOLOGY | Facility: CLINIC | Age: 69
End: 2024-05-02
Payer: MEDICARE

## 2024-05-02 VITALS
BODY MASS INDEX: 29.68 KG/M2 | RESPIRATION RATE: 18 BRPM | TEMPERATURE: 98 F | HEART RATE: 84 BPM | HEIGHT: 65 IN | SYSTOLIC BLOOD PRESSURE: 131 MMHG | OXYGEN SATURATION: 98 % | DIASTOLIC BLOOD PRESSURE: 85 MMHG | WEIGHT: 178.13 LBS

## 2024-05-02 DIAGNOSIS — C34.91 ADENOSQUAMOUS CARCINOMA OF LUNG, RIGHT: Primary | ICD-10-CM

## 2024-05-02 PROCEDURE — 1101F PT FALLS ASSESS-DOCD LE1/YR: CPT | Mod: CPTII,S$GLB,, | Performed by: SPECIALIST

## 2024-05-02 PROCEDURE — 1126F AMNT PAIN NOTED NONE PRSNT: CPT | Mod: CPTII,S$GLB,, | Performed by: SPECIALIST

## 2024-05-02 PROCEDURE — 99999 PR PBB SHADOW E&M-EST. PATIENT-LVL IV: CPT | Mod: PBBFAC,,, | Performed by: SPECIALIST

## 2024-05-02 PROCEDURE — 3075F SYST BP GE 130 - 139MM HG: CPT | Mod: CPTII,S$GLB,, | Performed by: SPECIALIST

## 2024-05-02 PROCEDURE — 3044F HG A1C LEVEL LT 7.0%: CPT | Mod: CPTII,S$GLB,, | Performed by: SPECIALIST

## 2024-05-02 PROCEDURE — 1159F MED LIST DOCD IN RCRD: CPT | Mod: CPTII,S$GLB,, | Performed by: SPECIALIST

## 2024-05-02 PROCEDURE — 3079F DIAST BP 80-89 MM HG: CPT | Mod: CPTII,S$GLB,, | Performed by: SPECIALIST

## 2024-05-02 PROCEDURE — 4010F ACE/ARB THERAPY RXD/TAKEN: CPT | Mod: CPTII,S$GLB,, | Performed by: SPECIALIST

## 2024-05-02 PROCEDURE — 3008F BODY MASS INDEX DOCD: CPT | Mod: CPTII,S$GLB,, | Performed by: SPECIALIST

## 2024-05-02 PROCEDURE — 99212 OFFICE O/P EST SF 10 MIN: CPT | Mod: S$GLB,,, | Performed by: SPECIALIST

## 2024-05-02 PROCEDURE — 3288F FALL RISK ASSESSMENT DOCD: CPT | Mod: CPTII,S$GLB,, | Performed by: SPECIALIST

## 2024-05-02 NOTE — PROGRESS NOTES
Mrs. Pickard returns for follow-up.  She is currently on Tagrisso and tolerating this well.  Her most recent PET scan 03/05/2024 without evidence of disease.  She still has hypermetabolism in the right upper lobe mass with atelectasis.  MRI of the brain T2 hyperintense enhancing mass within the left frontal lobe bone which is likely old injury.  She has no complaints of any neurologic symptoms.  Physical examination:  She has no supraclavicular adenopathy.  Her lungs are clear to auscultation.  Impression:  She continues to do well and is enjoying Dr. Wallace's good care.  Plan: I will see her back in 6 months.  I certainly appreciate participating in this delightful woman's care.

## 2024-05-20 DIAGNOSIS — E11.9 TYPE 2 DIABETES MELLITUS WITHOUT COMPLICATION, WITHOUT LONG-TERM CURRENT USE OF INSULIN: ICD-10-CM

## 2024-05-20 RX ORDER — ROSUVASTATIN CALCIUM 5 MG/1
5 TABLET, COATED ORAL DAILY
Qty: 90 TABLET | Refills: 1 | Status: SHIPPED | OUTPATIENT
Start: 2024-05-20

## 2024-05-20 NOTE — TELEPHONE ENCOUNTER
Refill Decision Note   Raquel Pickard  is requesting a refill authorization.  Brief Assessment and Rationale for Refill:  Approve     Medication Therapy Plan:         Comments:     Note composed:6:17 PM 05/20/2024

## 2024-05-20 NOTE — TELEPHONE ENCOUNTER
No care due was identified.  Health [facility] Embedded Care Due Messages. Reference number: [id number].   5/20/2024 6:52:50 AM CDT

## 2024-05-31 ENCOUNTER — TELEPHONE (OUTPATIENT)
Dept: INTERNAL MEDICINE | Facility: CLINIC | Age: 69
End: 2024-05-31
Payer: MEDICARE

## 2024-05-31 DIAGNOSIS — E11.29 CONTROLLED TYPE 2 DIABETES MELLITUS WITH MICROALBUMINURIA, WITHOUT LONG-TERM CURRENT USE OF INSULIN: ICD-10-CM

## 2024-05-31 DIAGNOSIS — R80.9 CONTROLLED TYPE 2 DIABETES MELLITUS WITH MICROALBUMINURIA, WITHOUT LONG-TERM CURRENT USE OF INSULIN: ICD-10-CM

## 2024-05-31 NOTE — TELEPHONE ENCOUNTER
Got a fax from [facility] Amaryl 2 mg tab @ has been discontinued by the  and is no longer available Please provide a new prescription for a replacement

## 2024-06-03 RX ORDER — GLYBURIDE 2.5 MG/1
2.5 TABLET ORAL
Qty: 90 TABLET | Refills: 0 | Status: SHIPPED | OUTPATIENT
Start: 2024-06-03

## 2024-06-03 NOTE — TELEPHONE ENCOUNTER
Please find out if the generic for Amaryl (glimepiride) is available and what the alternatives are.

## 2024-06-03 NOTE — TELEPHONE ENCOUNTER
Prescription sent to pharmacy for glyburide. Notify patient of change. She will need to monitor her glucoses regularly.

## 2024-06-08 DIAGNOSIS — E11.29 CONTROLLED TYPE 2 DIABETES MELLITUS WITH MICROALBUMINURIA, WITHOUT LONG-TERM CURRENT USE OF INSULIN: ICD-10-CM

## 2024-06-08 DIAGNOSIS — R80.9 CONTROLLED TYPE 2 DIABETES MELLITUS WITH MICROALBUMINURIA, WITHOUT LONG-TERM CURRENT USE OF INSULIN: ICD-10-CM

## 2024-06-08 RX ORDER — GLIMEPIRIDE 2 MG/1
2 TABLET ORAL
Qty: 90 TABLET | Refills: 1 | OUTPATIENT
Start: 2024-06-08

## 2024-06-08 NOTE — TELEPHONE ENCOUNTER
No care due was identified.  Health [facility] Embedded Care Due Messages. Reference number: [id number].   6/08/2024 12:51:18 AM CDT

## 2024-06-08 NOTE — TELEPHONE ENCOUNTER
Refill Decision Note  Jorge DC. Inappropriate Request     Raquelchris Pickard  is requesting a refill authorization.  Brief Assessment and Rationale for Refill:  Quick Discontinue     Medication Therapy Plan:  prescription was discontinued on 6/3/2024 by Marychuy Rojas DO for the following reason: Alternate therap    Medication Reconciliation Completed: No   Comments:           Note composed:11:31 AM 06/08/2024

## 2024-07-12 DIAGNOSIS — E11.9 TYPE 2 DIABETES MELLITUS WITHOUT COMPLICATION, WITHOUT LONG-TERM CURRENT USE OF INSULIN: ICD-10-CM

## 2024-07-12 RX ORDER — METFORMIN HYDROCHLORIDE 500 MG/1
1500 TABLET, EXTENDED RELEASE ORAL
Qty: 270 TABLET | Refills: 0 | Status: SHIPPED | OUTPATIENT
Start: 2024-07-12

## 2024-07-12 NOTE — TELEPHONE ENCOUNTER
Refill Decision Note   Raquel Pickard  is requesting a refill authorization.  Brief Assessment and Rationale for Refill:  Approve     Medication Therapy Plan:  flos      Comments:     Note composed:9:09 AM 07/12/2024

## 2024-07-12 NOTE — TELEPHONE ENCOUNTER
Care Due:                  Date            Visit Type   Department     Provider  --------------------------------------------------------------------------------                                EP -                              PRIMARY      [facility] INTERNAL  Last Visit: 10-      CARE ([facility])   FRANCISCO Rojas                              EP -                              PRIMARY      [facility] INTERNAL  Next Visit: 10-      CARE ([facility])   FRANCISCO Rojas                                                            Last  Test          Frequency    Reason                     Performed    Due Date  --------------------------------------------------------------------------------    HBA1C.......  6 months...  glyBURIDE, metFORMIN.....  03- 09-    [facility] Embedded Care Due Messages. Reference number: [id number].   7/12/2024 4:10:56 AM CDT

## 2024-07-22 DIAGNOSIS — Z87.898 HISTORY OF PALPITATIONS: ICD-10-CM

## 2024-07-23 RX ORDER — PROPRANOLOL HYDROCHLORIDE 80 MG/1
80 CAPSULE, EXTENDED RELEASE ORAL
Qty: 90 CAPSULE | Refills: 0 | Status: SHIPPED | OUTPATIENT
Start: 2024-07-23

## 2024-07-23 NOTE — TELEPHONE ENCOUNTER
No care due was identified.  Health [facility] Embedded Care Due Messages. Reference number: [id number].   7/22/2024 11:59:42 PM CDT

## 2024-07-23 NOTE — TELEPHONE ENCOUNTER
Refill Decision Note   Raquel Pickard  is requesting a refill authorization.  Brief Assessment and Rationale for Refill:  Approve     Medication Therapy Plan:         Comments:     Note composed:12:58 PM 07/23/2024

## 2024-08-09 DIAGNOSIS — E11.29 CONTROLLED TYPE 2 DIABETES MELLITUS WITH MICROALBUMINURIA, WITHOUT LONG-TERM CURRENT USE OF INSULIN: ICD-10-CM

## 2024-08-09 DIAGNOSIS — R80.9 CONTROLLED TYPE 2 DIABETES MELLITUS WITH MICROALBUMINURIA, WITHOUT LONG-TERM CURRENT USE OF INSULIN: ICD-10-CM

## 2024-08-09 NOTE — TELEPHONE ENCOUNTER
No care due was identified.  Health [facility] Embedded Care Due Messages. Reference number: [id number].   8/09/2024 4:09:45 AM CDT

## 2024-08-09 NOTE — TELEPHONE ENCOUNTER
Refill Routing Note   Medication(s) are not appropriate for processing by [facility] for the following reason(s):        New or recently adjusted medication    [facility] action(s):  Defer             Appointments  past 12m or future 3m with PCP    Date Provider   Last Visit   10/2/2023 Marychuy Rojas DO   Next Visit   10/3/2024 Marychuy Rojas DO   ED visits in past 90 days: 0        Note composed:8:44 AM 08/09/2024

## 2024-08-12 RX ORDER — GLYBURIDE 2.5 MG/1
2.5 TABLET ORAL
Qty: 90 TABLET | Refills: 0 | Status: SHIPPED | OUTPATIENT
Start: 2024-08-12

## 2024-08-28 DIAGNOSIS — H40.1133 PRIMARY OPEN ANGLE GLAUCOMA OF BOTH EYES, SEVERE STAGE: ICD-10-CM

## 2024-08-30 RX ORDER — BRIMONIDINE TARTRATE 1.5 MG/ML
1 SOLUTION/ DROPS OPHTHALMIC 2 TIMES DAILY
Qty: 20 ML | Refills: 3 | Status: SHIPPED | OUTPATIENT
Start: 2024-08-30

## 2024-09-05 ENCOUNTER — OFFICE VISIT (OUTPATIENT)
Dept: OPHTHALMOLOGY | Facility: CLINIC | Age: 69
End: 2024-09-05
Payer: MEDICARE

## 2024-09-05 DIAGNOSIS — H40.1133 PRIMARY OPEN ANGLE GLAUCOMA OF BOTH EYES, SEVERE STAGE: Primary | ICD-10-CM

## 2024-09-05 DIAGNOSIS — Z96.1 PSEUDOPHAKIA: ICD-10-CM

## 2024-09-05 PROCEDURE — 4010F ACE/ARB THERAPY RXD/TAKEN: CPT | Mod: CPTII,S$GLB,, | Performed by: OPHTHALMOLOGY

## 2024-09-05 PROCEDURE — 1160F RVW MEDS BY RX/DR IN RCRD: CPT | Mod: CPTII,S$GLB,, | Performed by: OPHTHALMOLOGY

## 2024-09-05 PROCEDURE — 1126F AMNT PAIN NOTED NONE PRSNT: CPT | Mod: CPTII,S$GLB,, | Performed by: OPHTHALMOLOGY

## 2024-09-05 PROCEDURE — 99999 PR PBB SHADOW E&M-EST. PATIENT-LVL III: CPT | Mod: PBBFAC,,, | Performed by: OPHTHALMOLOGY

## 2024-09-05 PROCEDURE — 99214 OFFICE O/P EST MOD 30 MIN: CPT | Mod: S$GLB,,, | Performed by: OPHTHALMOLOGY

## 2024-09-05 PROCEDURE — 1159F MED LIST DOCD IN RCRD: CPT | Mod: CPTII,S$GLB,, | Performed by: OPHTHALMOLOGY

## 2024-09-05 PROCEDURE — 3044F HG A1C LEVEL LT 7.0%: CPT | Mod: CPTII,S$GLB,, | Performed by: OPHTHALMOLOGY

## 2024-09-05 NOTE — PROGRESS NOTES
SUBJECTIVE  Raquelchris Pickard is 68 y.o. female  Uncorrected distance visual acuity was 20/20 -2 in the right eye and 20/20 -2 in the left eye.   Chief Complaint   Patient presents with    Glaucoma     4 month IOP check. VA is stable, no changes that she noticed. No pain or irritation. Compliant with gtts.          HPI     Glaucoma     Additional comments: 4 month IOP check. VA is stable, no changes that she   noticed. No pain or irritation. Compliant with gtts.           Comments    1. Severe COAG OD>OS DX 7-10 (Tmax 23/25) Goal = 16   SLT OS 4/23/14(18-16)   SLT OD 2/12/14(17-16)   HYPEREMIA WITH XALATAN AND TRAVATAN      2. DM SINCE 2012 NO DBR   3. PCIOL w/ goniotomy OS 6/16/21 (+19.0 SN60WF)   PC IOL + goniotomy OD 7/22/20 (+18.5 SN60WF)   4. Dry Eyes   5. Floater OS    COSOPT BID OU   ALPHAGAN BID OU   LATANOPROST QHS OU    [place] BID OU prn             Last edited by Tigre Chowdhury on 9/5/2024  9:32 AM.         Assessment /Plan :  1. Primary open angle glaucoma of both eyes, severe stage Doing well, intraocular pressure (IOP) within acceptable range relative to target IOP and no evidence of progression. Continue current treatment. Reviewed importance of continued compliance with treatment and follow up.      Patient instructed to continue using the following glaucoma medication as follows:  Latanoprost one drop in each eye nightly, Dorzolamide/Timolol (Cosopt) one drop in each eye every 12 hours, and Alphagan one drop in each eye 2 times a day    Return to clinic in 4 months with Dr. Silva or as needed.  With IOP Check and GOCT     2. Pseudophakia  -- Condition stable, no therapeutic change required. Monitoring routinely.

## 2024-09-11 DIAGNOSIS — I10 ESSENTIAL HYPERTENSION: ICD-10-CM

## 2024-09-11 RX ORDER — BENAZEPRIL HYDROCHLORIDE AND HYDROCHLOROTHIAZIDE 20; 12.5 MG/1; MG/1
1 TABLET ORAL DAILY
Qty: 90 TABLET | Refills: 0 | Status: SHIPPED | OUTPATIENT
Start: 2024-09-11

## 2024-09-12 NOTE — TELEPHONE ENCOUNTER
Care Due:                  Date            Visit Type   Department     Provider  --------------------------------------------------------------------------------                                EP -                              PRIMARY      [facility] INTERNAL  Last Visit: 10-      CARE ([facility])   FRANCISCO Rojas                              EP -                              PRIMARY      [facility] INTERNAL  Next Visit: 10-      CARE ([facility])   FRANCISCO Rojas                                                            Last  Test          Frequency    Reason                     Performed    Due Date  --------------------------------------------------------------------------------    HBA1C.......  6 months...  metFORMIN................  03- 09-    [facility] [employer] Embedded Care Due Messages. Reference number: [id number].   9/11/2024 9:25:50 PM CDT

## 2024-09-12 NOTE — TELEPHONE ENCOUNTER
Provider Staff:  Action required for this patient    Requires labs      Please see care gap opportunities below in Care Due Message.    Thanks!  [facility]     Appointments      Date Provider   Last Visit   10/2/2023 Marychuy Rojas DO   Next Visit   10/3/2024 Marychuy Rojas DO     Refill Decision Note   Raquel Pickard  is requesting a refill authorization.  Brief Assessment and Rationale for Refill:  Approve     Medication Therapy Plan: ddi reviewed ok to fill      Pharmacist review requested: Yes   Comments:     Note composed:10:58 PM 09/11/2024

## 2024-09-12 NOTE — TELEPHONE ENCOUNTER
Refill Routing Note   Medication(s) are not appropriate for processing by [facility] for the following reason(s):     DDI not previously overridden by current provider--after initial override, the Refill Center will be able to continue overrides      Drug-disease interaction: benazepril-hydrochlorthiazide and Pancytopenia due to chemotherapy     [facility] action(s):  Defer   Requires labs : Yes           Pharmacist review requested: Yes     Appointments  past 12m or future 3m with PCP    Date Provider   Last Visit   10/2/2023 Marychuy Rojas DO   Next Visit   10/3/2024 Marychuy Rojas DO   ED visits in past 90 days: 0        Note composed:10:51 PM 09/11/2024

## 2024-09-17 ENCOUNTER — LAB VISIT (OUTPATIENT)
Dept: LAB | Facility: HOSPITAL | Age: 69
End: 2024-09-17
Attending: INTERNAL MEDICINE
Payer: MEDICARE

## 2024-09-17 DIAGNOSIS — R80.9 CONTROLLED TYPE 2 DIABETES MELLITUS WITH MICROALBUMINURIA, WITHOUT LONG-TERM CURRENT USE OF INSULIN: ICD-10-CM

## 2024-09-17 DIAGNOSIS — E11.29 CONTROLLED TYPE 2 DIABETES MELLITUS WITH MICROALBUMINURIA, WITHOUT LONG-TERM CURRENT USE OF INSULIN: ICD-10-CM

## 2024-09-17 DIAGNOSIS — E78.5 HYPERLIPIDEMIA LDL GOAL <70: ICD-10-CM

## 2024-09-17 DIAGNOSIS — I10 HYPERTENSION GOAL BP (BLOOD PRESSURE) < 130/80: ICD-10-CM

## 2024-09-17 LAB
ALBUMIN SERPL BCP-MCNC: 3.5 G/DL (ref 3.5–5.2)
ALP SERPL-CCNC: 60 U/L (ref 55–135)
ALT SERPL W/O P-5'-P-CCNC: 17 U/L (ref 10–44)
ANION GAP SERPL CALC-SCNC: 7 MMOL/L (ref 8–16)
AST SERPL-CCNC: 26 U/L (ref 10–40)
BILIRUB SERPL-MCNC: 0.5 MG/DL (ref 0.1–1)
BUN SERPL-MCNC: 19 MG/DL (ref 8–23)
CALCIUM SERPL-MCNC: 10 MG/DL (ref 8.7–10.5)
CHLORIDE SERPL-SCNC: 103 MMOL/L (ref 95–110)
CHOLEST SERPL-MCNC: 213 MG/DL (ref 120–199)
CHOLEST/HDLC SERPL: 4.4 {RATIO} (ref 2–5)
CO2 SERPL-SCNC: 27 MMOL/L (ref 23–29)
CREAT SERPL-MCNC: 0.8 MG/DL (ref 0.5–1.4)
EST. GFR  (NO RACE VARIABLE): >60 ML/MIN/1.73 M^2
ESTIMATED AVG GLUCOSE: 108 MG/DL (ref 68–131)
GLUCOSE SERPL-MCNC: 81 MG/DL (ref 70–110)
HBA1C MFR BLD: 5.4 % (ref 4–5.6)
HDLC SERPL-MCNC: 48 MG/DL (ref 40–75)
HDLC SERPL: 22.5 % (ref 20–50)
LDLC SERPL CALC-MCNC: 141.8 MG/DL (ref 63–159)
NONHDLC SERPL-MCNC: 165 MG/DL
POTASSIUM SERPL-SCNC: 4.1 MMOL/L (ref 3.5–5.1)
PROT SERPL-MCNC: 7.3 G/DL (ref 6–8.4)
SODIUM SERPL-SCNC: 137 MMOL/L (ref 136–145)
TRIGL SERPL-MCNC: 116 MG/DL (ref 30–150)

## 2024-09-17 PROCEDURE — 83036 HEMOGLOBIN GLYCOSYLATED A1C: CPT | Performed by: INTERNAL MEDICINE

## 2024-09-17 PROCEDURE — 36415 COLL VENOUS BLD VENIPUNCTURE: CPT | Performed by: INTERNAL MEDICINE

## 2024-09-17 PROCEDURE — 80061 LIPID PANEL: CPT | Performed by: INTERNAL MEDICINE

## 2024-09-17 PROCEDURE — 80053 COMPREHEN METABOLIC PANEL: CPT | Performed by: INTERNAL MEDICINE

## 2024-09-23 DIAGNOSIS — E11.9 TYPE 2 DIABETES MELLITUS WITHOUT COMPLICATION, WITHOUT LONG-TERM CURRENT USE OF INSULIN: ICD-10-CM

## 2024-09-24 RX ORDER — ROSUVASTATIN CALCIUM 5 MG/1
5 TABLET, COATED ORAL
Qty: 90 TABLET | Refills: 0 | Status: SHIPPED | OUTPATIENT
Start: 2024-09-24

## 2024-09-24 NOTE — TELEPHONE ENCOUNTER
No care due was identified.  Health [facility] Embedded Care Due Messages. Reference number: [id number].   9/23/2024 9:42:35 PM CDT

## 2024-09-28 DIAGNOSIS — Z87.898 HISTORY OF PALPITATIONS: ICD-10-CM

## 2024-09-29 RX ORDER — PROPRANOLOL HYDROCHLORIDE 80 MG/1
80 CAPSULE, EXTENDED RELEASE ORAL
Qty: 90 CAPSULE | Refills: 0 | Status: SHIPPED | OUTPATIENT
Start: 2024-09-29

## 2024-09-29 NOTE — TELEPHONE ENCOUNTER
Refill Decision Note   Raquelchris Pickard  is requesting a refill authorization.  Brief Assessment and Rationale for Refill:  Approve     Medication Therapy Plan:       Medication Reconciliation Completed: No   Comments:     No Care Gaps recommended.     Note composed:7:52 AM 09/29/2024

## 2024-09-29 NOTE — TELEPHONE ENCOUNTER
No care due was identified.  [facility] Embedded Care Due Messages. Reference number: [id number].   9/28/2024 9:23:03 PM CDT

## 2024-10-03 ENCOUNTER — OFFICE VISIT (OUTPATIENT)
Dept: INTERNAL MEDICINE | Facility: CLINIC | Age: 69
End: 2024-10-03
Payer: MEDICARE

## 2024-10-03 VITALS
DIASTOLIC BLOOD PRESSURE: 72 MMHG | OXYGEN SATURATION: 99 % | BODY MASS INDEX: 29.57 KG/M2 | WEIGHT: 177.5 LBS | SYSTOLIC BLOOD PRESSURE: 130 MMHG | TEMPERATURE: 96 F | HEART RATE: 84 BPM | HEIGHT: 65 IN | RESPIRATION RATE: 20 BRPM

## 2024-10-03 DIAGNOSIS — E11.9 CONTROLLED TYPE 2 DIABETES MELLITUS WITHOUT COMPLICATION, WITHOUT LONG-TERM CURRENT USE OF INSULIN: Primary | ICD-10-CM

## 2024-10-03 DIAGNOSIS — Z23 NEED FOR VACCINATION: ICD-10-CM

## 2024-10-03 DIAGNOSIS — E78.5 HYPERLIPIDEMIA LDL GOAL <70: ICD-10-CM

## 2024-10-03 DIAGNOSIS — I10 HYPERTENSION GOAL BP (BLOOD PRESSURE) < 130/80: ICD-10-CM

## 2024-10-03 DIAGNOSIS — R25.2 MUSCLE CRAMPS: ICD-10-CM

## 2024-10-03 PROBLEM — Z79.899 DRUG-INDUCED IMMUNODEFICIENCY: Status: RESOLVED | Noted: 2022-11-11 | Resolved: 2024-10-03

## 2024-10-03 PROBLEM — D84.821 DRUG-INDUCED IMMUNODEFICIENCY: Status: RESOLVED | Noted: 2022-11-11 | Resolved: 2024-10-03

## 2024-10-03 PROBLEM — D61.810 PANCYTOPENIA DUE TO CHEMOTHERAPY: Status: RESOLVED | Noted: 2023-02-08 | Resolved: 2024-10-03

## 2024-10-03 LAB
ALBUMIN/CREAT UR: NORMAL UG/MG (ref 0–30)
CREAT UR-MCNC: 42 MG/DL (ref 15–325)
MICROALBUMIN UR DL<=1MG/L-MCNC: <5 UG/ML

## 2024-10-03 PROCEDURE — 82570 ASSAY OF URINE CREATININE: CPT | Performed by: INTERNAL MEDICINE

## 2024-10-03 PROCEDURE — 82043 UR ALBUMIN QUANTITATIVE: CPT | Performed by: INTERNAL MEDICINE

## 2024-10-03 PROCEDURE — 99999 PR PBB SHADOW E&M-EST. PATIENT-LVL V: CPT | Mod: PBBFAC,,, | Performed by: INTERNAL MEDICINE

## 2024-10-03 RX ORDER — METFORMIN HYDROCHLORIDE 500 MG/1
1000 TABLET, EXTENDED RELEASE ORAL DAILY
Qty: 180 TABLET | Refills: 3 | Status: SHIPPED | OUTPATIENT
Start: 2024-10-03

## 2024-10-03 RX ORDER — ROSUVASTATIN CALCIUM 5 MG/1
5 TABLET, COATED ORAL NIGHTLY
Qty: 90 TABLET | Refills: 3 | Status: SHIPPED | OUTPATIENT
Start: 2024-10-03

## 2024-10-03 RX ORDER — BENAZEPRIL HYDROCHLORIDE AND HYDROCHLOROTHIAZIDE 20; 12.5 MG/1; MG/1
1 TABLET ORAL DAILY
Qty: 90 TABLET | Refills: 3 | Status: SHIPPED | OUTPATIENT
Start: 2024-10-03

## 2024-10-03 NOTE — PROGRESS NOTES
Raquel Pickard  68 y.o. Black or  female  [id number]    Chief Complaint:  Chief Complaint   Patient presents with    Follow-up     6 months     Leg Pain       History of Present Illness:  History of Present Illness    CHIEF COMPLAINT:  Ms. Pickard presents today for leg cramps and heaviness.    DIABETES:   Stable on metformin.     HYPERTENSION:   Stable.     MEDICATION CHANGES:  She stopped rosuvastatin, which she was taking for cholesterol management, about 1.5 weeks ago. Since discontinuing, she notes some improvement in leg cramps. She takes most of her medications at night. For metformin, she is prescribed 3 tablets daily but admits to taking only 1.5 to 2 tablets at most, adjusting based on her blood sugar which she monitors frequently. Her thyroid medication is prescribed and managed by her oncologist.    LEG CRAMPS AND HEAVINESS:  She reports experiencing leg cramps and heaviness for approximately three weeks, worse on the left side. The symptoms start in the thigh and extend to the ankles, described as spasms that she can sense coming on. The cramps occur primarily at night or when sitting and improve with movement. She denies pain when walking. The left leg appears slightly larger than the right. She wonders if these symptoms could be related to her previous cancer and possible neuropathy. She has tried various home remedies including tonic water, vinegar, pickles, raisins, garlic, and mustard, with limited success. She denies significant back pain, numbness, or tingling.    URINARY HABITS:  She reports nocturia, which she attributes to drinking water late at night, often around 10 or 11 PM, or later if she is still awake.    CARDIOVASCULAR HEALTH:  She has never seen a cardiologist and denies experiencing any specific heart problems, but expresses some general concern about her cardiovascular health. Her oncologist typically performs comprehensive health checks.    ONCOLOGY CARE:  She continues  to be followed by Dr. Benji Wallace for oncology care.         Review of Systems   Respiratory:  Negative for shortness of breath.    Cardiovascular:  Negative for chest pain and leg swelling.   Genitourinary:  Positive for frequency (at night).   Musculoskeletal:  Positive for myalgias. Negative for back pain.   Neurological:  Negative for tingling.       Laboratory:  Lab Results   Component Value Date    WBC 3.10 (L) 11/11/2022    HGB 10.9 (L) 11/11/2022    HCT 36.5 (L) 11/11/2022     11/11/2022    CHOL 213 (H) 09/17/2024    TRIG 116 09/17/2024    HDL 48 09/17/2024    ALT 17 09/17/2024    AST 26 09/17/2024     09/17/2024    K 4.1 09/17/2024     09/17/2024    CREATININE 0.8 09/17/2024    BUN 19 09/17/2024    CO2 27 09/17/2024    TSH 1.551 12/22/2023    INR 1.0 02/02/2023    HGBA1C 5.4 09/17/2024     Lab Results   Component Value Date    LDLCALC 141.8 09/17/2024       History:  Past Medical History:   Diagnosis Date    Breast cancer 2011    Diabetes mellitus     Glaucoma     Hyperlipidemia     Hypertension     Hypothyroidism     Lung cancer 2022    MVP (mitral valve prolapse)     Obesity        Medications:  Current Outpatient Medications on File Prior to Visit   Medication Sig Dispense Refill    ACCU-CHEK CIRILO PLUS TEST STRP Strp CHECK BLOOD GLUCOSE ONE TIME DAILY 100 strip 3    blood-glucose meter kit To check BG 1 times daily, Accuchek Cirilo meter 1 each 0    brimonidine 0.15 % OPTH DROP (ALPHAGAN) 0.15 % ophthalmic solution INSTILL 1 DROP INTO BOTH EYES  TWICE DAILY 20 mL 3    clotrimazole-betamethasone 1-0.05% (LOTRISONE) cream Apply topically 2 (two) times daily. 15 g 0    COVID-19 (COMIRNATY 2024-25, 12Y UP,,PF,) 30 mcg/0.3 mL IM vaccine (>/= 11 yo) Inject 0.3 ml into the muscle. 0.3 mL 0    cyclobenzaprine (FLEXERIL) 10 MG tablet Take 1 tablet as needed at night for muscle spasm 60 tablet 2    dorzolamide-timolol 2-0.5% (COSOPT) 22.3-6.8 mg/mL ophthalmic solution Place 1 drop into both  eyes 2 (two) times daily. Dispense 90 days supply 30 mL 4    fluticasone propionate (FLONASE) 50 mcg/actuation nasal spray 2 sprays (100 mcg total) by Each Nostril route once daily. 9.9 mL 1    glyBURIDE (DIABETA) 2.5 MG tablet TAKE 1 TABLET BY MOUTH DAILY  WITH BREAKFAST 90 tablet 0    ibuprofen (ADVIL,MOTRIN) 800 MG tablet Take 800 mg by mouth every 6 (six) hours as needed for Pain.      lancets (ACCU-CHEK SOFTCLIX LANCETS) Misc USE  TO  CHECK BLOOD GLUCOSE ONE TIME DAILY 100 each 3    latanoprost 0.005 % ophthalmic solution INSTILL 1 DROP INTO BOTH EYES EVERY EVENING 7.5 mL 3    levothyroxine (SYNTHROID) 125 MCG tablet Take 125 mcg by mouth every morning.      potassium chloride SA (K-DUR,KLOR-CON) 20 MEQ tablet Take 1 tablet (20 mEq total) by mouth once daily. Take with food 90 tablet 1    promethazine (PHENERGAN) 25 MG tablet Take 25 mg by mouth every 6 (six) hours as needed.      promethazine-dextromethorphan (PROMETHAZINE-DM) 6.25-15 mg/5 mL Syrp Take 5 mLs by mouth as needed.      propranoloL (INDERAL LA) 80 MG 24 hr capsule TAKE 1 CAPSULE BY MOUTH ONCE  DAILY 90 capsule 0    TAGRISSO 80 mg Tab Take 1 tablet by mouth once daily.      benazepril-hydrochlorthiazide (LOTENSIN HCT) 20-12.5 mg per tablet TAKE 1 TABLET BY MOUTH ONCE  DAILY 90 tablet 0    metFORMIN (GLUCOPHAGE-XR) 500 MG ER 24hr tablet TAKE 3 TABLETS BY MOUTH ONCE  DAILY 270 tablet 0    rosuvastatin (CRESTOR) 5 MG tablet TAKE 1 TABLET BY MOUTH ONCE  DAILY 90 tablet 0     No current facility-administered medications on file prior to visit.       Allergies:  Review of patient's allergies indicates:   Allergen Reactions    Pollen extracts     Travatan z [travoprost]        Exam:  Vitals:    10/03/24 0747   BP: 130/72   Pulse: 84   Resp: 20   Temp: 96.2 °F (35.7 °C)     Weight: 80.5 kg (177 lb 7.5 oz)   Body mass index is 29.53 kg/m².      Physical Exam    Vitals: Reviewed.  Constitutional: No acute distress. Well-developed. Not ill-appearing.  Eyes:  No scleral icterus.  Neck: No cervical lymphadenopathy.  Cardiovascular: Normal rate and regular rhythm. Normal heart sounds.  Pulmonary: Pulmonary effort is normal. No respiratory distress. Normal breath sounds.  Abdomen: Soft. Nontender. Nondistended. Normoactive bowel sounds.  Extremities: No edema. Good pulse in right foot. Good pulse in left foot.  Skin: Warm. Dry.  Neurological: Alert and oriented to person, place, and time.  Psychiatric: Behavior normal.     Physical Exam  Cardiovascular:      Pulses:           Dorsalis pedis pulses are 2+ on the right side and 2+ on the left side.   Feet:      Right foot:      Protective Sensation: 5 sites tested.  5 sites sensed.      Skin integrity: No ulcer.      Left foot:      Protective Sensation: 5 sites tested.  5 sites sensed.      Skin integrity: No ulcer.           Assessment:  The primary encounter diagnosis was Controlled type 2 diabetes mellitus without complication, without long-term current use of insulin. Diagnoses of Hypertension goal BP (blood pressure) < 130/80, Hyperlipidemia LDL goal <70, Muscle cramps, and Need for vaccination were also pertinent to this visit.    Assessment & Plan    DIABETES:  - Reviewed lab results, confirming well-controlled diabetes and normal electrolytes.  - Ms. Pickard to continue current diabetes management approach.  - Decreased metformin from 3 tablets to 2 tablets daily.    HYPERTENSION:  - Continued benazepril, hydrochlorothiazide for blood pressure.    HYPERLIPIDEMIA:  - Evaluated cholesterol management, noting increase after patient self-discontinued rosuvastatin.  - Ms. Pickard to monitor cholesterol levels while incorporating garlic into diet.  - Restarted rosuvastatin 5 mg at night, with option to take every other night if incorporating garlic on off days.    LEG CRAMPS AND POTENTIAL NEUROPATHY:  - Assessed leg cramps and potential neuropathy, considering recent cancer history.  - Considered neuropathy as possible cause  for leg symptoms.  - Explained potential benefits of tonic water for muscle cramps.  - Explained relationship between nerves and muscles in causing spasms.  - Ms. Pickard to try tonic water for leg cramps, using a capful before bedtime.  - Contact the office if leg cramps persist after trying tonic water.    THYROID DISORDER:  - Refilled all current medications, excluding levothyroxine (prescribed by oncologist).    MEDICATIONS/SUPPLEMENTS:  - Discussed importance of FDA approval for medications and supplements.    LABS:  - Urine sample ordered.    FOLLOW UP:  - Determined no immediate need for cardiology referral based on current symptoms   - Refilled all current medications.  - Send messages through patient portal to update on rosuvastatin effects and leg cramp management.

## 2024-11-13 ENCOUNTER — OFFICE VISIT (OUTPATIENT)
Dept: RADIATION ONCOLOGY | Facility: CLINIC | Age: 69
End: 2024-11-13
Payer: MEDICARE

## 2024-11-13 VITALS
TEMPERATURE: 97 F | RESPIRATION RATE: 18 BRPM | HEART RATE: 81 BPM | BODY MASS INDEX: 29.68 KG/M2 | OXYGEN SATURATION: 97 % | DIASTOLIC BLOOD PRESSURE: 83 MMHG | WEIGHT: 178.13 LBS | HEIGHT: 65 IN | SYSTOLIC BLOOD PRESSURE: 139 MMHG

## 2024-11-13 DIAGNOSIS — C34.91 ADENOSQUAMOUS CARCINOMA OF LUNG, RIGHT: Primary | ICD-10-CM

## 2024-11-13 PROCEDURE — 3079F DIAST BP 80-89 MM HG: CPT | Mod: CPTII,S$GLB,, | Performed by: SPECIALIST

## 2024-11-13 PROCEDURE — 3075F SYST BP GE 130 - 139MM HG: CPT | Mod: CPTII,S$GLB,, | Performed by: SPECIALIST

## 2024-11-13 PROCEDURE — 3066F NEPHROPATHY DOC TX: CPT | Mod: CPTII,S$GLB,, | Performed by: SPECIALIST

## 2024-11-13 PROCEDURE — 3288F FALL RISK ASSESSMENT DOCD: CPT | Mod: CPTII,S$GLB,, | Performed by: SPECIALIST

## 2024-11-13 PROCEDURE — 3008F BODY MASS INDEX DOCD: CPT | Mod: CPTII,S$GLB,, | Performed by: SPECIALIST

## 2024-11-13 PROCEDURE — 3044F HG A1C LEVEL LT 7.0%: CPT | Mod: CPTII,S$GLB,, | Performed by: SPECIALIST

## 2024-11-13 PROCEDURE — 1101F PT FALLS ASSESS-DOCD LE1/YR: CPT | Mod: CPTII,S$GLB,, | Performed by: SPECIALIST

## 2024-11-13 PROCEDURE — 1126F AMNT PAIN NOTED NONE PRSNT: CPT | Mod: CPTII,S$GLB,, | Performed by: SPECIALIST

## 2024-11-13 PROCEDURE — 99212 OFFICE O/P EST SF 10 MIN: CPT | Mod: S$GLB,,, | Performed by: SPECIALIST

## 2024-11-13 PROCEDURE — 4010F ACE/ARB THERAPY RXD/TAKEN: CPT | Mod: CPTII,S$GLB,, | Performed by: SPECIALIST

## 2024-11-13 PROCEDURE — 1159F MED LIST DOCD IN RCRD: CPT | Mod: CPTII,S$GLB,, | Performed by: SPECIALIST

## 2024-11-13 PROCEDURE — 3061F NEG MICROALBUMINURIA REV: CPT | Mod: CPTII,S$GLB,, | Performed by: SPECIALIST

## 2024-11-13 PROCEDURE — 99999 PR PBB SHADOW E&M-EST. PATIENT-LVL IV: CPT | Mod: PBBFAC,,, | Performed by: SPECIALIST

## 2024-11-13 NOTE — PROGRESS NOTES
Mrs. Pickard returns for follow-up after receiving radiotherapy for left breast cancer after initial lumpectomy and axillary node dissection 07/21/2011 followed by TAC x6 completed 02/10/2012 followed by irradiation to her left breast and axilla with a boost to the surgical bed completed 05/01/2012.  She then developed a stage IIB (T1c N1 M0) adenocarcinoma of the right lung undergoing thoracotomy 12/09/2022 yielding 2 of 3 involved lymph nodes.  She subsequently underwent neoadjuvant carboplatin/Taxol and nivolumab completed after 3 cycles 10/28/2022.  She was unresectable going to definitive chemoradiotherapy completed 02/24/2023 with weekly carboplatin plus Taxol.  She then received durvalumab for 1 year.  She has done well in the interval since last seen and has no complaints of dyspnea or difficulty swallowing.e her most recent PET scan 07/08/2024 shows low-level uptake in the right pulmonary hilum with low-level uptake in the previous right upper lobe lesion.  There was an area of uptake in the right breast measuring 7 mm but follow-up mammography 07/19/2024 was without evidence of disease as was ultrasound.  Physical exam: She has no palpable supraclavicular adenopathy.  Her lungs are clear to auscultation  Impression: She continues to do well.  With regards to Dr. Price does departure I have recommended she remain with Mehran Rudd: And his managing physician.  Plan: I will see her back in 6 months' time.  I appreciate participating in this delightful woman's care.

## 2024-11-19 ENCOUNTER — OFFICE VISIT (OUTPATIENT)
Dept: SPORTS MEDICINE | Facility: CLINIC | Age: 69
End: 2024-11-19
Payer: MEDICARE

## 2024-11-19 VITALS — BODY MASS INDEX: 29.68 KG/M2 | RESPIRATION RATE: 17 BRPM | WEIGHT: 178.13 LBS | HEIGHT: 65 IN

## 2024-11-19 DIAGNOSIS — M67.912 TENDINOPATHY OF LEFT ROTATOR CUFF: Primary | ICD-10-CM

## 2024-11-19 PROCEDURE — 1101F PT FALLS ASSESS-DOCD LE1/YR: CPT | Mod: CPTII,S$GLB,, | Performed by: STUDENT IN AN ORGANIZED HEALTH CARE EDUCATION/TRAINING PROGRAM

## 2024-11-19 PROCEDURE — 3288F FALL RISK ASSESSMENT DOCD: CPT | Mod: CPTII,S$GLB,, | Performed by: STUDENT IN AN ORGANIZED HEALTH CARE EDUCATION/TRAINING PROGRAM

## 2024-11-19 PROCEDURE — 3008F BODY MASS INDEX DOCD: CPT | Mod: CPTII,S$GLB,, | Performed by: STUDENT IN AN ORGANIZED HEALTH CARE EDUCATION/TRAINING PROGRAM

## 2024-11-19 PROCEDURE — 99999 PR PBB SHADOW E&M-EST. PATIENT-LVL IV: CPT | Mod: PBBFAC,,, | Performed by: STUDENT IN AN ORGANIZED HEALTH CARE EDUCATION/TRAINING PROGRAM

## 2024-11-19 PROCEDURE — 1160F RVW MEDS BY RX/DR IN RCRD: CPT | Mod: CPTII,S$GLB,, | Performed by: STUDENT IN AN ORGANIZED HEALTH CARE EDUCATION/TRAINING PROGRAM

## 2024-11-19 PROCEDURE — 4010F ACE/ARB THERAPY RXD/TAKEN: CPT | Mod: CPTII,S$GLB,, | Performed by: STUDENT IN AN ORGANIZED HEALTH CARE EDUCATION/TRAINING PROGRAM

## 2024-11-19 PROCEDURE — 3061F NEG MICROALBUMINURIA REV: CPT | Mod: CPTII,S$GLB,, | Performed by: STUDENT IN AN ORGANIZED HEALTH CARE EDUCATION/TRAINING PROGRAM

## 2024-11-19 PROCEDURE — 1159F MED LIST DOCD IN RCRD: CPT | Mod: CPTII,S$GLB,, | Performed by: STUDENT IN AN ORGANIZED HEALTH CARE EDUCATION/TRAINING PROGRAM

## 2024-11-19 PROCEDURE — 1125F AMNT PAIN NOTED PAIN PRSNT: CPT | Mod: CPTII,S$GLB,, | Performed by: STUDENT IN AN ORGANIZED HEALTH CARE EDUCATION/TRAINING PROGRAM

## 2024-11-19 PROCEDURE — 3044F HG A1C LEVEL LT 7.0%: CPT | Mod: CPTII,S$GLB,, | Performed by: STUDENT IN AN ORGANIZED HEALTH CARE EDUCATION/TRAINING PROGRAM

## 2024-11-19 PROCEDURE — 20610 DRAIN/INJ JOINT/BURSA W/O US: CPT | Mod: LT,S$GLB,, | Performed by: STUDENT IN AN ORGANIZED HEALTH CARE EDUCATION/TRAINING PROGRAM

## 2024-11-19 PROCEDURE — 99214 OFFICE O/P EST MOD 30 MIN: CPT | Mod: 25,S$GLB,, | Performed by: STUDENT IN AN ORGANIZED HEALTH CARE EDUCATION/TRAINING PROGRAM

## 2024-11-19 PROCEDURE — 3066F NEPHROPATHY DOC TX: CPT | Mod: CPTII,S$GLB,, | Performed by: STUDENT IN AN ORGANIZED HEALTH CARE EDUCATION/TRAINING PROGRAM

## 2024-11-19 RX ADMIN — TRIAMCINOLONE ACETONIDE 40 MG: 40 INJECTION, SUSPENSION INTRA-ARTICULAR; INTRAMUSCULAR at 04:11

## 2024-11-19 NOTE — PROGRESS NOTES
Orthopaedic Follow-Up Visit    Last Appointment: 3/29/23  Diagnosis: Bilateral rotator cuff tendinopathy  Prior Procedure: Simone ALEXANDER CSI    Raquel Pickard is a 69 y.o. female who is here for f/u evaluation of her bilateral shoulders. The patient was last seen here by me on 3/29/23 at which point she proceeded with repeat bilateral shoulder subacromial space corticosteroid injections. The patient returns today reporting left shoulder pain that started about three days prior The pain was primarily located in the top part of the left shoulder, in the trapezius area, and extends down from this area. She indicates ongoing discomfort by repeatedly adjusting her shoulder strap. She stated that she typically tries to tolerate pain, but this time the pain was intolerable. She mentioned being about to go out of town, which has prompted her to seek treatment to avoid having to find relief elsewhere. Raquel received a shoulder injection about a year and a half ago, which provided significant relief until recently.     To review her history, Raquel Pickard is a 69 y.o. right-hand dominant female who presented with bilateral shoulder pain and dysfunction that initially began several years ago but began to worsen about one year ago with no specific injury. She had tried multiple nonoperative treatments including rest, activity modification, and oral anti-inflammatories but continued to experience symptoms. An MRI of the left shoulder was ordered due to concern for rotator cuff tear. It ultimately showed rotator cuff tendinopathy. We proceeded with bilateral shoulder subacromial space corticosteroid injections on 4/8/22 which gave her more than 6 months of relief. She returned to clinic on 12/2/22 with reports of worsening pain and received repeat injections.     Patient's medications, allergies, past medical, surgical, social and family histories were reviewed and updated as appropriate.    Review of Systems   All systems reviewed were  negative.  Specifically, the patient denies fever, chills, weight loss, chest pain, shortness of breath, or dyspnea on exertion.      Past Medical History:   Diagnosis Date    Breast cancer     Dr. Renetta Hernandez--Breast specialist    Diabetes mellitus     Glaucoma     Hyperlipidemia     Hypertension     Hypothyroidism     Lung cancer 2022    MVP (mitral valve prolapse)     Obesity        Past Surgical History:   Procedure Laterality Date    BREAST LUMPECTOMY      CATARACT EXTRACTION W/  INTRAOCULAR LENS IMPLANT Right 07/22/2020    w/ goniotomy    CATARACT EXTRACTION W/  INTRAOCULAR LENS IMPLANT Left 06/16/2021    w/ goniotomy    COLONOSCOPY W/ POLYPECTOMY  04/18/2017    DR. CHARLENE NATH/GI ASSO. POLYP X 3. REPEAT 5 YRS.    ENDOBRONCHIAL ULTRASOUND Bilateral 8/25/2022    Procedure: ENDOBRONCHIAL ULTRASOUND (EBUS);  Surgeon: Musa Phillips MD;  Location: [facility];  Service: Pulmonary;  Laterality: Bilateral;    PCIOL Right     DR. MENDOZA    SLT - OU - BOTH EYES      TRIGGER FINGER RELEASE      Thumb    TUBAL LIGATION         Patient's Medications   New Prescriptions    No medications on file   Previous Medications    ACCU-CHEK MONICA PLUS TEST STRP STRP    CHECK BLOOD GLUCOSE ONE TIME DAILY    BENAZEPRIL-HYDROCHLORTHIAZIDE (LOTENSIN HCT) 20-12.5 MG PER TABLET    Take 1 tablet by mouth once daily.    BLOOD-GLUCOSE METER KIT    To check BG 1 times daily, Accuchek Monica meter    BRIMONIDINE 0.15 % OPTH DROP (ALPHAGAN) 0.15 % OPHTHALMIC SOLUTION    INSTILL 1 DROP INTO BOTH EYES  TWICE DAILY    CLOTRIMAZOLE-BETAMETHASONE 1-0.05% (LOTRISONE) CREAM    Apply topically 2 (two) times daily.    COVID-19 (COMIRNATY 2024-25, 12Y UP,,PF,) 30 MCG/0.3 ML IM VACCINE (>/= 13 YO)    Inject 0.3 ml into the muscle.    CYCLOBENZAPRINE (FLEXERIL) 10 MG TABLET    Take 1 tablet as needed at night for muscle spasm    DORZOLAMIDE-TIMOLOL 2-0.5% (COSOPT) 22.3-6.8 MG/ML OPHTHALMIC SOLUTION    Place 1 drop into both eyes 2 (two) times  daily. Dispense 90 days supply    FLUTICASONE PROPIONATE (FLONASE) 50 MCG/ACTUATION NASAL SPRAY    2 sprays (100 mcg total) by Each Nostril route once daily.    GLYBURIDE (DIABETA) 2.5 MG TABLET    TAKE 1 TABLET BY MOUTH DAILY  WITH BREAKFAST    IBUPROFEN (ADVIL,MOTRIN) 800 MG TABLET    Take 800 mg by mouth every 6 (six) hours as needed for Pain.    LANCETS (ACCU-CHEK SOFTCLIX LANCETS) MISC    USE  TO  CHECK BLOOD GLUCOSE ONE TIME DAILY    LATANOPROST 0.005 % OPHTHALMIC SOLUTION    INSTILL 1 DROP INTO BOTH EYES EVERY EVENING    LEVOTHYROXINE (SYNTHROID) 125 MCG TABLET    Take 125 mcg by mouth every morning.    METFORMIN (GLUCOPHAGE-XR) 500 MG ER 24HR TABLET    Take 2 tablets (1,000 mg total) by mouth once daily.    POTASSIUM CHLORIDE SA (K-DUR,KLOR-CON) 20 MEQ TABLET    Take 1 tablet (20 mEq total) by mouth once daily. Take with food    PROMETHAZINE (PHENERGAN) 25 MG TABLET    Take 25 mg by mouth every 6 (six) hours as needed.    PROMETHAZINE-DEXTROMETHORPHAN (PROMETHAZINE-DM) 6.25-15 MG/5 ML SYRP    Take 5 mLs by mouth as needed.    PROPRANOLOL (INDERAL LA) 80 MG 24 HR CAPSULE    TAKE 1 CAPSULE BY MOUTH ONCE  DAILY    ROSUVASTATIN (CRESTOR) 5 MG TABLET    Take 1 tablet (5 mg total) by mouth every evening.    RSV, PREF A AND PREF B,PF, (ABRYSVO, PF,) 120 MCG/0.5 ML SOLR VACCINE    Inject into the muscle.    TAGRISSO 80 MG TAB    Take 1 tablet by mouth once daily.   Modified Medications    No medications on file   Discontinued Medications    No medications on file       Family History   Problem Relation Name Age of Onset    Glaucoma Brother      Macular degeneration Brother      Esophageal cancer Father      Heart disease Father      Hypertension Father      Liver cancer Mother      Cancer Mother      Hypertension Sister      Cataracts Sister      Diabetes Sister      Cholelithiasis Sister      Blindness Neg Hx      Retinal detachment Neg Hx      Strabismus Neg Hx      Stroke Neg Hx      Thyroid disease Neg Hx          Review of patient's allergies indicates:   Allergen Reactions    Pollen extracts     Travatan z [travoprost]          Objective:      Physical Exam  Patient is alert and oriented, no distress. Skin is intact. Neuro is normal with no focal motor or sensory findings.    Cervical exam is unremarkable. Intact cervical ROM. Negative Spurling's test    Physical Exam:  RIGHT    LEFT    Scap Dyskinesis/Winging (-)    (-)    Tenderness:          Greater Tuberosity  +    +  Bicipital Groove  (-)    (-)  AC joint   (-)    (-)  Other:     ROM:  Forward Elevation 170    170  Abduction  120    120  ER (at side)  50    50  IR   L5    L5    Strength:   Supraspinatus  4+/5    4+/5  Infraspinatus  5/5    5/5  Subscap / IR  5/5    5/5     Special Tests:   Neer:    (-)    (-)   Jerez:   +    +   SS Stress:   +    +   Bear Hug:   (-)    (-)   Humphrey's:   +    +   Resisted Thrower's:   +    +   Cross Arm Abduction:  (-)    (-)    Neurovascular examination  - Motor grossly intact bilaterally to shoulder abduction, elbow flexion and extension, wrist flexion and extension, and intrinsic hand musculature  - Sensation intact to light touch bilaterally in axillary, median, radial, and ulnar distributions  - Symmetrical radial pulses    Imaging:    XR Results:  Results for orders placed during the hospital encounter of 02/08/22    X-Ray Shoulder Complete Bilateral    Narrative  EXAMINATION:  XR SHOULDER COMPLETE 2 OR MORE VIEWS BILATERAL    CLINICAL HISTORY:  Pain in right shoulder    TECHNIQUE:  Four views were obtained of the bilateral shoulders.    COMPARISON:  None    FINDINGS:  Small ossific density noted adjacent to the margins of the greater tuberosity on the right which can be associated with calcific tendinosis.  No acute fractures or dislocations visualized.  There is mild-to-moderate bilateral AC joint arthrosis.  Mild degenerative marginal productive changes are present at the glenohumeral joints  bilaterally.    Impression  As above      Electronically signed by: Lito Malone MD  Date:    02/08/2022  Time:    10:52      MRI Results:  Results for orders placed during the hospital encounter of 04/07/22    MRI Shoulder Without Contrast Left    Narrative  EXAMINATION:  MRI SHOULDER WITHOUT CONTRAST LEFT    CLINICAL HISTORY:  Shoulder pain, rotator cuff disorder suspected, xray done;  Pain in left shoulder    TECHNIQUE:  Multiplanar multisequence images were performed through the left shoulder.  Contrast was not administered    COMPARISON:  None    FINDINGS:  Tendinosis and low-grade partial-thickness tearing of the supraspinatus and infraspinatus tendons.  The subscapularis and teres minor tendons are grossly intact.  Partial-thickness tearing of the long head of the biceps tendon.    Glenohumeral osteoarthritis with anterior superior and superior labral tearing.    Osteoarthritis of the acromioclavicular joint with subacromial enthesophyte.    Small joint effusion without significant bursal fluid collection.  No acute stress or traumatic fracture is present.  No muscle atrophy or edema.    Impression  Tendinosis and low-grade partial-thickness tearing of the supraspinatus and infraspinatus tendons.    Partial-thickness tearing of the intra-articular long head of the biceps tendon.    Superior and anterior superior labral tear.    Osteoarthritis.    Nonspecific supraclavicular lymph nodes measuring up to 11 x 8 mm in size.      Electronically signed by: Konrad Tejeda  Date:    04/07/2022  Time:    17:49      CT Results:  No results found for this or any previous visit.      Physician read: I agree with the above impression.    Assessment/Plan:   Raquel Pickard is a 69 y.o. female with left shoulder rotator cuff tendinopathy     Plan:    She has known rotator cuff tendinopathy that affects mostly her left shoulder. She had good relief with CSI in the past which was about 1.5 years ago. Symptoms only restarted  recently.   I recommend proceeding with left shoulder corticosteroid injection into the subacromial space. The patient is in agreement with this plan.   Procedure performed today and patient tolerated the procedure well with no immediate complications.   Follow up with me as needed.           Garry Lundy MD    I, Rashad Jackson, acted as a scribe for Garry Lundy MD for the duration of this office visit.

## 2024-11-25 RX ORDER — TRIAMCINOLONE ACETONIDE 40 MG/ML
40 INJECTION, SUSPENSION INTRA-ARTICULAR; INTRAMUSCULAR
Status: DISCONTINUED | OUTPATIENT
Start: 2024-11-19 | End: 2024-11-25 | Stop reason: HOSPADM

## 2024-11-25 NOTE — PROCEDURES
Large Joint Aspiration/Injection: L subacromial bursa    Date/Time: 11/19/2024 4:15 PM    Performed by: Garry Lundy MD  Authorized by: Garry Lundy MD    Consent Done?:  Yes (Verbal)  Indications:  Pain  Site marked: the procedure site was marked    Timeout: prior to procedure the correct patient, procedure, and site was verified    Prep: patient was prepped and draped in usual sterile fashion      Local anesthesia used?: Yes    Anesthesia:  Local infiltration  Local anesthetic:  Lidocaine 1% without epinephrine    Details:  Needle Size:  22 G  Ultrasonic Guidance for needle placement?: No    Approach:  Posterior  Location:  Shoulder  Site:  L subacromial bursa  Medications:  40 mg triamcinolone acetonide 40 mg/mL  Patient tolerance:  Patient tolerated the procedure well with no immediate complications

## 2024-12-12 DIAGNOSIS — E11.9 TYPE 2 DIABETES MELLITUS WITHOUT COMPLICATION, WITHOUT LONG-TERM CURRENT USE OF INSULIN: ICD-10-CM

## 2024-12-12 DIAGNOSIS — T50.2X5A DIURETIC-INDUCED HYPOKALEMIA: ICD-10-CM

## 2024-12-12 DIAGNOSIS — E87.6 DIURETIC-INDUCED HYPOKALEMIA: ICD-10-CM

## 2024-12-12 RX ORDER — LANCETS
EACH MISCELLANEOUS
Qty: 90 EACH | Refills: 3 | Status: SHIPPED | OUTPATIENT
Start: 2024-12-12

## 2024-12-12 NOTE — TELEPHONE ENCOUNTER
Patient prescribed potassium 1yr ago, has she been taking it daily?  Potassium was normal last visit

## 2024-12-12 NOTE — TELEPHONE ENCOUNTER
Refill Routing Note   Medication(s) are not appropriate for processing by [facility] for the following reason(s):        No active prescription written by provider    [facility] action(s):  Defer             Appointments  past 12m or future 3m with PCP    Date Provider   Last Visit   10/3/2024 Marychuy Rojas, DO   Next Visit   4/3/2025 Marychuy Rojas DO   ED visits in past 90 days: 0        Note composed:12:17 PM 12/12/2024

## 2024-12-12 NOTE — TELEPHONE ENCOUNTER
Patient said she takes as needed when she gets cramps but she is ok with waiting till her next visit to do labs

## 2024-12-12 NOTE — TELEPHONE ENCOUNTER
No care due was identified.  Health [facility] Embedded Care Due Messages. Reference number: [id number].   12/12/2024 8:53:33 AM CST

## 2024-12-13 RX ORDER — POTASSIUM CHLORIDE 20 MEQ/1
TABLET, EXTENDED RELEASE ORAL
Qty: 90 TABLET | Refills: 1 | Status: SHIPPED | OUTPATIENT
Start: 2024-12-13

## 2024-12-16 DIAGNOSIS — E11.9 TYPE 2 DIABETES MELLITUS WITHOUT COMPLICATION, WITHOUT LONG-TERM CURRENT USE OF INSULIN: ICD-10-CM

## 2024-12-16 NOTE — TELEPHONE ENCOUNTER
No care due was identified.  Health [facility] Embedded Care Due Messages. Reference number: [id number].   12/16/2024 1:00:38 PM CST

## 2024-12-17 RX ORDER — LANCETS
EACH MISCELLANEOUS
Qty: 100 EACH | Refills: 3 | Status: SHIPPED | OUTPATIENT
Start: 2024-12-17

## 2024-12-17 NOTE — TELEPHONE ENCOUNTER
Refill Routing Note   Medication(s) are not appropriate for processing by [facility] for the following reason(s):        No active prescription written by provider    [facility] action(s):  Defer               Appointments  past 12m or future 3m with PCP    Date Provider   Last Visit   10/3/2024 Marychuy Rojas, DO   Next Visit   4/3/2025 Marychuy Rojas DO   ED visits in past 90 days: 0        Note composed:1:16 PM 12/17/2024

## 2024-12-18 DIAGNOSIS — Z87.898 HISTORY OF PALPITATIONS: ICD-10-CM

## 2024-12-18 DIAGNOSIS — E11.9 TYPE 2 DIABETES MELLITUS WITHOUT COMPLICATION, WITHOUT LONG-TERM CURRENT USE OF INSULIN: ICD-10-CM

## 2024-12-18 RX ORDER — PROPRANOLOL HYDROCHLORIDE 80 MG/1
80 CAPSULE, EXTENDED RELEASE ORAL
Qty: 90 CAPSULE | Refills: 3 | Status: SHIPPED | OUTPATIENT
Start: 2024-12-18

## 2024-12-18 RX ORDER — LANCETS
EACH MISCELLANEOUS
Qty: 100 EACH | Refills: 0 | OUTPATIENT
Start: 2024-12-18

## 2024-12-18 NOTE — TELEPHONE ENCOUNTER
Refill Decision Note   Raquel Pickard  is requesting a refill authorization.  Brief Assessment and Rationale for Refill:  Quick Discontinue     Medication Therapy Plan:  FLOS; Receipt confirmed by pharmacy (12/17/2024  5:17 PM CST)      Comments:     Note composed:5:45 PM 12/18/2024

## 2024-12-18 NOTE — TELEPHONE ENCOUNTER
Care Due:                  Date            Visit Type   Department     Provider  --------------------------------------------------------------------------------                                EP -                              PRIMARY      [facility] INTERNAL  Last Visit: 10-      CARE ([facility])   FRANCISCO Rojas                              EP -                              PRIMARY      [facility] INTERNAL  Next Visit: 04-      CARE ([facility])   FRANCISCO Rojas                                                            Last  Test          Frequency    Reason                     Performed    Due Date  --------------------------------------------------------------------------------    HBA1C.......  6 months...  metFORMIN................  09- 03-    [employer] Embedded Care Due Messages. Reference number: [id number].   12/18/2024 3:08:31 PM CST

## 2024-12-19 NOTE — TELEPHONE ENCOUNTER
Refill Decision Note   Raquel Pickard  is requesting a refill authorization.  Brief Assessment and Rationale for Refill:  Approve     Medication Therapy Plan:         Comments:     Note composed:9:56 PM 12/18/2024             Appointments     Last Visit   10/3/2024 Marychuy Rojas DO   Next Visit   4/3/2025 Marychuy Rojas, DO

## 2024-12-19 NOTE — TELEPHONE ENCOUNTER
No care due was identified.  Health [facility] Embedded Care Due Messages. Reference number: [id number].   12/18/2024 9:39:04 PM CST

## 2024-12-30 DIAGNOSIS — H40.1133 PRIMARY OPEN ANGLE GLAUCOMA OF BOTH EYES, SEVERE STAGE: ICD-10-CM

## 2024-12-31 ENCOUNTER — OFFICE VISIT (OUTPATIENT)
Dept: URGENT CARE | Facility: CLINIC | Age: 69
End: 2024-12-31
Payer: MEDICARE

## 2024-12-31 VITALS
DIASTOLIC BLOOD PRESSURE: 70 MMHG | OXYGEN SATURATION: 98 % | HEIGHT: 65 IN | SYSTOLIC BLOOD PRESSURE: 123 MMHG | WEIGHT: 178.38 LBS | TEMPERATURE: 99 F | BODY MASS INDEX: 29.72 KG/M2 | HEART RATE: 90 BPM | RESPIRATION RATE: 17 BRPM

## 2024-12-31 DIAGNOSIS — R09.82 POST-NASAL DRIP: ICD-10-CM

## 2024-12-31 DIAGNOSIS — J06.9 VIRAL URI WITH COUGH: Primary | ICD-10-CM

## 2024-12-31 DIAGNOSIS — J34.89 SINUS PRESSURE: ICD-10-CM

## 2024-12-31 DIAGNOSIS — R09.81 NASAL CONGESTION: ICD-10-CM

## 2024-12-31 DIAGNOSIS — H61.23 BILATERAL IMPACTED CERUMEN: ICD-10-CM

## 2024-12-31 LAB
CTP QC/QA: YES
POC MOLECULAR INFLUENZA A AGN: NEGATIVE
POC MOLECULAR INFLUENZA B AGN: NEGATIVE
RSV RAPID ANTIGEN: NEGATIVE
SARS-COV-2 AG RESP QL IA.RAPID: NEGATIVE

## 2024-12-31 PROCEDURE — 87807 RSV ASSAY W/OPTIC: CPT | Mod: QW,S$GLB,, | Performed by: NURSE PRACTITIONER

## 2024-12-31 PROCEDURE — 87502 INFLUENZA DNA AMP PROBE: CPT | Mod: QW,S$GLB,, | Performed by: NURSE PRACTITIONER

## 2024-12-31 PROCEDURE — 99214 OFFICE O/P EST MOD 30 MIN: CPT | Mod: S$GLB,,, | Performed by: NURSE PRACTITIONER

## 2024-12-31 PROCEDURE — 87811 SARS-COV-2 COVID19 W/OPTIC: CPT | Mod: QW,S$GLB,, | Performed by: NURSE PRACTITIONER

## 2024-12-31 RX ORDER — PROMETHAZINE HYDROCHLORIDE AND DEXTROMETHORPHAN HYDROBROMIDE 6.25; 15 MG/5ML; MG/5ML
5 SYRUP ORAL EVERY 6 HOURS PRN
Qty: 118 ML | Refills: 0 | Status: SHIPPED | OUTPATIENT
Start: 2024-12-31 | End: 2025-01-07

## 2024-12-31 RX ORDER — LATANOPROST 50 UG/ML
SOLUTION/ DROPS OPHTHALMIC
Qty: 7.5 ML | Refills: 3 | Status: SHIPPED | OUTPATIENT
Start: 2024-12-31

## 2024-12-31 NOTE — PATIENT INSTRUCTIONS
Rest  Hydration/increase fluids  Symptom management discussed  Claritin D OTC as directed for nasal congestion/post nasal drip  Monitor home blood pressures: discontinue Claritin D and switch to Coricidin HBP with persistent elevations  Phenergan DM every 6 hours as needed for cough (Caution drowsiness)  Typical course and duration of illness discussed  Signs and symptoms of worsening discussed  Follow up as needed/with worsening    Ear hygiene discussed  Debrox OTC as directed   AVOID QTIPS

## 2024-12-31 NOTE — PROGRESS NOTES
"Subjective:      Patient ID: Raquel Pickard is a 69 y.o. female.    Vitals:  height is 5' 5" (1.651 m) and weight is 80.9 kg (178 lb 5.6 oz). Her temperature is 99.2 °F (37.3 °C). Her blood pressure is 123/70 and her pulse is 90. Her respiration is 17 and oxygen saturation is 98%.     Chief Complaint: Sinus Problem    69 year old female presents for evaluation of dry cough, sneezing, post nasal drip, runny nose, and nasal congestion x 2 days. Symptom onset Sunday. OTC Mucinex and Promethazine DM with some relief.     Sinus Problem  This is a new problem. The current episode started in the past 7 days. The problem is unchanged. Associated symptoms include congestion, coughing (attributes to post nasal drip), ear pain (mild/occasional left ear pressure), sinus pressure and sneezing. Pertinent negatives include no chills, diaphoresis, headaches, hoarse voice, neck pain, shortness of breath, sore throat or swollen glands. The treatment provided no relief.       Constitution: Negative. Negative for appetite change, chills, sweating, fatigue and fever.   HENT:  Positive for ear pain (mild/occasional left ear pressure), congestion, postnasal drip and sinus pressure. Negative for sore throat.    Neck: neck negative. Negative for neck pain.   Cardiovascular: Negative.    Eyes: Negative.    Respiratory:  Positive for cough (attributes to post nasal drip). Negative for sputum production, shortness of breath and wheezing.    Gastrointestinal: Negative.  Negative for nausea and diarrhea.   Endocrine: negative.   Genitourinary: Negative.    Musculoskeletal: Negative.  Negative for muscle ache.   Skin: Negative.    Allergic/Immunologic: Positive for sneezing.   Neurological: Negative.  Negative for headaches.   Hematologic/Lymphatic: Negative.    Psychiatric/Behavioral: Negative.        Objective:     Physical Exam   Constitutional: She is oriented to person, place, and time. She appears well-developed. She is cooperative.  " Non-toxic appearance. She does not appear ill. No distress. She is not intubated. normalawake  HENT:   Head: Normocephalic and atraumatic.   Ears:   Right Ear: Hearing, tympanic membrane, external ear and ear canal normal. Tympanic membrane is not injected, not scarred, not perforated, not erythematous, not retracted and not bulging. impacted cerumen (complete)  Left Ear: Hearing, tympanic membrane, external ear and ear canal normal. Tympanic membrane is not injected, not scarred, not perforated, not erythematous, not retracted and not bulging. impacted cerumen (complete)  Nose: Mucosal edema and congestion present. No rhinorrhea or nasal deformity. No epistaxis. Right sinus exhibits no maxillary sinus tenderness and no frontal sinus tenderness. Left sinus exhibits no maxillary sinus tenderness and no frontal sinus tenderness.   Mouth/Throat: Uvula is midline, oropharynx is clear and moist and mucous membranes are normal. Mucous membranes are moist. No trismus in the jaw. Normal dentition. No uvula swelling. Cobblestoning present. No oropharyngeal exudate, posterior oropharyngeal edema, posterior oropharyngeal erythema or tonsillar abscesses. Tonsils are 0 on the right. Tonsils are 0 on the left. No tonsillar exudate.   Eyes: Conjunctivae and lids are normal. Pupils are equal, round, and reactive to light. Right eye exhibits no discharge. Left eye exhibits no discharge. No scleral icterus. Extraocular movement intact   Neck: Trachea normal and phonation normal. Neck supple. No edema present. No erythema present. No neck rigidity present.   Cardiovascular: Normal rate, regular rhythm, normal heart sounds and normal pulses.   Pulmonary/Chest: Effort normal and breath sounds normal. No accessory muscle usage or stridor. No apnea, no tachypnea and no bradypnea. She is not intubated. No respiratory distress. She has no decreased breath sounds. She has no wheezes. She has no rhonchi. She has no rales. She exhibits no  tenderness.   Abdominal: Normal appearance.   Musculoskeletal: Normal range of motion.         General: No deformity. Normal range of motion.   Neurological: no focal deficit. She is alert and oriented to person, place, and time. She exhibits normal muscle tone. Coordination normal.   Skin: Skin is warm, dry, intact, not diaphoretic and not pale.   Psychiatric: Her speech is normal and behavior is normal. Mood, judgment and thought content normal.   Nursing note and vitals reviewed.      Results for orders placed or performed in visit on 12/31/24   POCT respiratory syncytial virus    Collection Time: 12/31/24 11:11 AM   Result Value Ref Range    RSV Rapid Ag Negative Negative     Acceptable Yes    POCT Influenza A/B MOLECULAR    Collection Time: 12/31/24 11:11 AM   Result Value Ref Range    POC Molecular Influenza A Ag Negative Negative    POC Molecular Influenza B Ag Negative Negative     Acceptable Yes    SARS Coronavirus 2 Antigen, POCT Manual Read    Collection Time: 12/31/24 12:07 PM   Result Value Ref Range    SARS Coronavirus 2 Antigen Negative Negative     Acceptable Yes        Assessment:     1. Viral URI with cough    2. Sinus pressure    3. Nasal congestion    4. Post-nasal drip    5. Bilateral impacted cerumen        Plan:       Viral URI with cough  -     POCT Influenza A/B MOLECULAR  -     promethazine-dextromethorphan (PROMETHAZINE-DM) 6.25-15 mg/5 mL Syrp; Take 5 mLs by mouth every 6 (six) hours as needed.  Dispense: 118 mL; Refill: 0    Sinus pressure    Nasal congestion  -     POCT respiratory syncytial virus  -     SARS Coronavirus 2 Antigen, POCT Manual Read    Post-nasal drip    Bilateral impacted cerumen  -     Ear wax removal          Patient presents with symptoms and examination that are consistent with acute viral URI. (-)RSV/(-)Covid/(-)Flu swabs discussed. Exam reveals bilateral cerumen impaction, patient desires ear wash for relief. Exam  negative for otitis media, bacterial sinusitis/bacterial tonsillitis/bronchitis/pneumonia. Plan is to manage symptoms, prevent worsening, and follow up as needed/with worsening. Discussed with patient who verbalizes understanding.           Patient Instructions   Rest  Hydration/increase fluids  Symptom management discussed  Claritin D OTC as directed for nasal congestion/post nasal drip  Monitor home blood pressures: discontinue Claritin D and switch to Coricidin HBP with persistent elevations  Phenergan DM every 6 hours as needed for cough (Caution drowsiness)  Typical course and duration of illness discussed  Signs and symptoms of worsening discussed  Follow up as needed/with worsening    Ear hygiene discussed  Debrox OTC as directed   AVOID QTIPS

## 2025-02-06 ENCOUNTER — OFFICE VISIT (OUTPATIENT)
Dept: OPHTHALMOLOGY | Facility: CLINIC | Age: 70
End: 2025-02-06
Payer: MEDICARE

## 2025-02-06 DIAGNOSIS — Z96.1 PSEUDOPHAKIA: ICD-10-CM

## 2025-02-06 DIAGNOSIS — H40.1133 PRIMARY OPEN ANGLE GLAUCOMA OF BOTH EYES, SEVERE STAGE: Primary | ICD-10-CM

## 2025-02-06 PROCEDURE — 99214 OFFICE O/P EST MOD 30 MIN: CPT | Mod: S$GLB,,, | Performed by: OPHTHALMOLOGY

## 2025-02-06 PROCEDURE — 92020 GONIOSCOPY: CPT | Mod: S$GLB,,, | Performed by: OPHTHALMOLOGY

## 2025-02-06 PROCEDURE — 1159F MED LIST DOCD IN RCRD: CPT | Mod: CPTII,S$GLB,, | Performed by: OPHTHALMOLOGY

## 2025-02-06 PROCEDURE — 92133 CPTRZD OPH DX IMG PST SGM ON: CPT | Mod: S$GLB,,, | Performed by: OPHTHALMOLOGY

## 2025-02-06 PROCEDURE — 99999 PR PBB SHADOW E&M-EST. PATIENT-LVL I: CPT | Mod: PBBFAC,,, | Performed by: OPHTHALMOLOGY

## 2025-02-06 PROCEDURE — 1160F RVW MEDS BY RX/DR IN RCRD: CPT | Mod: CPTII,S$GLB,, | Performed by: OPHTHALMOLOGY

## 2025-02-06 NOTE — PROGRESS NOTES
HPI     Glaucoma     Additional comments: Patient states vision OU has been stable since last   visit.     COSOPT BID OU   ALPHAGAN BID OU   LATANOPROST QHS OU  [place] BID OU prn              Comments    HPI     Glaucoma     Additional comments: 4 month IOP check. VA is stable, no changes that she     noticed. No pain or irritation. Compliant with gtts.            Comments    1. Severe COAG OD>OS DX 7-10 (Tmax 23/25) Goal = 16   SLT OS 4/23/14(18-16)   SLT OD 2/12/14(17-16)   HYPEREMIA WITH XALATAN AND TRAVATAN      2. DM SINCE 2012 NO DBR   3. PCIOL w/ goniotomy OS 6/16/21 (+19.0 SN60WF)   PC IOL + goniotomy OD 7/22/20 (+18.5 SN60WF)   4. Dry Eyes   5. Floater OS     COSOPT BID OU   ALPHAGAN BID OU   LATANOPROST QHS OU     [place] BID OU prn               Last edited by Tigre Chowdhury on 9/5/2024  9:32 AM.                    Last edited by aJke Mcdonald on 2/6/2025 11:35 AM.            Assessment /Plan     For exam results, see Encounter Report.    Primary open angle glaucoma of both eyes, severe stage  Doing well, intraocular pressure (IOP) within acceptable range relative to target IOP with no evidence of progression. Continue current treatment. Reviewed importance of continued compliance with treatment and follow up.      Continue current gtts:  Latanoprost one drop in each eye nightly, Brimoninide one drop in each eye every 12 hours, and Dorzolamide/Timolol (Cosopt) one drop in each eye every 12 hours    RTC in 4 months with HVF 24-2, CCT, and DFE.      Pseudophakia  Condition stable, no therapeutic intervention necessary at this time. Will continue to monitor.

## 2025-02-19 DIAGNOSIS — R80.9 CONTROLLED TYPE 2 DIABETES MELLITUS WITH MICROALBUMINURIA, WITHOUT LONG-TERM CURRENT USE OF INSULIN: ICD-10-CM

## 2025-02-19 DIAGNOSIS — E11.29 CONTROLLED TYPE 2 DIABETES MELLITUS WITH MICROALBUMINURIA, WITHOUT LONG-TERM CURRENT USE OF INSULIN: ICD-10-CM

## 2025-02-19 RX ORDER — GLYBURIDE 2.5 MG/1
2.5 TABLET ORAL
Qty: 90 TABLET | Refills: 0 | Status: SHIPPED | OUTPATIENT
Start: 2025-02-19

## 2025-02-19 NOTE — TELEPHONE ENCOUNTER
LOV 10/3/24 Dr. Marychuy Rojas  NOV 4/3/25 Dr. Marychuy Rojas    Refill request received via fax from pharmacy.

## 2025-02-19 NOTE — TELEPHONE ENCOUNTER
Refill Decision Note   Raquel Pickard  is requesting a refill authorization.  Brief Assessment and Rationale for Refill:  Approve     Medication Therapy Plan:  FLOS ( A1c); last med order under PA-C but previously under PCP. Order noted renewal provider as PCP      Comments:     Note composed:12:52 PM 02/19/2025

## 2025-02-19 NOTE — TELEPHONE ENCOUNTER
Care Due:                  Date            Visit Type   Department     Provider  --------------------------------------------------------------------------------                                EP -                              PRIMARY      [facility] INTERNAL  Last Visit: 10-      CARE ([facility])   FRANCISCO Rojas                              EP -                              PRIMARY      [facility] INTERNAL  Next Visit: 04-      CARE ([facility])   FRANCISCO Rojas                                                            Last  Test          Frequency    Reason                     Performed    Due Date  --------------------------------------------------------------------------------    HBA1C.......  6 months...  metFORMIN................  09- 03-    [employer] Embedded Care Due Messages. Reference number: [id number].   2/19/2025 12:50:23 PM CST

## 2025-02-26 DIAGNOSIS — H40.1133 PRIMARY OPEN ANGLE GLAUCOMA OF BOTH EYES, SEVERE STAGE: ICD-10-CM

## 2025-02-27 RX ORDER — BRIMONIDINE TARTRATE 1.5 MG/ML
1 SOLUTION/ DROPS OPHTHALMIC 2 TIMES DAILY
Qty: 20 ML | Refills: 3 | Status: SHIPPED | OUTPATIENT
Start: 2025-02-27

## 2025-03-25 DIAGNOSIS — H40.1133 PRIMARY OPEN ANGLE GLAUCOMA OF BOTH EYES, SEVERE STAGE: ICD-10-CM

## 2025-03-26 RX ORDER — DORZOLAMIDE HYDROCHLORIDE AND TIMOLOL MALEATE 20; 5 MG/ML; MG/ML
1 SOLUTION/ DROPS OPHTHALMIC 2 TIMES DAILY
Qty: 30 ML | Refills: 3 | Status: SHIPPED | OUTPATIENT
Start: 2025-03-26

## 2025-03-27 ENCOUNTER — LAB VISIT (OUTPATIENT)
Dept: LAB | Facility: HOSPITAL | Age: 70
End: 2025-03-27
Attending: INTERNAL MEDICINE
Payer: MEDICARE

## 2025-03-27 DIAGNOSIS — E11.29 CONTROLLED TYPE 2 DIABETES MELLITUS WITH MICROALBUMINURIA, WITHOUT LONG-TERM CURRENT USE OF INSULIN: ICD-10-CM

## 2025-03-27 DIAGNOSIS — I10 HYPERTENSION GOAL BP (BLOOD PRESSURE) < 130/80: ICD-10-CM

## 2025-03-27 DIAGNOSIS — R80.9 CONTROLLED TYPE 2 DIABETES MELLITUS WITH MICROALBUMINURIA, WITHOUT LONG-TERM CURRENT USE OF INSULIN: ICD-10-CM

## 2025-03-27 DIAGNOSIS — E78.5 HYPERLIPIDEMIA LDL GOAL <70: ICD-10-CM

## 2025-03-27 LAB
ALBUMIN SERPL BCP-MCNC: 3.3 G/DL (ref 3.5–5.2)
ALP SERPL-CCNC: 48 UNIT/L (ref 40–150)
ALT SERPL W/O P-5'-P-CCNC: 8 UNIT/L (ref 10–44)
ANION GAP (OHS): 11 MMOL/L (ref 8–16)
AST SERPL-CCNC: 17 UNIT/L (ref 11–45)
BILIRUB SERPL-MCNC: 0.5 MG/DL (ref 0.1–1)
BUN SERPL-MCNC: 13 MG/DL (ref 8–23)
CALCIUM SERPL-MCNC: 9.3 MG/DL (ref 8.7–10.5)
CHLORIDE SERPL-SCNC: 102 MMOL/L (ref 95–110)
CHOLEST SERPL-MCNC: 198 MG/DL (ref 120–199)
CHOLEST/HDLC SERPL: 4 {RATIO} (ref 2–5)
CO2 SERPL-SCNC: 25 MMOL/L (ref 23–29)
CREAT SERPL-MCNC: 0.8 MG/DL (ref 0.5–1.4)
EAG (OHS): 108 MG/DL (ref 68–131)
GFR SERPLBLD CREATININE-BSD FMLA CKD-EPI: >60 ML/MIN/1.73/M2
GLUCOSE SERPL-MCNC: 88 MG/DL (ref 70–110)
HBA1C MFR BLD: 5.4 % (ref 4–5.6)
HDLC SERPL-MCNC: 50 MG/DL (ref 40–75)
HDLC SERPL: 25.3 % (ref 20–50)
LDLC SERPL CALC-MCNC: 130 MG/DL (ref 63–159)
NONHDLC SERPL-MCNC: 148 MG/DL
POTASSIUM SERPL-SCNC: 3.7 MMOL/L (ref 3.5–5.1)
PROT SERPL-MCNC: 6.9 GM/DL (ref 6–8.4)
SODIUM SERPL-SCNC: 138 MMOL/L (ref 136–145)
TRIGL SERPL-MCNC: 90 MG/DL (ref 30–150)

## 2025-03-27 PROCEDURE — 80053 COMPREHEN METABOLIC PANEL: CPT

## 2025-03-27 PROCEDURE — 83036 HEMOGLOBIN GLYCOSYLATED A1C: CPT

## 2025-03-27 PROCEDURE — 80061 LIPID PANEL: CPT

## 2025-03-27 PROCEDURE — 36415 COLL VENOUS BLD VENIPUNCTURE: CPT

## 2025-04-01 ENCOUNTER — RESULTS FOLLOW-UP (OUTPATIENT)
Dept: INTERNAL MEDICINE | Facility: CLINIC | Age: 70
End: 2025-04-01

## 2025-04-02 RX ORDER — OSIMERTINIB 80 MG/1
80 TABLET, FILM COATED ORAL
COMMUNITY
Start: 2024-07-15

## 2025-04-02 RX ORDER — METFORMIN HYDROCHLORIDE 500 MG/1
1000 TABLET, EXTENDED RELEASE ORAL
COMMUNITY
Start: 2024-10-03 | End: 2025-04-03 | Stop reason: SDUPTHER

## 2025-04-02 RX ORDER — FLUTICASONE PROPIONATE 50 MCG
1 SPRAY, SUSPENSION (ML) NASAL
COMMUNITY

## 2025-04-02 RX ORDER — BRIMONIDINE TARTRATE 1.5 MG/ML
1 SOLUTION/ DROPS OPHTHALMIC
COMMUNITY
Start: 2024-08-30

## 2025-04-02 RX ORDER — BENAZEPRIL HYDROCHLORIDE AND HYDROCHLOROTHIAZIDE 20; 12.5 MG/1; MG/1
1 TABLET ORAL
COMMUNITY
Start: 2024-10-03

## 2025-04-02 RX ORDER — PROPRANOLOL HYDROCHLORIDE 80 MG/1
1 CAPSULE, EXTENDED RELEASE ORAL
COMMUNITY
Start: 2024-09-29

## 2025-04-02 RX ORDER — ROSUVASTATIN CALCIUM 5 MG/1
5 TABLET, COATED ORAL
COMMUNITY
Start: 2024-10-03

## 2025-04-02 RX ORDER — LEVOTHYROXINE SODIUM 100 UG/1
100 TABLET ORAL EVERY MORNING
COMMUNITY

## 2025-04-03 ENCOUNTER — OFFICE VISIT (OUTPATIENT)
Dept: INTERNAL MEDICINE | Facility: CLINIC | Age: 70
End: 2025-04-03
Payer: MEDICARE

## 2025-04-03 VITALS
TEMPERATURE: 97 F | BODY MASS INDEX: 29.9 KG/M2 | SYSTOLIC BLOOD PRESSURE: 112 MMHG | HEIGHT: 65 IN | HEART RATE: 78 BPM | OXYGEN SATURATION: 100 % | RESPIRATION RATE: 20 BRPM | DIASTOLIC BLOOD PRESSURE: 70 MMHG | WEIGHT: 179.44 LBS

## 2025-04-03 DIAGNOSIS — I10 HYPERTENSION GOAL BP (BLOOD PRESSURE) < 130/80: ICD-10-CM

## 2025-04-03 DIAGNOSIS — E11.29 CONTROLLED TYPE 2 DIABETES MELLITUS WITH MICROALBUMINURIA, WITHOUT LONG-TERM CURRENT USE OF INSULIN: ICD-10-CM

## 2025-04-03 DIAGNOSIS — R80.9 CONTROLLED TYPE 2 DIABETES MELLITUS WITH MICROALBUMINURIA, WITHOUT LONG-TERM CURRENT USE OF INSULIN: ICD-10-CM

## 2025-04-03 DIAGNOSIS — E03.9 HYPOTHYROIDISM (ACQUIRED): ICD-10-CM

## 2025-04-03 DIAGNOSIS — E78.5 HYPERLIPIDEMIA LDL GOAL <70: ICD-10-CM

## 2025-04-03 DIAGNOSIS — Z00.00 ANNUAL PHYSICAL EXAM: Primary | ICD-10-CM

## 2025-04-03 PROCEDURE — 99999 PR PBB SHADOW E&M-EST. PATIENT-LVL V: CPT | Mod: PBBFAC,,, | Performed by: INTERNAL MEDICINE

## 2025-04-03 RX ORDER — METFORMIN HYDROCHLORIDE 500 MG/1
1000 TABLET, EXTENDED RELEASE ORAL NIGHTLY
Qty: 180 TABLET | Refills: 3 | Status: SHIPPED | OUTPATIENT
Start: 2025-04-03

## 2025-04-03 NOTE — PROGRESS NOTES
Raquel Pickard  69 y.o. Black or  female  [id number]    Chief Complaint:  Chief Complaint   Patient presents with    Annual Exam       History of Present Illness:  History of Present Illness    CHIEF COMPLAINT:  Ms. Pickard presents today for an annual exam    MEDICAL HISTORY:  She has a history of diabetes, hypertension, hyperlipidemia and hypothyroidism.     MEDICATIONS:   She takes cholesterol medication every other day due to muscle cramps, with improvement in symptoms on this modified schedule. She takes Metformin 500mg two tablets, sometimes splitting the dose between day and night. Glipizide is taken as needed only when blood sugar is elevated, taking one or 1.5 tablets instead of the full two tablets when blood sugar is in the 80s. She takes Levothyroxine with recent dose adjustment by her oncologist.    LOWER EXTREMITY EDEMA:  She reports intermittent leg swelling without identifiable pattern or timing.    IMMUNIZATIONS:  Last COVID vaccination was received in September.         Review of Systems   Constitutional:  Negative for fever.   Respiratory:  Negative for cough and shortness of breath.    Cardiovascular:  Positive for leg swelling. Negative for chest pain.   Gastrointestinal:  Negative for abdominal pain, blood in stool, constipation, diarrhea and melena.   Genitourinary:  Negative for dysuria.   Neurological:  Negative for dizziness and headaches.       Laboratory  Lab Results   Component Value Date    WBC 3.10 (L) 11/11/2022    HGB 10.9 (L) 11/11/2022    HCT 36.5 (L) 11/11/2022     11/11/2022    .0 03/27/2025    CHOL 198 03/27/2025    TRIG 90 03/27/2025    HDL 50 03/27/2025    ALT 8 (L) 03/27/2025    AST 17 03/27/2025     03/27/2025    K 3.7 03/27/2025     03/27/2025    CREATININE 0.8 03/27/2025    BUN 13 03/27/2025    CO2 25 03/27/2025    TSH 1.805 01/09/2025    INR 1.0 02/02/2023    HGBA1C 5.4 03/27/2025     Lab Results   Component Value Date    LDLCALC 141.8  09/17/2024       The following were reviewed: Active problem list, medication list, allergies, family history, social history, and Health Maintenance.     History:  Past Medical History:   Diagnosis Date    Breast cancer     Dr. Renetta Hernandez--Breast specialist    Diabetes mellitus     Glaucoma     Hyperlipidemia     Hypertension     Hypothyroidism     Lung cancer 2022    MVP (mitral valve prolapse)     Obesity      Past Surgical History:   Procedure Laterality Date    BREAST LUMPECTOMY      CATARACT EXTRACTION W/  INTRAOCULAR LENS IMPLANT Right 07/22/2020    w/ goniotomy    CATARACT EXTRACTION W/  INTRAOCULAR LENS IMPLANT Left 06/16/2021    w/ goniotomy    COLONOSCOPY W/ POLYPECTOMY  04/18/2017    DR. CHARLENE NATH/GI ASSO. POLYP X 3. REPEAT 5 YRS.    ENDOBRONCHIAL ULTRASOUND Bilateral 8/25/2022    Procedure: ENDOBRONCHIAL ULTRASOUND (EBUS);  Surgeon: Musa Phillips MD;  Location: [facility];  Service: Pulmonary;  Laterality: Bilateral;    PCIOL Right     DR. MENDOZA    SLT - OU - BOTH EYES      TRIGGER FINGER RELEASE      Thumb    TUBAL LIGATION       Family History   Problem Relation Name Age of Onset    Glaucoma Brother      Macular degeneration Brother      Esophageal cancer Father      Heart disease Father      Hypertension Father      Liver cancer Mother      Cancer Mother      Hypertension Sister      Cataracts Sister      Diabetes Sister      Cholelithiasis Sister      Blindness Neg Hx      Retinal detachment Neg Hx      Strabismus Neg Hx      Stroke Neg Hx      Thyroid disease Neg Hx       Social History[1]  Problem List[2]  Review of patient's allergies indicates:   Allergen Reactions    Pollen extracts     Travatan z [travoprost]        Health Maintenance  Health Maintenance Topics with due status: Not Due       Topic Last Completion Date    TETANUS VACCINE 11/08/2016    Colorectal Cancer Screening 06/02/2022    DEXA Scan 07/05/2023    Mammogram 07/19/2024    Diabetes Urine Screening 10/03/2024     Foot Exam 10/03/2024    Lipid Panel 03/27/2025    Hemoglobin A1c 03/27/2025    Low Dose Statin 04/03/2025     Health Maintenance Due   Topic Date Due    COVID-19 Vaccine (8 - 2024-25 season) 11/08/2024    Diabetic Eye Exam  03/25/2025       Medications:  Medications Ordered Prior to Encounter[3]    Medications have been reviewed and reconciled with patient at visit today.    Exam:  Vitals:    04/03/25 0759   BP: 112/70   Pulse: 78   Resp: 20   Temp: 96.9 °F (36.1 °C)     Weight: 81.4 kg (179 lb 7.3 oz)   Body mass index is 29.86 kg/m².      Physical Exam    Vitals: Reviewed.  Constitutional: No acute distress. Well-developed. Not ill-appearing.  Eyes: No scleral icterus.  Cardiovascular: Normal rate and regular rhythm. Normal heart sounds.  Pulmonary: Pulmonary effort is normal. No respiratory distress. Normal breath sounds.  Abdomen: Soft. Nontender. Nondistended. Normoactive bowel sounds.  Extremities: No edema.  Skin: Warm. Dry.  Neurological: Alert and oriented to person, place, and time.  Psychiatric: Behavior normal.         Assessment:  The primary encounter diagnosis was Annual physical exam. Diagnoses of Controlled type 2 diabetes mellitus with microalbuminuria, without long-term current use of insulin, Hypertension goal BP (blood pressure) < 130/80, Hyperlipidemia LDL goal <70, and Hypothyroidism (acquired) were also pertinent to this visit.    Assessment & Plan    ANNUAL PHYSICAL EXAM:  - Counseled regarding age appropriate screenings and immunizations  - Counseled regarding lifestyle modifications    TYPE 2 DIABETES MELLITUS WITH MICROALBUMINURIA:  - Evaluated recent A1c results, indicating well-controlled diabetes.  - Reviewed patient's self-management of diabetes medications.  - Ms. Pickard takes metformin 500mg, 2 tablets at bedtime, sometimes adjusting dosage based on blood sugar.  - Glipizide is taken only when blood sugar is high, not as a daily medication.  - Prescribed metformin 500mg, 2 tablets  at bedtime, with a 90-day supply and 3 refills.  - Recommend walking for weight management to help with diabetes control.  - Noted patient reports intermittent leg swelling.    HYPERTENSION:   - Continued benazepril-HCTZ    HYPERLIPIDEMIA:  - Noted improvement in cholesterol levels, likely due to lifestyle changes and medication adherence.  - Confirmed patient is taking cholesterol medication every other day.    HYPOTHYROIDISM:  - Reviewed thyroid medication (levothyroxine) dosage change.  - Recent lab results show normalized thyroid levels after dose adjustment.  - Confirmed patient continues to take levothyroxine with adjusted dosage.    WEIGHT MANAGEMENT AND EXERCISE:  - Explained that walking is considered one of the best exercises for weight loss, particularly for abdominal fat.  - Encouraged patient to increase walking activity, especially with warmer weather approaching.  - Suggested considering bicycle use for exercise.    FOLLOW-UP AND PREVENTIVE CARE:  - Ms. Pickard to inquire at pharmacy about updated COVID-19 vaccines.  - Follow up as scheduled for next appointment.                      [1]   Social History  Socioeconomic History    Marital status:     Number of children: 2   Occupational History    Occupation: retired teacher   Tobacco Use    Smoking status: Never    Smokeless tobacco: Never   Substance and Sexual Activity    Alcohol use: Yes     Comment: very rare    Drug use: No    Sexual activity: Yes     Partners: Male     Social Drivers of Health     Financial Resource Strain: Patient Declined (10/21/2024)    Received from [facility]    Overall Financial Resource Strain (CARDIA)     Difficulty of Paying Living Expenses: Patient declined   Food Insecurity: Patient Declined (10/21/2024)    Received from [facility]    Hunger Vital Sign     Worried About Running Out of Food in the Last Year: Patient declined     Ran Out of Food in the Last Year: Patient declined   Transportation  Needs: Patient Declined (10/21/2024)    Received from [facility]    PRAPARE - Transportation     Lack of Transportation (Medical): Patient declined     Lack of Transportation (Non-Medical): Patient declined   Physical Activity: Patient Declined (10/21/2024)    Received from [facility]    Exercise Vital Sign     Days of Exercise per Week: Patient declined     Minutes of Exercise per Session: Patient declined   Stress: Patient Declined (10/21/2024)    Received from [facility]    [employer] - Occupational Stress Questionnaire     Feeling of Stress : Patient declined   Housing Stability: Patient Declined (10/21/2024)    Received from [facility]    Housing Stability Vital Sign     Unable to Pay for Housing in the Last Year: Patient declined     Homeless in the Last Year: Patient declined   [2]   Patient Active Problem List  Diagnosis    Low tension glaucoma - Both Eyes    Hypertension goal BP (blood pressure) < 130/80    Primary open angle glaucoma of both eyes, severe stage    Nuclear sclerosis of both eyes    Severe stage chronic open angle glaucoma    Dry eye    Controlled type 2 diabetes mellitus with microalbuminuria    History of left breast cancer    Malignant neoplasm of left breast in female, estrogen receptor positive    Plantar fasciitis    Lymphedema of left arm    Mediastinal lymphadenopathy    Drug-induced hypothyroidism    History of DVT (deep vein thrombosis)    Hyperlipidemia LDL goal <70    Non-small cell cancer of right lung    Pain in joint of right shoulder    Secondary malignant neoplasm of intrathoracic lymph nodes   [3]   Current Outpatient Medications on File Prior to Visit   Medication Sig Dispense Refill    ACCU-CHEK CIRILO PLUS TEST STRP Strp CHECK BLOOD GLUCOSE ONE TIME DAILY 100 strip 3    benazepril-hydrochlorthiazide (LOTENSIN HCT) 20-12.5 mg per tablet Take 1 tablet by mouth.      blood-glucose meter kit To check BG 1 times daily,  Accuchek Monica meter 1 each 0    brimonidine 0.15 % OPTH DROP (ALPHAGAN) 0.15 % ophthalmic solution Apply 1 drop to eye.      clotrimazole-betamethasone 1-0.05% (LOTRISONE) cream Apply topically 2 (two) times daily. 15 g 0    cyclobenzaprine (FLEXERIL) 10 MG tablet Take 1 tablet as needed at night for muscle spasm 60 tablet 2    dorzolamide-timolol 2-0.5% (COSOPT) 22.3-6.8 mg/mL ophthalmic solution INSTILL 1 DROP INTO BOTH EYES  TWICE DAILY 30 mL 3    fluticasone propionate (FLONASE) 50 mcg/actuation nasal spray 1 spray.      glyBURIDE (DIABETA) 2.5 MG tablet Take 1 tablet (2.5 mg total) by mouth daily with breakfast. 90 tablet 0    lancets Misc Use daily 100 each 3    latanoprost 0.005 % ophthalmic solution INSTILL 1 DROP INTO BOTH EYES IN THE EVENING 7.5 mL 3    levothyroxine (SYNTHROID) 100 MCG tablet Take 100 mcg by mouth every morning.      osimertinib (TAGRISSO) 80 mg Tab Take 80 mg by mouth.      potassium chloride SA (K-DUR,KLOR-CON) 20 MEQ tablet Take 1 tablet (20 mEq total) by mouth once daily. Take with food 90 tablet 1    promethazine-dextromethorphan (PROMETHAZINE-DM) 6.25-15 mg/5 mL Syrp Take 5 mLs by mouth as needed.      propranoloL (INDERAL LA) 80 MG 24 hr capsule Take 1 capsule by mouth.      rosuvastatin (CRESTOR) 5 MG tablet Take 5 mg by mouth.      [DISCONTINUED] metFORMIN (GLUCOPHAGE-XR) 500 MG ER 24hr tablet Take 1,000 mg by mouth.      [DISCONTINUED] benazepril-hydrochlorthiazide (LOTENSIN HCT) 20-12.5 mg per tablet Take 1 tablet by mouth once daily. (Patient not taking: Reported on 4/3/2025) 90 tablet 3    [DISCONTINUED] brimonidine 0.15 % OPTH DROP (ALPHAGAN) 0.15 % ophthalmic solution INSTILL 1 DROP INTO BOTH EYES  TWICE DAILY (Patient not taking: Reported on 4/3/2025) 20 mL 3    [DISCONTINUED] COVID-19 (COMIRNATY 2024-25, 12Y UP,,PF,) 30 mcg/0.3 mL IM vaccine (>/= 11 yo) Inject 0.3 ml into the muscle. (Patient not taking: Reported on 4/3/2025) 0.3 mL 0    [DISCONTINUED] fluticasone  propionate (FLONASE) 50 mcg/actuation nasal spray 2 sprays (100 mcg total) by Each Nostril route once daily. (Patient not taking: Reported on 4/3/2025) 9.9 mL 1    [DISCONTINUED] ibuprofen (ADVIL,MOTRIN) 800 MG tablet Take 800 mg by mouth every 6 (six) hours as needed for Pain. (Patient not taking: Reported on 4/3/2025)      [DISCONTINUED] levothyroxine (SYNTHROID) 125 MCG tablet Take 125 mcg by mouth every morning. (Patient not taking: Reported on 4/3/2025)      [DISCONTINUED] metFORMIN (GLUCOPHAGE-XR) 500 MG ER 24hr tablet Take 2 tablets (1,000 mg total) by mouth once daily. (Patient not taking: Reported on 4/3/2025) 180 tablet 3    [DISCONTINUED] promethazine (PHENERGAN) 25 MG tablet Take 25 mg by mouth every 6 (six) hours as needed. (Patient not taking: Reported on 4/3/2025)      [DISCONTINUED] propranoloL (INDERAL LA) 80 MG 24 hr capsule TAKE 1 CAPSULE BY MOUTH ONCE  DAILY (Patient not taking: Reported on 4/3/2025) 90 capsule 3    [DISCONTINUED] rosuvastatin (CRESTOR) 5 MG tablet Take 1 tablet (5 mg total) by mouth every evening. (Patient not taking: Reported on 4/3/2025) 90 tablet 3    [DISCONTINUED] RSV, preF A and preF B,PF, (ABRYSVO, PF,) 120 mcg/0.5 mL SolR vaccine Inject into the muscle. (Patient not taking: Reported on 4/3/2025) 0.5 mL 0    [DISCONTINUED] TAGRISSO 80 mg Tab Take 1 tablet by mouth once daily. (Patient not taking: Reported on 4/3/2025)       No current facility-administered medications on file prior to visit.

## 2025-05-14 ENCOUNTER — OFFICE VISIT (OUTPATIENT)
Dept: RADIATION ONCOLOGY | Facility: CLINIC | Age: 70
End: 2025-05-14
Payer: MEDICARE

## 2025-05-14 VITALS
WEIGHT: 181 LBS | HEART RATE: 88 BPM | DIASTOLIC BLOOD PRESSURE: 77 MMHG | OXYGEN SATURATION: 98 % | RESPIRATION RATE: 19 BRPM | HEIGHT: 65 IN | SYSTOLIC BLOOD PRESSURE: 112 MMHG | TEMPERATURE: 98 F | BODY MASS INDEX: 30.16 KG/M2

## 2025-05-14 DIAGNOSIS — C34.91 ADENOSQUAMOUS CARCINOMA OF LUNG, RIGHT: Primary | ICD-10-CM

## 2025-05-14 PROCEDURE — 99212 OFFICE O/P EST SF 10 MIN: CPT | Mod: S$GLB,,, | Performed by: SPECIALIST

## 2025-05-14 PROCEDURE — 3044F HG A1C LEVEL LT 7.0%: CPT | Mod: CPTII,S$GLB,, | Performed by: SPECIALIST

## 2025-05-14 PROCEDURE — 3008F BODY MASS INDEX DOCD: CPT | Mod: CPTII,S$GLB,, | Performed by: SPECIALIST

## 2025-05-14 PROCEDURE — 3288F FALL RISK ASSESSMENT DOCD: CPT | Mod: CPTII,S$GLB,, | Performed by: SPECIALIST

## 2025-05-14 PROCEDURE — 1159F MED LIST DOCD IN RCRD: CPT | Mod: CPTII,S$GLB,, | Performed by: SPECIALIST

## 2025-05-14 PROCEDURE — 1126F AMNT PAIN NOTED NONE PRSNT: CPT | Mod: CPTII,S$GLB,, | Performed by: SPECIALIST

## 2025-05-14 PROCEDURE — 4010F ACE/ARB THERAPY RXD/TAKEN: CPT | Mod: CPTII,S$GLB,, | Performed by: SPECIALIST

## 2025-05-14 PROCEDURE — 3078F DIAST BP <80 MM HG: CPT | Mod: CPTII,S$GLB,, | Performed by: SPECIALIST

## 2025-05-14 PROCEDURE — 99999 PR PBB SHADOW E&M-EST. PATIENT-LVL IV: CPT | Mod: PBBFAC,,, | Performed by: SPECIALIST

## 2025-05-14 PROCEDURE — 3072F LOW RISK FOR RETINOPATHY: CPT | Mod: CPTII,S$GLB,, | Performed by: SPECIALIST

## 2025-05-14 PROCEDURE — 3074F SYST BP LT 130 MM HG: CPT | Mod: CPTII,S$GLB,, | Performed by: SPECIALIST

## 2025-05-14 PROCEDURE — 1101F PT FALLS ASSESS-DOCD LE1/YR: CPT | Mod: CPTII,S$GLB,, | Performed by: SPECIALIST

## 2025-05-14 NOTE — PROGRESS NOTES
HISTORY OF PRESENT ILLNESS  Ms. Pickard is a 69-year-old lady with a left axillary lymph node detected in May of 2011. She had an axillary lymph node excision performed on 6/3/2011 that revealed ER negative, CK7 positive carcinoma. She self detected this mass just prior to the beginning of the workup. She ultimately had an MRI that revealed an ipsilateral breast mass. Segmental mastectomy was performed on 7/21/2011. She healed well from her surgery. She ultimately underwent axillary lymph node dissection and ultimately was stage with a stage III triple negative breast carcinoma for which she underwent segmental mastectomy, axillary lymph node dissection followed by chemotherapy and radiation therapy. She has been in active surveillance since that time. Of note, she also developed a right arm DVT that was treated with rivaroxaban, that was discontinued in March of 2014 without any recurrent signs or symptoms of arm swelling or pain.     She was seen in the office on November 04, 2014 with complaint of lump to right neck. Clinical examination revealed one palpable right supraclavicular lymph node measuring approximately 1-5 cm in size without any other adenopathy. CT of neck, chest, abdomen, & pelvis was performed 12/12/2014 for further evaluation however, revealing no evidence of adenopathy in neck or findings concerning for recurrent metastatic disease throughout.     Subsequently in August 2022 patient underwent CT imaging of chest which revealed enlarging right upper lobe pulmonary mass suspicious for bronchogenic edema with concerning hilum lymphadenopathy. Biopsy of right upper lobe pulmonary mass along with mediastinal lymph nodes was ultimately performed confirming non-small cell lung carcinoma with positive level for lymph node involvement. Initial staging revealed locally advanced disease. Her case was presented to multidisciplinary lung conference and was felt to be amendable for surgical resection therefore  recommendations were made for neoadjuvant therapy consisting of carboplatin plus paclitaxel plus Nivolumab x 3 cycles prior to definitive surgical resection. Neoadjuvant therapy was initiated on 9/15/2022. She subsequently underwent 3 cycles of neoadjuvant therapy completing 10/28/2022.    Although the patient had a good radiographic response to neoadjuvant chemotherapy, she had significant disease at the time of direct visualization at an attempt for surgical resection resulting in  of surgical resection. Without potential for surgical resection alternate recommendations were made for the use of definitive chemoradiation with the use of weekly carboplatin plus paclitaxel. Chemoradiation initiated on 2022. She went on to complete 7 weeks of definitive chemoradiation; completing 2023. Following recovery from definitive chemoradiation she was initiated on consolidation durvalumab IV q 4 weeks on 2023 with plan for completion of 1 year of therapy pending tolerability and ongoing treatment response. She remained on consolidation immunotherapy until 2023 when she was found to have radiographic evidence of recurrent/progressive disease. Next-generation sequencing at that time revealed presence of EGFR mutation resulting in recommendations for osimertinib 80 mg p.o. daily for second line palliative systemic therapy. She was initiated on osimertinib in late 2023. She remains on osimertinib for ongoing palliative systemic therapy with positive radiographic response.     She continues to do well at present and has no complaints other than the discrepancy in the size for breasts.  Physical exam: She has no palpable supraclavicular adenopathy.  On examination of her breasts her left breast is smaller than her right by 1 or 2 cup sizes.  She has no palpable masses in either breast.  She has normal range of motion of her left shoulder and no lymphedema of her left arm.  Her lungs are  clear to auscultation.  Impression: She continues to do well.  We have had a discussion about plastic surgery.  I have recommended that she discuss this with Dr. Hernandez at her next visit this October.  Plan: I will see her back in 1 year's time.  I certainly appreciate participating in this delightful woman's care.

## 2025-05-19 ENCOUNTER — PATIENT MESSAGE (OUTPATIENT)
Dept: INTERNAL MEDICINE | Facility: CLINIC | Age: 70
End: 2025-05-19
Payer: MEDICARE

## 2025-05-19 NOTE — TELEPHONE ENCOUNTER
No care due was identified.  [facility] Embedded Care Due Messages. Reference number: [id number].   5/19/2025 1:31:56 PM CDT

## 2025-05-20 RX ORDER — BENAZEPRIL HYDROCHLORIDE AND HYDROCHLOROTHIAZIDE 20; 12.5 MG/1; MG/1
TABLET ORAL
Qty: 90 TABLET | Refills: 0 | OUTPATIENT
Start: 2025-05-20

## 2025-05-20 NOTE — TELEPHONE ENCOUNTER
Refill Decision Note   Raquel Pickard  is requesting a refill authorization.  Brief Assessment and Rationale for Refill:  Quick Discontinue     Medication Therapy Plan:         Comments:     Note composed:10:41 AM 05/20/2025

## 2025-05-20 NOTE — TELEPHONE ENCOUNTER
No care due was identified.  [facility] Embedded Care Due Messages. Reference number: [id number].   5/20/2025 7:45:45 AM CDT

## 2025-05-20 NOTE — TELEPHONE ENCOUNTER
Refill Routing Note   Medication(s) are not appropriate for processing by [facility] for the following reason(s):        No active prescription written by provider    [facility] action(s):  Defer             Appointments  past 12m or future 3m with PCP    Date Provider   Last Visit   4/3/2025 Marychuy Rojas DO   Next Visit   Visit date not found Marychuy Rojas DO   ED visits in past 90 days: 0        Note composed:9:55 PM 05/19/2025

## 2025-05-21 ENCOUNTER — TELEPHONE (OUTPATIENT)
Dept: INTERNAL MEDICINE | Facility: CLINIC | Age: 70
End: 2025-05-21

## 2025-05-21 RX ORDER — BENAZEPRIL HYDROCHLORIDE AND HYDROCHLOROTHIAZIDE 20; 12.5 MG/1; MG/1
1 TABLET ORAL DAILY
Qty: 90 TABLET | Refills: 3 | Status: SHIPPED | OUTPATIENT
Start: 2025-05-21

## 2025-06-12 ENCOUNTER — OFFICE VISIT (OUTPATIENT)
Dept: OPHTHALMOLOGY | Facility: CLINIC | Age: 70
End: 2025-06-12
Payer: MEDICARE

## 2025-06-12 DIAGNOSIS — H04.129 DRY EYE: ICD-10-CM

## 2025-06-12 DIAGNOSIS — Z96.1 PSEUDOPHAKIA: ICD-10-CM

## 2025-06-12 DIAGNOSIS — E11.9 DIABETES MELLITUS TYPE 2 WITHOUT RETINOPATHY: ICD-10-CM

## 2025-06-12 DIAGNOSIS — H40.1133 PRIMARY OPEN ANGLE GLAUCOMA OF BOTH EYES, SEVERE STAGE: Primary | ICD-10-CM

## 2025-06-12 PROCEDURE — 92083 EXTENDED VISUAL FIELD XM: CPT | Mod: S$GLB,,, | Performed by: OPHTHALMOLOGY

## 2025-06-12 PROCEDURE — 2023F DILAT RTA XM W/O RTNOPTHY: CPT | Mod: CPTII,S$GLB,, | Performed by: OPHTHALMOLOGY

## 2025-06-12 PROCEDURE — 99999 PR PBB SHADOW E&M-EST. PATIENT-LVL I: CPT | Mod: PBBFAC,,, | Performed by: OPHTHALMOLOGY

## 2025-06-12 PROCEDURE — 76514 ECHO EXAM OF EYE THICKNESS: CPT | Mod: S$GLB,,, | Performed by: OPHTHALMOLOGY

## 2025-06-12 PROCEDURE — 99214 OFFICE O/P EST MOD 30 MIN: CPT | Mod: S$GLB,,, | Performed by: OPHTHALMOLOGY

## 2025-06-12 PROCEDURE — 3044F HG A1C LEVEL LT 7.0%: CPT | Mod: CPTII,S$GLB,, | Performed by: OPHTHALMOLOGY

## 2025-06-12 PROCEDURE — 1160F RVW MEDS BY RX/DR IN RCRD: CPT | Mod: CPTII,S$GLB,, | Performed by: OPHTHALMOLOGY

## 2025-06-12 PROCEDURE — 4010F ACE/ARB THERAPY RXD/TAKEN: CPT | Mod: CPTII,S$GLB,, | Performed by: OPHTHALMOLOGY

## 2025-06-12 PROCEDURE — 1159F MED LIST DOCD IN RCRD: CPT | Mod: CPTII,S$GLB,, | Performed by: OPHTHALMOLOGY

## 2025-06-12 NOTE — PROGRESS NOTES
HPI     Glaucoma     Additional comments: COSOPT BID OU   ALPHAGAN BID OU   LATANOPROST QHS OU   Pat states vision OU has been stable since last visit, no complaints at   this time. Pt present for HVF 24-2/ DFE             Comments    HPI     Glaucoma     Additional comments: 4 month IOP check. VA is stable, no changes that she     noticed. No pain or irritation. Compliant with gtts.            Comments    1. Severe COAG OD>OS DX 7-10 (Tmax 23/25) Goal = 16   SLT OS 4/23/14(18-16)   SLT OD 2/12/14(17-16)   HYPEREMIA WITH XALATAN AND TRAVATAN      2. DM SINCE 2012 NO DBR   3. PCIOL w/ goniotomy OS 6/16/21 (+19.0 SN60WF)   PC IOL + goniotomy OD 7/22/20 (+18.5 SN60WF)   4. Dry Eyes   5. Floater OS     COSOPT BID OU   ALPHAGAN BID OU   LATANOPROST QHS OU     [place] BID OU prn               Last edited by Tigre Chowdhury on 9/5/2024  9:32 AM.                    Last edited by Jake Mcdonald on 6/12/2025  8:33 AM.            Assessment /Plan     For exam results, see Encounter Report.    Primary open angle glaucoma of both eyes, severe stage  Doing well, intraocular pressure (IOP) within acceptable range relative to target IOP with no evidence of progression. Continue current treatment. Reviewed importance of continued compliance with treatment and follow up.      Continue current gtts:  Latanoprost one drop in each eye nightly, Brimoninide one drop in each eye every 12 hours, and Dorzolamide/Timolol (Cosopt) one drop in each eye every 12 hours    RTC in 4 months with IOP check.      Diabetes mellitus type 2 without retinopathy  Last A1c 5.4 . Diabetes controlled with no diabetic retinopathy on dilated exam. Reviewed diabetic eye precautions including excellent blood sugar control, and importance of regular follow up.     Dry eye  Patient with symptoms and exam consistent with dry eye. Start artificial tears 3-4 times daily with warm compresses.    Pseudophakia  Condition stable, no therapeutic intervention necessary  at this time. Will continue to monitor.